# Patient Record
Sex: FEMALE | Race: WHITE | Employment: OTHER | URBAN - METROPOLITAN AREA
[De-identification: names, ages, dates, MRNs, and addresses within clinical notes are randomized per-mention and may not be internally consistent; named-entity substitution may affect disease eponyms.]

---

## 2020-08-11 ENCOUNTER — OFFICE VISIT (OUTPATIENT)
Dept: NEUROLOGY | Age: 50
End: 2020-08-11
Payer: COMMERCIAL

## 2020-08-11 VITALS
OXYGEN SATURATION: 95 % | DIASTOLIC BLOOD PRESSURE: 77 MMHG | HEART RATE: 71 BPM | TEMPERATURE: 97 F | SYSTOLIC BLOOD PRESSURE: 103 MMHG | BODY MASS INDEX: 37.65 KG/M2 | HEIGHT: 65 IN | WEIGHT: 226 LBS

## 2020-08-11 PROCEDURE — 4004F PT TOBACCO SCREEN RCVD TLK: CPT | Performed by: PSYCHIATRY & NEUROLOGY

## 2020-08-11 PROCEDURE — 99205 OFFICE O/P NEW HI 60 MIN: CPT | Performed by: PSYCHIATRY & NEUROLOGY

## 2020-08-11 PROCEDURE — G8419 CALC BMI OUT NRM PARAM NOF/U: HCPCS | Performed by: PSYCHIATRY & NEUROLOGY

## 2020-08-11 PROCEDURE — G8427 DOCREV CUR MEDS BY ELIG CLIN: HCPCS | Performed by: PSYCHIATRY & NEUROLOGY

## 2020-08-11 RX ORDER — HYDROCHLOROTHIAZIDE 25 MG/1
25 TABLET ORAL DAILY
COMMUNITY
Start: 2020-08-05

## 2020-08-11 RX ORDER — CLOPIDOGREL BISULFATE 75 MG/1
75 TABLET ORAL DAILY
Qty: 30 TABLET | Refills: 3 | Status: ON HOLD | OUTPATIENT
Start: 2020-08-11 | End: 2020-08-27 | Stop reason: SDUPTHER

## 2020-08-11 RX ORDER — VENLAFAXINE HYDROCHLORIDE 75 MG/1
150 CAPSULE, EXTENDED RELEASE ORAL DAILY
COMMUNITY
Start: 2020-06-19

## 2020-08-11 RX ORDER — LISINOPRIL 40 MG/1
40 TABLET ORAL DAILY
Status: ON HOLD | COMMUNITY
Start: 2020-08-05 | End: 2021-02-05 | Stop reason: HOSPADM

## 2020-08-11 RX ORDER — CARVEDILOL 12.5 MG/1
12.5 TABLET ORAL 2 TIMES DAILY WITH MEALS
COMMUNITY

## 2020-08-11 RX ORDER — CLONAZEPAM 0.5 MG/1
0.5 TABLET ORAL 2 TIMES DAILY PRN
COMMUNITY
Start: 2020-06-19

## 2020-08-11 RX ORDER — OMEPRAZOLE 40 MG/1
40 CAPSULE, DELAYED RELEASE ORAL
COMMUNITY
Start: 2020-08-05 | End: 2020-08-25

## 2020-08-11 RX ORDER — ATORVASTATIN CALCIUM 10 MG/1
10 TABLET, FILM COATED ORAL NIGHTLY
Status: ON HOLD | COMMUNITY
End: 2021-02-05 | Stop reason: HOSPADM

## 2020-08-11 NOTE — PROGRESS NOTES
Neurocritical Care, Stroke & Neurointerventional Note    [address]   [address] [place] [address],  [address] , [address] [place] [address]   P: [phone]  Endovascular Neurosurgery In Person Consult    Pt Name: Roopa Mcfarland  MRN: [id number]  YOB: 1970  Date of evaluation: 8/11/2020  Primary Care Physician: Cielo Rodriguez MD    Reason for evaluation: Cerebral Aneurysm     SUBJECTIVE:   History of Chief Complaint:    Roopa Mcfarland is a 53 y.o. female who presents with hx of hypertension, hypercholesterolemia, breast cancer in 2018 and cancer free since then, c-spine surgery with fusion surgery, she has been having vague symptoms for the last year including dizziness, and during the evaluation, she was found to have:    Bifurcation right MCA aneurysm: A 9 mm saccular aneurysm arising off of the distal M1 segment of the right MCA on MR on May 26, 2020. She is here for further management and evaluation    Allergies  is allergic to adhesive tape. Medications  Prior to Admission medications    Medication Sig Start Date End Date Taking? Authorizing Provider   lisinopril (PRINIVIL;ZESTRIL) 40 MG tablet Take 40 mg by mouth daily 8/5/20  Yes Historical Provider, MD   omeprazole (PRILOSEC) 40 MG delayed release capsule Take 40 mg by mouth every morning (before breakfast) 8/5/20  Yes Historical Provider, MD   clonazePAM (KLONOPIN) 0.5 MG tablet Take 0.5 mg by mouth 2 times daily as needed.  6/19/20  Yes Historical Provider, MD   venlafaxine (EFFEXOR XR) 75 MG extended release capsule Take 75 mg by mouth daily 6/19/20  Yes Historical Provider, MD   hydroCHLOROthiazide (HYDRODIURIL) 25 MG tablet Take 25 mg by mouth daily 8/5/20  Yes Historical Provider, MD   Cholecalciferol (VITAMIN D3) 25 MCG (1000 UT) CAPS Take 2,000 Units by mouth daily   Yes Historical Provider, MD   atorvastatin (LIPITOR) 10 MG tablet Take 10 mg by mouth daily   Yes Historical Provider, MD   carvedilol (COREG) 12.5 MG tablet Take 12.5 mg by mouth 2 times daily (with meals)   Yes Historical Provider, MD    Scheduled Meds:  Continuous Infusions:  PRN Meds:.  Past Medical History   has no past medical history on file. Past Surgical History   has no past surgical history on file. Social History   has no history on file for tobacco.   has no history on file for alcohol.   has no history on file for drug. Family History  family history is not on file. Review of Systems:  CONSTITUTIONAL:  negative for fevers, chills, fatigue and malaise    EYES:  negative for double vision, blurred vision and photophobia     HEENT:  negative for tinnitus, epistaxis and sore throat    RESPIRATORY:  negative for cough, shortness of breath, wheezing    CARDIOVASCULAR:  negative for chest pain, palpitations, syncope, edema    GASTROINTESTINAL:  negative for nausea, vomiting    GENITOURINARY:  negative for incontinence    MUSCULOSKELETAL:  negative for neck or back pain    NEUROLOGICAL:  Negative for weakness and tingling  negative for headaches and dizziness    PSYCHIATRIC:  negative for anxiety      Review of systems otherwise negative. OBJECTIVE:   Vitals: /77 (Site: Right Upper Arm, Position: Sitting, Cuff Size: Large Adult)   Pulse 71   Temp 97 °F (36.1 °C) (Temporal)   Ht 5' 5\" (1.651 m)   Wt 226 lb (102.5 kg)   SpO2 95%   BMI 37.61 kg/m²   General appearance: Lying in bed, NAD  HEENT: Head: Normocephalic, no lesions, without obvious abnormality.   Neck: no adenopathy, no carotid bruit, no JVD, supple, symmetrical, trachea midline and thyroid not enlarged, symmetric, no tenderness/mass/nodules  Lungs: clear to auscultation bilaterally  Heart: regular rate and rhythm, S1, S2 normal, no murmur, click, rub or gallop  Abdomen: soft, non-tender; nondistended, bowel sounds normal  Extremities: extremities normal, atraumatic, no cyanosis or edema    Neurologic: Mental status: Alert, oriented, thought content appropriate, Alert and oriented x 3, Language/speech: intact language, attention, knowledge  CN: II-XII intact  MOTOR: 5/5 throughout with normal tone and bulk for age  SENSORY: LT and PP symmetrical and intact  COORDINATION: F to N and Gait intact for age      LABS:   No results for input(s): WBC, HGB, HCT, PLT, NA, K, CL, CO2, BUN, CREATININE, MG, PHOS, CALCIUM, PTT, INR, AST, ALT, BILITOT, BILIDIR, NITRU, COLORU, BACTERIA in the last 72 hours. Invalid input(s): PT, WBCU, RBCU, LEUKOCYTESUA  No results for input(s): ALKPHOS, ALT, AST, BILITOT, BILIDIR, LABALBU, AMYLASE, LIPASE in the last 72 hours. RADIOLOGY:   Images were personally reviewed including:    MRI head 5.26.2020  There is a 9 mm saccular aneurysm arising off of the distal M1 segment of the right MCA. IMPRESSIONS:   Gurmeet Cooper is a 53 y.o. female who presents with hx of hypertension, hypercholesterolemia, breast cancer in 2018 and cancer free since then, c-spine surgery with fusion surgery, she has been having vague symptoms for the last year including dizziness, and during the evaluation, she was found to have:    Bifurcation right MCA aneurysm: A 9 mm saccular aneurysm arising off of the distal M1 segment of the right MCA on MR on May 26, 2020. She is here for further management and evaluation    The natural history of cerebral aneurysm rupture is discussed based on UCAS and ISUA data at length with the patient. Options, alternatives including open surgical clipping and coiling as well as observation were discussed with the patient. The risk and benefit of the transarterial aneurysm coiling with or without stenting or WEB device placement was explained at length to the patient, including but not limited to vessel injury, X-ray dye allergic reaction, kidney dysfunction, stroke and groin hematoma. 1. does not have a problem list on file. PLANS:   1. CTA head   2.  Web device or coiling or stent assisted therapy  Consultation Visit Time:  60 minutes  Patient given educational materials - see patient instructions. Discussed use, benefit, and side effects of prescribed medications. Personally reviewed imaging with patients and all questions answered. Pt voiced understanding. Patient agreed with treatment plan. Follow up as directed below. Greater than 50% of the time was for counseling and providing answer to the patient question. The findings and the plan discussed with the patient and all her questions were answered. Thank you very much for your referral, please do not hesitate to contact me with any questions.     Charley Zhu MD Pager: [phone]  Stroke, [facility] Stroke [facility], [address] [place] [address]  Pager [phone]  Electronically signed 8/11/2020 at 2:54 PM

## 2020-08-19 ENCOUNTER — HOSPITAL ENCOUNTER (OUTPATIENT)
Dept: CT IMAGING | Age: 50
Discharge: HOME | End: 2020-08-21
Payer: COMMERCIAL

## 2020-08-19 PROCEDURE — 6360000004 HC RX CONTRAST MEDICATION: Performed by: PSYCHIATRY & NEUROLOGY

## 2020-08-19 PROCEDURE — 70496 CT ANGIOGRAPHY HEAD: CPT

## 2020-08-19 RX ADMIN — IOHEXOL 90 ML: 350 INJECTION, SOLUTION INTRAVENOUS at 09:33

## 2020-08-22 ENCOUNTER — HOSPITAL ENCOUNTER (OUTPATIENT)
Dept: PREADMISSION TESTING | Age: 50
Setting detail: SPECIMEN
Discharge: HOME | End: 2020-08-26
Payer: COMMERCIAL

## 2020-08-22 PROCEDURE — U0003 INFECTIOUS AGENT DETECTION BY NUCLEIC ACID (DNA OR RNA); SEVERE ACUTE RESPIRATORY SYNDROME CORONAVIRUS 2 (SARS-COV-2) (CORONAVIRUS DISEASE [COVID-19]), AMPLIFIED PROBE TECHNIQUE, MAKING USE OF HIGH THROUGHPUT TECHNOLOGIES AS DESCRIBED BY CMS-2020-01-R: HCPCS

## 2020-08-23 LAB — SARS-COV-2, NAA: NOT DETECTED

## 2020-08-24 ENCOUNTER — TELEPHONE (OUTPATIENT)
Dept: PRIMARY CARE CLINIC | Age: 50
End: 2020-08-24

## 2020-08-25 RX ORDER — ASPIRIN 325 MG
325 TABLET ORAL DAILY
Status: ON HOLD | COMMUNITY
End: 2020-08-27 | Stop reason: SDUPTHER

## 2020-08-25 NOTE — PROGRESS NOTES
Instructed on NPO. No mint, gum or cigarettes. Leave all personal belongs and jewelry at home. Wear comfortable clothing the day of surgery. May shower before surgery with antibacterial soap. Patient to take medications the morning of surgery with a sip of water as per office instructions. Patient to bring C-pap with her. Verbalized understanding.

## 2020-08-26 ENCOUNTER — ANESTHESIA (OUTPATIENT)
Dept: INTERVENTIONAL RADIOLOGY/VASCULAR | Age: 50
End: 2020-08-26

## 2020-08-26 ENCOUNTER — ANESTHESIA EVENT (OUTPATIENT)
Dept: INTERVENTIONAL RADIOLOGY/VASCULAR | Age: 50
End: 2020-08-26

## 2020-08-26 ENCOUNTER — HOSPITAL ENCOUNTER (INPATIENT)
Dept: INTERVENTIONAL RADIOLOGY/VASCULAR | Age: 50
LOS: 1 days | Discharge: HOME | DRG: 030 | End: 2020-08-27
Attending: PSYCHIATRY & NEUROLOGY | Admitting: PSYCHIATRY & NEUROLOGY
Payer: COMMERCIAL

## 2020-08-26 ENCOUNTER — HOSPITAL ENCOUNTER (OUTPATIENT)
Age: 50
Discharge: HOME | End: 2020-08-28
Payer: COMMERCIAL

## 2020-08-26 VITALS — SYSTOLIC BLOOD PRESSURE: 77 MMHG | DIASTOLIC BLOOD PRESSURE: 44 MMHG | OXYGEN SATURATION: 100 % | TEMPERATURE: 96.4 F

## 2020-08-26 PROBLEM — Z98.890 S/P COIL EMBOLIZATION OF CEREBRAL ANEURYSM: Status: ACTIVE | Noted: 2020-08-26

## 2020-08-26 LAB
ABO/RH: NORMAL
ACTIVATED CLOTTING TIME: 124 SEC (ref 79–149)
ACTIVATED CLOTTING TIME: 213 SEC (ref 79–149)
ACTIVATED CLOTTING TIME: 217 SEC (ref 79–149)
ACTIVATED CLOTTING TIME: 230 SEC (ref 79–149)
ACTIVATED CLOTTING TIME: 247 SEC (ref 79–149)
ACTIVATED CLOTTING TIME: 276 SEC (ref 79–149)
ANTIBODY SCREEN: NEGATIVE
ARM BAND NUMBER: NORMAL
COLLAGEN ADENOSINE-5'-DIPHOSPHATE (ADP) TIME: 76 SEC (ref 67–112)
COLLAGEN EPINEPHRINE TIME: 255 SEC (ref 85–172)
EXPIRATION DATE: NORMAL
GFR NON-AFRICAN AMERICAN: >60 ML/MIN
GFR SERPL CREATININE-BSD FRML MDRD: >60 ML/MIN
GFR SERPL CREATININE-BSD FRML MDRD: NORMAL ML/MIN/{1.73_M2}
GLUCOSE BLD-MCNC: 110 MG/DL (ref 74–100)
PLATELET FUNCTION INTERP: ABNORMAL
POC CHLORIDE: 106 MMOL/L (ref 98–107)
POC CREATININE: 0.6 MG/DL (ref 0.51–1.19)
POC HEMATOCRIT: 43 % (ref 36–46)
POC HEMOGLOBIN: 14.6 G/DL (ref 12–16)
POC IONIZED CALCIUM: 1.25 MMOL/L (ref 1.15–1.33)
POC LACTIC ACID: 1.45 MMOL/L (ref 0.56–1.39)
POC POTASSIUM: 3.6 MMOL/L (ref 3.5–4.5)
POC SODIUM: 144 MMOL/L (ref 138–146)

## 2020-08-26 PROCEDURE — C1769 GUIDE WIRE: HCPCS

## 2020-08-26 PROCEDURE — 85014 HEMATOCRIT: CPT

## 2020-08-26 PROCEDURE — 86850 RBC ANTIBODY SCREEN: CPT

## 2020-08-26 PROCEDURE — 75898 FOLLOW-UP ANGIOGRAPHY: CPT | Performed by: PSYCHIATRY & NEUROLOGY

## 2020-08-26 PROCEDURE — C1887 CATHETER, GUIDING: HCPCS

## 2020-08-26 PROCEDURE — 7100000000 HC PACU RECOVERY - FIRST 15 MIN

## 2020-08-26 PROCEDURE — 2709999900 HC NON-CHARGEABLE SUPPLY

## 2020-08-26 PROCEDURE — 86901 BLOOD TYPING SEROLOGIC RH(D): CPT

## 2020-08-26 PROCEDURE — 6360000002 HC RX W HCPCS: Performed by: NURSE PRACTITIONER

## 2020-08-26 PROCEDURE — C1725 CATH, TRANSLUMIN NON-LASER: HCPCS

## 2020-08-26 PROCEDURE — 2500000003 HC RX 250 WO HCPCS: Performed by: NURSE ANESTHETIST, CERTIFIED REGISTERED

## 2020-08-26 PROCEDURE — C1760 CLOSURE DEV, VASC: HCPCS

## 2020-08-26 PROCEDURE — 75894 X-RAYS TRANSCATH THERAPY: CPT | Performed by: PSYCHIATRY & NEUROLOGY

## 2020-08-26 PROCEDURE — 82947 ASSAY GLUCOSE BLOOD QUANT: CPT

## 2020-08-26 PROCEDURE — 36224 PLACE CATH CAROTD ART: CPT | Performed by: PSYCHIATRY & NEUROLOGY

## 2020-08-26 PROCEDURE — 61624 TCAT PERM OCCLS/EMBOLJ CNS: CPT | Performed by: PSYCHIATRY & NEUROLOGY

## 2020-08-26 PROCEDURE — 6360000004 HC RX CONTRAST MEDICATION: Performed by: NURSE ANESTHETIST, CERTIFIED REGISTERED

## 2020-08-26 PROCEDURE — C9248 INJ, CLEVIDIPINE BUTYRATE: HCPCS | Performed by: NURSE PRACTITIONER

## 2020-08-26 PROCEDURE — 86900 BLOOD TYPING SEROLOGIC ABO: CPT

## 2020-08-26 PROCEDURE — 82330 ASSAY OF CALCIUM: CPT

## 2020-08-26 PROCEDURE — 85347 COAGULATION TIME ACTIVATED: CPT

## 2020-08-26 PROCEDURE — 7100000001 HC PACU RECOVERY - ADDTL 15 MIN

## 2020-08-26 PROCEDURE — 6370000000 HC RX 637 (ALT 250 FOR IP): Performed by: STUDENT IN AN ORGANIZED HEALTH CARE EDUCATION/TRAINING PROGRAM

## 2020-08-26 PROCEDURE — 2580000003 HC RX 258: Performed by: STUDENT IN AN ORGANIZED HEALTH CARE EDUCATION/TRAINING PROGRAM

## 2020-08-26 PROCEDURE — B31R1ZZ FLUOROSCOPY OF INTRACRANIAL ARTERIES USING LOW OSMOLAR CONTRAST: ICD-10-PCS | Performed by: PSYCHIATRY & NEUROLOGY

## 2020-08-26 PROCEDURE — 82565 ASSAY OF CREATININE: CPT

## 2020-08-26 PROCEDURE — 99223 1ST HOSP IP/OBS HIGH 75: CPT | Performed by: PSYCHIATRY & NEUROLOGY

## 2020-08-26 PROCEDURE — 76377 3D RENDER W/INTRP POSTPROCES: CPT | Performed by: PSYCHIATRY & NEUROLOGY

## 2020-08-26 PROCEDURE — 6370000000 HC RX 637 (ALT 250 FOR IP): Performed by: NURSE PRACTITIONER

## 2020-08-26 PROCEDURE — B3131ZZ FLUOROSCOPY OF RIGHT COMMON CAROTID ARTERY USING LOW OSMOLAR CONTRAST: ICD-10-PCS | Performed by: PSYCHIATRY & NEUROLOGY

## 2020-08-26 PROCEDURE — 2780000010 IR ANGIOGRAM CAROTID CEREBRAL BILATERAL

## 2020-08-26 PROCEDURE — 6360000002 HC RX W HCPCS: Performed by: NURSE ANESTHETIST, CERTIFIED REGISTERED

## 2020-08-26 PROCEDURE — 84132 ASSAY OF SERUM POTASSIUM: CPT

## 2020-08-26 PROCEDURE — 3700000001 HC ADD 15 MINUTES (ANESTHESIA)

## 2020-08-26 PROCEDURE — 84295 ASSAY OF SERUM SODIUM: CPT

## 2020-08-26 PROCEDURE — APPNB45 APP NON BILLABLE 31-45 MINUTES: Performed by: NURSE PRACTITIONER

## 2020-08-26 PROCEDURE — 83605 ASSAY OF LACTIC ACID: CPT

## 2020-08-26 PROCEDURE — 3700000000 HC ANESTHESIA ATTENDED CARE

## 2020-08-26 PROCEDURE — C1874 STENT, COATED/COV W/DEL SYS: HCPCS

## 2020-08-26 PROCEDURE — 2580000003 HC RX 258: Performed by: NURSE ANESTHETIST, CERTIFIED REGISTERED

## 2020-08-26 PROCEDURE — 82435 ASSAY OF BLOOD CHLORIDE: CPT

## 2020-08-26 PROCEDURE — 36228 PLACE CATH INTRACRANIAL ART: CPT | Performed by: PSYCHIATRY & NEUROLOGY

## 2020-08-26 PROCEDURE — 2000000003 HC NEURO ICU R&B

## 2020-08-26 PROCEDURE — C1894 INTRO/SHEATH, NON-LASER: HCPCS

## 2020-08-26 PROCEDURE — B3161ZZ FLUOROSCOPY OF RIGHT INTERNAL CAROTID ARTERY USING LOW OSMOLAR CONTRAST: ICD-10-PCS | Performed by: PSYCHIATRY & NEUROLOGY

## 2020-08-26 PROCEDURE — 03VG3HZ RESTRICTION OF INTRACRANIAL ARTERY WITH INTRALUMINAL DEVICE, FLOW DIVERTER, PERCUTANEOUS APPROACH: ICD-10-PCS | Performed by: PSYCHIATRY & NEUROLOGY

## 2020-08-26 PROCEDURE — 85576 BLOOD PLATELET AGGREGATION: CPT

## 2020-08-26 RX ORDER — CLOPIDOGREL BISULFATE 75 MG/1
75 TABLET ORAL DAILY
Status: DISCONTINUED | OUTPATIENT
Start: 2020-08-26 | End: 2020-08-27 | Stop reason: HOSPADM

## 2020-08-26 RX ORDER — LISINOPRIL 20 MG/1
40 TABLET ORAL DAILY
Status: DISCONTINUED | OUTPATIENT
Start: 2020-08-26 | End: 2020-08-27 | Stop reason: HOSPADM

## 2020-08-26 RX ORDER — CARVEDILOL 12.5 MG/1
12.5 TABLET ORAL 2 TIMES DAILY WITH MEALS
Status: DISCONTINUED | OUTPATIENT
Start: 2020-08-26 | End: 2020-08-27 | Stop reason: HOSPADM

## 2020-08-26 RX ORDER — SODIUM CHLORIDE 9 MG/ML
INJECTION, SOLUTION INTRAVENOUS CONTINUOUS
Status: DISCONTINUED | OUTPATIENT
Start: 2020-08-26 | End: 2020-08-27 | Stop reason: HOSPADM

## 2020-08-26 RX ORDER — FENTANYL CITRATE 50 UG/ML
25 INJECTION, SOLUTION INTRAMUSCULAR; INTRAVENOUS EVERY 5 MIN PRN
Status: DISCONTINUED | OUTPATIENT
Start: 2020-08-26 | End: 2020-08-26

## 2020-08-26 RX ORDER — GLYCOPYRROLATE 1 MG/5 ML
SYRINGE (ML) INTRAVENOUS PRN
Status: DISCONTINUED | OUTPATIENT
Start: 2020-08-26 | End: 2020-08-26 | Stop reason: SDUPTHER

## 2020-08-26 RX ORDER — SODIUM CHLORIDE, SODIUM LACTATE, POTASSIUM CHLORIDE, CALCIUM CHLORIDE 600; 310; 30; 20 MG/100ML; MG/100ML; MG/100ML; MG/100ML
INJECTION, SOLUTION INTRAVENOUS CONTINUOUS PRN
Status: DISCONTINUED | OUTPATIENT
Start: 2020-08-26 | End: 2020-08-26 | Stop reason: SDUPTHER

## 2020-08-26 RX ORDER — HYDROCHLOROTHIAZIDE 25 MG/1
25 TABLET ORAL DAILY
Status: DISCONTINUED | OUTPATIENT
Start: 2020-08-26 | End: 2020-08-27 | Stop reason: HOSPADM

## 2020-08-26 RX ORDER — FENTANYL CITRATE 50 UG/ML
50 INJECTION, SOLUTION INTRAMUSCULAR; INTRAVENOUS EVERY 5 MIN PRN
Status: DISCONTINUED | OUTPATIENT
Start: 2020-08-26 | End: 2020-08-26

## 2020-08-26 RX ORDER — IODIXANOL 270 MG/ML
100 INJECTION, SOLUTION INTRAVASCULAR
Status: COMPLETED | OUTPATIENT
Start: 2020-08-26 | End: 2020-08-26

## 2020-08-26 RX ORDER — ONDANSETRON 2 MG/ML
4 INJECTION INTRAMUSCULAR; INTRAVENOUS EVERY 6 HOURS PRN
Status: DISCONTINUED | OUTPATIENT
Start: 2020-08-26 | End: 2020-08-27 | Stop reason: HOSPADM

## 2020-08-26 RX ORDER — CLOPIDOGREL BISULFATE 75 MG/1
75 TABLET ORAL DAILY
Status: DISCONTINUED | OUTPATIENT
Start: 2020-08-27 | End: 2020-08-26 | Stop reason: SDUPTHER

## 2020-08-26 RX ORDER — LIDOCAINE HYDROCHLORIDE 10 MG/ML
INJECTION, SOLUTION EPIDURAL; INFILTRATION; INTRACAUDAL; PERINEURAL PRN
Status: DISCONTINUED | OUTPATIENT
Start: 2020-08-26 | End: 2020-08-26 | Stop reason: SDUPTHER

## 2020-08-26 RX ORDER — LABETALOL HYDROCHLORIDE 5 MG/ML
10 INJECTION, SOLUTION INTRAVENOUS ONCE
Status: DISCONTINUED | OUTPATIENT
Start: 2020-08-26 | End: 2020-08-27 | Stop reason: HOSPADM

## 2020-08-26 RX ORDER — ONDANSETRON 2 MG/ML
INJECTION INTRAMUSCULAR; INTRAVENOUS PRN
Status: DISCONTINUED | OUTPATIENT
Start: 2020-08-26 | End: 2020-08-26 | Stop reason: SDUPTHER

## 2020-08-26 RX ORDER — HEPARIN SODIUM 1000 [USP'U]/ML
INJECTION, SOLUTION INTRAVENOUS; SUBCUTANEOUS PRN
Status: DISCONTINUED | OUTPATIENT
Start: 2020-08-26 | End: 2020-08-26 | Stop reason: SDUPTHER

## 2020-08-26 RX ORDER — ATORVASTATIN CALCIUM 10 MG/1
10 TABLET, FILM COATED ORAL DAILY
Status: DISCONTINUED | OUTPATIENT
Start: 2020-08-26 | End: 2020-08-27 | Stop reason: HOSPADM

## 2020-08-26 RX ORDER — VENLAFAXINE HYDROCHLORIDE 75 MG/1
75 CAPSULE, EXTENDED RELEASE ORAL DAILY
Status: DISCONTINUED | OUTPATIENT
Start: 2020-08-27 | End: 2020-08-27 | Stop reason: HOSPADM

## 2020-08-26 RX ORDER — FENTANYL CITRATE 50 UG/ML
INJECTION, SOLUTION INTRAMUSCULAR; INTRAVENOUS PRN
Status: DISCONTINUED | OUTPATIENT
Start: 2020-08-26 | End: 2020-08-26 | Stop reason: SDUPTHER

## 2020-08-26 RX ORDER — ROCURONIUM BROMIDE 10 MG/ML
INJECTION, SOLUTION INTRAVENOUS PRN
Status: DISCONTINUED | OUTPATIENT
Start: 2020-08-26 | End: 2020-08-26 | Stop reason: SDUPTHER

## 2020-08-26 RX ORDER — ASPIRIN 325 MG
325 TABLET ORAL ONCE
Status: COMPLETED | OUTPATIENT
Start: 2020-08-26 | End: 2020-08-26

## 2020-08-26 RX ORDER — NEOSTIGMINE METHYLSULFATE 5 MG/5 ML
SYRINGE (ML) INTRAVENOUS PRN
Status: DISCONTINUED | OUTPATIENT
Start: 2020-08-26 | End: 2020-08-26 | Stop reason: SDUPTHER

## 2020-08-26 RX ORDER — CLONAZEPAM 1 MG/1
0.5 TABLET ORAL 2 TIMES DAILY PRN
Status: DISCONTINUED | OUTPATIENT
Start: 2020-08-26 | End: 2020-08-27 | Stop reason: HOSPADM

## 2020-08-26 RX ORDER — ACETAMINOPHEN 325 MG/1
650 TABLET ORAL EVERY 4 HOURS PRN
Status: DISCONTINUED | OUTPATIENT
Start: 2020-08-26 | End: 2020-08-27 | Stop reason: HOSPADM

## 2020-08-26 RX ORDER — DEXAMETHASONE SODIUM PHOSPHATE 4 MG/ML
INJECTION, SOLUTION INTRA-ARTICULAR; INTRALESIONAL; INTRAMUSCULAR; INTRAVENOUS; SOFT TISSUE PRN
Status: DISCONTINUED | OUTPATIENT
Start: 2020-08-26 | End: 2020-08-26 | Stop reason: SDUPTHER

## 2020-08-26 RX ORDER — PROPOFOL 10 MG/ML
INJECTION, EMULSION INTRAVENOUS PRN
Status: DISCONTINUED | OUTPATIENT
Start: 2020-08-26 | End: 2020-08-26 | Stop reason: SDUPTHER

## 2020-08-26 RX ORDER — PROTAMINE SULFATE 10 MG/ML
INJECTION, SOLUTION INTRAVENOUS PRN
Status: DISCONTINUED | OUTPATIENT
Start: 2020-08-26 | End: 2020-08-26 | Stop reason: SDUPTHER

## 2020-08-26 RX ADMIN — FENTANYL CITRATE 75 MCG: 50 INJECTION INTRAMUSCULAR; INTRAVENOUS at 08:33

## 2020-08-26 RX ADMIN — HEPARIN SODIUM 7000 UNITS: 1000 INJECTION INTRAVENOUS; SUBCUTANEOUS at 09:34

## 2020-08-26 RX ADMIN — ONDANSETRON 4 MG: 2 INJECTION, SOLUTION INTRAMUSCULAR; INTRAVENOUS at 13:13

## 2020-08-26 RX ADMIN — PHENYLEPHRINE HYDROCHLORIDE 100 MCG: 10 INJECTION INTRAVENOUS at 13:26

## 2020-08-26 RX ADMIN — PHENYLEPHRINE HYDROCHLORIDE 100 MCG: 10 INJECTION INTRAVENOUS at 09:09

## 2020-08-26 RX ADMIN — Medication 0.2 MG: at 09:15

## 2020-08-26 RX ADMIN — SODIUM CHLORIDE, POTASSIUM CHLORIDE, SODIUM LACTATE AND CALCIUM CHLORIDE: 600; 310; 30; 20 INJECTION, SOLUTION INTRAVENOUS at 08:27

## 2020-08-26 RX ADMIN — PHENYLEPHRINE HYDROCHLORIDE 100 MCG: 10 INJECTION INTRAVENOUS at 09:37

## 2020-08-26 RX ADMIN — CLEVIPIDINE 6 MG/HR: 0.5 EMULSION INTRAVENOUS at 22:21

## 2020-08-26 RX ADMIN — CLOPIDOGREL 75 MG: 75 TABLET, FILM COATED ORAL at 08:09

## 2020-08-26 RX ADMIN — SODIUM CHLORIDE: 9 INJECTION, SOLUTION INTRAVENOUS at 15:25

## 2020-08-26 RX ADMIN — PHENYLEPHRINE HYDROCHLORIDE 100 MCG: 10 INJECTION INTRAVENOUS at 09:15

## 2020-08-26 RX ADMIN — Medication 0.2 MG: at 10:29

## 2020-08-26 RX ADMIN — HEPARIN SODIUM 3000 UNITS: 1000 INJECTION INTRAVENOUS; SUBCUTANEOUS at 10:09

## 2020-08-26 RX ADMIN — PROPOFOL 30 MG: 10 INJECTION, EMULSION INTRAVENOUS at 08:36

## 2020-08-26 RX ADMIN — ACETAMINOPHEN 650 MG: 325 TABLET ORAL at 21:02

## 2020-08-26 RX ADMIN — PROTAMINE SULFATE 100 MG: 10 INJECTION, SOLUTION INTRAVENOUS at 13:23

## 2020-08-26 RX ADMIN — DEXAMETHASONE SODIUM PHOSPHATE 4 MG: 4 INJECTION, SOLUTION INTRAMUSCULAR; INTRAVENOUS at 09:24

## 2020-08-26 RX ADMIN — CLONAZEPAM 0.5 MG: 1 TABLET ORAL at 21:10

## 2020-08-26 RX ADMIN — Medication 3 MG: at 13:17

## 2020-08-26 RX ADMIN — ATORVASTATIN CALCIUM 10 MG: 10 TABLET, FILM COATED ORAL at 21:02

## 2020-08-26 RX ADMIN — CARVEDILOL 12.5 MG: 12.5 TABLET, FILM COATED ORAL at 21:02

## 2020-08-26 RX ADMIN — LIDOCAINE HYDROCHLORIDE 50 MG: 10 INJECTION, SOLUTION EPIDURAL; INFILTRATION; INTRACAUDAL; PERINEURAL at 08:33

## 2020-08-26 RX ADMIN — PHENYLEPHRINE HYDROCHLORIDE 100 MCG: 10 INJECTION INTRAVENOUS at 09:29

## 2020-08-26 RX ADMIN — FENTANYL CITRATE 25 MCG: 50 INJECTION INTRAMUSCULAR; INTRAVENOUS at 08:36

## 2020-08-26 RX ADMIN — ASPIRIN 325 MG: 325 TABLET ORAL at 08:09

## 2020-08-26 RX ADMIN — CLEVIPIDINE 2 MG/HR: 0.5 EMULSION INTRAVENOUS at 16:55

## 2020-08-26 RX ADMIN — Medication 0.6 MG: at 13:17

## 2020-08-26 RX ADMIN — IODIXANOL 148 ML: 270 INJECTION, SOLUTION INTRAVASCULAR at 13:21

## 2020-08-26 RX ADMIN — ROCURONIUM BROMIDE 50 MG: 10 INJECTION INTRAVENOUS at 08:33

## 2020-08-26 RX ADMIN — CEFAZOLIN 2 G: 10 INJECTION, POWDER, FOR SOLUTION INTRAVENOUS at 09:10

## 2020-08-26 RX ADMIN — HEPARIN SODIUM 3000 UNITS: 1000 INJECTION INTRAVENOUS; SUBCUTANEOUS at 10:35

## 2020-08-26 RX ADMIN — PHENYLEPHRINE HYDROCHLORIDE 25 MCG/MIN: 10 INJECTION INTRAVENOUS at 09:32

## 2020-08-26 RX ADMIN — PHENYLEPHRINE HYDROCHLORIDE 100 MCG: 10 INJECTION INTRAVENOUS at 09:22

## 2020-08-26 RX ADMIN — SODIUM CHLORIDE, POTASSIUM CHLORIDE, SODIUM LACTATE AND CALCIUM CHLORIDE: 600; 310; 30; 20 INJECTION, SOLUTION INTRAVENOUS at 12:12

## 2020-08-26 RX ADMIN — PHENYLEPHRINE HYDROCHLORIDE 100 MCG: 10 INJECTION INTRAVENOUS at 13:23

## 2020-08-26 RX ADMIN — SODIUM CHLORIDE, POTASSIUM CHLORIDE, SODIUM LACTATE AND CALCIUM CHLORIDE: 600; 310; 30; 20 INJECTION, SOLUTION INTRAVENOUS at 08:56

## 2020-08-26 RX ADMIN — HEPARIN SODIUM 2000 UNITS: 1000 INJECTION INTRAVENOUS; SUBCUTANEOUS at 11:42

## 2020-08-26 RX ADMIN — PROPOFOL 170 MG: 10 INJECTION, EMULSION INTRAVENOUS at 08:33

## 2020-08-26 ASSESSMENT — PULMONARY FUNCTION TESTS
PIF_VALUE: 21
PIF_VALUE: 0
PIF_VALUE: 21
PIF_VALUE: 1
PIF_VALUE: 20
PIF_VALUE: 22
PIF_VALUE: 21
PIF_VALUE: 28
PIF_VALUE: 20
PIF_VALUE: 21
PIF_VALUE: 20
PIF_VALUE: 1
PIF_VALUE: 21
PIF_VALUE: 21
PIF_VALUE: 20
PIF_VALUE: 21
PIF_VALUE: 22
PIF_VALUE: 21
PIF_VALUE: 21
PIF_VALUE: 0
PIF_VALUE: 21
PIF_VALUE: 20
PIF_VALUE: 20
PIF_VALUE: 21
PIF_VALUE: 20
PIF_VALUE: 21
PIF_VALUE: 20
PIF_VALUE: 22
PIF_VALUE: 21
PIF_VALUE: 22
PIF_VALUE: 20
PIF_VALUE: 21
PIF_VALUE: 20
PIF_VALUE: 21
PIF_VALUE: 1
PIF_VALUE: 20
PIF_VALUE: 20
PIF_VALUE: 21
PIF_VALUE: 20
PIF_VALUE: 21
PIF_VALUE: 2
PIF_VALUE: 21
PIF_VALUE: 22
PIF_VALUE: 21
PIF_VALUE: 20
PIF_VALUE: 21
PIF_VALUE: 25
PIF_VALUE: 21
PIF_VALUE: 21
PIF_VALUE: 22
PIF_VALUE: 21
PIF_VALUE: 22
PIF_VALUE: 21
PIF_VALUE: 25
PIF_VALUE: 21
PIF_VALUE: 20
PIF_VALUE: 21
PIF_VALUE: 21
PIF_VALUE: 20
PIF_VALUE: 21
PIF_VALUE: 20
PIF_VALUE: 21
PIF_VALUE: 20
PIF_VALUE: 21
PIF_VALUE: 20
PIF_VALUE: 21
PIF_VALUE: 21
PIF_VALUE: 20
PIF_VALUE: 21
PIF_VALUE: 21
PIF_VALUE: 3
PIF_VALUE: 21
PIF_VALUE: 21
PIF_VALUE: 2
PIF_VALUE: 21
PIF_VALUE: 0
PIF_VALUE: 21
PIF_VALUE: 21
PIF_VALUE: 25
PIF_VALUE: 21
PIF_VALUE: 20
PIF_VALUE: 20
PIF_VALUE: 22
PIF_VALUE: 21
PIF_VALUE: 20
PIF_VALUE: 20
PIF_VALUE: 21
PIF_VALUE: 20
PIF_VALUE: 2
PIF_VALUE: 20
PIF_VALUE: 21
PIF_VALUE: 4
PIF_VALUE: 21
PIF_VALUE: 21
PIF_VALUE: 0
PIF_VALUE: 21
PIF_VALUE: 3
PIF_VALUE: 21
PIF_VALUE: 22
PIF_VALUE: 21
PIF_VALUE: 20
PIF_VALUE: 21
PIF_VALUE: 20
PIF_VALUE: 21
PIF_VALUE: 20
PIF_VALUE: 21
PIF_VALUE: 21
PIF_VALUE: 20
PIF_VALUE: 20
PIF_VALUE: 21
PIF_VALUE: 20
PIF_VALUE: 21
PIF_VALUE: 4
PIF_VALUE: 21
PIF_VALUE: 3
PIF_VALUE: 20
PIF_VALUE: 21
PIF_VALUE: 21
PIF_VALUE: 22
PIF_VALUE: 21
PIF_VALUE: 22
PIF_VALUE: 21
PIF_VALUE: 20
PIF_VALUE: 21
PIF_VALUE: 0
PIF_VALUE: 21
PIF_VALUE: 20
PIF_VALUE: 21
PIF_VALUE: 20
PIF_VALUE: 21
PIF_VALUE: 21
PIF_VALUE: 1
PIF_VALUE: 21
PIF_VALUE: 20
PIF_VALUE: 21
PIF_VALUE: 20
PIF_VALUE: 2
PIF_VALUE: 3
PIF_VALUE: 21
PIF_VALUE: 22
PIF_VALUE: 21

## 2020-08-26 ASSESSMENT — PAIN DESCRIPTION - PAIN TYPE
TYPE: ACUTE PAIN
TYPE: ACUTE PAIN

## 2020-08-26 ASSESSMENT — PAIN SCALES - GENERAL
PAINLEVEL_OUTOF10: 3
PAINLEVEL_OUTOF10: 0
PAINLEVEL_OUTOF10: 0
PAINLEVEL_OUTOF10: 8
PAINLEVEL_OUTOF10: 5
PAINLEVEL_OUTOF10: 0
PAINLEVEL_OUTOF10: 0

## 2020-08-26 ASSESSMENT — PAIN DESCRIPTION - ORIENTATION
ORIENTATION: LEFT
ORIENTATION: LEFT

## 2020-08-26 ASSESSMENT — PAIN DESCRIPTION - LOCATION
LOCATION: ANKLE
LOCATION: ANKLE

## 2020-08-26 ASSESSMENT — PAIN - FUNCTIONAL ASSESSMENT: PAIN_FUNCTIONAL_ASSESSMENT: 0-10

## 2020-08-26 ASSESSMENT — ENCOUNTER SYMPTOMS: SHORTNESS OF BREATH: 0

## 2020-08-26 ASSESSMENT — PAIN DESCRIPTION - DESCRIPTORS: DESCRIPTORS: ACHING

## 2020-08-26 NOTE — PLAN OF CARE
Problem: Pain:  Goal: Pain level will decrease  Description: Pain level will decrease  Outcome: Ongoing     Problem: Infection:  Goal: Will remain free from infection  Description: Will remain free from infection  Outcome: Ongoing     Problem: Safety:  Goal: Free from accidental physical injury  Description: Free from accidental physical injury  Outcome: Ongoing  Goal: Free from intentional harm  Description: Free from intentional harm  Outcome: Ongoing     Problem: Skin Integrity:  Goal: Skin integrity will stabilize  Description: Skin integrity will stabilize  Outcome: Ongoing

## 2020-08-26 NOTE — H&P
Neuro Critical Care  H&P    Patient Name: Dewey Wilhelm [facility]  Patient : 1970  Room/Bed: 0521/0521-01  Code Status: FULL  Allergies: Allergies   Allergen Reactions    Adhesive Tape Rash     redness       CHIEF COMPLAINT:      Aneurysm      HISTORY OF PRESENT ILLNESS:     The patient is a 54 yo female with a history of breast CA (s/p lumpectomy, chemo, radiation 2018), HTN, HLD, and ARVIND on CPAP who presented to [address] [place] [address] on 20 for elective treatment of a right MCA bifurcation aneurysm. Per patient, she has been struggling with nausea and vomiting for the last year. She states her Oncologist ordered a CT head to rule out any underlying mass that could be contributing to her symptoms. There was an incidental finding on an aneurysm. She was referred to Dr. Jim Kimball in the Neuro Endovascular Clinic. She had an outpatient CTA Head on 20 showed a right MCA trifurcation aneurysm measuring 1.0x0.8cm. She presents today for elective treatment. Underwent cerebral angio showing a right MCA bifurcation aneurysm successfully treated with WEB device and two stents. Admitted to the Neuro ICU post op. Right groin site intact. Goal -150. 's. Borderline bradycardic. Started on Clevidipine infusion. Home antihypertensive resumed, goal to titrate of Clevidipine infusion. Doing well post op, no focal neurological deficits.     CURRENT MEDICATIONS:  SCHEDULED MEDICATIONS:   clopidogrel  75 mg Oral Daily    [START ON 2020] aspirin  325 mg Oral Daily    labetalol  10 mg Intravenous Once     CONTINUOUS INFUSIONS:   sodium chloride 75 mL/hr at 20 1525    clevidipine       PRN MEDICATIONS:   fentanNYL, fentanNYL, acetaminophen    VITALS:  Temperature Range: Temp: 98 °F (36.7 °C) Temp  Av.6 °F (35.9 °C)  Min: 96.2 °F (35.7 °C)  Max: 98.6 °F (37 °C)  BP Range: Systolic (86WGX), TPK:651 , Min:77 , QTZ:713     Diastolic (30XJQ), XSC:16, Min:44, Max:171    Pulse Range: Pulse Av.7  Min: 49  Max: 75  Respiration Range: Resp  Av.3  Min: 0  Max: 43  Current Pulse Ox: SpO2: 96 %  24HR Pulse Ox Range: SpO2  Av.3 %  Min: 96 %  Max: 100 %  Patient Vitals for the past 12 hrs:   BP Temp Temp src Pulse Resp SpO2 Height Weight   20 1600 (!) 190/88 -- -- 75 18 96 % -- --   20 1545 -- -- -- 52 20 96 % -- --   20 1530 -- -- -- 64 17 98 % -- --   20 1515 -- -- -- -- -- 97 % -- --   20 1513 -- -- -- -- -- -- 5' 5\" (1.651 m) 240 lb 4.8 oz (109 kg)   20 1508 (!) 191/90 98 °F (36.7 °C) Oral 54 16 96 % -- --   20 1430 124/88 97.9 °F (36.6 °C) Temporal 54 13 99 % -- --   20 1415 128/77 -- -- (!) 49 12 100 % -- --   20 1400 131/70 -- -- 50 16 100 % -- --   20 1352 119/65 97.9 °F (36.6 °C) Temporal 54 15 100 % -- --   20 0706 128/88 98.6 °F (37 °C) Temporal 67 18 98 % -- --     Estimated body mass index is 39.99 kg/m² as calculated from the following:    Height as of this encounter: 5' 5\" (1.651 m). Weight as of this encounter: 240 lb 4.8 oz (109 kg).  []<16 Severe malnutrition  []16-16.99 Moderate malnutrition  []17-18.49 Mild malnutrition  []18.5-24.9 Normal  []25-29.9 Overweight (not obese)  []30-34.9 Obese class 1 (Low Risk)  [x]35-39.9 Obese class 2 (Moderate Risk)  []?40 Obese class 3 (High Risk)    RECENT LABS:   Lab Results   Component Value Date    CREATININE 0.60 2020     24 HOUR INTAKE/OUTPUT:    Intake/Output Summary (Last 24 hours) at 2020 1633  Last data filed at 2020 1347  Gross per 24 hour   Intake 1800 ml   Output 760 ml   Net 1040 ml       IMAGING:   Cta Head W Contrast  Result Date: 2020  Aneurysm at the right middle cerebral artery trifurcation measuring 1.0 x 0.8 cm. Labs and Images reviewed with:  [x] Dr. Shoaib Cameron    [] Dr. Audra Jackson  [] Dr. Kristine Neville  [] There are no new interval images to review.      REVIEW OF SYSTEMS     CONSTITUTIONAL: negative for fatigue and malaise   EYES: negative for double vision and photophobia    HEENT: negative for tinnitus and sore throat   RESPIRATORY: negative for cough, shortness of breath   CARDIOVASCULAR: negative for chest pain, palpitations, or syncope   GASTROINTESTINAL: negative for abdominal pain, nausea, vomiting, diarrhea, or constipation    GENITOURINARY: negative for incontinence or retention    MUSCULOSKELETAL: negative for neck or back pain, negative for extremity pain   NEUROLOGICAL: Negative for seizures, headaches, weakness, numbness, confusion, aphasia, dysarthria   PSYCHIATRIC: negative for agitation, hallucination, SI/HI   SKIN Negative for spontaneous contusions, rashes, or lesions        PHYSICAL EXAM       CONSTITUTIONAL:  Well developed, well nourished. Alert and oriented x 3, in no acute distress. GCS 15. Nontoxic. No dysarthria. No aphasia. HEAD:  normocephalic, atraumatic    EYES:  PERRL, EOMI   ENT:  moist mucous membranes   NECK:  supple, symmetric   LUNGS:  Equal air entry bilaterally, clear   CARDIOVASCULAR:  normal s1 / s2, RRR, distal pulses intact   ABDOMEN:  Soft, no rigidity, normal bowel sounds   NEUROLOGIC:  Mental Status:  A & O x3, Awake             Cranial Nerves:    cranial nerves II-XII are grossly intact    Motor Exam:    Drift:  absent  Tone:  normal    Motor exam is symmetrical 5 out of 5 all extremities bilaterally    Sensory:    Touch:    Right Upper Extremity:  normal  Left Upper Extremity:  normal  Right Lower Extremity:  normal  Left Lower Extremity:  normal    Coordination/Dysmetria:  Heel to Shin:  Right:  normal  Left:  normal  Finger to Nose:   Right:  normal  Left:  normal    DRAINS:  [x] There are no drains for Neuro Critical Care to monitor at this time.      ASSESSMENT AND PLAN:       The patient is a 52 yo female with a history of breast CA (s/p lumpectomy, chemo, radiation 2018), HTN, HLD, and ARVIND on CPAP who [facility] for elective treatment of a right MCA bifurcation aneurysm. NEUROLOGIC:  - Right MCA bifurcation aneurysm  - POD #0 s/p treatment of right MCA bifurcation aneurysm with web device and stent x2  - Resume Aspirin 325mg QD and Plavix 75mg QD tomorrow  - Goal -150  - Neuro checks per protocol    CARDIOVASCULAR:  - Goal -150  - Clevidipine infusion for hypertension  - History of HTN  - Resume home Lisinopril 40mg QD, HCTZ 25mg QD and Coreg 12.5mg BID (hold HR<60)  - Continue telemetry    PULMONARY:  - Maintaining O2 sats on room air    RENAL/FLUID/ELECTROLYTE:  - Normal renal functioning   - Creatinine 0.60  - Monitor urine output  - Remove milan cath  - IVF: [email or web address] post contrast  - Replace electrolytes PRN  - Daily BMP    GI/NUTRITION:  NUTRITION:  No diet orders on file  - Bowel regimen: Milk of Mag PRN  - GI prophylaxis: not indicated    ID/HEME:  - Afebrile  - No signs/symptoms of infection  - Continue to monitor for fevers  - No active bleeding, groin site intact  - Daily CBC    ENDOCRINE:  - Continue to monitor blood glucose, goal <180  - Blood glucose well controlled    OTHER:  - PT/OT/ST  - Code Status: FULL     PROPHYLAXIS:  Stress ulcer: N/A    DVT PROPHYLAXIS:  - SCD sleeves - Thigh High   - Consider starting Lovenox tomorrow AM    DISPOSITION:  [x] To remain ICU for close neurological monitoring post angio. We will continue to follow along. For any changes in exam or patient status please contact Neuro Critical Care.       POPEYE Cole - [facility]  Neuro Critical Care  Pager [phone]  8/26/2020     4:33 PM    .[facility]

## 2020-08-26 NOTE — OP NOTE
[facility] Stroke [facility]    NEUROENDOVASCULAR SERVICE: POST-OP NOTE: 8/26/2020    Pt Name: Sandi Jackson  MRN: [id number]  YOB: 1970  Date of Procedure: 8/26/2020  Primary Care Physician: POPEYE Betts - CNP    Pre-Procedural Diagnosis: Right MCA bifurcation aneurysm. Post-Procedural Diagnosis: Successfully treated with WEB device stent assisted coiling (two Neuroform [place] stents)      Procedure Performed:cerebral angiogram with stent assisted coil embolization with WEB device    Surgeon:   James Bruce MD    Fellow:  Kristi Menezes MD and Abdirizak Bernard MD, PhD     1st Assistant:  Walter Patel    PRE-PROCEDURAL EXAM:  Prestroke baseline mRS MODIFIED DELORES SCORE: 0 - No symptoms at all. Neurological exam performed and unchanged from initial H&P or consult    Anesthesia: General Anesthesia  Complications: none    Intra-Operative EXAM:  Neurological exam performed and unchanged from initial H&P or consult    EBL: < Minimal      Cc            Specimens: Were not Obtained  Contrast:     Visipaque 270 low osmolar 148 Cc             Fluoro: 55.8 min    Findings:  Please see dictated Radiology note for further details  1. Right MCA bifurcation aneurysm measuring approximately 5.33 x 7.4 with neck size 6.37 mm.  2. This was successfully treated using WEB device 8 x 4 mm stent assisted coiling (two Neuroform [place] stents) resulting in WEB occlusion scale at the D. Both Neuroform [place] stents appears wih good wall apposition with no IntraStent stenosis noted. WEB occlusion scale D. POST-PROCEDURAL EXAM :   Stable neurological Exam  Neurological exam performed and unchanged from initial H&P or consult    Closure:  right Angioseal 6   F    POST-PROCEDURAL MONITORING : see orders  Disposition: Neuro ICU    Recommendations:  Back to NSICU. Do not bend right leg for 3 hours. Groin checks per protocol. Neuro checks per icu protocol. Peripheral pulse checks per protocol.   ICU management per NSICU team.   SBP goal 120-150  Resume aspirin and plavix starting tomorrow.   Upon discharge follow up with Dr. Cecilia Pacheco in 2 weeks and Dr. Maggy Galvez in 3 months with MRA head with and without contrast.      MD Carol De Jesus MD   Pager: [phone]  Stroke, [facility]  [address]  Electronically signed 8/26/2020 at 1:34 PM

## 2020-08-26 NOTE — ANESTHESIA PROCEDURE NOTES
Arterial Line:    An arterial line was placed using ultrasound guidance, in the OR for the following indication(s): continuous blood pressure monitoring and blood sampling needed. A 20 gauge (size), 1 and 1/4 inch (length), Arrow (type) catheter was placed, Seldinger technique used, into the right radial artery, secured by Tegaderm and tape. Events:  patient tolerated procedure well with no complications.   8/26/2020 8:39 AM8/26/2020 8:42 AM  Anesthesiologist: Sobia Dawson MD  Performed: Anesthesiologist   Preanesthetic Checklist  Completed: patient identified, site marked, surgical consent, pre-op evaluation, timeout performed, IV checked, risks and benefits discussed, monitors and equipment checked, anesthesia consent given, oxygen available and patient being monitored

## 2020-08-26 NOTE — ANESTHESIA POSTPROCEDURE EVALUATION
Department of Anesthesiology  Postprocedure Note    Patient: Wojciech Lopez  MRN: [id number]  YOB: 1970  Date of evaluation: 8/26/2020  Time:  2:56 PM     Procedure Summary     Date:  08/26/20 Room / Location:  [facility] Special Procedures    Anesthesia Start:  0827 Anesthesia Stop:  7332    Procedure:  IR ANGIOGRAM CAROTID CEREBRAL BILATERAL Diagnosis:       Aneurysm ([address]      S/P coil embolization of cerebral aneurysm      (Embo/coiling)    Scheduled Providers:  POPEYE Carlin - CRNA Responsible Provider:  Silvia Patino MD    Anesthesia Type:  general, MAC ASA Status:  3          Anesthesia Type: general, MAC    Katie Phase I: Katie Score: 9    Katie Phase II:      Last vitals: Reviewed and per EMR flowsheets.        Anesthesia Post Evaluation    Patient location during evaluation: PACU  Patient participation: complete - patient participated  Level of consciousness: awake and alert  Pain score: 0  Airway patency: patent  Nausea & Vomiting: no nausea and no vomiting  Complications: no  Cardiovascular status: hemodynamically stable  Respiratory status: acceptable, spontaneous ventilation and room air  Hydration status: euvolemic

## 2020-08-26 NOTE — H&P
Endovascular Neurosurgery Consult    Pt Name: Dejuan Napoles  MRN: [id number]  YOB: 1970  Date of evaluation: 8/26/2020  Primary Care Physician: POPEYE Marquez CNP  Reason for evaluation: Right MCA aneurysm. SUBJECTIVE:   History of Chief Complaint:    Dejuan Napoles is a 53 y.o. female with past medical history including hypertension, hyperlipidemia, sleep apnea. She was evaluated in the neuro endovascular clinic after symptoms of dizziness. 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 She presented today for aneurysm treatment. She is on dual antiplatelet therapy with aspirin 325 and Plavix 75. She received aspirin and Plavix this morning. She is not aware of family history of aneurysms. Doing good. No new weakness. She is n.p.o. Allergies  is allergic to adhesive tape. Medications  Prior to Admission medications    Medication Sig Start Date End Date Taking? Authorizing Provider   Probiotic Product (PROBIOTIC DAILY PO) Take 1 tablet by mouth daily   Yes Historical Provider, MD   aspirin 325 MG tablet Take 325 mg by mouth daily   Yes Historical Provider, MD   lisinopril (PRINIVIL;ZESTRIL) 40 MG tablet Take 40 mg by mouth daily 8/5/20  Yes Historical Provider, MD   clonazePAM (KLONOPIN) 0.5 MG tablet Take 0.5 mg by mouth 2 times daily as needed.  6/19/20  Yes Historical Provider, MD   venlafaxine (EFFEXOR XR) 75 MG extended release capsule Take 75 mg by mouth daily 6/19/20  Yes Historical Provider, MD   hydroCHLOROthiazide (HYDRODIURIL) 25 MG tablet Take 25 mg by mouth daily 8/5/20  Yes Historical Provider, MD   Cholecalciferol (VITAMIN D3) 25 MCG (1000 UT) CAPS Take 2,000 Units by mouth daily   Yes Historical Provider, MD   atorvastatin (LIPITOR) 10 MG tablet Take 10 mg by mouth daily   Yes Historical Provider, MD   carvedilol (COREG) 12.5 MG tablet Take 12.5 mg by mouth 2 times daily (with meals)   Yes Historical Provider, MD   clopidogrel (PLAVIX) 75 MG tablet Take 1 tablet by mouth daily 20  Yes Vladimir Mujica MD    Scheduled Meds:   clopidogrel  75 mg Oral Daily     Continuous Infusions:  PRN Meds:.  Past Medical History   has a past medical history of Cancer ([address] Contact lens/glasses fitting, Hyperlipidemia, Hypertension, and Sleep apnea. Past Surgical History   has a past surgical history that includes Breast surgery (Left, 2018); Tunneled venous port placement; Hysterectomy (2018); Carpal tunnel release (Bilateral);  section; and back surgery. Social History   reports that she has quit smoking. She does not have any smokeless tobacco history on file. has no history on file for alcohol. reports no history of drug use. Family History  Family history is unknown by patient. Review of Systems:  CONSTITUTIONAL:  negative for fevers, chills, fatigue and malaise    EYES:  negative for double vision, blurred vision and photophobia     HEENT:  negative for tinnitus, epistaxis and sore throat    RESPIRATORY:  negative for cough, shortness of breath, wheezing    CARDIOVASCULAR:  negative for chest pain, palpitations, syncope, edema    GASTROINTESTINAL:  negative for nausea, vomiting    GENITOURINARY:  negative for incontinence    MUSCULOSKELETAL:  negative for neck or back pain    NEUROLOGICAL:  Negative for weakness and tingling  negative for headaches and dizziness    PSYCHIATRIC:  negative for anxiety      Review of systems otherwise negative. OBJECTIVE:   Vitals: /88   Pulse 67   Temp 98.6 °F (37 °C) (Temporal)   Resp 18   Ht 5' 5\" (1.651 m)   Wt 225 lb (102.1 kg)   SpO2 98%   BMI 37.44 kg/m²   General appearance: Lying in bed, NAD. HEENT: Atraumatic. Neck: Neck is supple. Lungs: No respiratory distress noted. Heart: normal sinus rhythm on tele. .   Abdomen: Soft nontender. Extremities: No lower limb edema noted.   Neurologic: He is awake, following simple commands appropriately, able to name simple objects, intact comprehension, no dysarthria noted. CN: Has intact extraocular muscles movements, no facial droop noted, intact sensation on the face on trigeminal distribution bilaterally. MOTOR: Has good strength in both upper and lower extremities, moving both upper and lower extremities against gravity with no drift. SENSORY: Intact sensation to simple touch bilaterally in both upper and lower extremities. COORDINATION: Intact finger-nose test bilaterally with no dysmetria noted. LABS:     Recent Labs     08/26/20  0720   CREATININE 0.60     No results for input(s): ALKPHOS, ALT, AST, BILITOT, BILIDIR, LABALBU, AMYLASE, LIPASE in the last 72 hours. RADIOLOGY:   Images were personally reviewed including:  CTA imaging: Vessel images reviewed with right MCA bifurcation versus anterior temporal artery origin aneurysm. IMPRESSIONS:     Right MCA bifurcation versus anterior temporal artery origin aneurysm. PLANS:   1. Keep patient n.p.o.  2. Continue dual antiplatelet therapy with aspirin and Plavix. 3. Proceed for angiogram.      Thank you for the interesting evaluation.     Sari Scott MD  Pager [phone]  Stroke, [facility]  [id number] [place]  Electronically signed 8/26/2020 at 8:44 AM

## 2020-08-26 NOTE — SEDATION DOCUMENTATION
Post Procedure Transfer from IR Table  [x] Tubes and Lines intact     Post Procedure Neuro-Checks/NIHSS/Vitals  [x] Completed post procedure  [x] Completed bedside handoff  [x] Frequency ordered  [x] Verbal communication of frequency      Post Procedure Puncture Site Checks  [x] Completed post procedure  [x] Completed bedside handoff  [x] Frequency ordered  [x] Verbal communication of frequency    Post Procedure Pulse  Checks  [x] Completed post procedure  [x] Completed bedside handoff  [x] Frequency ordered  [x] Verbal communication of frequency    Order Set  [x] Post Neuro-Endo Procedure  [] Stroke  [] t-PA     B/P control  [x] Verbal Communication  -150   [x] Order in Care Path    Medication Review  [x] Given during procedure   IV ancef 2gm, IV Heparin 65080 units, IV Protamine 100mg  [x] Current drips/meds/fluids    [x] Physician Notified of All changes in Assessment  1340   pt waking up with large pupils and very thick speech, unintellagable. Called fellows over, pupils reactive, pt did follow commands. Watch pt  [id number]   Pt more awake, oriented, speaking, able to understand, still thick tongued, c/o that feet hurt. Pulses palpable and warm to touch. Family  [x] Location Post Procedure  SaDONOVAN barrera fellow spoke with family in IR waiting room.

## 2020-08-26 NOTE — SEDATION DOCUMENTATION
2nd Neuroform Winter stent placed inside previous stent extents outward both sides since larger than 1st stent.

## 2020-08-26 NOTE — ANESTHESIA PRE PROCEDURE
Department of Anesthesiology  Preprocedure Note       Name:  Mack Hooker   Age:  53 y.o.  :  1970                                          MRN:  [id number]         Date:  2020      Surgeon: * No surgeons listed *    Procedure: * No procedures listed *    Medications prior to admission:   Prior to Admission medications    Medication Sig Start Date End Date Taking? Authorizing Provider   Probiotic Product (PROBIOTIC DAILY PO) Take 1 tablet by mouth daily   Yes Historical Provider, MD   aspirin 325 MG tablet Take 325 mg by mouth daily   Yes Historical Provider, MD   lisinopril (PRINIVIL;ZESTRIL) 40 MG tablet Take 40 mg by mouth daily 20  Yes Historical Provider, MD   clonazePAM (KLONOPIN) 0.5 MG tablet Take 0.5 mg by mouth 2 times daily as needed. 20  Yes Historical Provider, MD   venlafaxine (EFFEXOR XR) 75 MG extended release capsule Take 75 mg by mouth daily 20  Yes Historical Provider, MD   hydroCHLOROthiazide (HYDRODIURIL) 25 MG tablet Take 25 mg by mouth daily 20  Yes Historical Provider, MD   Cholecalciferol (VITAMIN D3) 25 MCG (1000 UT) CAPS Take 2,000 Units by mouth daily   Yes Historical Provider, MD   atorvastatin (LIPITOR) 10 MG tablet Take 10 mg by mouth daily   Yes Historical Provider, MD   carvedilol (COREG) 12.5 MG tablet Take 12.5 mg by mouth 2 times daily (with meals)   Yes Historical Provider, MD   clopidogrel (PLAVIX) 75 MG tablet Take 1 tablet by mouth daily 20  Yes Marry Felix MD       Current medications:    Current Outpatient Medications   Medication Sig Dispense Refill    Probiotic Product (PROBIOTIC DAILY PO) Take 1 tablet by mouth daily      aspirin 325 MG tablet Take 325 mg by mouth daily      lisinopril (PRINIVIL;ZESTRIL) 40 MG tablet Take 40 mg by mouth daily      clonazePAM (KLONOPIN) 0.5 MG tablet Take 0.5 mg by mouth 2 times daily as needed.       venlafaxine (EFFEXOR XR) 75 MG extended release capsule Take 75 mg by mouth daily      hydroCHLOROthiazide (HYDRODIURIL) 25 MG tablet Take 25 mg by mouth daily      Cholecalciferol (VITAMIN D3) 25 MCG (1000 UT) CAPS Take 2,000 Units by mouth daily      atorvastatin (LIPITOR) 10 MG tablet Take 10 mg by mouth daily      carvedilol (COREG) 12.5 MG tablet Take 12.5 mg by mouth 2 times daily (with meals)      clopidogrel (PLAVIX) 75 MG tablet Take 1 tablet by mouth daily 30 tablet 3     Current Facility-Administered Medications   Medication Dose Route Frequency Provider Last Rate Last Dose    aspirin tablet 325 mg  325 mg Oral Once Giselle Pink MD        clopidogrel (PLAVIX) tablet 75 mg  75 mg Oral Daily Giselle Pink MD           Allergies: Allergies   Allergen Reactions    Adhesive Tape Rash     redness       Problem List:  There is no problem list on file for this patient.       Past Medical History:        Diagnosis Date    Cancer [facility]) 2018    left breast    Contact lens/glasses fitting     Hyperlipidemia     Hypertension     Abrahan Fly, CNP    Sleep apnea     C-pap       Past Surgical History:        Procedure Laterality Date    BACK SURGERY      neck fusion & L4-L5 fusion    BREAST SURGERY Left 2018    lumpectomy    CARPAL TUNNEL RELEASE Bilateral      SECTION      x 2,  &     HYSTERECTOMY  2018    TUNNELED VENOUS PORT PLACEMENT      placed , removed 2019       Social History:    Social History     Tobacco Use    Smoking status: Former Smoker    Tobacco comment: quit 20 years ago   Substance Use Topics    Alcohol use: Not on file     Comment: drinks lwine                                 Counseling given: Not Answered  Comment: quit 20 years ago      Vital Signs (Current):   Vitals:    20 0836 20 0706   BP:  128/88   Pulse:  67   Resp:  18   Temp:  98.6 °F (37 °C)   TempSrc:  Temporal   SpO2:  98%   Weight: 225 lb (102.1 kg)    Height: 5' 5\" (1.651 m)                                               BP Readings from Last 3 Encounters: 08/26/20 128/88   08/11/20 103/77       NPO Status: Time of last liquid consumption: 0500(sip of water with meds this a.m.)                        Time of last solid consumption: 2100                        Date of last liquid consumption: 08/26/20                        Date of last solid food consumption: 08/25/20    BMI:   Wt Readings from Last 3 Encounters:   08/25/20 225 lb (102.1 kg)   08/11/20 226 lb (102.5 kg)     Body mass index is 37.44 kg/m².     CBC: No results found for: WBC, RBC, HGB, HCT, MCV, RDW, PLT    CMP:   Lab Results   Component Value Date    CREATININE 0.60 08/26/2020    LABGLOM >60 08/26/2020       POC Tests:   Recent Labs     08/26/20  0720   POCGLU 110*   POCNA 144   POCK 3.6   POCCL 106   POCHEMO 14.6   POCHCT 43       Coags: No results found for: PROTIME, INR, APTT    HCG (If Applicable): No results found for: PREGTESTUR, PREGSERUM, HCG, HCGQUANT     ABGs: No results found for: PHART, PO2ART, GCV0XXP, DXE2AHX, BEART, E7DQWNEL     Type & Screen (If Applicable):  No results found for: LABABO, LABRH    Drug/Infectious Status (If Applicable):  No results found for: HIV, HEPCAB    COVID-19 Screening (If Applicable):   Lab Results   Component Value Date    COVID19 Not Detected 08/22/2020         Anesthesia Evaluation  Patient summary reviewed and Nursing notes reviewed no history of anesthetic complications:   Airway: Mallampati: I  TM distance: >3 FB   Neck ROM: full  Comment: Limited neck flexion, extension is normal  Mouth opening: > = 3 FB Dental: normal exam         Pulmonary:normal exam  breath sounds clear to auscultation  (+) sleep apnea: on CPAP,      (-) COPD, asthma, shortness of breath and recent URI                           Cardiovascular:  Exercise tolerance: good (>4 METS),   (+) hypertension:, hyperlipidemia    (-) past MI, CAD, CABG/stent,  angina,  CHF, orthopnea and  RUVALCABA      Rhythm: regular  Rate: normal                    Neuro/Psych:   (+) psychiatric history:   (-) seizures, TIA and CVA            ROS comment: 9 mm saccular aneurysm arising off of the distal M1 segment of the right MCA  Hx cervical fusion GI/Hepatic/Renal:   (+) morbid obesity     (-) GERD       Endo/Other:    (+) malignancy/cancer (Hx L breast). (-) diabetes mellitus               Abdominal:           Vascular: negative vascular ROS. Anesthesia Plan      general and MAC     ASA 3       Induction: intravenous. arterial line  MIPS: Postoperative opioids intended and Prophylactic antiemetics administered. Anesthetic plan and risks discussed with patient.       Plan discussed with CRNA and surgical team.                Anna Crain MD   8/26/2020

## 2020-08-26 NOTE — PROGRESS NOTES
Patient ambulated from bed to bedside commode. Tolerated well. Pt did complain of being dizzy which improved after sitting at the side of the bed for a few minutes. Snow catheter removed since patient is able to sit up and ambulate post angio.

## 2020-08-27 VITALS
HEIGHT: 65 IN | RESPIRATION RATE: 19 BRPM | HEART RATE: 73 BPM | DIASTOLIC BLOOD PRESSURE: 82 MMHG | SYSTOLIC BLOOD PRESSURE: 148 MMHG | OXYGEN SATURATION: 96 % | BODY MASS INDEX: 39.56 KG/M2 | TEMPERATURE: 98.7 F | WEIGHT: 237.44 LBS

## 2020-08-27 LAB
ABSOLUTE EOS #: <0.03 K/UL (ref 0–0.44)
ABSOLUTE IMMATURE GRANULOCYTE: 0.05 K/UL (ref 0–0.3)
ABSOLUTE LYMPH #: 1.77 K/UL (ref 1.1–3.7)
ABSOLUTE MONO #: 0.77 K/UL (ref 0.1–1.2)
ANION GAP SERPL CALCULATED.3IONS-SCNC: 12 MMOL/L (ref 9–17)
BASOPHILS # BLD: 0 % (ref 0–2)
BASOPHILS ABSOLUTE: <0.03 K/UL (ref 0–0.2)
BUN BLDV-MCNC: 6 MG/DL (ref 6–20)
BUN/CREAT BLD: ABNORMAL (ref 9–20)
CALCIUM SERPL-MCNC: 8.7 MG/DL (ref 8.6–10.4)
CHLORIDE BLD-SCNC: 104 MMOL/L (ref 98–107)
CO2: 24 MMOL/L (ref 20–31)
CREAT SERPL-MCNC: 0.38 MG/DL (ref 0.5–0.9)
DIFFERENTIAL TYPE: ABNORMAL
EOSINOPHILS RELATIVE PERCENT: 0 % (ref 1–4)
GFR AFRICAN AMERICAN: >60 ML/MIN
GFR NON-AFRICAN AMERICAN: >60 ML/MIN
GFR SERPL CREATININE-BSD FRML MDRD: ABNORMAL ML/MIN/{1.73_M2}
GFR SERPL CREATININE-BSD FRML MDRD: ABNORMAL ML/MIN/{1.73_M2}
GLUCOSE BLD-MCNC: 122 MG/DL (ref 70–99)
HCT VFR BLD CALC: 39.1 % (ref 36.3–47.1)
HEMOGLOBIN: 13.2 G/DL (ref 11.9–15.1)
IMMATURE GRANULOCYTES: 0 %
LYMPHOCYTES # BLD: 15 % (ref 24–43)
MCH RBC QN AUTO: 32.1 PG (ref 25.2–33.5)
MCHC RBC AUTO-ENTMCNC: 33.8 G/DL (ref 28.4–34.8)
MCV RBC AUTO: 95.1 FL (ref 82.6–102.9)
MONOCYTES # BLD: 7 % (ref 3–12)
NRBC AUTOMATED: 0 PER 100 WBC
PDW BLD-RTO: 12.7 % (ref 11.8–14.4)
PLATELET # BLD: 350 K/UL (ref 138–453)
PLATELET ESTIMATE: ABNORMAL
PMV BLD AUTO: 9.4 FL (ref 8.1–13.5)
POTASSIUM SERPL-SCNC: 3.7 MMOL/L (ref 3.7–5.3)
RBC # BLD: 4.11 M/UL (ref 3.95–5.11)
RBC # BLD: ABNORMAL 10*6/UL
SEG NEUTROPHILS: 78 % (ref 36–65)
SEGMENTED NEUTROPHILS ABSOLUTE COUNT: 8.95 K/UL (ref 1.5–8.1)
SODIUM BLD-SCNC: 140 MMOL/L (ref 135–144)
WBC # BLD: 11.6 K/UL (ref 3.5–11.3)
WBC # BLD: ABNORMAL 10*3/UL

## 2020-08-27 PROCEDURE — C9248 INJ, CLEVIDIPINE BUTYRATE: HCPCS | Performed by: NURSE PRACTITIONER

## 2020-08-27 PROCEDURE — 80048 BASIC METABOLIC PNL TOTAL CA: CPT

## 2020-08-27 PROCEDURE — 97162 PT EVAL MOD COMPLEX 30 MIN: CPT

## 2020-08-27 PROCEDURE — 97535 SELF CARE MNGMENT TRAINING: CPT

## 2020-08-27 PROCEDURE — 6360000002 HC RX W HCPCS: Performed by: NURSE PRACTITIONER

## 2020-08-27 PROCEDURE — 6370000000 HC RX 637 (ALT 250 FOR IP): Performed by: STUDENT IN AN ORGANIZED HEALTH CARE EDUCATION/TRAINING PROGRAM

## 2020-08-27 PROCEDURE — 6370000000 HC RX 637 (ALT 250 FOR IP): Performed by: NURSE PRACTITIONER

## 2020-08-27 PROCEDURE — 99233 SBSQ HOSP IP/OBS HIGH 50: CPT | Performed by: PSYCHIATRY & NEUROLOGY

## 2020-08-27 PROCEDURE — APPNB45 APP NON BILLABLE 31-45 MINUTES: Performed by: NURSE PRACTITIONER

## 2020-08-27 PROCEDURE — 85025 COMPLETE CBC W/AUTO DIFF WBC: CPT

## 2020-08-27 PROCEDURE — 99238 HOSP IP/OBS DSCHRG MGMT 30/<: CPT | Performed by: PSYCHIATRY & NEUROLOGY

## 2020-08-27 PROCEDURE — 97165 OT EVAL LOW COMPLEX 30 MIN: CPT

## 2020-08-27 PROCEDURE — 2500000003 HC RX 250 WO HCPCS: Performed by: NURSE PRACTITIONER

## 2020-08-27 PROCEDURE — 97530 THERAPEUTIC ACTIVITIES: CPT

## 2020-08-27 RX ORDER — CLOPIDOGREL BISULFATE 75 MG/1
75 TABLET ORAL DAILY
Qty: 30 TABLET | Refills: 3 | Status: SHIPPED | OUTPATIENT
Start: 2020-08-27 | End: 2020-08-27 | Stop reason: SDUPTHER

## 2020-08-27 RX ORDER — ASPIRIN 325 MG
325 TABLET ORAL DAILY
Qty: 30 TABLET | Refills: 3 | Status: SHIPPED | OUTPATIENT
Start: 2020-08-27

## 2020-08-27 RX ORDER — LABETALOL HYDROCHLORIDE 5 MG/ML
10 INJECTION, SOLUTION INTRAVENOUS
Status: DISCONTINUED | OUTPATIENT
Start: 2020-08-27 | End: 2020-08-27 | Stop reason: HOSPADM

## 2020-08-27 RX ORDER — CLOPIDOGREL BISULFATE 75 MG/1
75 TABLET ORAL DAILY
Qty: 30 TABLET | Refills: 3 | Status: SHIPPED | OUTPATIENT
Start: 2020-08-27 | End: 2021-01-21

## 2020-08-27 RX ADMIN — VENLAFAXINE HYDROCHLORIDE 75 MG: 75 CAPSULE, EXTENDED RELEASE ORAL at 08:04

## 2020-08-27 RX ADMIN — CLOPIDOGREL 75 MG: 75 TABLET, FILM COATED ORAL at 08:04

## 2020-08-27 RX ADMIN — CLEVIPIDINE 10 MG/HR: 0.5 EMULSION INTRAVENOUS at 04:55

## 2020-08-27 RX ADMIN — HYDROCHLOROTHIAZIDE 25 MG: 25 TABLET ORAL at 08:04

## 2020-08-27 RX ADMIN — Medication 10 MG: at 11:40

## 2020-08-27 RX ADMIN — LISINOPRIL 40 MG: 20 TABLET ORAL at 08:04

## 2020-08-27 RX ADMIN — Medication 10 MG: at 08:59

## 2020-08-27 RX ADMIN — ASPIRIN 325 MG: 325 TABLET, COATED ORAL at 08:04

## 2020-08-27 RX ADMIN — CARVEDILOL 12.5 MG: 12.5 TABLET, FILM COATED ORAL at 08:04

## 2020-08-27 ASSESSMENT — PAIN SCALES - GENERAL
PAINLEVEL_OUTOF10: 0

## 2020-08-27 NOTE — PROGRESS NOTES
Occupational Therapy   Occupational Therapy Initial Assessment  Date: 2020   Patient Name: Alma Jett  MRN: [id number]     : 1970    Date of Service: 2020    Discharge Recommendations:    No therapy recommended at discharge. Assessment   Treatment Diagnosis: aneurysm  Prognosis: Good  Decision Making: Low Complexity  Patient Education: pt ed on POC, purpose of eval, balancing rest with movement. good return  No Skilled OT: Independent with functional mobility; Independent with ADL's;No OT goals identified  REQUIRES OT FOLLOW UP: No  Activity Tolerance  Activity Tolerance: Patient Tolerated treatment well  Safety Devices  Safety Devices in place: Yes  Type of devices: Gait belt;Left in bed;Call light within reach  Restraints  Initially in place: No           Patient Diagnosis(es): Diagnoses of Aneurysm ([facility] [address] and S/P coil embolization of cerebral aneurysm were pertinent to this visit. has a past medical history of Cancer ([facility] [address] Contact lens/glasses fitting, Hyperlipidemia, Hypertension, and Sleep apnea. has a past surgical history that includes Breast surgery (Left, 2018); Tunneled venous port placement; Hysterectomy (2018); Carpal tunnel release (Bilateral);  section; back surgery; and Brain aneurysm surgery (2020). Treatment Diagnosis: aneurysm      Restrictions  Restrictions/Precautions  Required Braces or Orthoses?: No  Position Activity Restriction  Other position/activity restrictions: Up with assist    Subjective   General  Patient assessed for rehabilitation services?: Yes  Family / Caregiver Present: No  Diagnosis: aneurysm  General Comment  Comments: RN ok'd for therapy this morning.  Pt agreeable to participate in session and cooperative/pleasant throughout  Patient Currently in Pain: Denies    Social/Functional History  Social/Functional History  Lives With: Alone(Pt reports having one cat)  Type of Home: Apartment(Condo)  Home Layout: Multi-level(2nd floor condo/apartment)  Home Access: Stairs to enter with rails  Entrance Stairs - Number of Steps: 12 to get to second floor, 3 into building  Entrance Stairs - Rails: Both  Bathroom Shower/Tub: Tub/Shower unit  Bathroom Toilet: Standard  Home Equipment: (No DME)  Receives Help From: Friend(s)  ADL Assistance: Independent  Homemaking Assistance: Independent  Homemaking Responsibilities: Yes  Ambulation Assistance: Independent  Transfer Assistance: Independent  Active : Yes  Mode of Transportation: SUV  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        Objective   Vision: Impaired(or contacts)  Vision Exceptions: Wears glasses at all times  Hearing: Within functional limits    Orientation  Overall Orientation Status: Within Functional Limits  Observation/Palpation  Posture: Good  Balance  Sitting Balance: Independent(~10 minutes on EOB and on commode)  Standing Balance: Supervision  Standing Balance  Time: ~6 minutes  Activity: pt completed functional mobility around unit and to/from commode  Comment: pt with no LOB  Toilet Transfers  Toilet - Technique: Ambulating  Equipment Used: Standard bedside commode  Toilet Transfer: Supervision  ADL  Feeding: Independent  Grooming: Independent  UE Bathing: Independent  LE Bathing: Modified independent   UE Dressing: Independent(pt donned gown around back)  LE Dressing: Modified independent (pt donned B socks while sitting on EOB)  Toileting: Modified independent (pt used commode and completed kelly-care with no difficulties)  Tone RUE  RUE Tone: Normotonic  Tone LUE  LUE Tone: Normotonic  Coordination  Movements Are Fluid And Coordinated: Yes     Bed mobility  Supine to Sit: Independent  Sit to Supine: Independent  Scooting: Independent  Transfers  Sit to stand: Supervision  Stand to sit: Supervision     Cognition  Overall Cognitive Status: [facility]  Overall Sensation Status: WFL        LUE AROM (degrees)  LUE AROM : WFL  Left Hand AROM (degrees)  Left Hand AROM: WFL  RUE AROM (degrees)  RUE AROM : WFL  Right Hand AROM (degrees)  Right Hand AROM: WFL  LUE Strength  Gross LUE Strength: WFL  L Hand General: 4+/5  RUE Strength  Gross RUE Strength: WFL  R Hand General: 4+/5         Plan   Plan  Times per week: D/C OT    AM-PAC Score        AM-[facility] Inpatient Daily Activity Raw Score: 24 (08/27/20 1407)  AM-PAC Inpatient ADL T-Scale Score : 57.54 (08/27/20 1407)  ADL Inpatient CMS 0-100% Score: 0 (08/27/20 1407)  ADL Inpatient CMS G-Code Modifier : CH (08/27/20 1407)       Therapy Time   Individual Concurrent Group Co-treatment   Time In 0949         Time Out 1013         Minutes 24      co-eval with PT    Timed Code Treatment Minutes: Azam 1765, OTR/L

## 2020-08-27 NOTE — PROGRESS NOTES
Breast surgery (Left, 2018); Tunneled venous port placement; Hysterectomy (2018); Carpal tunnel release (Bilateral);  section; back surgery; and Brain aneurysm surgery (2020). Restrictions  Restrictions/Precautions  Required Braces or Orthoses?: No  Position Activity Restriction  Other position/activity restrictions: Up with assist  Vision/Hearing  Vision: Impaired  Vision Exceptions: Wears glasses for distance(typically wears contacts)  Hearing: Exceptions to [facility]  Hearing Exceptions: Hard of hearing/hearing concerns     Subjective  General  Patient assessed for rehabilitation services?: Yes  Response To Previous Treatment: Not applicable  Family / Caregiver Present: No  Follows Commands: Within Functional Limits  Subjective  Subjective: RN and pt in agreement for eval/treat. Pt supine in bed upon arrival. Pt very pleasant and cooperative throughout. Pain Screening  Patient Currently in Pain: No  Pain Assessment  Pain Assessment: 0-10  Pain Level: 0  Vital Signs  Patient Currently in Pain: No       Orientation  Orientation  Overall Orientation Status: Within Normal Limits  Social/Functional History  Social/Functional History  Lives With: Alone(Pt reports having one cat)  Type of Home: Apartment(Condo)  Home Layout: One level(2nd Level condo)  Home Access: Stairs to enter with rails  Entrance Stairs - Number of Steps: 12 + 3  Entrance Stairs - Rails: Both  Bathroom Shower/Tub: Tub/Shower unit  Bathroom Toilet: Standard  Home Equipment: (No DME)  Receives Help From: Friend(s)  ADL Assistance: Independent  Homemaking Assistance: Independent  Homemaking Responsibilities: Yes  Ambulation Assistance: Independent  Transfer Assistance: Independent  Active : Yes  Mode of Transportation: [facility]  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   Cognition   Cognition  Overall Cognitive Status: WNL    Objective     Observation/Palpation  Posture: Good    Joint Mobility  ROM RLE: WNL  ROM LLE: WNL  ROM RUE: WNL  ROM LUE: WNL  Strength RLE  Strength RLE: Exception  R Hip Flexion: 4+/5  R Knee Flexion: 4/5  R Knee Extension: 4+/5  R Ankle Dorsiflexion: 3+/5  R Ankle Plantar flexion: 3+/5  Strength LLE  Strength LLE: Exception  L Hip Flexion: 4+/5  L Knee Flexion: 4/5  L Knee Extension: 4+/5  L Ankle Dorsiflexion: 3+/5  L Ankle Plantar Flexion: 3+/5  Tone RLE  RLE Tone: Normotonic  Sensation  Overall Sensation Status: WNL  Bed mobility  Bridging: Independent  Rolling to Left: Independent  Rolling to Right: Independent  Supine to Sit: Independent  Sit to Supine: Independent  Scooting: Independent  Transfers  Sit to Stand: Independent  Stand to sit: Independent  Bed to Chair: Independent  Ambulation  Ambulation?: Yes  Ambulation 1  Surface: level tile  Device: No Device  Assistance: Contact guard assistance  Gait Deviations: Slow Neida;Decreased step length  Comments: Pt ambulates with toe out gait, no observed LOB. Stairs/Curb  Stairs?: Yes  Stairs  # Steps : 2  Stairs Height: 6\"  Rails: Left ascending  Device: No Device  Comment: Pt performed step-to pattern, unable to complete additional steps due to lines. Balance  Posture: Good  Sitting - Static: Good  Sitting - Dynamic: Good  Standing - Static: Good  Standing - Dynamic: Good        Plan   Plan  Times per week: DCPT  Safety Devices  Type of devices: Call light within reach, Gait belt, Left in bed, Nurse notified  Restraints  Initially in place: No      Therapy Time   Individual Concurrent Group Co-treatment   Time In 0949         Time Out 1012         Minutes 23         Timed Code Treatment Minutes: Yesenia Bradshaw 12 performed by Student PT under the supervision of co-signing PT who agrees with all evaluation/treatment and documentation.

## 2020-08-27 NOTE — PROGRESS NOTES
Endovascular Neurosurgery Progress Note    SUBJECTIVE:   No reported events overnight. Pt was on clevidipine gtt overnight for htn. This am pt has no acute complaints. Review of Systems:  CONSTITUTIONAL:  negative for fevers, chills, fatigue and malaise    EYES:  negative for double vision, blurred vision and photophobia     HEENT:  negative for tinnitus, epistaxis and sore throat    RESPIRATORY:  negative for cough, shortness of breath, wheezing    CARDIOVASCULAR:  negative for chest pain, palpitations, syncope, edema    GASTROINTESTINAL:  negative for nausea, vomiting    GENITOURINARY:  negative for incontinence    MUSCULOSKELETAL:  negative for neck or back pain    NEUROLOGICAL:  Negative for weakness and tingling  negative for headaches and dizziness    PSYCHIATRIC:  negative for anxiety      Review of systems otherwise negative. OBJECTIVE:     Vitals:    08/27/20 0745   BP:    Pulse: 93   Resp: 21   Temp:    SpO2: 98%        General:  Gen: normal habitus, NAD  HEENT: NCAT, mucosa moist  Cvs: RRR, S1 S2 normal  Resp: symmetric unlabored breathing  Abd: s/nd/nt  Ext: no edema  Skin: no lesions seen, warm and dry. Groin site no hematoma    Neuro:  Gen: awake and alert, oriented x3. Lang/speech: no aphasia or dysarthria. Follows commands. CN: PERRL, EOMI, VFF, V1-3 intact, face symmetric, hearing intact, shoulder shrug symmetric, tongue midline  Motor: grossly 5/5 UE and LE b/l  Sense: LT intact in all 4 ext. Coord: FTN and HTS intact b/l  DTR: deferred  Gait: deferred    LABS:   Reviewed. RADIOLOGY:   Images were personally reviewed including:   DSA 8/26/2020  1. Right MCA M1 bifurcation aneurysm projecting inferiorly and anteriorly measuring approximately 5.33 x 7.4 by X 6.12 mm and neck size 6.37 mm.    2. The above mentioned aneurysm is treated with Web device SL 8 x 4 mm that resulted in Web Occlusion Scale: D.  This was combined with a stent assisted coiling using Y stenting 4.5 x 21 mm and 4.5 x 30 mm Neuroform [place] stents. 3. No evidence of arterial vasospasm or occlusion. ASSESSMENT:   Abigail Mallory is a 53 y.o. female with past medical history including hypertension, hyperlipidemia, sleep apnea. 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 S/p atlas y stent x2 assisted web embolism 8/26/2020. There is slight occlusion of the parent M2 (25% occlusion)     Pt was on clevidipine gtt overnight for htn. PLAN:   --once bp is normalized stable for dc to home from endovascular perspective. --continue asa and plavix  --Upon discharge follow up with Dr. Jordyn Lawson in 2 weeks and Dr. Dk Payne in 3 months with MRA head with and without contrast.    Case discussed with Dr. Angel Zheng attending.     Wisam Marsh MD, PhD    Stroke, [facility]  [facility]  Electronically signed 8/27/2020 at 8:32 AM

## 2020-08-27 NOTE — DISCHARGE INSTR - COC
Continuity of Care Form    Patient Name: Mack Hooker   :  1970  MRN:  [id number]    [facility] date:  2020  Discharge date:  ***    Code Status Order: Full Code   Advance Directives:   Advance Care Flowsheet Documentation     Date/Time Healthcare Directive Type of Healthcare Directive Copy in [address] Agent's Name Healthcare Agent's Phone Number    20 1523  No, patient does not have an advance directive for healthcare treatment -- -- -- -- --    20 0833  Yes, patient has an advance directive for healthcare treatment -- -- -- -- --          Admitting Physician:  Susana Mackenzie MD  PCP: POPEYE Malloy CNP    Discharging Nurse: [facility] Unit/Room#: 0521/0521-01  Discharging Unit Phone Number: ***    Emergency Contact:   Extended Emergency Contact Information  Primary Emergency Contact: Monica Avendano Phone: [phone]  Relation: Child  Preferred language: English   needed? No    Past Surgical History:  Past Surgical History:   Procedure Laterality Date    BACK SURGERY      neck fusion & L4-L5 fusion    BRAIN ANEURYSM SURGERY  2020    IR ANGIOGRAM CAROTID CEREBRAL BILATERAL,,S/P coil embolization of cerebral aneurysm    BREAST SURGERY Left 2018    lumpectomy    CARPAL TUNNEL RELEASE Bilateral      SECTION      x 2,  &     HYSTERECTOMY  2018    TUNNELED VENOUS PORT PLACEMENT      placed 2018, removed 2019       Immunization History: There is no immunization history on file for this patient.     Active Problems:  Patient Active Problem List   Diagnosis Code    S/P coil embolization of cerebral aneurysm Z98.890       Isolation/Infection:   Isolation          No Isolation        Patient Infection Status     Infection Onset Added Last Indicated Last Indicated By Review Planned Expiration Resolved Resolved By    None active    Resolved    COVID-19 Rule Out 20 [address] CC Vent MWVI:[id number]}    Rehab Therapies: {THERAPEUTIC INTERVENTION:3682038658}  Weight Bearing Status/Restrictions: {[facility] Weight Bearin}  Other Medical Equipment (for information only, NOT a DME order):  {EQUIPMENT:332833823}  Other Treatments: ***    Patient's personal belongings (please select all that are sent with patient):  {CHP DME Belongings:178431823}    RN SIGNATURE:  {Esignature:500963004}    CASE MANAGEMENT/SOCIAL WORK SECTION    Inpatient Status Date: ***    Readmission Risk Assessment Score:  Readmission Risk              Risk of Unplanned Readmission:        6           Discharging to Facility/ Agency   · Name:   · Address:  · Phone:  · Fax:    Dialysis Facility (if applicable)   · Name:  · Address:  · Dialysis Schedule:  · Phone:  · Fax:    / signature: {Esignature:189871367}    PHYSICIAN SECTION    Prognosis: {Prognosis:2286624886}    Condition at Discharge: [address] Patient Condition:029852432}    Rehab Potential (if transferring to Rehab): {Prognosis:2291407110}    Recommended Labs or Other Treatments After Discharge: ***    Physician Certification: I certify the above information and transfer of Rufino Chavez  is necessary for the continuing treatment of the diagnosis listed and that she requires {Admit to Appropriate Level of Care:34779} for {GREATER/LESS:144036721} 30 days.      Update Admission H&P: {CHP DME Changes in TLIVS:481171097}    PHYSICIAN SIGNATURE:  {Esignature:340587141}

## 2020-08-27 NOTE — PLAN OF CARE
Problem: Pain:  Goal: Pain level will decrease  Description: Pain level will decrease  8/26/2020 2006 by Carmen Sepulveda RN  Outcome: Met This Shift  8/26/2020 1759 by Eryn Alas RN  Outcome: Ongoing     Problem: Infection:  Goal: Will remain free from infection  Description: Will remain free from infection  8/26/2020 2006 by Carmen Sepulveda RN  Outcome: Ongoing  8/26/2020 1759 by Eryn Alas RN  Outcome: Ongoing     Problem: Safety:  Goal: Free from accidental physical injury  Description: Free from accidental physical injury  8/26/2020 2006 by Carmen Sepulveda RN  Outcome: Ongoing  8/26/2020 1759 by Eryn lAas RN  Outcome: Ongoing  Goal: Free from intentional harm  Description: Free from intentional harm  8/26/2020 1759 by Eryn Alas RN  Outcome: Ongoing     Problem: Skin Integrity:  Goal: Skin integrity will stabilize  Description: Skin integrity will stabilize  8/26/2020 2006 by Carmen Sepulveda RN  Outcome: Ongoing  8/26/2020 1759 by Eryn Alas RN  Outcome: Ongoing

## 2020-08-27 NOTE — FLOWSHEET NOTE
08/27/20 1052   Encounter Summary   Services provided to: Patient   Support System Children   Place of Taoism None   Contact Church No   Continue Visiting   (8/27/20)   Complexity of Encounter Low   Length of Encounter 15 minutes   Spiritual Assessment Completed Yes   Routine   Type Post-procedure   Assessment Calm; Approachable;Coping   Intervention Active listening;Explored feelings, thoughts, concerns;Nurtured hope;Sustaining presence/ Ministry of presence   Outcome Acceptance;Comfort;Expressed gratitude

## 2020-08-27 NOTE — DISCHARGE SUMMARY
Neuro Critical Care   Discharge Summary      PATIENT NAME: Haily Guerrero FPGSESF  YOB: 1970  MEDICAL RECORD NO. [id number]  DATE: 8/27/2020  PRIMARY CARE PHYSICIAN: POPEYE Vanessa CNP   ADMISSION DATE:  8/26/20  DISCHARGE DATE:  08/27/20  DISCHARGE DIAGNOSIS:   Patient Active Problem List   Diagnosis Code    S/P coil embolization of cerebral aneurysm Z98.890       HOSPITAL COURSE     Ainsley Schlatter is a 53 y.o. yo female with a history of breast CA (s/p lumpectomy, chemo, radiation 2018), HTN, HLD, and ARVIND on CPAP who presented to [facility] on 8/26/2020  2:50 PM for elective treatment of a right MCA bifurcation aneurysm. Per patient, she has been struggling with nausea and vomiting for the last year. She states her Oncologist ordered a CT head to rule out any underlying mass that could be contributing to her symptoms. There was an incidental finding on an aneurysm. She was referred to Dr. Emanuel Bullock in the Neuro Endovascular Clinic. She had an outpatient CTA Head on 8/19/20 showed a right MCA trifurcation aneurysm measuring 1.0x0.8cm. She presents today for elective treatment. Underwent cerebral angio showing a right MCA bifurcation aneurysm successfully treated with WEB device and two stents.       Admitted to the Neuro ICU post op. Right groin site intact. Patient did well post procedure and remained neurologically intact. Goal -150. 's. Borderline bradycardic. Started on Clevidipine infusion. Home antihypertensive resumed. Patient weaned off Clevidpine infusion the next morning after receiving home medications. She was evaluated by PT and OT who did not feel she needed further therapy on discharge. She was counseled on the importance of medication compliance, especially Aspirin and Plavix in setting of stents.   She is to follow up with the Neuro Endovascular team in 2 weeks and 3 months with pre-clinic MRA Head with and without contrast.  Also instructed to follow up with her PCP for continued blood pressure management. Labs and imaging were followed daily. At time of discharge, Archana Cristina was tolerating a DIET GENERAL;, ambulating independently and is in stable condition to be discharged to home. PROCEDURES:    8/26: Cerebral angio showing a right MCA bifurcation aneurysm successfully treated with WEB device and two stents. PHYSICAL EXAMINATION        Discharge Vitals:  height is 5' 5\" (1.651 m) and weight is 237 lb 7 oz (107.7 kg). Her oral temperature is 98.7 °F (37.1 °C). Her blood pressure is 148/82 (abnormal) and her pulse is 73. Her respiration is 19 and oxygen saturation is 96%. CONSTITUTIONAL:  Well developed, well nourished. Alert and oriented x 3, in no acute distress. GCS 15. Nontoxic. No dysarthria. No aphasia.    HEAD:  normocephalic, atraumatic    EYES:  PERRL, EOMI   ENT:  moist mucous membranes   NECK:  supple, symmetric   LUNGS:  Equal air entry bilaterally, clear   CARDIOVASCULAR:  normal s1 / s2, RRR, distal pulses intact   ABDOMEN:  Soft, no rigidity, normal bowel sounds   NEUROLOGIC:  Mental Status:  A & O x3, Awake             Cranial Nerves:    cranial nerves II-XII are grossly intact     Motor Exam:    Drift:  absent  Tone:  normal     Motor exam is symmetrical 5 out of 5 all extremities bilaterally     Sensory:    Touch:    Right Upper Extremity:  normal  Left Upper Extremity:  normal  Right Lower Extremity:  normal  Left Lower Extremity:  normal     Coordination/Dysmetria:  Heel to Shin:  Right:  normal  Left:  normal  Finger to Nose:   Right:  normal  Left:  normal        LABS/IMAGING     Recent Labs     08/26/20  0720 08/27/20  0346   WBC  --  11.6*   HGB  --  13.2   HCT  --  39.1   PLT  --  350   NA  --  140   K  --  3.7   CL  --  104   CO2  --  24   BUN  --  6   CREATININE 0.60 0.38*       Ir Angiogram Carotid Cerebral Bilateral    Result Date: 8/26/2020  Date of Service: 8/26/2020 Diagnosis: Middle cerebral artery cerebral aneurysm. Procedure: 1. Transarterial Coil Embolization of MCA Artery Cerebral Aneurysm 2. Intra-procedure right Internal Carotid Artery Cerebral Angiograms. 3. Selective right Common Carotid Artery Cervical Angiogram. 4. 3D right middle Cerebral Artery Angiogram 5. Slow IA Nicardipine Infusion to the right ICA Neurointerventionalist: ?Twan Mccollum MD, MS [place]: Farrah Mcnair Fluoroscopy time: 55.8minutes Contrast:148 cc of Visipaque-270 Access: Right common femoral artery sheath placement. Consent: After explaining the risk and benefit to the patient's family, including but not limited to stroke, coma, death, vessel injury, dissection, tear, occlusion, X-ray dye allergic reaction; consent form was obtained. Indication and Clinical History: ?71-year-old female with past medical history including hypertension, hyperlipidemia, sleep apnea. He has a right MCA bifurcation aneurysm. She is undergoing a right MCA aneurysm treatment. She is on dual antiplatelet therapy with aspirin and Plavix. Anesthesia: General Endotracheal Anesthesia. Description of Procedure: the patient was brought to the interventional suite by the anesthesia team and placed in a supine position on the angio table and general anesthesia is administered. The right groin region was prepped, draped and cleaned in a sterile fashion. The right common femoral artery was accessed via single wall needle technique and a 6F x 10 cm short introducer sheath was placed. 70 units/kg of Heparin was given. A 6 [place] 90cm shuttle was advanced into the right common carotid artery under fluoroscopic guidance and images were obtained in biplane projections of the cervical carotid artery. Then the right internal carotid artery was selectively catheterized under fluoroscopic and roadmap guidance and images were obtained in biplane projections of the anterior circulation.  3-D rotational right internal carotid cerebral angiogram: A 3-D rotation of cerebral angiogram was performed with a right internal carotid artery injection using a power injector to better visualize the aneurysmal morphology and for surgical planning. This was interpreted on a separate workstation. Information obtained from the 3-D rotational angiogram could not be obtained from other noninvasive diagnostic studies done prior to the procedure. ? The Guide catheter was advanced into the petrous portion of the right internal carotid artery under fluoroscopic guidance and the working view was obtained. ? Subsequently, A VIA 27 micro-catheter was advanced and placed over Synchro2 . [address] through [place] intermediate cathter through Shuttle catheter into the aneurysm lumen and was parked in the aneurysm. Subsequently, SL-10micro-catheter was advanced and placed over Synchro2 . [address] through [place] intermediate cathter through Shuttle into the right MCA M2 inferior division lumen. Then the Neuroform [place] 4.5 x 21 mm without TIP stent was advanced through the SL 10 microcatheter and was successfully achieved/deployed across the aneurysm neck. Then SL-10micro-catheter was removed. Then through the VIA 27 micro-catheter the web device was advanced and deployed sequentially. ? Then the SL-10micro-catheter was advanced and placed over Synchro2 . [address] through [place] intermediate cathter through Shuttle into the right MCA M2 superior division lumen. Then the Neuroform [place] 4.5 x 30 mm without TIP stent was advanced through the SL 10 microcatheter and was successfully achieved/deployed across the aneurysm neck. Then SL-10micro-catheter was removed. Super-selective intra-procedure internal carotid artery cerebral angiograms were obtained. After reviewing the final images, the guide catheter was removed from the internal carotid artery under fluoroscopic guidance.  Right common femoral artery angiogram was obtained and revealed the puncture site and the artery size to be suitable for  closure 8/19/2020  Aneurysm at the right middle cerebral artery trifurcation measuring 1.0 x 0.8 cm. DISCHARGE INSTRUCTIONS     *Plavix continued from PTA, new script sent    Discharge Medications:        Medication List      START taking these medications    clopidogrel 75 MG tablet  Commonly known as:  Plavix  Take 1 tablet by mouth daily        CONTINUE taking these medications    aspirin 325 MG tablet  Take 1 tablet by mouth daily     atorvastatin 10 MG tablet  Commonly known as:  LIPITOR     carvedilol 12.5 MG tablet  Commonly known as:  COREG     clonazePAM 0.5 MG tablet  Commonly known as:  KLONOPIN     hydroCHLOROthiazide 25 MG tablet  Commonly known as:  HYDRODIURIL     lisinopril 40 MG tablet  Commonly known as:  PRINIVIL;ZESTRIL     PROBIOTIC DAILY PO     venlafaxine 75 MG extended release capsule  Commonly known as:  EFFEXOR XR     Vitamin D3 25 MCG (1000 UT) Caps           Where to Get Your Medications      These medications were sent to [address], [address]  [address] [place] [address], [address] [place] [address]    Phone:  [phone]   · aspirin 325 MG tablet  · clopidogrel 75 MG tablet       Diet: DIET GENERAL; diet as tolerated  Activity: As tolerated, no lifting greater than 10 pounds for 1 week post angio. Follow-up:  PCP in 1-2 weeks.   Dr. Richie Martino in   Time Spent for discharge: 27 minutes    POPEYE Interiano - CNP  Neuro Critical Care  8/27/2020, 2:13 PM

## 2020-09-02 ENCOUNTER — APPOINTMENT (OUTPATIENT)
Dept: CT IMAGING | Age: 50
End: 2020-09-02
Payer: COMMERCIAL

## 2020-09-02 ENCOUNTER — APPOINTMENT (OUTPATIENT)
Dept: MRI IMAGING | Age: 50
End: 2020-09-02
Payer: COMMERCIAL

## 2020-09-02 ENCOUNTER — HOSPITAL ENCOUNTER (OUTPATIENT)
Age: 50
Setting detail: OBSERVATION
Discharge: HOME | End: 2020-09-04
Attending: EMERGENCY MEDICINE | Admitting: PSYCHIATRY & NEUROLOGY
Payer: COMMERCIAL

## 2020-09-02 ENCOUNTER — APPOINTMENT (OUTPATIENT)
Dept: GENERAL RADIOLOGY | Age: 50
End: 2020-09-02
Payer: COMMERCIAL

## 2020-09-02 PROBLEM — R42 VERTIGO: Status: ACTIVE | Noted: 2020-09-02

## 2020-09-02 LAB
ABSOLUTE EOS #: 0.29 K/UL (ref 0–0.44)
ABSOLUTE IMMATURE GRANULOCYTE: 0.04 K/UL (ref 0–0.3)
ABSOLUTE LYMPH #: 2.54 K/UL (ref 1.1–3.7)
ABSOLUTE MONO #: 0.87 K/UL (ref 0.1–1.2)
ALBUMIN SERPL-MCNC: 3.9 G/DL (ref 3.5–5.2)
ALBUMIN/GLOBULIN RATIO: 1.1 (ref 1–2.5)
ALP BLD-CCNC: 113 U/L (ref 35–104)
ALT SERPL-CCNC: 25 U/L (ref 5–33)
ANION GAP SERPL CALCULATED.3IONS-SCNC: 11 MMOL/L (ref 9–17)
AST SERPL-CCNC: 21 U/L
BASOPHILS # BLD: 0 % (ref 0–2)
BASOPHILS ABSOLUTE: 0.04 K/UL (ref 0–0.2)
BILIRUB SERPL-MCNC: 0.41 MG/DL (ref 0.3–1.2)
BUN BLDV-MCNC: 17 MG/DL (ref 6–20)
BUN/CREAT BLD: ABNORMAL (ref 9–20)
CALCIUM SERPL-MCNC: 9.3 MG/DL (ref 8.6–10.4)
CHLORIDE BLD-SCNC: 100 MMOL/L (ref 98–107)
CO2: 25 MMOL/L (ref 20–31)
CREAT SERPL-MCNC: 0.51 MG/DL (ref 0.5–0.9)
D-DIMER QUANTITATIVE: 2.19 MG/L FEU
DIFFERENTIAL TYPE: NORMAL
EOSINOPHILS RELATIVE PERCENT: 3 % (ref 1–4)
GFR AFRICAN AMERICAN: >60 ML/MIN
GFR NON-AFRICAN AMERICAN: >60 ML/MIN
GFR SERPL CREATININE-BSD FRML MDRD: ABNORMAL ML/MIN/{1.73_M2}
GFR SERPL CREATININE-BSD FRML MDRD: ABNORMAL ML/MIN/{1.73_M2}
GLUCOSE BLD-MCNC: 99 MG/DL (ref 70–99)
HCT VFR BLD CALC: 40.8 % (ref 36.3–47.1)
HEMOGLOBIN: 13.9 G/DL (ref 11.9–15.1)
IMMATURE GRANULOCYTES: 0 %
LYMPHOCYTES # BLD: 28 % (ref 24–43)
MCH RBC QN AUTO: 32.3 PG (ref 25.2–33.5)
MCHC RBC AUTO-ENTMCNC: 34.1 G/DL (ref 28.4–34.8)
MCV RBC AUTO: 94.9 FL (ref 82.6–102.9)
MONOCYTES # BLD: 10 % (ref 3–12)
NRBC AUTOMATED: 0 PER 100 WBC
PDW BLD-RTO: 12.8 % (ref 11.8–14.4)
PLATELET # BLD: 387 K/UL (ref 138–453)
PLATELET ESTIMATE: NORMAL
PMV BLD AUTO: 9.5 FL (ref 8.1–13.5)
POTASSIUM SERPL-SCNC: 3.6 MMOL/L (ref 3.7–5.3)
RBC # BLD: 4.3 M/UL (ref 3.95–5.11)
RBC # BLD: NORMAL 10*6/UL
SEG NEUTROPHILS: 59 % (ref 36–65)
SEGMENTED NEUTROPHILS ABSOLUTE COUNT: 5.16 K/UL (ref 1.5–8.1)
SODIUM BLD-SCNC: 136 MMOL/L (ref 135–144)
TOTAL PROTEIN: 7.5 G/DL (ref 6.4–8.3)
WBC # BLD: 8.9 K/UL (ref 3.5–11.3)
WBC # BLD: NORMAL 10*3/UL

## 2020-09-02 PROCEDURE — 71045 X-RAY EXAM CHEST 1 VIEW: CPT

## 2020-09-02 PROCEDURE — 2580000003 HC RX 258: Performed by: STUDENT IN AN ORGANIZED HEALTH CARE EDUCATION/TRAINING PROGRAM

## 2020-09-02 PROCEDURE — 70546 MR ANGIOGRAPH HEAD W/O&W/DYE: CPT

## 2020-09-02 PROCEDURE — G0378 HOSPITAL OBSERVATION PER HR: HCPCS

## 2020-09-02 PROCEDURE — 93005 ELECTROCARDIOGRAM TRACING: CPT | Performed by: EMERGENCY MEDICINE

## 2020-09-02 PROCEDURE — 70547 MR ANGIOGRAPHY NECK W/O DYE: CPT

## 2020-09-02 PROCEDURE — 70551 MRI BRAIN STEM W/O DYE: CPT

## 2020-09-02 PROCEDURE — 96372 THER/PROPH/DIAG INJ SC/IM: CPT

## 2020-09-02 PROCEDURE — A9576 INJ PROHANCE MULTIPACK: HCPCS | Performed by: STUDENT IN AN ORGANIZED HEALTH CARE EDUCATION/TRAINING PROGRAM

## 2020-09-02 PROCEDURE — 85379 FIBRIN DEGRADATION QUANT: CPT

## 2020-09-02 PROCEDURE — 80053 COMPREHEN METABOLIC PANEL: CPT

## 2020-09-02 PROCEDURE — 2580000003 HC RX 258: Performed by: EMERGENCY MEDICINE

## 2020-09-02 PROCEDURE — 99233 SBSQ HOSP IP/OBS HIGH 50: CPT | Performed by: PSYCHIATRY & NEUROLOGY

## 2020-09-02 PROCEDURE — 70450 CT HEAD/BRAIN W/O DYE: CPT

## 2020-09-02 PROCEDURE — 99285 EMERGENCY DEPT VISIT HI MDM: CPT

## 2020-09-02 PROCEDURE — 85025 COMPLETE CBC W/AUTO DIFF WBC: CPT

## 2020-09-02 PROCEDURE — 6370000000 HC RX 637 (ALT 250 FOR IP): Performed by: STUDENT IN AN ORGANIZED HEALTH CARE EDUCATION/TRAINING PROGRAM

## 2020-09-02 PROCEDURE — 6360000002 HC RX W HCPCS: Performed by: STUDENT IN AN ORGANIZED HEALTH CARE EDUCATION/TRAINING PROGRAM

## 2020-09-02 PROCEDURE — 93971 EXTREMITY STUDY: CPT

## 2020-09-02 PROCEDURE — 6360000004 HC RX CONTRAST MEDICATION: Performed by: STUDENT IN AN ORGANIZED HEALTH CARE EDUCATION/TRAINING PROGRAM

## 2020-09-02 PROCEDURE — 96374 THER/PROPH/DIAG INJ IV PUSH: CPT

## 2020-09-02 PROCEDURE — 6360000002 HC RX W HCPCS: Performed by: EMERGENCY MEDICINE

## 2020-09-02 PROCEDURE — 6370000000 HC RX 637 (ALT 250 FOR IP): Performed by: EMERGENCY MEDICINE

## 2020-09-02 RX ORDER — CARVEDILOL 12.5 MG/1
12.5 TABLET ORAL 2 TIMES DAILY WITH MEALS
Status: DISCONTINUED | OUTPATIENT
Start: 2020-09-02 | End: 2020-09-04 | Stop reason: HOSPADM

## 2020-09-02 RX ORDER — LACTOBACILLUS RHAMNOSUS GG 10B CELL
1 CAPSULE ORAL DAILY
Status: DISCONTINUED | OUTPATIENT
Start: 2020-09-02 | End: 2020-09-04 | Stop reason: HOSPADM

## 2020-09-02 RX ORDER — ONDANSETRON 2 MG/ML
4 INJECTION INTRAMUSCULAR; INTRAVENOUS EVERY 6 HOURS PRN
Status: DISCONTINUED | OUTPATIENT
Start: 2020-09-02 | End: 2020-09-04 | Stop reason: HOSPADM

## 2020-09-02 RX ORDER — CLONAZEPAM 1 MG/1
0.5 TABLET ORAL 2 TIMES DAILY PRN
Status: DISCONTINUED | OUTPATIENT
Start: 2020-09-02 | End: 2020-09-04 | Stop reason: HOSPADM

## 2020-09-02 RX ORDER — ACETAMINOPHEN 325 MG/1
650 TABLET ORAL EVERY 6 HOURS PRN
Status: DISCONTINUED | OUTPATIENT
Start: 2020-09-02 | End: 2020-09-04 | Stop reason: HOSPADM

## 2020-09-02 RX ORDER — 0.9 % SODIUM CHLORIDE 0.9 %
1000 INTRAVENOUS SOLUTION INTRAVENOUS ONCE
Status: COMPLETED | OUTPATIENT
Start: 2020-09-02 | End: 2020-09-02

## 2020-09-02 RX ORDER — VITAMIN B COMPLEX
2000 TABLET ORAL DAILY
Status: DISCONTINUED | OUTPATIENT
Start: 2020-09-02 | End: 2020-09-04 | Stop reason: HOSPADM

## 2020-09-02 RX ORDER — SODIUM CHLORIDE 0.9 % (FLUSH) 0.9 %
10 SYRINGE (ML) INJECTION 2 TIMES DAILY
Status: DISCONTINUED | OUTPATIENT
Start: 2020-09-02 | End: 2020-09-02

## 2020-09-02 RX ORDER — ATORVASTATIN CALCIUM 10 MG/1
10 TABLET, FILM COATED ORAL DAILY
Status: DISCONTINUED | OUTPATIENT
Start: 2020-09-02 | End: 2020-09-04 | Stop reason: HOSPADM

## 2020-09-02 RX ORDER — POLYETHYLENE GLYCOL 3350 17 G/17G
17 POWDER, FOR SOLUTION ORAL DAILY PRN
Status: DISCONTINUED | OUTPATIENT
Start: 2020-09-02 | End: 2020-09-04 | Stop reason: HOSPADM

## 2020-09-02 RX ORDER — HYDROCHLOROTHIAZIDE 25 MG/1
25 TABLET ORAL DAILY
Status: DISCONTINUED | OUTPATIENT
Start: 2020-09-02 | End: 2020-09-04 | Stop reason: HOSPADM

## 2020-09-02 RX ORDER — PROMETHAZINE HYDROCHLORIDE 25 MG/1
12.5 TABLET ORAL EVERY 6 HOURS PRN
Status: DISCONTINUED | OUTPATIENT
Start: 2020-09-02 | End: 2020-09-04 | Stop reason: HOSPADM

## 2020-09-02 RX ORDER — ONDANSETRON 2 MG/ML
4 INJECTION INTRAMUSCULAR; INTRAVENOUS ONCE
Status: COMPLETED | OUTPATIENT
Start: 2020-09-02 | End: 2020-09-02

## 2020-09-02 RX ORDER — SODIUM CHLORIDE 0.9 % (FLUSH) 0.9 %
10 SYRINGE (ML) INJECTION PRN
Status: DISCONTINUED | OUTPATIENT
Start: 2020-09-02 | End: 2020-09-04 | Stop reason: HOSPADM

## 2020-09-02 RX ORDER — VENLAFAXINE HYDROCHLORIDE 75 MG/1
75 CAPSULE, EXTENDED RELEASE ORAL DAILY
Status: DISCONTINUED | OUTPATIENT
Start: 2020-09-02 | End: 2020-09-04 | Stop reason: HOSPADM

## 2020-09-02 RX ORDER — CLOPIDOGREL BISULFATE 75 MG/1
75 TABLET ORAL DAILY
Status: DISCONTINUED | OUTPATIENT
Start: 2020-09-02 | End: 2020-09-04 | Stop reason: HOSPADM

## 2020-09-02 RX ORDER — ASPIRIN 325 MG
325 TABLET ORAL DAILY
Status: DISCONTINUED | OUTPATIENT
Start: 2020-09-02 | End: 2020-09-04 | Stop reason: HOSPADM

## 2020-09-02 RX ORDER — SODIUM CHLORIDE, SODIUM LACTATE, POTASSIUM CHLORIDE, CALCIUM CHLORIDE 600; 310; 30; 20 MG/100ML; MG/100ML; MG/100ML; MG/100ML
INJECTION, SOLUTION INTRAVENOUS CONTINUOUS
Status: ACTIVE | OUTPATIENT
Start: 2020-09-02 | End: 2020-09-03

## 2020-09-02 RX ORDER — MECLIZINE HCL 12.5 MG/1
25 TABLET ORAL 3 TIMES DAILY PRN
Status: DISCONTINUED | OUTPATIENT
Start: 2020-09-02 | End: 2020-09-04 | Stop reason: HOSPADM

## 2020-09-02 RX ORDER — SODIUM CHLORIDE 0.9 % (FLUSH) 0.9 %
10 SYRINGE (ML) INJECTION EVERY 12 HOURS SCHEDULED
Status: DISCONTINUED | OUTPATIENT
Start: 2020-09-02 | End: 2020-09-04 | Stop reason: HOSPADM

## 2020-09-02 RX ORDER — ACETAMINOPHEN 650 MG/1
650 SUPPOSITORY RECTAL EVERY 6 HOURS PRN
Status: DISCONTINUED | OUTPATIENT
Start: 2020-09-02 | End: 2020-09-04 | Stop reason: HOSPADM

## 2020-09-02 RX ORDER — LISINOPRIL 10 MG/1
40 TABLET ORAL DAILY
Status: DISCONTINUED | OUTPATIENT
Start: 2020-09-02 | End: 2020-09-04 | Stop reason: HOSPADM

## 2020-09-02 RX ORDER — MECLIZINE HCL 12.5 MG/1
25 TABLET ORAL ONCE
Status: COMPLETED | OUTPATIENT
Start: 2020-09-02 | End: 2020-09-02

## 2020-09-02 RX ADMIN — CARVEDILOL 12.5 MG: 12.5 TABLET, FILM COATED ORAL at 18:20

## 2020-09-02 RX ADMIN — MECLIZINE HYDROCHLORIDE 25 MG: 12.5 TABLET, FILM COATED ORAL at 13:33

## 2020-09-02 RX ADMIN — SODIUM CHLORIDE, POTASSIUM CHLORIDE, SODIUM LACTATE AND CALCIUM CHLORIDE: 600; 310; 30; 20 INJECTION, SOLUTION INTRAVENOUS at 17:24

## 2020-09-02 RX ADMIN — ONDANSETRON 4 MG: 2 INJECTION INTRAMUSCULAR; INTRAVENOUS at 11:29

## 2020-09-02 RX ADMIN — VITAMIN D, TAB 1000IU (100/BT) 2000 UNITS: 25 TAB at 19:28

## 2020-09-02 RX ADMIN — SODIUM CHLORIDE 1000 ML: 9 INJECTION, SOLUTION INTRAVENOUS at 11:38

## 2020-09-02 RX ADMIN — GADOTERIDOL 20 ML: 279.3 INJECTION, SOLUTION INTRAVENOUS at 15:29

## 2020-09-02 RX ADMIN — MECLIZINE HYDROCHLORIDE 25 MG: 12.5 TABLET, FILM COATED ORAL at 18:17

## 2020-09-02 RX ADMIN — ATORVASTATIN CALCIUM 10 MG: 10 TABLET, FILM COATED ORAL at 19:28

## 2020-09-02 RX ADMIN — ENOXAPARIN SODIUM 40 MG: 40 INJECTION SUBCUTANEOUS at 18:22

## 2020-09-02 ASSESSMENT — ENCOUNTER SYMPTOMS
WHEEZING: 0
VOICE CHANGE: 0
VOMITING: 0
NAUSEA: 0
COUGH: 0
FACIAL SWELLING: 0
ABDOMINAL PAIN: 0
DIARRHEA: 0
SHORTNESS OF BREATH: 0

## 2020-09-02 NOTE — ED NOTES
Pt to ed from home with friend. Patient reports having brain aneurysm surgery last week with mesh and two stents. Patient reports staying for 24 hours afterwards. Patient reports went home asymptomatic. Patient reports lightheaded x3 days. Pt reports nausea. Pt reports room spinning and she is unable to close her eyes due to the dizziness. Patient states she has a hx of breast cancer with lymph node removal on left side. Patient reports chemo finished last year. Report she had a brain ct scan approx 1 month ago due to nausea and ongoing symptoms to rule out a [place] tumor\" patient reports they incidentally found the brain aneurysm and surgery was scheduled which happened one week ago. Patient is aox3. Patient denies numbness or tingling. Pt denies chest pain or shortness of breath.       Brook Patino RN  09/02/20 8978

## 2020-09-02 NOTE — ED NOTES
Pt resting in bed. Family at bedside. Pt respirations are even and unlabored, pt is oriented X 4, speaking in complete sentences, bed is in the lowest position, call light is within reach. Will continue to monitor.        Lia Evangelista RN  09/02/20 6291

## 2020-09-02 NOTE — ED NOTES
ED to inpatient nurses report    Chief Complaint   Patient presents with    Dizziness     brain aneurysm surgery one week ago with stents. denies numbness or tingling. Present to ED from home  LOC:   Vital signs   Vitals:    09/02/20 1417 09/02/20 1801 09/02/20 1817 09/02/20 1832   BP: 119/69 126/82 130/73 134/82   Pulse: 74 73 71 70   Resp: 20 21 21 16   Temp:       TempSrc:       SpO2: 98% 97% 96% 97%   Weight:       Height:          Oxygen Baseline 98    Current needs required none  LDAs:   Peripheral IV 09/02/20 Right Forearm (Active)   Site Assessment Clean;Dry; Intact 09/02/20 1120   Line Status Blood return noted;Brisk blood return;Normal saline locked; Flushed;Specimen collected 09/02/20 1120   Dressing Status Clean;Dry; Intact 09/02/20 1120   Dressing Intervention New 09/02/20 1120     Mobility: dizziness with ambulation  Pending ED orders: none  Present condition: a&o x 4   Code Status: [unfilled]   Consults:  []  Hospitalist  Completed  [] yes [] no  []  Medicine  Completed  [] yes [] No  []  Cardiology  Completed  [] yes [] No  []  GI   Completed  [] yes [] No  []  Neurology  Completed  [] yes [] No  []  Nephrology Completed  [] yes [] No  []  Vascular  Completed  [] yes [] No   []  Surgery  Completed  [] yes [] No   []  Urology  Completed  [] yes [] No   []  Plastics  Completed  [] yes [] No   []  ENT  Completed  [] yes [] No   []  Other   Completed  [] yes [] No  Pertinent event(  Pertinent event(s)   Electronically signed by Darlina Severance, RN on 9/2/2020 at 7:50 PM     Darlina Severance, RN  09/02/20 1953

## 2020-09-02 NOTE — ED NOTES
Pt adjusted in bed. Provided water. NAD noted. Will continue to monitor.      Connor Huerta RN  09/02/20 5280

## 2020-09-02 NOTE — ED NOTES
[facility]  09/02/20 735 [facility], RN  09/02/20 8839

## 2020-09-02 NOTE — FLOWSHEET NOTE
[facility] CARE DEPARTMENT - [address]     Emergency/Trauma Note    PATIENT NAME: Gurmeet Cooper    Shift date: 09/02/2020  Shift day: Wednesday   Shift # 1    Room # 25/25   Name: Gurmeet Cooper            Age: 53 y.o. Gender: female          Pentecostal: None   Place of Quaker: None    Trauma/Incident type: Stroke Consult  Admit Date & Time: 9/2/2020 10:41 AM  TRAUMA NAME: None    PATIENT/EVENT DESCRIPTION:  Gurmeet Cooper is a 53 y.o. female who arrived ED as stroke Consult. Patient was conscious and responsive. Patient to be admitted to 25/25. SPIRITUAL ASSESSMENT/INTERVENTION:  No spiritual assessment was carried. Patient was receptive to pastoral presence but declined prayer. Patient's friend was present and open to spiritual care. Patient remained hopeful and seemed to have confidence in the medical staff.  offered emotional support. PATIENT BELONGINGS:  This  did not handle patient's belongings. ANY BELONGINGS OF SIGNIFICANT VALUE NOTED:  Unknown    REGISTRATION STAFF NOTIFIED? Yes    WHAT IS YOUR SPIRITUAL CARE PLAN FOR THIS PATIENT?:   Follow up visits recommended for more support. Electronically signed by Fr. Trecia Duverney, on 9/2/2020 at 1:18 PM.  [address] [facility]  [phone]       09/02/20 1317   Encounter Summary   Services provided to: Patient  (Friend)   Support System Family members;Friends/neighbors   Place of Samaritan None   Continue Visiting   (09/02/2020)   Complexity of Encounter Moderate   Length of Encounter 15 minutes   Spiritual Assessment Completed Yes   Routine   Type Initial   Assessment Calm; Approachable; Hopeful   Intervention Active listening;Nurtured hope;Empowerment   Outcome Expressed gratitude

## 2020-09-02 NOTE — ED PROVIDER NOTES
[facility]  Emergency Department  Emergency Medicine Resident Sign-out     Care of Gladis Mac was assumed from Dr. Kenrick Walker and is being seen for Dizziness (brain aneurysm surgery one week ago with stents. denies numbness or tingling. )  . The patient's initial evaluation and plan have been discussed with the prior provider who initially evaluated the patient.      EMERGENCY DEPARTMENT COURSE / MEDICAL DECISION MAKING:       MEDICATIONS GIVEN:  Orders Placed This Encounter   Medications    0.9 % sodium chloride bolus    ondansetron (ZOFRAN) injection 4 mg    meclizine (ANTIVERT) tablet 25 mg    gadoteridol (PROHANCE) injection 20 mL    sodium chloride flush 0.9 % injection 10 mL    sodium chloride flush 0.9 % injection 10 mL    sodium chloride flush 0.9 % injection 10 mL    OR Linked Order Group     acetaminophen (TYLENOL) tablet 650 mg     acetaminophen (TYLENOL) suppository 650 mg    polyethylene glycol (GLYCOLAX) packet 17 g    OR Linked Order Group     promethazine (PHENERGAN) tablet 12.5 mg     ondansetron (ZOFRAN) injection 4 mg    enoxaparin (LOVENOX) injection 40 mg    aspirin tablet 325 mg    atorvastatin (LIPITOR) tablet 10 mg    carvedilol (COREG) tablet 12.5 mg    Vitamin D3 CAPS 2,000 Units    clonazePAM (KLONOPIN) tablet 0.5 mg    clopidogrel (PLAVIX) tablet 75 mg    hydroCHLOROthiazide (HYDRODIURIL) tablet 25 mg    lisinopril (PRINIVIL;ZESTRIL) tablet 40 mg    Probiotic Daily CAPS 1 tablet    venlafaxine (EFFEXOR XR) extended release capsule 75 mg    lactated ringers infusion    meclizine (ANTIVERT) chewable tablet 25 mg       LABS / RADIOLOGY:     Results for orders placed or performed during the hospital encounter of 09/02/20   CBC Auto Differential   Result Value Ref Range    WBC 8.9 3.5 - 11.3 k/uL    RBC 4.30 3.95 - 5.11 m/uL    Hemoglobin 13.9 11.9 - 15.1 g/dL    Hematocrit 40.8 36.3 - 47.1 %    MCV 94.9 82.6 - 102.9 fL    MCH 32.3 25.2 - 33.5 pg    MCHC 34.1 28.4 - 34.8 g/dL    RDW 12.8 11.8 - 14.4 %    Platelets 632 854 - 713 k/uL    MPV 9.5 8.1 - 13.5 fL    NRBC Automated 0.0 0.0 per 100 WBC    Differential Type NOT REPORTED     Seg Neutrophils 59 36 - 65 %    Lymphocytes 28 24 - 43 %    Monocytes 10 3 - 12 %    Eosinophils % 3 1 - 4 %    Basophils 0 0 - 2 %    Immature Granulocytes 0 0 %    Segs Absolute 5.16 1.50 - 8.10 k/uL    Absolute Lymph # 2.54 1.10 - 3.70 k/uL    Absolute Mono # 0.87 0.10 - 1.20 k/uL    Absolute Eos # 0.29 0.00 - 0.44 k/uL    Basophils Absolute 0.04 0.00 - 0.20 k/uL    Absolute Immature Granulocyte 0.04 0.00 - 0.30 k/uL    WBC Morphology NOT REPORTED     RBC Morphology NOT REPORTED     Platelet Estimate NOT REPORTED    Comprehensive Metabolic Panel   Result Value Ref Range    Glucose 99 70 - 99 mg/dL    BUN 17 6 - 20 mg/dL    CREATININE 0.51 0.50 - 0.90 mg/dL    Bun/Cre Ratio NOT REPORTED 9 - 20    Calcium 9.3 8.6 - 10.4 mg/dL    Sodium 136 135 - 144 mmol/L    Potassium 3.6 (L) 3.7 - 5.3 mmol/L    Chloride 100 98 - 107 mmol/L    CO2 25 20 - 31 mmol/L    Anion Gap 11 9 - 17 mmol/L    Alkaline Phosphatase 113 (H) 35 - 104 U/L    ALT 25 5 - 33 U/L    AST 21 <32 U/L    Total Bilirubin 0.41 0.3 - 1.2 mg/dL    Total Protein 7.5 6.4 - 8.3 g/dL    Alb 3.9 3.5 - 5.2 g/dL    Albumin/Globulin Ratio 1.1 1.0 - 2.5    GFR Non-African American >60 >60 mL/min    GFR African American >60 >60 mL/min    GFR Comment          GFR Staging NOT REPORTED    D-DIMER, QUANTITATIVE   Result Value Ref Range    D-Dimer, Quant 2.19 mg/L FEU   EKG 12 Lead   Result Value Ref Range    Ventricular Rate 67 BPM    Atrial Rate 67 BPM    P-R Interval 124 ms    QRS Duration 80 ms    Q-T Interval 418 ms    QTc Calculation (Bazett) 441 ms    R Axis 39 degrees    T Axis 46 degrees       Ct Head Wo Contrast    Result Date: 9/2/2020  EXAMINATION: CT OF THE HEAD WITHOUT CONTRAST  9/2/2020 12:08 pm TECHNIQUE: CT of the head was performed without the administration of intravenous contrast. Dose modulation, iterative reconstruction, and/or weight based adjustment of the mA/kV was utilized to reduce the radiation dose to as low as reasonably achievable. COMPARISON: CTA head performed 08/19/2020. HISTORY: ORDERING SYSTEM PROVIDED HISTORY: s/o aneurysm stent, new dizziness TECHNOLOGIST PROVIDED HISTORY: s/o aneurysm stent, new dizziness Is the patient pregnant?->No Reason for Exam: s/o aneurysm stent, new dizziness Acuity: Acute Type of Exam: Initial FINDINGS: BRAIN/VENTRICLES: There is no acute intracranial hemorrhage, mass effect, or midline shift. There is satisfactory overall gray-white matter differentiation. There is an aneurysm coil mass at the site of prior right middle cerebral artery aneurysm. Stent is visualized as well. The ventricular structures are symmetric and unremarkable. The infratentorial structures are unremarkable. ORBITS: The visualized portion of the orbits demonstrate no acute abnormality. SINUSES: The visualized paranasal sinuses and mastoid air cells demonstrate no acute abnormality. SOFT TISSUES/SKULL:  No acute abnormality of the visualized skull or soft tissues. No acute intracranial abnormality. Aneurysm coil mass at the site of prior right middle cerebral artery aneurysm with adjacent stents. Ir Angiogram Carotid Cerebral Bilateral    Result Date: 9/1/2020  Date of Service: 8/26/2020 Diagnosis: Middle cerebral artery cerebral aneurysm. Procedure: 1. Transarterial ENDOVASCULAR WEB DEVICE Embolization of MCA Artery Cerebral Aneurysm with ATLAS Y Stents 2. Intra-procedure right Internal Carotid Artery Cerebral Angiograms. 3. Selective right Common Carotid Artery Cervical Angiogram. 4. 3D right middle Cerebral Artery Angiogram 5.  Slow IA Nicardipine Infusion to the right ICA Neurointerventionalist: ?Jewels Strong MD, MS [employer]: Gaby [employer] Fluoroscopy time: 55.8minutes Contrast:148 cc of Visipaque-270 Access: Right common femoral artery sheath placement. Consent: After explaining the risk and benefit to the patient's family, including but not limited to stroke, coma, death, vessel injury, dissection, tear, occlusion, X-ray dye allergic reaction; consent form was obtained. Indication and Clinical History: ?81-year-old female with past medical history including hypertension, hyperlipidemia, sleep apnea. He has a right MCA bifurcation aneurysm. She is undergoing a right MCA aneurysm treatment. She is on dual antiplatelet therapy with aspirin and Plavix. Anesthesia: General Endotracheal Anesthesia. Description of Procedure: the patient was brought to the interventional suite by the anesthesia team and placed in a supine position on the angio table and general anesthesia is administered. The right groin region was prepped, draped and cleaned in a sterile fashion. The right common femoral artery was accessed via single wall needle technique and a 6F x 10 cm short introducer sheath was placed. 70 units/kg of Heparin was given. A 6 [place] 90cm shuttle was advanced into the right common carotid artery under fluoroscopic guidance and images were obtained in biplane projections of the cervical carotid artery. Then the right internal carotid artery was selectively catheterized under fluoroscopic and roadmap guidance and images were obtained in biplane projections of the anterior circulation. 3-D rotational right internal carotid cerebral angiogram: A 3-D rotation of cerebral angiogram was performed with a right internal carotid artery injection using a power injector to better visualize the aneurysmal morphology and for surgical planning. This was interpreted on a separate workstation. Information obtained from the 3-D rotational angiogram could not be obtained from other noninvasive diagnostic studies done prior to the procedure. ?  The Guide catheter was advanced into the petrous portion of the right internal carotid artery under fluoroscopic guidance and the working view was obtained. ? Subsequently, A VIA 27 micro-catheter was advanced and placed over Synchro2 . [address] through [place] intermediate cathter through Shuttle catheter into the aneurysm lumen and was parked in the aneurysm. Subsequently, SL-10micro-catheter was advanced and placed over Synchro2 . [address] through [place] intermediate cathter through Shuttle into the right MCA M2 inferior division lumen. Then the Neuroform [place] 4.5 x 21 mm without TIP stent was advanced through the SL 10 microcatheter and was successfully achieved/deployed across the aneurysm neck. Then SL-10micro-catheter was removed. Then through the VIA 27 micro-catheter the web device was advanced and deployed sequentially. ? Then the SL-10micro-catheter was advanced and placed over Synchro2 . [address] through [place] intermediate cathter through Shuttle into the right MCA M2 superior division lumen. Then the Neuroform [place] 4.5 x 30 mm without TIP stent was advanced through the SL 10 microcatheter and was successfully achieved/deployed across the aneurysm neck. Then SL-10micro-catheter was removed. Super-selective intra-procedure internal carotid artery cerebral angiograms were obtained. After reviewing the final images, the guide catheter was removed from the internal carotid artery under fluoroscopic guidance. Right common femoral artery angiogram was obtained and revealed the puncture site and the artery size to be suitable for  closure device use. Hemostasis was maintained using 6 [place] Angio-Seal device, with good result and intact distal pulses. Findings: The right common carotid cervical angiogram demonstrated minimal atherosclerosis at the carotid artery bifurcation. The right intracranial ICA images demonstrated normal anterograde flow into the middle cerebral arteries with no evidence of vasospasm or narrowing or vascular occlusion.  The MCA, M1 bifurcation aneurysm is noted measuring approximately 5.33 x 7.4 by X 6.12 and neck 6.37mm. Normal capillary and venous phases was noted. Trans-arterial Web Embolization of the [right] MCA Cerebral Artery Aneurysm: the VIA 27 micro-catheter was placed in the aneurysm lumen under fluoroscopic guidance and was used to deploy Web device SL 8 x 4 mm under fluoroscopic and roadmap guidance. Super-selective intra-procedure internal carotid artery cerebral angiograms were obtained, and demonstrated progressive occlusion of the aneurysm without any evidence of a clot formation or branch occlusion to Web Occlusion Scale (WOS): D. Disposition: the patient was extubated and transported to NSICU in stable clinical and hemodynamic conditions. Complications: None immediate. Impression: 1. Right MCA M1 bifurcation aneurysm projecting inferiorly and anteriorly measuring approximately 5.33 x 7.4 by X 6.12 mm and neck size 6.37 mm. 2. The above mentioned aneurysm is treated with Web device SL 8 x 4 mm that resulted in Web Occlusion Scale: D. This was combined with a stent assisted coiling using Y stenting 4.5 x 21 mm and 4.5 x 30 mm Neuroform [place] stents. 3. No evidence of arterial vasospasm or occlusion. Dr. Vickie Cox dictated this invasive procedure. Christy Villareal was present for all procedural and imaging components of this case. Examination was reviewed and reported findings confirmed and edited by Christy Villareal.  supervised and interpreted this procedure. Final report electronically signed by Marlen Luz M.D. on 9/1/2020 10:30 AM    Xr Chest Portable    Result Date: 9/2/2020  EXAMINATION: ONE XRAY VIEW OF THE CHEST 9/2/2020 11:17 am COMPARISON: None HISTORY: ORDERING SYSTEM PROVIDED HISTORY: dizziness, recent surgery TECHNOLOGIST PROVIDED HISTORY: dizziness, recent surgery Reason for Exam: dizzy, recent surgery 1 wk ago FINDINGS: Clear lungs. No definite findings of pneumothorax or pleural effusion. Normal mediastinal, hilar, and cardiac contours.   Partially included changes of abnormality. MRA HEAD: ANTERIOR CIRCULATION: The internal carotid arteries are normal in caliber. The anterior cerebral arteries are patent. There are areas of abnormal signal along the distal M1 segment of the right middle cerebral artery, related to stent material.  There aneurysm coils noted for the right MCA bifurcation. The stent extends into the posterior right M branch. Faint signal is noted along the margins of the aneurysm coils, more pronounced along the lateral aspect where residual filling cannot entirely be excluded. No other aneurysms are identified. POSTERIOR CIRCULATION: The vertebrobasilar system is The posterior normal cerebral arteries in appearance. Are unremarkable. The right PCOM is patent. 1. Unremarkable MRI brain. No evidence of an acute infarct. 2. Right MCA aneurysm bifurcation coiling with stent assistance. Most of the aneurysm lumen is occluded. There are areas of increased signal along the margins of the aneurysm, more pronounced laterally where residual filling cannot entirely be excluded. 3. No other aneurysms are identified. Mra Head W Wo Contrast    Result Date: 9/2/2020  EXAMINATION: MRI OF THE BRAIN WITHOUT CONTRAST AND MRA HEAD WITHOUT CONTRAST 9/2/2020 2:38 pm TECHNIQUE: Multiplanar multisequence MRI of the brain was performed without the administration of intravenous contrast. MRA of the head was performed utilizing time-of-flight imaging with MIP images. No intravenous contrast was administered.  COMPARISON: 08/19/2020 HISTORY: ORDERING SYSTEM PROVIDED HISTORY: vertigo, recent aneurysm stenting TECHNOLOGIST PROVIDED HISTORY: vertigo, recent aneurysm stenting Is the patient pregnant?->No; ORDERING SYSTEM PROVIDED HISTORY: new onset vertigo, recent aneursym stenting TECHNOLOGIST PROVIDED HISTORY: new onset vertigo, recent aneursym stenting Is the patient pregnant?->No FINDINGS: MRI BRAIN: INTRACRANIAL STRUCTURES/VENTRICLES: There are no areas of restricted

## 2020-09-02 NOTE — ED NOTES
Pt respirations are even and unlabored, pt is oriented X 4, speaking in complete sentences, bed is in the lowest position, call light is within reach. Pt states that he feels pressure on the top of her head, like she is wearing a hat that fits too tight. Will continue to monitor.        Chandrika Calvert RN  09/02/20 [id number]

## 2020-09-02 NOTE — ED NOTES
Pt respirations are even and unlabored, pt is oriented X 4, speaking in complete sentences, bed is in the lowest position, call light is within reach. Will continue to monitor.        Asher Garcia RN  09/02/20 9799

## 2020-09-02 NOTE — ED NOTES
Bed: 25  Expected date:   Expected time:   Means of arrival:   Comments:     Maria Del Carmen Queen RN  09/02/20 1045

## 2020-09-02 NOTE — ED NOTES
Pt respirations are even and unlabored, pt is oriented X 4, speaking in complete sentences, bed is in the lowest position, call light is within reach. Will continue to monitor.        Asher Garcia RN  09/02/20 0774

## 2020-09-02 NOTE — ED NOTES
Pt received meal tray and the chicken is not edible. Pt not provided a knife to attempt to cut chicken. Plastic knife provided. Offered pt lunch box. Pt states she has a friend who is going to bring her phone  and states she might have her bring her some food. Christi Singh RN notified. Will continue to monitor.         Santy Schaeffer RN  09/02/20 4032

## 2020-09-02 NOTE — ED PROVIDER NOTES
FACULTY SIGN-OUT  ADDENDUM     Care of this patient was assumed from previous attending physician. The patient's initial evaluation and plan have been discussed with the prior provider who initially evaluated the patient. Attestation  I was available and discussed any additional care issues that arose and coordinated the management plans with the resident(s) caring for the patient during my duty period. Any areas of disagreement with resident's documentation of care or procedures are noted on the chart. I was personally present for the key portions of any/all procedures, during my duty period. I have documented in the chart those procedures where I was not present during the key portions. ED COURSE      The patient was given the following medications:  Orders Placed This Encounter   Medications    0.9 % sodium chloride bolus    ondansetron (ZOFRAN) injection 4 mg    meclizine (ANTIVERT) tablet 25 mg       RECENT VITALS:   Temp: 98.4 °F (36.9 °C), Pulse: 69, Resp: 17, BP: 130/83    MEDICAL DECISION MAKING        Vriginia Roth is a 53 y.o. female who presents to the Emergency Department with complaints of persistent dizziness. Seen by neurology requesting MRI. Evgeny Polk MD, F.A.C.E.P.   Attending Emergency Physician    (Please note that portions of this note were completed with a voice recognition program.  Efforts were made to edit the dictations but occasionally words are mis-transcribed.)         Evgeny Polk MD  09/02/20 0761

## 2020-09-02 NOTE — CONSULTS
Endovascular Neurosurgery Consult      Reason for evaluation: vertigo    SUBJECTIVE:   History of Chief Complaint:    53 y.o. female with past medical history including hypertension, hyperlipidemia, sleep apnea, breat cancer s/p rx 2018, cervical spine fusion, right MCA bifurcation aneurysm S/p atlas y stent x2 assisted web embolism 8/26/2020 with slight occlusion of the parent M2 (25% occlusion). Pt presents with nausea and vertigo. For the past 4 days (since 8/30/2020) pt has had episodes of room spinning vertigo and nausea lasting seconds, often induced on changing head positions. This would occur 1-2x/day. However today 9/2/2020 pt awoke, and on changing positions she had worse and more frequent episodes. She went to work and was not able to tolerate the symptoms, and presented to the ED. ROS reveals a sense of something on her head, but otherwise no headache, weakness, numbness, double vision, speech issues. This has never happened before, she does not have history of migraines or headaches. Allergies  is allergic to adhesive tape. Medications  Prior to Admission medications    Medication Sig Start Date End Date Taking? Authorizing Provider   aspirin 325 MG tablet Take 1 tablet by mouth daily 8/27/20   Clearance POPEYE Bell CNP   clopidogrel (PLAVIX) 75 MG tablet Take 1 tablet by mouth daily 8/27/20   Clearance POPEYE Bell - MENDEL   Probiotic Product (PROBIOTIC DAILY PO) Take 1 tablet by mouth daily    Historical Provider, MD   lisinopril (PRINIVIL;ZESTRIL) 40 MG tablet Take 40 mg by mouth daily 8/5/20   Historical Provider, MD   clonazePAM (KLONOPIN) 0.5 MG tablet Take 0.5 mg by mouth 2 times daily as needed.  6/19/20   Historical Provider, MD   venlafaxine (EFFEXOR XR) 75 MG extended release capsule Take 75 mg by mouth daily 6/19/20   Historical Provider, MD   hydroCHLOROthiazide (HYDRODIURIL) 25 MG tablet Take 25 mg by mouth daily 8/5/20   Historical Provider, MD   Cholecalciferol (VITAMIN D3) 25 MCG (1000 UT) CAPS Take 2,000 Units by mouth daily    Historical Provider, MD   atorvastatin (LIPITOR) 10 MG tablet Take 10 mg by mouth daily    Historical Provider, MD   carvedilol (COREG) 12.5 MG tablet Take 12.5 mg by mouth 2 times daily (with meals)    Historical Provider, MD    Scheduled Meds:  Continuous Infusions:  PRN Meds:.  Past Medical History   has a past medical history of Cancer ([facility] [address] Contact lens/glasses fitting, Hyperlipidemia, Hypertension, and Sleep apnea. Past Surgical History   has a past surgical history that includes Breast surgery (Left, 2018); Tunneled venous port placement; Hysterectomy (2018); Carpal tunnel release (Bilateral);  section; back surgery; and Brain aneurysm surgery (2020). Social History   reports that she has quit smoking. She has never used smokeless tobacco.   has no history on file for alcohol. reports no history of drug use. Family History  Family history is unknown by patient. Review of Systems:  CONSTITUTIONAL:  negative for fevers, chills, fatigue and malaise    EYES:  negative for double vision, blurred vision and photophobia     HEENT:  negative for tinnitus, epistaxis and sore throat    RESPIRATORY:  negative for cough, shortness of breath, wheezing    CARDIOVASCULAR:  negative for chest pain, palpitations, syncope, edema    GASTROINTESTINAL:  negative for nausea, vomiting    GENITOURINARY:  negative for incontinence    MUSCULOSKELETAL:  negative for neck or back pain    NEUROLOGICAL:  Negative for weakness and tingling  negative for headaches and dizziness    PSYCHIATRIC:  negative for anxiety      Review of systems otherwise negative. OBJECTIVE:     Vitals:    20 1051   BP:    Pulse:    Resp:    Temp: 98.4 °F (36.9 °C)   SpO2:         General:  Gen: obese habitus, NAD. Nausea with sitting up lasting seconds.   HEENT: NCAT, mucosa moist  Cvs: RRR, S1 S2 normal  Resp: symmetric unlabored breathing  Abd: s/nd/nt  Ext: no edema  Skin: no lesions seen, warm and dry    Neuro:  Gen: awake and alert, oriented x3. Lang/speech: no aphasia or dysarthria. Follows commands. CN: PERRL, EOMI, VFF, V1-3 intact, face symmetric, hearing intact, shoulder shrug symmetric, tongue midline  Motor: grossly 5/5 UE and LE b/l  Sense: LT intact in all 4 ext. Coord: FTN and HTS intact b/l  DTR: deferred  Gait: deferred  Special: HINTS: R  Nystagmus on R gaze, unclear if persistent (pt closes eyes because she becomes nauseous after a couple seconds), + horizontal skew, + L impulse (nystagmus on L head turn). fito hallpike neg. NIH Stroke Scale:   1a  Level of consciousness: 0 - alert; keenly responsive   1b. LOC questions:  0 - answers both questions correctly   1c. LOC commands: 0 - performs both tasks correctly   2. Best Gaze: 0 - normal   3. Visual: 0 - no visual loss   4. Facial Palsy: 0 - normal symmetric movement   5a. Motor left arm: 0 - no drift, limb holds 90 (or 45) degrees for full 10 seconds   5b. Motor right arm: 0 - no drift, limb holds 90 (or 45) degrees for full 10 seconds   6a. Motor left le - no drift; leg holds 30 degree position for full 5 seconds   6b  Motor right le - no drift; leg holds 30 degree position for full 5 seconds   7. Limb Ataxia: 0 - absent   8. Sensory: 0 - normal; no sensory loss   9. Best Language:  0 - no aphasia, normal   10. Dysarthria: 0 - normal   11. Extinction and Inattention: 0 - no abnormality         Total:   0     MRS: 0      LABS:   Reviewed. RADIOLOGY:   Images were personally reviewed including:  CT head 2020  No acute intracranial abnormality.         Aneurysm coil mass at the site of prior right middle cerebral artery aneurysm    with adjacent stents.           ASSESSMENT:   53 y.o. female with past medical history including hypertension, hyperlipidemia, sleep apnea, breat cancer s/p rx 2018, cervical spine fusion, right MCA bifurcation aneurysm S/p atlas y stent x2 assisted web embolism 2020 with slight occlusion of the parent M2 (25% occlusion). Pt presents with symptoms suggestive of BPPV, exam also suggestive of peripheral etiology. R/o posterior stroke given risk factors. Aneurysm unlikely to be causative of her symptoms.     PLAN:   --check MRI brain wo con and  MRA head w and wo con  --if imaging is stable/not suggestive of central etiology, ok to treat symptomatically and d/c to home as per ED. Consider IVF and meclazine  --continue asa 80 and plavix 75    Case discussed with Dr. Vicente Diss attending.     Feliciano Vazquez MD, PhD   Stroke, [facility]  [facility]  Electronically signed 9/2/2020 at 2:10 PM

## 2020-09-02 NOTE — ED PROVIDER NOTES
results  2. Discussed with neuro endovascular attending, no concern for MRI results however patient continuing to complain of dizziness, will admit for observation PT OT     FINAL IMPRESSION:     No diagnosis found. DISPOSITION:       DISPOSITION:  []  Discharge   []  Transfer -    [x]  Admission -     []  Against Medical Advice   []  Eloped   FOLLOW-UP: No follow-up provider specified.    DISCHARGE MEDICATIONS: New Prescriptions    No medications on file          Bhavin Alexis DO  Emergency Medicine Resident  [address] [facility], [address] [place] [address]  Resident  09/02/20 3023

## 2020-09-02 NOTE — ED NOTES
Pt states that she does not have any dizziness as long as she lays still, however, once she starts moving or sits up she get very dizzy again. Pt updated on plan of care.        Asher Garcia RN  09/02/20 3238

## 2020-09-02 NOTE — ED NOTES
Called dietary about status of meal tray. Told by Paula Keene that the tray is on the way.      Jennifer Mobley, BILL  09/02/20 3201

## 2020-09-02 NOTE — ED NOTES
Pt resting in bed. Filling out MRI checklist with family at bedside. Pt respirations are even and unlabored, pt is oriented X 4, speaking in complete sentences, bed is in the lowest position, call light is within reach. Will continue to monitor.          Mateo Thompson RN  09/02/20 4518

## 2020-09-02 NOTE — ED PROVIDER NOTES
[facility] ONC/MED SURG  eMERGENCY dEPARTMENT eNCOUnter      Pt Name: Sandi Jackson  MRN: [id number]  [place] 1970  Date of evaluation: 9/2/2020  Provider: Suzi Verde MD    CHIEF COMPLAINT     Chief Complaint   Patient presents with    Dizziness     brain aneurysm surgery one week ago with stents. denies numbness or tingling. HISTORY OF PRESENT ILLNESS   (Location/Symptom, Timing/Onset, Context/Setting,Quality, Duration, Modifying Factors, Severity)  Note limiting factors. Sandi Jackson is a52 y.o. female who presents to the emergency department      HPI    81-year-old with incidental aneurysm found on brain CT. Underwent stents and coiling 1 week ago by neuro endovascular. For the last 2 days she has had intermittent dizziness, worse today. States she has room spinning which is worse when her eyes are closed. She denies any numbness, weakness, trouble speaking, trouble forming words, confusion. Nursing Notes werereviewed. REVIEW OF SYSTEMS    (2-9 systems for level 4, 10 or more for level 5)     Review of Systems   Constitutional: Negative for appetite change, chills, fatigue and fever. HENT: Negative for drooling, facial swelling, mouth sores and voice change. Eyes: Negative for visual disturbance. Respiratory: Negative for cough, shortness of breath and wheezing. Cardiovascular: Negative for chest pain. Gastrointestinal: Negative for abdominal pain, diarrhea, nausea and vomiting. Genitourinary: Negative for difficulty urinating and dysuria. Musculoskeletal: Negative for neck stiffness. Skin: Negative for rash. Neurological: Positive for dizziness and light-headedness. Negative for tremors, syncope, speech difficulty, weakness, numbness and headaches. Psychiatric/Behavioral: Negative for agitation. All other systems reviewed and are negative. Except as noted above the remainder of the review of systems was reviewed and negative.        PAST MEDICAL HISTORY     Past Medical History:   Diagnosis Date    Cancer [facility]) 2018    left breast    Contact lens/glasses fitting     Hyperlipidemia     Hypertension     Amrita Velazquez, CNP    Sleep apnea     C-pap         SURGICALHISTORY       Past Surgical History:   Procedure Laterality Date    BACK SURGERY      neck fusion & L4-L5 fusion    BRAIN ANEURYSM SURGERY  2020    [employer] EMOBLIZATION SYSTEM MRI CONDTIONAL 3T OK, SAFE IMMEDIATELY. IR ANGIOGRAM CAROTID CEREBRAL BILATERAL,,S/P coil embolization of cerebral aneurysm    BREAST SURGERY Left 2018    lumpectomy    CARPAL TUNNEL RELEASE Bilateral      SECTION      x 2,  &     HYSTERECTOMY  2018    TUNNELED VENOUS PORT PLACEMENT      placed 2018, removed 2019         CURRENT MEDICATIONS       Discharge Medication List as of 2020  9:57 AM      CONTINUE these medications which have NOT CHANGED    Details   aspirin 325 MG tablet Take 1 tablet by mouth daily, Disp-30 tablet,R-3Normal      clopidogrel (PLAVIX) 75 MG tablet Take 1 tablet by mouth daily, Disp-30 tablet,R-3Normal      Probiotic Product (PROBIOTIC DAILY PO) Take 1 tablet by mouth dailyHistorical Med      lisinopril (PRINIVIL;ZESTRIL) 40 MG tablet Take 40 mg by mouth dailyHistorical Med      clonazePAM (KLONOPIN) 0.5 MG tablet Take 0.5 mg by mouth 2 times daily as needed. Historical Med      venlafaxine (EFFEXOR XR) 75 MG extended release capsule Take 75 mg by mouth dailyHistorical Med      hydroCHLOROthiazide (HYDRODIURIL) 25 MG tablet Take 25 mg by mouth dailyHistorical Med      Cholecalciferol (VITAMIN D3) 25 MCG (1000 UT) CAPS Take 2,000 Units by mouth dailyHistorical Med      atorvastatin (LIPITOR) 10 MG tablet Take 10 mg by mouth dailyHistorical Med      carvedilol (COREG) 12.5 MG tablet Take 12.5 mg by mouth 2 times daily (with meals)Historical Med             ALLERGIES     Adhesive tape    FAMILY HISTORY       Family History   Family history unknown:  Yes General: She is not in acute distress. Appearance: She is well-developed. HENT:      Head: Normocephalic and atraumatic. Eyes:      Extraocular Movements: Extraocular movements intact. Conjunctiva/sclera: Conjunctivae normal.      Pupils: Pupils are equal, round, and reactive to light. Comments: No nystagmus, does have some double vision with extremes of gaze however extraocular motion appears to be intact. Neck:      Musculoskeletal: Normal range of motion and neck supple. Vascular: No JVD. Cardiovascular:      Rate and Rhythm: Normal rate and regular rhythm. Pulmonary:      Effort: Pulmonary effort is normal. No respiratory distress. Breath sounds: No stridor. Abdominal:      General: There is no distension. Palpations: Abdomen is soft. Musculoskeletal: Normal range of motion. Skin:     General: Skin is warm and dry. Neurological:      Mental Status: She is alert and oriented to person, place, and time. Comments: Patient has dizziness that is somewhat worsened by turning the head to the left, no focal neuro deficits, cranial nerves are intact, full strength and sensation to bilateral upper and lower extremities. No signs of ataxia or dysmetria. DIAGNOSTIC RESULTS     EKG: All EKG's are interpreted by the Emergency Department Physician who either signs orCo-signs this chart in the absence of a cardiologist.      RADIOLOGY:   Non-plainfilm images such as CT, Ultrasound and MRI are read by the radiologist. Plain radiographic images are visualized and preliminarily interpreted by the emergency physician with the below findings:      Interpretationper the Radiologist below, if available at the time of this note:    MRI BRAIN WO CONTRAST   Final Result   1. Unremarkable MRI brain. No evidence of an acute infarct. 2. Right MCA aneurysm bifurcation coiling with stent assistance. Most of the   aneurysm lumen is occluded.   There are areas of increased signal along the   margins of the aneurysm, more pronounced laterally where residual aneurysm   filling cannot entirely be excluded. 3. No other aneurysms are identified. MRA HEAD W WO CONTRAST   Final Result   1. Unremarkable MRI brain. No evidence of an acute infarct. 2. Right MCA aneurysm bifurcation coiling with stent assistance. Most of the   aneurysm lumen is occluded. There are areas of increased signal along the   margins of the aneurysm, more pronounced laterally where residual aneurysm   filling cannot entirely be excluded. 3. No other aneurysms are identified. MRA NECK WO CONTRAST   Final Result   Normal MRA neck         VL DUP LOWER EXTREMITY VENOUS RIGHT   Final Result      CT HEAD WO CONTRAST   Final Result   No acute intracranial abnormality. Aneurysm coil mass at the site of prior right middle cerebral artery aneurysm   with adjacent stents. XR CHEST PORTABLE   Final Result   No acute cardiopulmonary process. ED BEDSIDE ULTRASOUND:   Performed by ED Physician - none    LABS:  Labs Reviewed   COMPREHENSIVE METABOLIC PANEL - Abnormal; Notable for the following components:       Result Value    Potassium 3.6 (*)     Alkaline Phosphatase 113 (*)     All other components within normal limits   CBC WITH AUTO DIFFERENTIAL   D-DIMER, QUANTITATIVE       All other labs were within normal range or not returned as of this dictation. EMERGENCY DEPARTMENT COURSE and DIFFERENTIAL DIAGNOSIS/MDM:   Vitals:    Vitals:    09/03/20 1130 09/03/20 1645 09/03/20 2129 09/04/20 0731   BP: 120/77 126/80 (!) 132/91 127/83   Pulse: 74 65 72 70   Resp: 12 14 15 14   Temp: 98.6 °F (37 °C) 98.1 °F (36.7 °C) 98.3 °F (36.8 °C) 98.2 °F (36.8 °C)   TempSrc: Oral Oral Oral Oral   SpO2: 98% 100% 98% 97%   Weight:       Height:             MDM    78-year-old female with recent coiling and stenting for incidental finding of an aneurysm.   Developed dizziness several days ago and now is worsening and persistent. Patient has no other focal neuro deficits, this did not have a clear onset and has been going for greater than 48 hours. [facility] [place] re[place]ovascular  Dr. Jo Hayes paged at [phone], did not return call  Paged several more times, then Dr. Humberto Jeronimo paged. Decision to place a stroke consult  1:01 PM EDT  Neurology resident at bedside    Signed out at 1500 to oncoming attending with resident following the case. ED Course as of Oct 14 2059   Wed Sep 02, 2020   1623 Doppler of the lower extremity unremarkable.    [WG]   1634 Spoke with neuro endovascular attending, not concerned about the MRI findings however due to the continued vertigo will plan to admit patient to neurology service for observation.    [WG]      ED Course User Index  [WG] Froy Fuller,         Procedures    FINAL IMPRESSION      1.  Dizziness        DISPOSITION/PLAN   DISPOSITION Admitted 09/02/2020 06:00:11 PM      PATIENT REFERRED TO:  POPEYE Gan - [address]  [facility] [id number]  [address]  [phone]    In 3 weeks      Enedelia Doty MD  [address] [place] [address], [facility] [address]  [facility] [address] [facility]  [phone]    In 2 weeks        DISCHARGE MEDICATIONS:  Discharge Medication List as of 9/4/2020  9:57 AM      START taking these medications    Details   meclizine (ANTIVERT) 25 MG tablet Take 1 tablet by mouth 3 times daily as needed for Dizziness, Disp-30 tablet,R-0Normal                    Summation      Patient Course:      ED Medications administered this visit:    Medications   lactated ringers infusion ( Intravenous New Bag 9/3/20 1306)   0.9 % sodium chloride bolus (0 mLs Intravenous Stopped 9/2/20 1335)   ondansetron (ZOFRAN) injection 4 mg (4 mg Intravenous Given 9/2/20 1129)   meclizine (ANTIVERT) tablet 25 mg (25 mg Oral Given 9/2/20 1333)   gadoteridol (PROHANCE) injection 20 mL (20 mLs Intravenous Given 9/2/20 1529)       New Prescriptions from this visit:    Discharge Medication List as of 9/4/2020  9:57 AM      START taking these medications    Details   meclizine (ANTIVERT) 25 MG tablet Take 1 tablet by mouth 3 times daily as needed for Dizziness, Disp-30 tablet,R-0Normal             Follow-up:  POPEYE Broderick - MENDEL  [address]  [address]  [phone]    In 3 weeks      Juan Gentile MD  [address] [place] [address] [id number] [facility]  [phone]    In 2 weeks          Final Impression:   1.  Dizziness               (Please note that portions of this note were completed with a voice recognition program.  Efforts were made to edit the dictations but occasionally words are mis-transcribed.)            Kaleb Bey MD  09/02/20 1916 Roma Bryan MD  10/14/20 3001

## 2020-09-02 NOTE — H&P
[facility]   [address] [place] [address]    HISTORY AND PHYSICAL EXAMINATION            Date:   9/2/2020  Patient name:  Wojciech Lopez  Date of admission:  9/2/2020 10:41 AM  MRN:   [id number]  Account:  [de-identified]  YOB: 1970  PCP:    POPEYE Guzman CNP  Room:   25/25  Code Status:    Prior    Chief Complaint:     Chief Complaint   Patient presents with    Dizziness     brain aneurysm surgery one week ago with stents. denies numbness or tingling. History Obtained From:     patient, electronic medical record    History of Present Illness:     41-year-old female with past medical history of hypertension hyperlipidemia, sleep apnea, breast cancer status post resection 2018, cervical spine fusion, recent right MCA furcation aneurysm repair on 8/26/2020 with a slight occlusion of M2 25% who presented with nausea and vertigo for 4 days. Vertigo started 4 days ago and was intermittent brief episodes associated with closing her eyes, and change in position. However, today she started to feel more constant vertigo and was not able to complete her walk and presented to the ED. She also reports nausea without vomiting. She denies diplopia, numbness, weakness, or tinnitus. She reports feeling bandlike pressure over her head without headache. She reports that her blood pressure is usually very high with diastolic more than 429J. Her blood pressure in the ED was 105/65 which is not usual for her. Her blood pressure medications were uptitrated last month and lisinopril was increased from 20 mg to 40 mg daily. She is also on hydrochlorothiazide 25 mg daily. Orthostatic vital signs were done in the ED and showed no orthostatic hypotension.     Past Medical History:     Past Medical History:   Diagnosis Date    Cancer [facility]) 2018    left breast    Contact lens/glasses fitting     Hyperlipidemia     Hypertension     Macie Mark, CNP    Sleep apnea C-pap        Past Surgical History:     Past Surgical History:   Procedure Laterality Date    BACK SURGERY      neck fusion & L4-L5 fusion    BRAIN ANEURYSM SURGERY  2020    [employer] MRI CONDTIONAL 3T OK, SAFE IMMEDIATELY. IR ANGIOGRAM CAROTID CEREBRAL BILATERAL,,S/P coil embolization of cerebral aneurysm    BREAST SURGERY Left 2018    lumpectomy    CARPAL TUNNEL RELEASE Bilateral      SECTION      x 2, 1988 &     HYSTERECTOMY  2018    TUNNELED VENOUS PORT PLACEMENT      placed , removed 2019        Medications Prior to Admission:     Prior to Admission medications    Medication Sig Start Date End Date Taking? Authorizing Provider   aspirin 325 MG tablet Take 1 tablet by mouth daily 20   POPEYE Forman CNP   clopidogrel (PLAVIX) 75 MG tablet Take 1 tablet by mouth daily 20   POPEYE Forman CNP   Probiotic Product (PROBIOTIC DAILY PO) Take 1 tablet by mouth daily    Historical Provider, MD   lisinopril (PRINIVIL;ZESTRIL) 40 MG tablet Take 40 mg by mouth daily 20   Historical Provider, MD   clonazePAM (KLONOPIN) 0.5 MG tablet Take 0.5 mg by mouth 2 times daily as needed. 20   Historical Provider, MD   venlafaxine (EFFEXOR XR) 75 MG extended release capsule Take 75 mg by mouth daily 20   Historical Provider, MD   hydroCHLOROthiazide (HYDRODIURIL) 25 MG tablet Take 25 mg by mouth daily 20   Historical Provider, MD   Cholecalciferol (VITAMIN D3) 25 MCG (1000 UT) CAPS Take 2,000 Units by mouth daily    Historical Provider, MD   atorvastatin (LIPITOR) 10 MG tablet Take 10 mg by mouth daily    Historical Provider, MD   carvedilol (COREG) 12.5 MG tablet Take 12.5 mg by mouth 2 times daily (with meals)    Historical Provider, MD        Allergies:     Adhesive tape    Social History:     Tobacco:    reports that she has quit smoking. She has never used smokeless tobacco.  Alcohol:      has no history on file for alcohol.   Drug Use: reports no history of drug use. Family History:     Family History   Family history unknown: Yes       Review of Systems:     ROS:  Constitutional  Negative for fever and chills    HEENT  Negative for ear discharge, ear pain, nosebleed    Eyes  Negative for photophobia, pain and discharge    Respiratory  Negative for hemoptysis and sputum    Cardiovascular  Negative for orthopnea, claudication and PND    Gastrointestinal  Negative for abdominal pain, diarrhea, blood in stool . Positive for nauesa   Musculoskeletal  Negative for joint pain, positive for right medial thigh ache    Skin  Negative for rash or itching    Endo/heme/allergies  Negative for polydipsia, environmental allergy    Psychiatric/behavioral  Negative for suicidal ideation. Patient is not anxious        Physical Exam:   /69   Pulse 74   Temp 98.4 °F (36.9 °C) (Oral)   Resp 20   Ht 5' 5\" (1.651 m)   Wt 237 lb (107.5 kg)   SpO2 98%   BMI 39.44 kg/m²   Temp (24hrs), Av.4 °F (36.9 °C), Min:98.4 °F (36.9 °C), Max:98.4 °F (36.9 °C)    No results for input(s): POCGLU in the last 72 hours. No intake or output data in the 24 hours ending 20 1702    General Appearance:  alert, well appearing, and in no acute distress  HEENT:  normocephalic, atraumatic. Lungs: Bilateral equal air entry, clear to ausculation, no wheezing, rales or rhonchi, normal effort  Cardiovascular: normal rate, regular rhythm, no murmur, gallop, rub.   Abdomen: Soft, nontender, nondistended, normal bowel sounds, no hepatomegaly or splenomegaly    NEUROLOGIC EXAMINATION    MENTAL STATUS:  Alert, oriented, intact memory, no confusion, normal speech, normal language, no hallucination or delusion   CRANIAL NERVES: II     -      Visual fields intact to confrontation  III,IV,VI -  PERR, EOMs full, no ptosis  V     -     Normal facial sensation   VII    -     Normal facial symmetry  VIII   -     Intact hearing   IX,X -     Symmetrical palate  XI    -     Symmetrical shoulder shrug  XII   -     Midline tongue, no atrophy    MOTOR FUNCTION: RUE: Significant for good strength of grade 5/5 in proximal and distal muscle groups   LUE: Significant for good strength of grade 5/5 in proximal and distal muscle groups   RLE: Significant for good strength of grade 5/5 in proximal and distal muscle groups   LLE: Significant for good strength of grade 5/5 in proximal and distal muscle groups      Normal bulk, normal tone and no involuntary movements, no tremor   SENSORY FUNCTION:  Normal touch, normal pin, normal vibration, normal proprioception   CEREBELLAR FUNCTION:  Intact fine motor control over upper limbs and lower limbs   REFLEX FUNCTION:  Symmetric in upper and lower extremities, no Babinski sign   STATION and GAIT  Normal gait and tandem station, normal tip toes and heel walking  Negative fito-halpike maneuver  HINT exam: negative head impulse test, nonsustained horizontal unilateral nystagmus, negative test of skew      Investigations:      Laboratory Testing:  Recent Results (from the past 24 hour(s))   EKG 12 Lead    Collection Time: 09/02/20 10:48 AM   Result Value Ref Range    Ventricular Rate 67 BPM    Atrial Rate 67 BPM    P-R Interval 124 ms    QRS Duration 80 ms    Q-T Interval 418 ms    QTc Calculation (Bazett) 441 ms    R Axis 39 degrees    T Axis 46 degrees   CBC Auto Differential    Collection Time: 09/02/20 11:19 AM   Result Value Ref Range    WBC 8.9 3.5 - 11.3 k/uL    RBC 4.30 3.95 - 5.11 m/uL    Hemoglobin 13.9 11.9 - 15.1 g/dL    Hematocrit 40.8 36.3 - 47.1 %    MCV 94.9 82.6 - 102.9 fL    MCH 32.3 25.2 - 33.5 pg    MCHC 34.1 28.4 - 34.8 g/dL    RDW 12.8 11.8 - 14.4 %    Platelets 000 283 - 546 k/uL    MPV 9.5 8.1 - 13.5 fL    NRBC Automated 0.0 0.0 per 100 WBC    Differential Type NOT REPORTED     Seg Neutrophils 59 36 - 65 %    Lymphocytes 28 24 - 43 %    Monocytes 10 3 - 12 %    Eosinophils % 3 1 - 4 %    Basophils 0 0 - 2 %    Immature Granulocytes 0 0 %    Segs Absolute 5.16 1.50 - 8.10 k/uL    Absolute Lymph # 2.54 1.10 - 3.70 k/uL    Absolute Mono # 0.87 0.10 - 1.20 k/uL    Absolute Eos # 0.29 0.00 - 0.44 k/uL    Basophils Absolute 0.04 0.00 - 0.20 k/uL    Absolute Immature Granulocyte 0.04 0.00 - 0.30 k/uL    WBC Morphology NOT REPORTED     RBC Morphology NOT REPORTED     Platelet Estimate NOT REPORTED    Comprehensive Metabolic Panel    Collection Time: 09/02/20 11:19 AM   Result Value Ref Range    Glucose 99 70 - 99 mg/dL    BUN 17 6 - 20 mg/dL    CREATININE 0.51 0.50 - 0.90 mg/dL    Bun/Cre Ratio NOT REPORTED 9 - 20    Calcium 9.3 8.6 - 10.4 mg/dL    Sodium 136 135 - 144 mmol/L    Potassium 3.6 (L) 3.7 - 5.3 mmol/L    Chloride 100 98 - 107 mmol/L    CO2 25 20 - 31 mmol/L    Anion Gap 11 9 - 17 mmol/L    Alkaline Phosphatase 113 (H) 35 - 104 U/L    ALT 25 5 - 33 U/L    AST 21 <32 U/L    Total Bilirubin 0.41 0.3 - 1.2 mg/dL    Total Protein 7.5 6.4 - 8.3 g/dL    Alb 3.9 3.5 - 5.2 g/dL    Albumin/Globulin Ratio 1.1 1.0 - 2.5    GFR Non-African American >60 >60 mL/min    GFR African American >60 >60 mL/min    GFR Comment          GFR Staging NOT REPORTED    D-DIMER, QUANTITATIVE    Collection Time: 09/02/20 11:19 AM   Result Value Ref Range    D-Dimer, Quant 2.19 mg/L FEU       Imaging/Diagnostics:  MRI brain without contrast-MRA head with and without contrast  1. Unremarkable MRI brain. No evidence of an acute infarct. 2. Right MCA aneurysm bifurcation coiling with stent assistance. Most of the   aneurysm lumen is occluded. There are areas of increased signal along the   margins of the aneurysm, more pronounced laterally where residual filling   cannot entirely be excluded. 3. No other aneurysms are identified. Assessment :          Active Hospital Problems    Diagnosis Date Noted    Vertigo [R42] 09/02/2020     Vertigo, peripheral  Central causes were rule out with MRI brain.   Patient is significantly symptomatic and was kept in observation for that reason. Orthostatic vital signs are negative. Right MCA bifurcation aneurysm status post embolization   MRA of the head was done which showed patent artery and web device in place   Plan:     Patient status Admit as observation in the  Med/Surge    1. Meclizine 25 mg p.o. 3 times daily as needed for vertigo  2. Physical therapy and Occupational Therapy  3. IV fluids, 100 cc/h LR  4. Lovenox 40 mg subcu for DVT prophylaxis  5. Continue home blood pressure medications including lisinopril 40 mg daily and HCTZ 25 mg daily  6. Phenergan 12.5 mg every 6 hours PRN for nausea  7. Continue aspirin 325 mg daily and Plavix 80 mg daily      Consultations:   IP CONSULT TO NEUROSURGERY  IP CONSULT TO NEUROLOGY     Patient is admitted as inpatient status because of co-morbidities listed above, severity of signs and symptoms as outlined, requirement for current medical therapies and most importantly because of direct risk to patient if care not provided in a hospital setting.     Hilda Isaac MD  Neurology Resident PGY-3  9/2/2020 at 5:02 PM

## 2020-09-03 PROCEDURE — G0378 HOSPITAL OBSERVATION PER HR: HCPCS

## 2020-09-03 PROCEDURE — 99220 PR INITIAL OBSERVATION CARE/DAY 70 MINUTES: CPT | Performed by: PSYCHIATRY & NEUROLOGY

## 2020-09-03 PROCEDURE — 96376 TX/PRO/DX INJ SAME DRUG ADON: CPT

## 2020-09-03 PROCEDURE — 95819 EEG AWAKE AND ASLEEP: CPT | Performed by: PSYCHIATRY & NEUROLOGY

## 2020-09-03 PROCEDURE — 6360000002 HC RX W HCPCS: Performed by: STUDENT IN AN ORGANIZED HEALTH CARE EDUCATION/TRAINING PROGRAM

## 2020-09-03 PROCEDURE — 96372 THER/PROPH/DIAG INJ SC/IM: CPT

## 2020-09-03 PROCEDURE — 6370000000 HC RX 637 (ALT 250 FOR IP): Performed by: STUDENT IN AN ORGANIZED HEALTH CARE EDUCATION/TRAINING PROGRAM

## 2020-09-03 PROCEDURE — 99233 SBSQ HOSP IP/OBS HIGH 50: CPT | Performed by: PSYCHIATRY & NEUROLOGY

## 2020-09-03 PROCEDURE — 95819 EEG AWAKE AND ASLEEP: CPT

## 2020-09-03 PROCEDURE — 2580000003 HC RX 258: Performed by: STUDENT IN AN ORGANIZED HEALTH CARE EDUCATION/TRAINING PROGRAM

## 2020-09-03 RX ADMIN — ENOXAPARIN SODIUM 40 MG: 40 INJECTION SUBCUTANEOUS at 11:01

## 2020-09-03 RX ADMIN — ONDANSETRON 4 MG: 2 INJECTION INTRAMUSCULAR; INTRAVENOUS at 15:07

## 2020-09-03 RX ADMIN — Medication 1 CAPSULE: at 10:56

## 2020-09-03 RX ADMIN — CLOPIDOGREL 75 MG: 75 TABLET, FILM COATED ORAL at 10:59

## 2020-09-03 RX ADMIN — LISINOPRIL 40 MG: 10 TABLET ORAL at 10:53

## 2020-09-03 RX ADMIN — ASPIRIN 325 MG: 325 TABLET ORAL at 10:53

## 2020-09-03 RX ADMIN — CARVEDILOL 12.5 MG: 12.5 TABLET, FILM COATED ORAL at 10:58

## 2020-09-03 RX ADMIN — SODIUM CHLORIDE, POTASSIUM CHLORIDE, SODIUM LACTATE AND CALCIUM CHLORIDE: 600; 310; 30; 20 INJECTION, SOLUTION INTRAVENOUS at 13:06

## 2020-09-03 RX ADMIN — VENLAFAXINE HYDROCHLORIDE 75 MG: 75 CAPSULE, EXTENDED RELEASE ORAL at 10:57

## 2020-09-03 RX ADMIN — ATORVASTATIN CALCIUM 10 MG: 10 TABLET, FILM COATED ORAL at 10:59

## 2020-09-03 RX ADMIN — VITAMIN D, TAB 1000IU (100/BT) 2000 UNITS: 25 TAB at 10:57

## 2020-09-03 RX ADMIN — HYDROCHLOROTHIAZIDE 25 MG: 25 TABLET ORAL at 10:56

## 2020-09-03 RX ADMIN — CARVEDILOL 12.5 MG: 12.5 TABLET, FILM COATED ORAL at 17:17

## 2020-09-03 ASSESSMENT — PAIN SCALES - GENERAL: PAINLEVEL_OUTOF10: 0

## 2020-09-03 NOTE — CARE COORDINATION
Case Management Initial Discharge Plan  María Elena Raymond,             Met with:patient to discuss discharge plans. Information verified: address, contacts, phone number, , insurance Yes    Emergency Contact/Next of Kin name & number:   Geo Holman (daughter) [phone]    PCP: Manjit Arauz, POPEYE - CNP  Date of last visit: 1 month ago    Insurance Provider: [employer]    Discharge Planning    Living Arrangements:  Alone   Support Systems:  Children, Friends/Neighbors    Home apartment   4 stairs to climb to get into front door,   Location of bedroom/bathroom in home maain    Patient able to perform ADL's:Independent    Current Services (outpatient & in home) none  DME equipment: none  DME provider: n/a    Receiving oral anticoagulation therapy? No    If indicated:   Physician managing anticoagulation treatment: n/a  Where does patient obtain lab work for ATC treatment? n/a      Potential Assistance Needed:  N/A    Patient agreeable to home care: No  [place]armel of choice provided:  n/a    Prior SNF/Rehab Placement and Facility: none  Agreeable to SNF/Rehab: No  Carmel of choice provided: n/a     Evaluation: n/a    Expected Discharge date:  20    Patient expects to be discharged to:  Home  Follow Up Appointment: Best Day/ Time: Wednesday AM    Transportation provider: friend  Transportation arrangements needed for discharge: No    Readmission Risk              Risk of Unplanned Readmission:        0             Does patient have a readmission risk score greater than 14?: No  If yes, follow-up appointment must be made within 7 days of discharge. Goals of Care: return to prior level of function      Discharge Plan: Home independent, states a friend will be staying with her for a few days.           Electronically signed by Patrick Young RN on 9/3/20 at 3:55 PM EDT

## 2020-09-03 NOTE — PROGRESS NOTES
ENDOVASCULAR NEUROSURGERY PROGRESS NOTE  9/3/2020 7:54 AM  Subjective:   Admit Date: 9/2/2020  PCP: Paul Crain APRN - CNP    She is feeling better this morning. No nausea or vomiting. She is tolerating p.o. intake. Objective:   Vitals: /88   Pulse 65   Temp 98 °F (36.7 °C) (Oral)   Resp 14   Ht 5' 5\" (1.651 m)   Wt 237 lb (107.5 kg)   SpO2 93%   BMI 39.44 kg/m²   General appearance: Lying in bed, NAD. HEENT: Atraumatic. Neck: Neck is supple. Lungs: No respiratory distress noted. Abdomen: Soft nontender. Extremities: No lower limb edema noted. Groin: Groin looks clean, no hematoma noted. Neurologic:  awake, following simple commands appropriately, able to name simple objects, intact comprehension, no dysarthria noted. CN: Has intact extraocular muscles movements, no facial droop noted  MOTOR: Has good strength in both upper and lower extremities, moving both upper and lower extremities against gravity with no drift. SENSORY: Intact sensation to simple touch bilaterally in both upper and lower extremities. COORDINATION: Intact finger-nose test bilaterally with no dysmetria noted.     Medications and labs:   Scheduled Meds:   sodium chloride flush  10 mL Intravenous 2 times per day    enoxaparin  40 mg Subcutaneous Daily    aspirin  325 mg Oral Daily    atorvastatin  10 mg Oral Daily    carvedilol  12.5 mg Oral BID WC    Vitamin D  2,000 Units Oral Daily    clopidogrel  75 mg Oral Daily    hydroCHLOROthiazide  25 mg Oral Daily    lisinopril  40 mg Oral Daily    lactobacillus  1 capsule Oral Daily    venlafaxine  75 mg Oral Daily     Continuous Infusions:   lactated ringers 100 mL/hr at 09/02/20 1724     CBC:   Recent Labs     09/02/20  1119   WBC 8.9   HGB 13.9        BMP:    Recent Labs     09/02/20  1119      K 3.6*      CO2 25   BUN 17   CREATININE 0.51   GLUCOSE 99     Hepatic:   Recent Labs     09/02/20  1119   AST 21   ALT 25   BILITOT 0.41 ALKPHOS 113*     Troponin: No results for input(s): TROPONINI in the last 72 hours. BNP: No results for input(s): BNP in the last 72 hours. Lipids: No results for input(s): CHOL, HDL in the last 72 hours. Invalid input(s): LDLCALCU  INR: No results for input(s): INR in the last 72 hours. Assessment and Recommendations:   53 y.o. female with past medical history including hypertension, hyperlipidemia, sleep apnea, breat cancer s/p rx 2018, cervical spine fusion, right MCA bifurcation aneurysm S/p atlas y stent x2 assisted web embolism 8/26/2020 with slight occlusion of the parent M2 (25% occlusion). Pt presents with nausea and vertigo.     For the past 4 days (since 8/30/2020) pt has had episodes of room spinning vertigo and nausea lasting seconds, often induced on changing head positions. This would occur 1-2x/day. However today 9/2/2020 pt awoke, and on changing positions she had worse and more frequent episodes. She went to work and was not able to tolerate the symptoms, and presented to the ED. ROS reveals a sense of something on her head, but otherwise no headache, weakness, numbness, double vision, speech issues. This has never happened before, she does not have history of migraines or headaches. MRI brain is negative for acute pathology. MRA head and neck with residual filling of right MCA aneurysm noted. Etiology of current dizziness is likely peripheral.    She is improving. Exam is stable.       1. Continue dual antiplatelet therapy with aspirin and Plavix. 2. PT/OT evaluation. 3. Follow-up in the neuro endovascular clinic.         Quin Hurst MD  Stroke, [facility]  [id number] [place]  Electronically signed 9/3/2020 at 7:54 AM

## 2020-09-03 NOTE — PROGRESS NOTES
Neurology Resident Progress Note      SUBJECTIVE:  This is a 53 y.o.  female admitted 2020 for Vertigo [R42]  Vertigo [R42]  This is a follow-up neurology progress note. The patient was seen and examined and the chart was reviewed. Today this morning, the patient reported feeling much better in terms of her vertigo, still however, complained of brain fogginess in addition to her pressure-like sensation/headache. There was no nausea, no vomiting, no new onset of focal neurological symptoms. Vital signs review was normal with no orthostatic hypotension. No new complaints.  sodium chloride flush  10 mL Intravenous 2 times per day    enoxaparin  40 mg Subcutaneous Daily    aspirin  325 mg Oral Daily    atorvastatin  10 mg Oral Daily    carvedilol  12.5 mg Oral BID WC    Vitamin D  2,000 Units Oral Daily    clopidogrel  75 mg Oral Daily    hydroCHLOROthiazide  25 mg Oral Daily    lisinopril  40 mg Oral Daily    lactobacillus  1 capsule Oral Daily    venlafaxine  75 mg Oral Daily       Past Medical History:   Diagnosis Date    Cancer ([facility] [address] 2018    left breast    Contact lens/glasses fitting     Hyperlipidemia     Hypertension     Shiva Pool, CNP    Sleep apnea     C-pap       Past Surgical History:   Procedure Laterality Date    BACK SURGERY      neck fusion & L4-L5 fusion    BRAIN ANEURYSM SURGERY  2020    WEB [employer] EMOBLIZATION SYSTEM MRI CONDTIONAL 3T OK, SAFE IMMEDIATELY. IR ANGIOGRAM CAROTID CEREBRAL BILATERAL,,S/P coil embolization of cerebral aneurysm    BREAST SURGERY Left 2018    lumpectomy    CARPAL TUNNEL RELEASE Bilateral      SECTION      x 2,  &     HYSTERECTOMY  2018    TUNNELED VENOUS PORT PLACEMENT      placed , removed 2019       PHYSICAL EXAM:      Blood pressure 129/88, pulse 65, temperature 98 °F (36.7 °C), temperature source Oral, resp. rate 14, height 5' 5\" (1.651 m), weight 237 lb (107.5 kg), SpO2 93 %.      General Examination General Awake   Head Normocephalic, without obvious abnormality, atraumatic   Neck Supple, symmetrical, trachea midline, No mass   Lungs Respirations unlabored   Chest Wall No deformity   Heart Regular rate and rhythm   Abdomen No masses   Extremities Extremities normal, atraumatic, no cyanosis or edema     Pulses 2+ and symmetric   Skin: Skin color, texture, turgor normal, no rashes or lesions             Mental status   Alert and oriented; intact memory with no confusion, speech or language problems; no hallucinations or delusions     Cranial nerves   II - visual fields intact to confrontation                                                III, IV, VI - extra-ocular muscles full: no pupillary defect; no RAFAEL, no nystagmus, no ptosis                                                                      V - normal facial sensation                                                               VII - normal facial symmetry                                                             VIII - intact hearing                                                                             IX, X - symmetrical palate                                                                  XI - symmetrical shoulder shrug                                                       XII - midline tongue without atrophy or fasciculation     Motor function  Normal muscle bulk and tone; normal power 5/5, including fine motor movements     Sensory function Intact to touch, pin, vibration, proprioception     Cerebellar Intact fine motor movement.  No involuntary movements or tremors     Reflex function Intact 2+ DTR and symmetric with no pathologic reflex or Babinski sign     Gait                  Normal station and gait             DATA      Lab Results   Component Value Date    WBC 8.9 09/02/2020    HGB 13.9 09/02/2020    HCT 40.8 09/02/2020     09/02/2020    ALT 25 09/02/2020    AST 21 09/02/2020     09/02/2020    K 3.6 (L) 09/02/2020  09/02/2020    CREATININE 0.51 09/02/2020    BUN 17 09/02/2020    CO2 25 09/02/2020         ASSESSMENT:  53years old female patient presented with nausea vomiting and vertigo 4 days prior to admission. Patient admitted for management of vertigo in addition to the nausea as patient was significantly symptomatic and the vertigo interfered with her daily activities. Patient did not have any vertigo like this before. It was associated and was increased when closing her eyes, change in body posture and rapid head movements. Patient denied any tinnitus any diplopia or hearing loss. No motor or sensory deficits. History was positive for pressure band sensation/headache. Past medical history significant for uncontrolled hypertension for which the patient had an increase for her lisinopril from 20 to 40 mg and hydrochlorothiazide 25 mg daily. Hyperlipidemia, obstructive sleep apnea, breast cancer status post resection in 2018 and 4 cycles of chemotherapy back in 2018, cervical spine fusion and a recent right MCA aneurysm repair on 26 August 2020. Investigations and labs:  -MRA: Showed patent arteries in addition to what device implanted in place.  -Brain MRI: Negative for strokes, right MCA aneurysm bifurcation coiling with stent assistance most of the aneurysm lumen is occluded. -MRA neck: negative   -brain CT: Normal.  -CBC and BMP were normal.    PLAN:    1. Vertigo:   -Given patient's history and physical examination, this vertigo most likely represents be BPPV. The conclusion of peripheral vertigo is likely in the light of negative MRA, brain MRI, brain CT and MRA neck. Patient is to continue on meclizine 25 mg 3 times daily as needed, and will be educated on Epley's maneuver. Nausea and vomiting were controlled with as needed Zofran in addition to promethazine.  -Cardiac etiology was ruled out with negative findings on EKG.   -Anemia was ruled out with normal CBC, BMP was normal.    2. Brain fogginess; R/O underlying seizures:  -Ordered EEG, awaiting final results. If EEG was negative, patient can be discharged on meclizine 25 mg 3 times daily as needed in addition to education on Epley's maneuver. -PT/OT clearance for discharge.    -Continue on patient's medications.  -Follow-up with neurology outpatient after clearance with PT OT.     Electronically signed by Bruce Boateng MD on 9/3/2020 at 8:20 AM

## 2020-09-04 VITALS
SYSTOLIC BLOOD PRESSURE: 127 MMHG | HEIGHT: 65 IN | DIASTOLIC BLOOD PRESSURE: 83 MMHG | BODY MASS INDEX: 39.49 KG/M2 | RESPIRATION RATE: 14 BRPM | WEIGHT: 237 LBS | OXYGEN SATURATION: 97 % | TEMPERATURE: 98.2 F | HEART RATE: 70 BPM

## 2020-09-04 LAB
EKG ATRIAL RATE: 67 BPM
EKG P-R INTERVAL: 124 MS
EKG Q-T INTERVAL: 418 MS
EKG QRS DURATION: 80 MS
EKG QTC CALCULATION (BAZETT): 441 MS
EKG R AXIS: 39 DEGREES
EKG T AXIS: 46 DEGREES
EKG VENTRICULAR RATE: 67 BPM

## 2020-09-04 PROCEDURE — 97535 SELF CARE MNGMENT TRAINING: CPT

## 2020-09-04 PROCEDURE — 99233 SBSQ HOSP IP/OBS HIGH 50: CPT | Performed by: PSYCHIATRY & NEUROLOGY

## 2020-09-04 PROCEDURE — G0378 HOSPITAL OBSERVATION PER HR: HCPCS

## 2020-09-04 PROCEDURE — 97165 OT EVAL LOW COMPLEX 30 MIN: CPT

## 2020-09-04 PROCEDURE — 99217 PR OBSERVATION CARE DISCHARGE MANAGEMENT: CPT | Performed by: PSYCHIATRY & NEUROLOGY

## 2020-09-04 RX ORDER — MECLIZINE HYDROCHLORIDE 25 MG/1
25 TABLET ORAL 3 TIMES DAILY PRN
Qty: 30 TABLET | Refills: 0 | Status: SHIPPED | OUTPATIENT
Start: 2020-09-04 | End: 2020-09-14

## 2020-09-04 ASSESSMENT — PAIN SCALES - GENERAL: PAINLEVEL_OUTOF10: 0

## 2020-09-04 NOTE — PROCEDURES
EEG REPORT       Patient: Court Flowers Age: 53 y.o. MRN: [id number]    Date: 9/2/2020  Referring Provider: No ref. provider found    History: This routine 30 minute scalp EEG was recorded with video- monitoring for a 53 y.o. Prem Royal female  who presented with encephalopathy. This EEG was performed to evaluate for focal and epileptiform abnormalities.      Lulu Maldonadowill   Current Facility-Administered Medications   Medication Dose Route Frequency Provider Last Rate Last Dose    sodium chloride flush 0.9 % injection 10 mL  10 mL Intravenous 2 times per day Chastity Palencia MD   Stopped at 09/03/20 1107    sodium chloride flush 0.9 % injection 10 mL  10 mL Intravenous PRN Chasttiy Palencia MD        acetaminophen (TYLENOL) tablet 650 mg  650 mg Oral Q6H PRN Chastity Palencia MD        Or    acetaminophen (TYLENOL) suppository 650 mg  650 mg Rectal Q6H PRN Chastity Palencia MD        polyethylene glycol [facility]) packet 17 g  17 g Oral Daily PRN Chastity Palencia MD        promethazine (PHENERGAN) tablet 12.5 mg  12.5 mg Oral Q6H PRN Chastity Palencia MD        Or    ondansetron [facility]) injection 4 mg  4 mg Intravenous Q6H PRN Chastity Palencia MD   4 mg at 09/03/20 1507    enoxaparin (LOVENOX) injection 40 mg  40 mg Subcutaneous Daily Chastity Palencia MD   40 mg at 09/03/20 1101    aspirin tablet 325 mg  325 mg Oral Daily Chastity Palencia MD   325 mg at 09/03/20 1053    atorvastatin (LIPITOR) tablet 10 mg  10 mg Oral Daily Chastity Palencia MD   10 mg at 09/03/20 1059    carvedilol (COREG) tablet 12.5 mg  12.5 mg Oral BID WC Chastity Palencia MD   12.5 mg at 09/03/20 1717    Vitamin D (CHOLECALCIFEROL) tablet 2,000 Units  2,000 Units Oral Daily Chastity Palencia MD   2,000 Units at 09/03/20 1057    clonazePAM (KLONOPIN) tablet 0.5 mg  0.5 mg Oral BID PRN Chastity Palencia MD        clopidogrel (PLAVIX) tablet 75 mg  75 mg Oral Daily Chastity Palencia MD   75 mg at 09/03/20 1053    hydroCHLOROthiazide (HYDRODIURIL) tablet 25 mg  25 mg Oral Daily Melba Huffman MD   25 mg at 09/03/20 1056    lisinopril (PRINIVIL;ZESTRIL) tablet 40 mg  40 mg Oral Daily Melba Huffman MD   40 mg at 09/03/20 1053    lactobacillus (CULTURELLE) capsule 1 capsule  1 capsule Oral Daily Melba Huffman MD   1 capsule at 09/03/20 1056    venlafaxine (EFFEXOR XR) extended release capsule 75 mg  75 mg Oral Daily Melba Huffman MD   75 mg at 09/03/20 1057    meclizine (ANTIVERT) tablet 25 mg  25 mg Oral TID PRN Melba Huffman MD   25 mg at 09/02/20 1164        Technical Description: This is a 21 channel digital EEG recording with time-locked video. Electrodes were placed in accordance with the 10-20 International System of Electrode Placement. Single lead EKG monitoring as well as temporal electrodes were included. The patient was not sleep deprived. This recording was obtained during wakefulness. EEG Description: The dominant background activity during maximal recorded wakefulness consisted of bioccipitally dominant 9-10 Hz, 25-35 uV symmetric, regular activity that was reactive to eye opening. During drowsiness, the background rhythm waxed and waned and there were periods of slowing. During stage II sleep symmetric V waves, K complexes, and sleep spindles were seen. Photic stimulation - stepwise photic stimulation at 2-30 Hz was performed and there was a biposterior, symmetric, driving response. Hyperventilation - was not preformed. No abnormalities were activated by photic stimulation     The EKG channel demonstrated a normal sinus rhythm. Interpretation  This EEG was normal in wakefulness and sleep. Clinical correlation  This EEG was normal. No focal or epileptiform abnormalities were seen.     Richie Ng MD  Diplomate, American Board of Psychiatry and Neurology  Diplomate, American Board of Clinical Neurophysiology  Diplomate, American Board of Epilepsy

## 2020-09-04 NOTE — PROGRESS NOTES
(1.651 m), weight 237 lb (107.5 kg), SpO2 97 %. General Examination    General Awake   Head Normocephalic, without obvious abnormality, atraumatic   Neck Supple, symmetrical, trachea midline, No mass   Lungs Respirations unlabored   Chest Wall No deformity   Heart Regular rate and rhythm   Abdomen No masses   Extremities Extremities normal, atraumatic, no cyanosis or edema     Pulses 2+ and symmetric   Skin: Skin color, texture, turgor normal, no rashes or lesions             Mental status   Alert and oriented; intact memory with no confusion, speech or language problems; no hallucinations or delusions     Cranial nerves   II - visual fields intact to confrontation                                                III, IV, VI - extra-ocular muscles full: no pupillary defect; no RAFAEL, no nystagmus, no ptosis                                                                      V - normal facial sensation                                                               VII - normal facial symmetry                                                             VIII - intact hearing                                                                             IX, X - symmetrical palate                                                                  XI - symmetrical shoulder shrug                                                       XII - midline tongue without atrophy or fasciculation     Motor function  Normal muscle bulk and tone; normal power 5/5, including fine motor movements     Sensory function Intact to touch, pin, vibration, proprioception     Cerebellar Intact fine motor movement.  No involuntary movements or tremors     Reflex function Intact 2+ DTR and symmetric with no pathologic reflex or Babinski sign     Gait                  Normal station and gait             DATA      Lab Results   Component Value Date    WBC 8.9 09/02/2020    HGB 13.9 09/02/2020    HCT 40.8 09/02/2020     09/02/2020    ALT 25 09/02/2020    AST 21 09/02/2020     09/02/2020    K 3.6 (L) 09/02/2020     09/02/2020    CREATININE 0.51 09/02/2020    BUN 17 09/02/2020    CO2 25 09/02/2020       ASSESSMENT:  53years old female patient presented with nausea vomiting and vertigo 4 days prior to admission. Patient admitted for management of vertigo in addition to the nausea as patient was significantly symptomatic and the vertigo interfered with her daily activities. Patient did not have any vertigo like this before. It was associated and was increased when closing her eyes, change in body posture and rapid head movements. Patient denied any tinnitus any diplopia or hearing loss. No motor or sensory deficits. History was positive for pressure band sensation/headache. Past medical history significant for uncontrolled hypertension for which the patient had an increase for her lisinopril from 20 to 40 mg and hydrochlorothiazide 25 mg daily. Hyperlipidemia, obstructive sleep apnea, breast cancer status post resection in 2018 and 4 cycles of chemotherapy back in 2018, cervical spine fusion and a recent right MCA aneurysm repair on 26 August 2020. Investigations and labs:  -MRA: Showed patent arteries in addition to what device implanted in place.  -Brain MRI: Negative for strokes, right MCA aneurysm bifurcation coiling with stent assistance most of the aneurysm lumen is occluded. -MRA neck: negative   -brain CT: Normal.  -CBC and BMP were normal.    PLAN:    1. Vertigo:   -Given patient's history and physical examination, this vertigo most likely represents be BPPV. The conclusion of peripheral vertigo is likely in the light of negative MRA, brain MRI, brain CT and MRA neck. Patient is to continue on meclizine 25 mg 3 times daily as needed, and will be educated on Epley's maneuver.   Nausea and vomiting were controlled with as needed Zofran in addition to promethazine.  -Cardiac etiology was ruled out with negative findings

## 2020-09-04 NOTE — PROGRESS NOTES
ENDOVASCULAR NEUROSURGERY PROGRESS NOTE  9/4/2020 9:28 AM  Subjective:   Admit Date: 9/2/2020  PCP: POPEYE García CNP    No new acute events. She is feeling better this morning. OT to evaluate. Objective:   Vitals: /83   Pulse 70   Temp 98.2 °F (36.8 °C) (Oral)   Resp 14   Ht 5' 5\" (1.651 m)   Wt 237 lb (107.5 kg)   SpO2 97%   BMI 39.44 kg/m²   General appearance: Lying in bed, NAD. HEENT: Atraumatic. Lungs: No respiratory distress noted. Abdomen: Soft nontender. Extremities: No lower limb edema noted. Neurologic:  awake, following simple commands appropriately, able to name simple objects, intact comprehension, no dysarthria noted. CN: Has intact extraocular muscles movements, no facial droop noted  MOTOR: Has good strength in both upper and lower extremities, moving both upper and lower extremities against gravity with no drift. SENSORY: Intact sensation to simple touch bilaterally in both upper and lower extremities. Medications and labs:   Scheduled Meds:   sodium chloride flush  10 mL Intravenous 2 times per day    enoxaparin  40 mg Subcutaneous Daily    aspirin  325 mg Oral Daily    atorvastatin  10 mg Oral Daily    carvedilol  12.5 mg Oral BID WC    Vitamin D  2,000 Units Oral Daily    clopidogrel  75 mg Oral Daily    hydroCHLOROthiazide  25 mg Oral Daily    lisinopril  40 mg Oral Daily    lactobacillus  1 capsule Oral Daily    venlafaxine  75 mg Oral Daily     Continuous Infusions:    CBC:   Recent Labs     09/02/20  1119   WBC 8.9   HGB 13.9        BMP:    Recent Labs     09/02/20  1119      K 3.6*      CO2 25   BUN 17   CREATININE 0.51   GLUCOSE 99     Hepatic:   Recent Labs     09/02/20  1119   AST 21   ALT 25   BILITOT 0.41   ALKPHOS 113*     Troponin: No results for input(s): TROPONINI in the last 72 hours. BNP: No results for input(s): BNP in the last 72 hours.   Lipids: No results for input(s): CHOL, HDL in the last 72 hours.    Invalid input(s): LDLCALCU  INR: No results for input(s): INR in the last 72 hours. Assessment and Recommendations:   53 y.o. female with past medical history including hypertension, hyperlipidemia, sleep apnea, breat cancer s/p rx 2018, cervical spine fusion, right MCA bifurcation aneurysm S/p atlas y stent x2 assisted web embolism 8/26/2020 with slight occlusion of the parent M2 (25% occlusion). Pt presents with nausea and vertigo.     For the past 4 days (since 8/30/2020) pt has had episodes of room spinning vertigo and nausea lasting seconds, often induced on changing head positions. This would occur 1-2x/day. However today 9/2/2020 pt awoke, and on changing positions she had worse and more frequent episodes. She went to work and was not able to tolerate the symptoms, and presented to the ED. ROS reveals a sense of something on her head, but otherwise no headache, weakness, numbness, double vision, speech issues. This has never happened before, she does not have history of migraines or headaches. MRI brain is negative for acute pathology. MRA head and neck with residual filling of right MCA aneurysm noted. Etiology of current dizziness is likely peripheral.    She is improving. Exam is stable. PT/OT evaluation. No new acute events.     1. Continue dual antiplatelet therapy with aspirin and Plavix. 2. PT/OT evaluation. 3. Follow-up in the neuro endovascular clinic.       Sapphire Damon MD  Stroke, [facility]  [address]  Electronically signed 9/4/2020 at 9:28 AM

## 2020-09-04 NOTE — PLAN OF CARE
Problem: Safety:  Goal: Free from accidental physical injury  Description: Free from accidental physical injury  9/4/2020 0506 by Cristofer Almaguer RN  Outcome: Met This Shift  9/3/2020 1900 by Ignacio De Dios RN  Outcome: Ongoing  9/3/2020 1741 by Jacques Lema RN  Outcome: Ongoing     Problem: Daily Care:  Goal: Daily care needs are met  Description: Daily care needs are met  9/4/2020 0506 by Cristofer Almaguer RN  Outcome: Met This Shift  9/3/2020 1900 by Ignacio De Dios RN  Outcome: Ongoing  9/3/2020 1741 by Jacques Lema RN  Outcome: Ongoing     Problem: Discharge Planning:  Goal: Patients continuum of care needs are met  Description: Patients continuum of care needs are met  9/4/2020 0506 by Cristofer Almaguer RN  Outcome: Ongoing  9/3/2020 1900 by Ignacio De Dios RN  Outcome: Ongoing  9/3/2020 1741 by Jacques Lema RN  Outcome: Ongoing

## 2020-09-04 NOTE — PROGRESS NOTES
Care  Patient Education: Pt ed on OT POC, safety awareness tech, proper hand placement for transfers, with good return  No Skilled OT: Independent with functional mobility; Independent with ADL's;No OT goals identified  REQUIRES OT FOLLOW UP: No  Activity Tolerance  Activity Tolerance: Patient Tolerated treatment well  Safety Devices  Safety Devices in place: Yes  Type of devices: Call light within reach; Left in chair  Restraints  Initially in place: No         Patient Diagnosis(es): The encounter diagnosis was Dizziness. has a past medical history of Cancer ([facility] [address] Contact lens/glasses fitting, Hyperlipidemia, Hypertension, and Sleep apnea. has a past surgical history that includes Breast surgery (Left, 2018); Tunneled venous port placement; Hysterectomy (2018); Carpal tunnel release (Bilateral);  section; back surgery; and Brain aneurysm surgery (2020). Treatment Diagnosis: Vertigo      Restrictions  Restrictions/Precautions  Restrictions/Precautions: Up as Tolerated  Required Braces or Orthoses?: No  Position Activity Restriction  Other position/activity restrictions: up with assistance    Subjective   General  Patient assessed for rehabilitation services?: Yes  Family / Caregiver Present: No  Patient Currently in Pain: No  Pain Assessment  Pain Assessment: 0-10  Pain Level: 0  Vital Signs  Patient Currently in Pain: No  Oxygen Therapy  O2 Device: None (Room air)  Social/Functional History  Social/Functional History  Lives With: Alone  Type of Home: House(2nd floor condo)  Home Layout: One level  Home Access: Stairs to enter with rails  Entrance Stairs - Number of Steps: 3 steps to enter building with R hand rail.  One flight of steps to 2nd floor with B rails  Entrance Stairs - Rails: Both  Bathroom Shower/Tub: Tub/Shower unit, Curtain  Bathroom Toilet: Standard(pt can use sink if needed on R side)  Bathroom Equipment: Shower chair, Hand-held shower(pt reports shower seat is stored away, does

## 2020-09-07 NOTE — DISCHARGE SUMMARY
[facility]    Discharge Summary     Patient ID: Chip Nix  :  1970   MRN: [id number]     ACCOUNT:  [de-identified]   Patient's PCP: POPEYE Reynolds CNP  Admit Date: 2020   Discharge Date: 2020    Length of Stay: 0  Code Status:  Prior  Admitting Physician: Sahil Che MD  Discharge Physician: Eduarda Verma MD     Active Discharge Diagnoses:       Primary Problem  <principal problem not specified>      [place] Problems    Diagnosis Date Noted    Family history of brain aneurysm [Z82.49]     History of cervical spinal surgery [Z98.890]     History of breast cancer [Z85.3]     Dizziness [R42] 2020       Admission Condition:  fair     Discharged Condition: good    Hospital Stay:       Hospital Course:  Chip Nix is a 53 y.o. female who was admitted for the management of   <principal problem not specified> , presented to ER with Dizziness (brain aneurysm surgery one week ago with stents. denies numbness or tingling. )    Patient was severely symptomatic and was admitted for symptomatic treatment and for further workup. Brain imaging with Brain CT and MRI/MRA didn't show any acute changes and no strokes or bleeding detected. Patient was given meclizine 25 mg orally once daily during hospitalization in addition to 25 mg orally TID PRN. Promethazine in addition to Zofran for nausea and vomiting control that were associated with patient's dizziness. EEG was done during hospitalization to R/O Seizures after the patient complained of brain fogginess. BPPV was presumed as a diagnosis and patient was discharged on meclizine 25 mg orally TID PRN with Epley's maneuver  Given her history of brain aneurysm, the patient was instructed to follow up with neurovasular department.          Significant therapeutic interventions:     Significant Diagnostic Studies:   Labs / Micro:  CBC:   Lab Results allergic reaction; consent form was obtained. Indication and Clinical History: ?46-year-old female with past medical history including hypertension, hyperlipidemia, sleep apnea. He has a right MCA bifurcation aneurysm. She is undergoing a right MCA aneurysm treatment. She is on dual antiplatelet therapy with aspirin and Plavix. Anesthesia: General Endotracheal Anesthesia. Description of Procedure: the patient was brought to the interventional suite by the anesthesia team and placed in a supine position on the angio table and general anesthesia is administered. The right groin region was prepped, draped and cleaned in a sterile fashion. The right common femoral artery was accessed via single wall needle technique and a 6F x 10 cm short introducer sheath was placed. 70 units/kg of Heparin was given. A 6 [place] 90cm shuttle was advanced into the right common carotid artery under fluoroscopic guidance and images were obtained in biplane projections of the cervical carotid artery. Then the right internal carotid artery was selectively catheterized under fluoroscopic and roadmap guidance and images were obtained in biplane projections of the anterior circulation. 3-D rotational right internal carotid cerebral angiogram: A 3-D rotation of cerebral angiogram was performed with a right internal carotid artery injection using a power injector to better visualize the aneurysmal morphology and for surgical planning. This was interpreted on a separate workstation. Information obtained from the 3-D rotational angiogram could not be obtained from other noninvasive diagnostic studies done prior to the procedure. ? The Guide catheter was advanced into the petrous portion of the right internal carotid artery under fluoroscopic guidance and the working view was obtained. ? Subsequently, A VIA 27 micro-catheter was advanced and placed over Synchro2 . [facility] through [place] intermediate cathter through Shuttle catheter into the aneurysm lumen and was parked in the aneurysm. Subsequently, SL-10micro-catheter was advanced and placed over Synchro2 . [address] through [place] intermediate cathter through Shuttle into the right MCA M2 inferior division lumen. Then the Neuroform [place] 4.5 x 21 mm without TIP stent was advanced through the SL 10 microcatheter and was successfully achieved/deployed across the aneurysm neck. Then SL-10micro-catheter was removed. Then through the VIA 27 micro-catheter the web device was advanced and deployed sequentially. ? Then the SL-10micro-catheter was advanced and placed over Synchro2 . [address] through [place] intermediate cathter through Shuttle into the right MCA M2 superior division lumen. Then the Neuroform [place] 4.5 x 30 mm without TIP stent was advanced through the SL 10 microcatheter and was successfully achieved/deployed across the aneurysm neck. Then SL-10micro-catheter was removed. Super-selective intra-procedure internal carotid artery cerebral angiograms were obtained. After reviewing the final images, the guide catheter was removed from the internal carotid artery under fluoroscopic guidance. Right common femoral artery angiogram was obtained and revealed the puncture site and the artery size to be suitable for  closure device use. Hemostasis was maintained using 6 [place] Angio-Seal device, with good result and intact distal pulses. Findings: The right common carotid cervical angiogram demonstrated minimal atherosclerosis at the carotid artery bifurcation. The right intracranial ICA images demonstrated normal anterograde flow into the middle cerebral arteries with no evidence of vasospasm or narrowing or vascular occlusion. The MCA, M1 bifurcation aneurysm is noted measuring approximately 5.33 x 7.4 by X 6.12 and neck 6.37mm. Normal capillary and venous phases was noted.  Trans-arterial Web Embolization of the [right] MCA Cerebral Artery Aneurysm: the VIA 27 micro-catheter was placed in the aneurysm lumen under fluoroscopic guidance and was used to deploy Web device SL 8 x 4 mm under fluoroscopic and roadmap guidance. Super-selective intra-procedure internal carotid artery cerebral angiograms were obtained, and demonstrated progressive occlusion of the aneurysm without any evidence of a clot formation or branch occlusion to Web Occlusion Scale (WOS): D. Disposition: the patient was extubated and transported to NSICU in stable clinical and hemodynamic conditions. Complications: None immediate. Impression: 1. Right MCA M1 bifurcation aneurysm projecting inferiorly and anteriorly measuring approximately 5.33 x 7.4 by X 6.12 mm and neck size 6.37 mm. 2. The above mentioned aneurysm is treated with Web device SL 8 x 4 mm that resulted in Web Occlusion Scale: D. This was combined with a stent assisted coiling using Y stenting 4.5 x 21 mm and 4.5 x 30 mm Neuroform [place] stents. 3. No evidence of arterial vasospasm or occlusion. Dr. Sorin Mendoza dictated this invasive procedure. Radhika Jo was present for all procedural and imaging components of this case. Examination was reviewed and reported findings confirmed and edited by Radhika Jo.  supervised and interpreted this procedure. Final report electronically signed by Carol Thomas M.D. on 9/1/2020 10:30 AM    Xr Chest Portable    Result Date: 9/2/2020  EXAMINATION: ONE XRAY VIEW OF THE CHEST 9/2/2020 11:17 am COMPARISON: None HISTORY: ORDERING SYSTEM PROVIDED HISTORY: dizziness, recent surgery TECHNOLOGIST PROVIDED HISTORY: dizziness, recent surgery Reason for Exam: dizzy, recent surgery 1 wk ago FINDINGS: Clear lungs. No definite findings of pneumothorax or pleural effusion. Normal mediastinal, hilar, and cardiac contours. Partially included changes of anterior cervical spine fusion. Surgical clips projecting over the left axilla. No acute fracture. No acute cardiopulmonary process.      Vl Dup Lower Extremity Venous Right    Result Date: +-------------------+----+-------------------------------------------------+   - The patient's risk factor(s) include: dyslipidemia and arterial     hypertension.   - The patient has a former tobacco history. - The patient has breast cancer. Velocities are measured in cm/s ; Diameters are measured in cm Right Lower Extremities DVT Study Measurements Right 2D Measurements +------------------------------------+----------+---------------+----------+ ! Location                            ! Visualized! Compressibility! Thrombosis! +------------------------------------+----------+---------------+----------+ ! Common Femoral                      !Yes       ! Yes            ! None      ! +------------------------------------+----------+---------------+----------+ ! Prox Femoral                        !Yes       ! Yes            ! None      ! +------------------------------------+----------+---------------+----------+ ! Mid Femoral                         !Yes       ! Yes            ! None      ! +------------------------------------+----------+---------------+----------+ ! Dist Femoral                        !Yes       ! Yes            ! None      ! +------------------------------------+----------+---------------+----------+ ! Deep Femoral                        !Yes       ! Yes            ! None      ! +------------------------------------+----------+---------------+----------+ ! Popliteal                           !Yes       ! Yes            ! None      ! +------------------------------------+----------+---------------+----------+ ! [place]o Femoral Junction            ! Yes       ! Yes            ! None      ! +------------------------------------+----------+---------------+----------+ ! PTV                                 ! Yes       ! Yes            ! None      ! +------------------------------------+----------+---------------+----------+ ! Peroneal                            !Yes       ! Yes            ! None      ! +------------------------------------+----------+---------------+----------+ ! Gastroc                             ! Yes       ! Yes            ! None      ! +------------------------------------+----------+---------------+----------+ ! GSV Thigh                           ! Yes       ! Yes            ! None      ! +------------------------------------+----------+---------------+----------+ ! GSV Knee                            ! Yes       ! Yes            ! None      ! +------------------------------------+----------+---------------+----------+ ! GSV Ankle                           ! Yes       ! Yes            ! None      ! +------------------------------------+----------+---------------+----------+ ! SSV                                 ! Yes       ! Yes            ! None      ! +------------------------------------+----------+---------------+----------+ Right Doppler Measurements +---------------------------+------+------+--------------------------------+ ! Location                   ! Signal!Reflux! Reflux (msec)                   ! +---------------------------+------+------+--------------------------------+ ! Common Femoral             !Phasic!      !                                ! +---------------------------+------+------+--------------------------------+ ! Prox Femoral               !Phasic!      !                                ! +---------------------------+------+------+--------------------------------+ ! Popliteal                  !Phasic!      !                                ! +---------------------------+------+------+--------------------------------+    Cta Head W Contrast    Result Date: 8/19/2020  EXAMINATION: CTA OF THE HEAD WITH CONTRAST 8/19/2020 9:18 am: TECHNIQUE: CTA of the head/brain was performed with the administration of intravenous contrast. Multiplanar reformatted images are provided for review. MIP images are provided for review.  Dose modulation, iterative reconstruction, and/or weight based adjustment of the mA/kV was utilized to reduce the radiation dose to as low as reasonably achievable. COMPARISON: None. HISTORY: ORDERING SYSTEM PROVIDED HISTORY: Cerebral aneurysm TECHNOLOGIST PROVIDED HISTORY: right MCA aneurysm Reason for Exam: right MCA aneurysm Acuity: Unknown Type of Exam: Unknown FINDINGS: ANTERIOR CIRCULATION: No significant stenosis of the intracranial internal carotid, anterior cerebral, or middle cerebral arteries. There is an aneurysm at the right middle cerebral artery trifurcation measuring 1.0 x 0.8 cm. POSTERIOR CIRCULATION: No significant stenosis of the vertebral, basilar, or posterior cerebral arteries. No aneurysm. OTHER: No dural venous sinus thrombosis on this non-dedicated study. BRAIN: No mass effect or midline shift. No extra-axial fluid collection. The gray-white differentiation is maintained. Aneurysm at the right middle cerebral artery trifurcation measuring 1.0 x 0.8 cm. Mra Neck Wo Contrast    Result Date: 9/2/2020  EXAMINATION: MRA OF THE NECK WITHOUT CONTRAST 9/2/2020 2:38 pm TECHNIQUE: Multiplanar multisequence MRA of the neck was performed without the administration of intravenous contrast. Stenosis of the internal carotid arteries measured using NASCET criteria. COMPARISON: CT brain September 2, 2020 and CTA head August 19, 2020 HISTORY: ORDERING SYSTEM PROVIDED HISTORY: new onset of vertigo and dizziness TECHNOLOGIST PROVIDED HISTORY: new onset of vertigo and dizziness Is the patient pregnant?->No FINDINGS: AORTIC ARCH/ARCH VESSELS: No dissection or arterial injury. No significant stenosis of the brachiocephalic or subclavian arteries. CAROTID ARTERIES: No dissection, arterial injury, or hemodynamically significant stenosis by NASCET criteria. VERTEBRAL ARTERIES: No dissection, arterial injury, or significant stenosis.      Normal MRA neck     Mri Brain Wo Contrast    Result Date: 9/2/2020  EXAMINATION: MRI OF THE BRAIN WITHOUT CONTRAST AND MRA HEAD WITHOUT CONTRAST 9/2/2020 2:38 pm TECHNIQUE: Multiplanar multisequence MRI of the brain was performed without the administration of intravenous contrast. MRA of the head was performed utilizing time-of-flight imaging with MIP images. No intravenous contrast was administered. COMPARISON: 08/19/2020 HISTORY: ORDERING SYSTEM PROVIDED HISTORY: vertigo, recent aneurysm stenting TECHNOLOGIST PROVIDED HISTORY: vertigo, recent aneurysm stenting Is the patient pregnant?->No; ORDERING SYSTEM PROVIDED HISTORY: new onset vertigo, recent aneursym stenting TECHNOLOGIST PROVIDED HISTORY: new onset vertigo, recent aneursym stenting Is the patient pregnant?->No FINDINGS: MRI BRAIN: INTRACRANIAL STRUCTURES/VENTRICLES: There are no areas of restricted diffusion to suggest an acute infarct. The cerebral and cerebellar parenchyma demonstrate normal volume without areas of abnormal signal.  No abnormal extra-axial fluid collections. The ventricles are normal in size. Normal major intracranial flow voids are noted. There is susceptibility artifact related to the aneurysm coils near the right MCA bifurcation. No other areas of susceptibility weighted artifact are identified. ORBITS: The visualized portion of the orbits demonstrate no acute abnormality. SINUSES: The visualized paranasal sinuses and mastoid air cells are well aerated. BONES/SOFT TISSUES: The bone marrow signal intensity appears normal. The soft tissues demonstrate no acute abnormality. MRA HEAD: ANTERIOR CIRCULATION: The internal carotid arteries are normal in caliber. The anterior cerebral arteries are patent. There are areas of abnormal signal along the distal M1 segment of the right middle cerebral artery, related to stent material.  There aneurysm coils noted for the right MCA bifurcation. The stent extends into the posterior right M branch.   Faint signal is noted along the margins of the aneurysm coils, more pronounced along the lateral aspect where residual filling cannot entirely visualized paranasal sinuses and mastoid air cells are well aerated. BONES/SOFT TISSUES: The bone marrow signal intensity appears normal. The soft tissues demonstrate no acute abnormality. MRA HEAD: ANTERIOR CIRCULATION: The internal carotid arteries are normal in caliber. The anterior cerebral arteries are patent. There are areas of abnormal signal along the distal M1 segment of the right middle cerebral artery, related to stent material.  There aneurysm coils noted for the right MCA bifurcation. The stent extends into the posterior right M branch. Faint signal is noted along the margins of the aneurysm coils, more pronounced along the lateral aspect where residual filling cannot entirely be excluded. No other aneurysms are identified. POSTERIOR CIRCULATION: The vertebrobasilar system is The posterior normal cerebral arteries in appearance. Are unremarkable. The right PCOM is patent. 1. Unremarkable MRI brain. No evidence of an acute infarct. 2. Right MCA aneurysm bifurcation coiling with stent assistance. Most of the aneurysm lumen is occluded. There are areas of increased signal along the margins of the aneurysm, more pronounced laterally where residual aneurysm filling cannot entirely be excluded. 3. No other aneurysms are identified. Consultations:    Consults:     Final Specialist Recommendations/Findings:   IP CONSULT TO NEUROSURGERY  IP CONSULT TO NEUROLOGY      The patient was seen and examined on day of discharge and this discharge summary is in conjunction with any daily progress note from day of discharge. Discharge plan:       Disposition: Home    Physician Follow Up:      Arville Skiff, APRN - CNP  [phone]  [facility] [id number]  [address], [address] [facility] [address] [facility] [address] [facility]  [phone]    In 3 weeks      Juana Saldana MD  [address] [place] [address]  MOB # Lucas [address] [facility]  [phone]    In 2 weeks         Requiring Further Evaluation/Follow Up POST HOSPITALIZATION/Incidental Findings:     Diet: regular diet    Activity: As tolerated    Instructions to Patient: Follow up with PCP and neuro-vascular     Discharge Medications:      Medication List      START taking these medications    meclizine 25 MG tablet  Commonly known as:  ANTIVERT  Take 1 tablet by mouth 3 times daily as needed for Dizziness        CONTINUE taking these medications    aspirin 325 MG tablet  Take 1 tablet by mouth daily     atorvastatin 10 MG tablet  Commonly known as:  LIPITOR     carvedilol 12.5 MG tablet  Commonly known as:  COREG     clonazePAM 0.5 MG tablet  Commonly known as:  KLONOPIN     clopidogrel 75 MG tablet  Commonly known as:  Plavix  Take 1 tablet by mouth daily     hydroCHLOROthiazide 25 MG tablet  Commonly known as:  HYDRODIURIL     lisinopril 40 MG tablet  Commonly known as:  PRINIVIL;ZESTRIL     PROBIOTIC DAILY PO     venlafaxine 75 MG extended release capsule  Commonly known as:  EFFEXOR XR     Vitamin D3 25 MCG (1000 UT) Caps           Where to Get Your Medications      These medications were sent to [place] [address] [address] [place] [address]    Phone:  [phone]   · meclizine 25 MG tablet         Time Spent on discharge is  33 mins in patient examination, evaluation, counseling as well as medication reconciliation, prescriptions for required medications, discharge plan and follow up. Electronically signed by   Yousuf Robertson MD  9/6/2020  9:58 PM      Thank you Dr. Bentley Hummel, APRN - CNP for the opportunity to be involved in this patient's care.

## 2020-09-18 ENCOUNTER — OFFICE VISIT (OUTPATIENT)
Dept: NEUROLOGY | Age: 50
End: 2020-09-18
Payer: COMMERCIAL

## 2020-09-18 VITALS
WEIGHT: 225 LBS | HEART RATE: 66 BPM | HEIGHT: 65 IN | TEMPERATURE: 96.3 F | SYSTOLIC BLOOD PRESSURE: 160 MMHG | BODY MASS INDEX: 37.49 KG/M2 | OXYGEN SATURATION: 99 % | DIASTOLIC BLOOD PRESSURE: 114 MMHG

## 2020-09-18 PROBLEM — I67.1 MIDDLE CEREBRAL ARTERY ANEURYSM: Status: ACTIVE | Noted: 2020-09-18

## 2020-09-18 PROCEDURE — G8427 DOCREV CUR MEDS BY ELIG CLIN: HCPCS | Performed by: PSYCHIATRY & NEUROLOGY

## 2020-09-18 PROCEDURE — 99214 OFFICE O/P EST MOD 30 MIN: CPT | Performed by: PSYCHIATRY & NEUROLOGY

## 2020-09-18 PROCEDURE — G8417 CALC BMI ABV UP PARAM F/U: HCPCS | Performed by: PSYCHIATRY & NEUROLOGY

## 2020-09-18 PROCEDURE — 1036F TOBACCO NON-USER: CPT | Performed by: PSYCHIATRY & NEUROLOGY

## 2020-09-18 PROCEDURE — 1111F DSCHRG MED/CURRENT MED MERGE: CPT | Performed by: PSYCHIATRY & NEUROLOGY

## 2020-09-18 NOTE — PROGRESS NOTES
Endovascular Neurosurgery Consult      Reason for evaluation: f/u ED dizziness. F/u R MCA IA s/p web and stents    SUBJECTIVE:   History of Chief Complaint:    53 y.o. female with past medical history including hypertension, hyperlipidemia, sleep apnea, breast cancer s/p rx 2018, cervical spine fusion, right MCA bifurcation aneurysm S/p atlas y stent x2 assisted web embolism 8/26/2020 with slight occlusion of the parent M2 (25% occlusion). Pt admitted 9/2/2020 with BPPV, MRI/A stable. Since discharge from hospital pt denies any new focal neuro symptoms. She does have persistent dizziness, occurring about 3x/week, each time lasting <1 second and associated with positional changes (e.g. bending over). The symptoms are mild, and she has not required prn medications. She reports compliance with medications. Allergies  is allergic to adhesive tape. Medications  Prior to Admission medications    Medication Sig Start Date End Date Taking? Authorizing Provider   aspirin 325 MG tablet Take 1 tablet by mouth daily 8/27/20  Yes POPEYE Amaral CNP   clopidogrel (PLAVIX) 75 MG tablet Take 1 tablet by mouth daily 8/27/20  Yes POPEYE Amaral CNP   Probiotic Product (PROBIOTIC DAILY PO) Take 1 tablet by mouth daily   Yes Historical Provider, MD   lisinopril (PRINIVIL;ZESTRIL) 40 MG tablet Take 40 mg by mouth daily 8/5/20  Yes Historical Provider, MD   clonazePAM (KLONOPIN) 0.5 MG tablet Take 0.5 mg by mouth 2 times daily as needed.  6/19/20  Yes Historical Provider, MD   venlafaxine (EFFEXOR XR) 75 MG extended release capsule Take 75 mg by mouth daily 6/19/20  Yes Historical Provider, MD   hydroCHLOROthiazide (HYDRODIURIL) 25 MG tablet Take 25 mg by mouth daily 8/5/20  Yes Historical Provider, MD   Cholecalciferol (VITAMIN D3) 25 MCG (1000 UT) CAPS Take 2,000 Units by mouth daily   Yes Historical Provider, MD   atorvastatin (LIPITOR) 10 MG tablet Take 10 mg by mouth daily   Yes Historical Provider, MD carvedilol (COREG) 12.5 MG tablet Take 12.5 mg by mouth 2 times daily (with meals)   Yes Historical Provider, MD    Scheduled Meds:  Continuous Infusions:  PRN Meds:.  Past Medical History   has a past medical history of Cancer ([address] Contact lens/glasses fitting, Hyperlipidemia, Hypertension, and Sleep apnea. Past Surgical History   has a past surgical history that includes Breast surgery (Left, 2018); Tunneled venous port placement; Hysterectomy (2018); Carpal tunnel release (Bilateral);  section; back surgery; and Brain aneurysm surgery (2020). Social History   reports that she quit smoking about 20 years ago. Her smoking use included cigarettes. She has a 15.00 pack-year smoking history. She has never used smokeless tobacco.   has no history on file for alcohol. reports no history of drug use. Family History  Family history is unknown by patient. Review of Systems:  CONSTITUTIONAL:  negative for fevers, chills, fatigue and malaise    EYES:  negative for double vision, blurred vision and photophobia     HEENT:  negative for tinnitus, epistaxis and sore throat    RESPIRATORY:  negative for cough, shortness of breath, wheezing    CARDIOVASCULAR:  negative for chest pain, palpitations, syncope, edema    GASTROINTESTINAL:  negative for nausea, vomiting    GENITOURINARY:  negative for incontinence    MUSCULOSKELETAL:  negative for neck or back pain    NEUROLOGICAL:  Negative for weakness and tingling  negative for headaches and dizziness    PSYCHIATRIC:  negative for anxiety      Review of systems otherwise negative. OBJECTIVE:     Vitals:    20 1003   BP: (!) 160/114   Pulse: 66   Temp:    SpO2:         General:  Gen: obese habitus, NAD  HEENT: NCAT, mucosa moist  Cvs: RRR, S1 S2 normal  Resp: symmetric unlabored breathing  Abd: s/nd/nt  Ext: no edema  Skin: no lesions seen, warm and dry    Neuro:  Gen: awake and alert, oriented x3. Lang/speech: no aphasia or dysarthria.  Follows commands. CN: PERRL, EOMI, VFF, V1-3 intact, face symmetric, hearing intact, shoulder shrug symmetric, tongue midline  Motor: grossly 5/5 UE and LE b/l  Sense: LT intact in all 4 ext. Coord: FTN and HTS intact b/l  DTR: deferred  Gait: narrow base gait    NIH Stroke Scale:   1a  Level of consciousness: 0 - alert; keenly responsive   1b. LOC questions:  0 - answers both questions correctly   1c. LOC commands: 0 - performs both tasks correctly   2. Best Gaze: 0 - normal   3. Visual: 0 - no visual loss   4. Facial Palsy: 0 - normal symmetric movement   5a. Motor left arm: 0 - no drift, limb holds 90 (or 45) degrees for full 10 seconds   5b. Motor right arm: 0 - no drift, limb holds 90 (or 45) degrees for full 10 seconds   6a. Motor left le - no drift; leg holds 30 degree position for full 5 seconds   6b  Motor right le - no drift; leg holds 30 degree position for full 5 seconds   7. Limb Ataxia: 0 - absent   8. Sensory: 0 - normal; no sensory loss   9. Best Language:  0 - no aphasia, normal   10. Dysarthria: 0 - normal   11. Extinction and Inattention: 0 - no abnormality         Total:   0     MRS: 1      LABS:   Reviewed. RADIOLOGY:   Images were personally reviewed including:  MRA/I head and neck 2020  Normal MRA neck   1. Unremarkable MRI brain.  No evidence of an acute infarct. 2. Right MCA aneurysm bifurcation coiling with stent assistance.  Most of the    aneurysm lumen is occluded.  There are areas of increased signal along the    margins of the aneurysm, more pronounced laterally where residual aneurysm    filling cannot entirely be excluded. 3. No other aneurysms are identified. ASSESSMENT:   53 y.o. female with past medical history including hypertension, hyperlipidemia, sleep apnea, breast cancer s/p rx 2018, cervical spine fusion, right MCA bifurcation aneurysm S/p atlas y stent x2 assisted web embolism 2020 with slight occlusion of the parent M2 (25% occlusion).  Pt admitted 9/2/2020 with BPPV, MRI/A stable. Pt has persistent positional dizziness, but is better and not requiring prn meds. PLAN:   --continue asa 80, plavix 75  --MRA head w and wo con prior to zaidat visit 11/27/2020. Pt instructed to call for appt. --f/u dr. Teresa Marroquin endovascular clinic scheduled 12/7/2020    Case discussed with Dr. Leonor Kraft attending.     Ludwin Sher MD, PhD   Stroke, [facility] Stroke [facility]  Electronically signed 9/18/2020 at 1:37 PM

## 2020-12-01 ENCOUNTER — HOSPITAL ENCOUNTER (OUTPATIENT)
Dept: MRI IMAGING | Age: 50
Discharge: HOME | End: 2020-12-03
Payer: COMMERCIAL

## 2020-12-01 LAB
GFR NON-AFRICAN AMERICAN: >60 ML/MIN
GFR SERPL CREATININE-BSD FRML MDRD: >60 ML/MIN
GFR SERPL CREATININE-BSD FRML MDRD: NORMAL ML/MIN/{1.73_M2}
POC CREATININE: 0.58 MG/DL (ref 0.51–1.19)

## 2020-12-01 PROCEDURE — A9576 INJ PROHANCE MULTIPACK: HCPCS | Performed by: PSYCHIATRY & NEUROLOGY

## 2020-12-01 PROCEDURE — 82565 ASSAY OF CREATININE: CPT

## 2020-12-01 PROCEDURE — 6360000004 HC RX CONTRAST MEDICATION: Performed by: PSYCHIATRY & NEUROLOGY

## 2020-12-01 PROCEDURE — 70546 MR ANGIOGRAPH HEAD W/O&W/DYE: CPT

## 2020-12-01 RX ORDER — SODIUM CHLORIDE 0.9 % (FLUSH) 0.9 %
10 SYRINGE (ML) INJECTION PRN
Status: DISCONTINUED | OUTPATIENT
Start: 2020-12-01 | End: 2020-12-04 | Stop reason: HOSPADM

## 2020-12-01 RX ADMIN — GADOTERIDOL 20 ML: 279.3 INJECTION, SOLUTION INTRAVENOUS at 09:11

## 2020-12-07 ENCOUNTER — OFFICE VISIT (OUTPATIENT)
Dept: NEUROLOGY | Age: 50
End: 2020-12-07
Payer: COMMERCIAL

## 2020-12-07 VITALS
DIASTOLIC BLOOD PRESSURE: 74 MMHG | SYSTOLIC BLOOD PRESSURE: 130 MMHG | RESPIRATION RATE: 16 BRPM | OXYGEN SATURATION: 96 % | HEART RATE: 71 BPM

## 2020-12-07 PROCEDURE — G8417 CALC BMI ABV UP PARAM F/U: HCPCS | Performed by: PSYCHIATRY & NEUROLOGY

## 2020-12-07 PROCEDURE — 1036F TOBACCO NON-USER: CPT | Performed by: PSYCHIATRY & NEUROLOGY

## 2020-12-07 PROCEDURE — 99215 OFFICE O/P EST HI 40 MIN: CPT | Performed by: PSYCHIATRY & NEUROLOGY

## 2020-12-07 PROCEDURE — G8427 DOCREV CUR MEDS BY ELIG CLIN: HCPCS | Performed by: PSYCHIATRY & NEUROLOGY

## 2020-12-07 PROCEDURE — G8484 FLU IMMUNIZE NO ADMIN: HCPCS | Performed by: PSYCHIATRY & NEUROLOGY

## 2020-12-07 NOTE — PROGRESS NOTES
Neurocritical Care, Stroke & Neurointerventional Note    [address]   [address] [place] [address],  [address] , [address] [place] [address]   P: [phone]  Endovascular Neurosurgery In Person Consult    Pt Name: Stephanie Vasquez  MRN: [id number]  YOB: 1970  Date of evaluation: 12/7/2020  Primary Care Physician: POPEYE Bowen CNP    Reason for evaluation: Cerebral Aneurysm     SUBJECTIVE:   History of Chief Complaint:    Stephanie Vasquez is a 53 y.o. female who presents with hx of hypertension, hypercholesterolemia, breast cancer in 2018 and cancer free since then, c-spine surgery with fusion surgery, she has been having vague symptoms for the last year including dizziness, and during the evaluation, she was found to have:    Bifurcation right MCA aneurysm: A 9 mm saccular aneurysm arising off of the distal M1 segment of the right MCA on MR on May 26, 2020. S/P aneurysm treatment with WEB and STENT on Aug, 26, 2020    She is here for further management and evaluation    Allergies  is allergic to adhesive tape. Medications  Prior to Admission medications    Medication Sig Start Date End Date Taking? Authorizing Provider   aspirin 325 MG tablet Take 1 tablet by mouth daily 8/27/20  Yes POPEYE Crawley CNP   clopidogrel (PLAVIX) 75 MG tablet Take 1 tablet by mouth daily 8/27/20  Yes POPEYE Crawley CNP   Probiotic Product (PROBIOTIC DAILY PO) Take 1 tablet by mouth daily   Yes Historical Provider, MD   lisinopril (PRINIVIL;ZESTRIL) 40 MG tablet Take 40 mg by mouth daily 8/5/20  Yes Historical Provider, MD   clonazePAM (KLONOPIN) 0.5 MG tablet Take 0.5 mg by mouth 2 times daily as needed.  6/19/20  Yes Historical Provider, MD   venlafaxine (EFFEXOR XR) 75 MG extended release capsule Take 75 mg by mouth daily 6/19/20  Yes Historical Provider, MD   hydroCHLOROthiazide (HYDRODIURIL) 25 MG tablet Take 25 mg by mouth daily 8/5/20  Yes Historical Provider, MD   Cholecalciferol (VITAMIN D3) 25 MCG (1000 UT) CAPS Take 2,000 Units by mouth daily   Yes Historical Provider, MD   atorvastatin (LIPITOR) 10 MG tablet Take 10 mg by mouth daily   Yes Historical Provider, MD   carvedilol (COREG) 12.5 MG tablet Take 12.5 mg by mouth 2 times daily (with meals)   Yes Historical Provider, MD    Scheduled Meds:  Continuous Infusions:  PRN Meds:.  Past Medical History   has a past medical history of Cancer ([address] Contact lens/glasses fitting, Hyperlipidemia, Hypertension, and Sleep apnea. Past Surgical History   has a past surgical history that includes Breast surgery (Left, 2018); Tunneled venous port placement; Hysterectomy (2018); Carpal tunnel release (Bilateral);  section; back surgery; and Brain aneurysm surgery (2020). Social History   reports that she quit smoking about 20 years ago. Her smoking use included cigarettes. She has a 15.00 pack-year smoking history. She has never used smokeless tobacco.   has no history on file for alcohol. reports no history of drug use. Family History  Family history is unknown by patient. Review of Systems:  CONSTITUTIONAL:  negative for fevers, chills, fatigue and malaise    EYES:  negative for double vision, blurred vision and photophobia     HEENT:  negative for tinnitus, epistaxis and sore throat    RESPIRATORY:  negative for cough, shortness of breath, wheezing    CARDIOVASCULAR:  negative for chest pain, palpitations, syncope, edema    GASTROINTESTINAL:  negative for nausea, vomiting    GENITOURINARY:  negative for incontinence    MUSCULOSKELETAL:  negative for neck or back pain    NEUROLOGICAL:  Negative for weakness and tingling  negative for headaches and dizziness    PSYCHIATRIC:  negative for anxiety      Review of systems otherwise negative.       OBJECTIVE:   Vitals: /74 (Site: Right Upper Arm, Position: Sitting, Cuff Size: Medium Adult)   Pulse 71   Resp 16   SpO2 96% Comment: room air  General appearance: Lying in bed, NAD  HEENT: Head: Normocephalic, no lesions, without obvious abnormality. Neck: no adenopathy, no carotid bruit, no JVD, supple, symmetrical, trachea midline and thyroid not enlarged, symmetric, no tenderness/mass/nodules  Lungs: clear to auscultation bilaterally  Heart: regular rate and rhythm, S1, S2 normal, no murmur, click, rub or gallop  Abdomen: soft, non-tender; nondistended, bowel sounds normal  Extremities: extremities normal, atraumatic, no cyanosis or edema    Neurologic: Mental status: Alert, oriented, thought content appropriate, Alert and oriented x 3,   Language/speech: intact language, attention, knowledge  CN: II-XII intact  MOTOR: 5/5 throughout with normal tone and bulk for age  SENSORY: LT and PP symmetrical and intact  COORDINATION: F to N and Gait intact for age      LABS:   No results for input(s): WBC, HGB, HCT, PLT, NA, K, CL, CO2, BUN, CREATININE, MG, PHOS, CALCIUM, PTT, INR, AST, ALT, BILITOT, BILIDIR, NITRU, COLORU, BACTERIA in the last 72 hours. Invalid input(s): PT, WBCU, RBCU, LEUKOCYTESUA  No results for input(s): ALKPHOS, ALT, AST, BILITOT, BILIDIR, LABALBU, AMYLASE, LIPASE in the last 72 hours. RADIOLOGY:   Images were personally reviewed includin2020      MRA dec 1, 2020      MRI head 2020  There is a 9 mm saccular aneurysm arising off of the distal M1 segment of the right MCA. IMPRESSIONS:   Surinder Núñez is a 53 y.o. female who presents with hx of hypertension, hypercholesterolemia, breast cancer in 2018 and cancer free since then, c-spine surgery with fusion surgery, she has been having vague symptoms for the last year including dizziness, and during the evaluation, she was found to have:    Bifurcation right MCA aneurysm: A 9 mm saccular aneurysm arising off of the distal M1 segment of the right MCA on MR on May 26, 2020.     S/P aneurysm treatment with WEB and STENT on Aug, 26, 2020    She is here for further management and evaluation    Bilateral hand numbness  Right dorsal foot numbness     does not have any pertinent problems on file. PLANS:   1. FU angiogram   2. DC plavix 12/26/2020  3. The risk and benefit of the cerebral angiography was explained at length to the patient and her family, including but not limited to vessel injury, X-ray dye allergic reaction, kidney dysfunction, stroke and groin hematoma. Consultation Visit Time:  40 minutes  Patient given educational materials - see patient instructions. Discussed use, benefit, and side effects of prescribed medications. Personally reviewed imaging with patients and all questions answered. Pt voiced understanding. Patient agreed with treatment plan. Follow up as directed below. Greater than 50% of the time was for counseling and providing answer to the patient question. The findings and the plan discussed with the patient and all her questions were answered. Thank you very much for your referral, please do not hesitate to contact me with any questions.     Fiona Morin MD Pager: [phone]  Stroke, [facility] Stroke [place], [place]  Pager [phone]  Electronically signed 12/7/2020 at 2:59 PM

## 2021-01-21 ENCOUNTER — HOSPITAL ENCOUNTER (OUTPATIENT)
Dept: INTERVENTIONAL RADIOLOGY/VASCULAR | Age: 51
Discharge: HOME | End: 2021-01-23
Payer: COMMERCIAL

## 2021-01-21 VITALS
SYSTOLIC BLOOD PRESSURE: 144 MMHG | OXYGEN SATURATION: 96 % | HEIGHT: 65 IN | WEIGHT: 230 LBS | HEART RATE: 92 BPM | RESPIRATION RATE: 19 BRPM | DIASTOLIC BLOOD PRESSURE: 81 MMHG | BODY MASS INDEX: 38.32 KG/M2 | TEMPERATURE: 98.1 F

## 2021-01-21 DIAGNOSIS — I72.9 ANEURYSM (HCC): ICD-10-CM

## 2021-01-21 LAB
GFR NON-AFRICAN AMERICAN: >60 ML/MIN
GFR SERPL CREATININE-BSD FRML MDRD: >60 ML/MIN
GFR SERPL CREATININE-BSD FRML MDRD: NORMAL ML/MIN/{1.73_M2}
GLUCOSE BLD-MCNC: 84 MG/DL (ref 74–100)
POC CHLORIDE: 105 MMOL/L (ref 98–107)
POC CREATININE: 0.57 MG/DL (ref 0.51–1.19)
POC HEMATOCRIT: 42 % (ref 36–46)
POC HEMOGLOBIN: 14.1 G/DL (ref 12–16)
POC POTASSIUM: 4 MMOL/L (ref 3.5–4.5)
POC SODIUM: 142 MMOL/L (ref 138–146)

## 2021-01-21 PROCEDURE — C1894 INTRO/SHEATH, NON-LASER: HCPCS

## 2021-01-21 PROCEDURE — 84295 ASSAY OF SERUM SODIUM: CPT

## 2021-01-21 PROCEDURE — C1769 GUIDE WIRE: HCPCS

## 2021-01-21 PROCEDURE — 2780000010 HC IMPLANT OTHER

## 2021-01-21 PROCEDURE — 7100000001 HC PACU RECOVERY - ADDTL 15 MIN

## 2021-01-21 PROCEDURE — 84132 ASSAY OF SERUM POTASSIUM: CPT

## 2021-01-21 PROCEDURE — 36226 PLACE CATH VERTEBRAL ART: CPT | Performed by: PSYCHIATRY & NEUROLOGY

## 2021-01-21 PROCEDURE — 99152 MOD SED SAME PHYS/QHP 5/>YRS: CPT | Performed by: PSYCHIATRY & NEUROLOGY

## 2021-01-21 PROCEDURE — 36224 PLACE CATH CAROTD ART: CPT | Performed by: PSYCHIATRY & NEUROLOGY

## 2021-01-21 PROCEDURE — 6360000002 HC RX W HCPCS: Performed by: STUDENT IN AN ORGANIZED HEALTH CARE EDUCATION/TRAINING PROGRAM

## 2021-01-21 PROCEDURE — 76376 3D RENDER W/INTRP POSTPROCES: CPT | Performed by: PSYCHIATRY & NEUROLOGY

## 2021-01-21 PROCEDURE — 36225 PLACE CATH SUBCLAVIAN ART: CPT | Performed by: PSYCHIATRY & NEUROLOGY

## 2021-01-21 PROCEDURE — 82947 ASSAY GLUCOSE BLOOD QUANT: CPT

## 2021-01-21 PROCEDURE — 7100000000 HC PACU RECOVERY - FIRST 15 MIN

## 2021-01-21 PROCEDURE — 85014 HEMATOCRIT: CPT

## 2021-01-21 PROCEDURE — C1887 CATHETER, GUIDING: HCPCS

## 2021-01-21 PROCEDURE — 2709999900 HC NON-CHARGEABLE SUPPLY

## 2021-01-21 PROCEDURE — 2580000003 HC RX 258: Performed by: PSYCHIATRY & NEUROLOGY

## 2021-01-21 PROCEDURE — 6360000004 HC RX CONTRAST MEDICATION: Performed by: PSYCHIATRY & NEUROLOGY

## 2021-01-21 PROCEDURE — 2580000003 HC RX 258: Performed by: STUDENT IN AN ORGANIZED HEALTH CARE EDUCATION/TRAINING PROGRAM

## 2021-01-21 PROCEDURE — 82565 ASSAY OF CREATININE: CPT

## 2021-01-21 PROCEDURE — 82435 ASSAY OF BLOOD CHLORIDE: CPT

## 2021-01-21 PROCEDURE — 99213 OFFICE O/P EST LOW 20 MIN: CPT | Performed by: PSYCHIATRY & NEUROLOGY

## 2021-01-21 RX ORDER — ONDANSETRON 2 MG/ML
4 INJECTION INTRAMUSCULAR; INTRAVENOUS EVERY 6 HOURS PRN
Status: DISCONTINUED | OUTPATIENT
Start: 2021-01-21 | End: 2021-01-24 | Stop reason: HOSPADM

## 2021-01-21 RX ORDER — PROMETHAZINE HYDROCHLORIDE 12.5 MG/1
12.5 TABLET ORAL EVERY 6 HOURS PRN
Status: DISCONTINUED | OUTPATIENT
Start: 2021-01-21 | End: 2021-01-24 | Stop reason: HOSPADM

## 2021-01-21 RX ORDER — FENTANYL CITRATE 50 UG/ML
INJECTION, SOLUTION INTRAMUSCULAR; INTRAVENOUS
Status: COMPLETED | OUTPATIENT
Start: 2021-01-21 | End: 2021-01-21

## 2021-01-21 RX ORDER — SODIUM CHLORIDE 9 MG/ML
INJECTION, SOLUTION INTRAVENOUS CONTINUOUS
Status: DISCONTINUED | OUTPATIENT
Start: 2021-01-21 | End: 2021-01-24 | Stop reason: HOSPADM

## 2021-01-21 RX ORDER — 0.9 % SODIUM CHLORIDE 0.9 %
1000 INTRAVENOUS SOLUTION INTRAVENOUS ONCE
Status: COMPLETED | OUTPATIENT
Start: 2021-01-21 | End: 2021-01-21

## 2021-01-21 RX ORDER — HEPARIN SODIUM (PORCINE) LOCK FLUSH IV SOLN 100 UNIT/ML 100 UNIT/ML
SOLUTION INTRAVENOUS
Status: COMPLETED | OUTPATIENT
Start: 2021-01-21 | End: 2021-01-21

## 2021-01-21 RX ORDER — ACETAMINOPHEN 325 MG/1
650 TABLET ORAL EVERY 4 HOURS PRN
Status: DISCONTINUED | OUTPATIENT
Start: 2021-01-21 | End: 2021-01-24 | Stop reason: HOSPADM

## 2021-01-21 RX ORDER — MIDAZOLAM HYDROCHLORIDE 2 MG/2ML
INJECTION, SOLUTION INTRAMUSCULAR; INTRAVENOUS
Status: COMPLETED | OUTPATIENT
Start: 2021-01-21 | End: 2021-01-21

## 2021-01-21 RX ORDER — IODIXANOL 270 MG/ML
300 INJECTION, SOLUTION INTRAVASCULAR
Status: COMPLETED | OUTPATIENT
Start: 2021-01-21 | End: 2021-01-21

## 2021-01-21 RX ADMIN — FENTANYL CITRATE 25 MCG: 50 INJECTION, SOLUTION INTRAMUSCULAR; INTRAVENOUS at 13:49

## 2021-01-21 RX ADMIN — SODIUM CHLORIDE 1000 ML: 9 INJECTION, SOLUTION INTRAVENOUS at 13:00

## 2021-01-21 RX ADMIN — FENTANYL CITRATE 50 MCG: 50 INJECTION, SOLUTION INTRAMUSCULAR; INTRAVENOUS at 11:47

## 2021-01-21 RX ADMIN — MIDAZOLAM HYDROCHLORIDE 1 MG: 1 INJECTION, SOLUTION INTRAMUSCULAR; INTRAVENOUS at 12:00

## 2021-01-21 RX ADMIN — FENTANYL CITRATE 25 MCG: 50 INJECTION, SOLUTION INTRAMUSCULAR; INTRAVENOUS at 13:01

## 2021-01-21 RX ADMIN — IODIXANOL 120 ML: 270 INJECTION, SOLUTION INTRAVASCULAR at 14:08

## 2021-01-21 RX ADMIN — FENTANYL CITRATE 25 MCG: 50 INJECTION, SOLUTION INTRAMUSCULAR; INTRAVENOUS at 12:08

## 2021-01-21 RX ADMIN — HEPARIN 2 ML: 100 SYRINGE at 12:20

## 2021-01-21 RX ADMIN — MIDAZOLAM HYDROCHLORIDE 1 MG: 1 INJECTION, SOLUTION INTRAMUSCULAR; INTRAVENOUS at 11:48

## 2021-01-21 RX ADMIN — CEFAZOLIN 2000 MG: 10 INJECTION, POWDER, FOR SOLUTION INTRAVENOUS at 11:17

## 2021-01-21 RX ADMIN — FENTANYL CITRATE 25 MCG: 50 INJECTION, SOLUTION INTRAMUSCULAR; INTRAVENOUS at 12:17

## 2021-01-21 RX ADMIN — SODIUM CHLORIDE: 9 INJECTION, SOLUTION INTRAVENOUS at 09:30

## 2021-01-21 ASSESSMENT — PAIN SCALES - GENERAL: PAINLEVEL_OUTOF10: 0

## 2021-01-21 NOTE — OP NOTE
[facility]    NEUROENDOVASCULAR SERVICE: POST-OP NOTE: 1/21/2021    Pt Name: Regino Schwab  MRN: [id number]  YOB: 1970  Date of Procedure: 1/21/2021  Primary Care Physician: POPEYE Dinero - CNP    Pre-Procedural Diagnosis: History of right MCA bifurcation aneurysm treated with WEB device with stent assisted (Y stenting)  Post-Procedural Diagnosis: There is a small residual filling noted. Procedure Performed:Diagnostic Cerebral Angiogram    Surgeon:   Pavel Evangelista MD    Fellow:  Berny Hopper MD and Stanislaw Bernabe MD, PhD     1st Assistant:  Clovis Albert    PRE-PROCEDURAL EXAM:  Prestroke baseline mRS MODIFIED DELORES SCORE: 1 - No significant disability: despite symptoms, able to carry out all usual duties and activities. Neurological exam performed and unchanged from initial H&P or consult    Anesthesia: IV Moderate Sedation  Complications: none    Intra-Operative EXAM:  Neurological exam performed and unchanged from initial H&P or consult    EBL: < Minimal      Cc            Specimens: Were not Obtained  Contrast:     Visipaque 270 low osmolar 120 Cc             Fluoro: 26.4 min    Findings:  Please see dictated Radiology note for further details  1. Previously treated right MCA M1 bifurcation aneurysm is with a small residual filling noted measuring approximately 10.7 x 4.19 mm in diameter. With occlusion scale C.  2. Functional right fetal PCA noted. POST-PROCEDURAL EXAM :   Stable neurological Exam  Neurological exam performed and unchanged from initial H&P or consult    Closure:  right [place] 5   F    POST-PROCEDURAL MONITORING : see orders  Disposition: Recovery room    Recommendations:  Back to recovery room. Do not bend right leg for 3 hours. Groin checks per protocol. Neuro checks per icu protocol. Peripheral pulse checks per protocol. ICU management per NSICU team.   SBP goal 100-140  Resume aspirin and plavix starting tomorrow.   Upon discharge follow up with Dr. Suzanne Camacho in 2 weeks and Dr. Clay Quintero in 3 months. We will plan for retreatment during follow-up.       MD Eduar Suh MD   Pager: [phone]  Stroke, [facility]  [address]  Electronically signed 1/21/2021 at 2:07 PM

## 2021-01-21 NOTE — PROGRESS NOTES
Received post procedure to [facility] to room 8 alert and awake. Assessment obtained / neuro intact. Restrictions reviewed with patient and friendd Post procedure pathway initiated. Right groin site soft, SAFEGUARD secure, dry and intact. No hematoma noted. Friend at side. Patient without complaints. Head of bed flat with right leg straight. Declines fluids. Side rails up 2 of 2 with call light to reach.

## 2021-01-21 NOTE — PROGRESS NOTES
Patient admitted, consent signed and questions answered. Patient ready for procedure. Call light to reach with side rails up 2 of 2. Family at bedside with patient. History and physical needed.

## 2021-01-21 NOTE — PROGRESS NOTES
All discharge instructions reviewed, questions answered, paper signed and given copy. Patient discharged per ambulatory with friend and belongings.

## 2021-01-21 NOTE — SEDATION DOCUMENTATION
Placed supine on table. Monitors on. Writer again explains procedure. Patient was unaware she would be awake for this. Patient crying and upset.  Dr Ohara Bear in to talk with patient

## 2021-01-21 NOTE — H&P
Endovascular Neurosurgery H&P      Reason for evaluation:  R MCA aneurysm s/p web and stent    SUBJECTIVE:   History of Chief Complaint:    53yo female with pmh of hld, htn, breast cancer 2018, gray. 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 Pt presents for f/u DSA. Pt was last seen in clinic with dr Jos Marroquin 12/7/2020. Since this time pt has been well, and denies nay new focal symptoms. Allergies  is allergic to adhesive tape. Medications  Prior to Admission medications    Medication Sig Start Date End Date Taking? Authorizing Provider   aspirin 325 MG tablet Take 1 tablet by mouth daily 8/27/20   Leopoldo Leventhal, APRN - CNP   clopidogrel (PLAVIX) 75 MG tablet Take 1 tablet by mouth daily 8/27/20   Leopoldo Leventhal, APRN - CNP   Probiotic Product (PROBIOTIC DAILY PO) Take 1 tablet by mouth daily    Historical Provider, MD   lisinopril (PRINIVIL;ZESTRIL) 40 MG tablet Take 40 mg by mouth daily 8/5/20   Historical Provider, MD   clonazePAM (KLONOPIN) 0.5 MG tablet Take 0.5 mg by mouth 2 times daily as needed. 6/19/20   Historical Provider, MD   venlafaxine (EFFEXOR XR) 75 MG extended release capsule Take 75 mg by mouth daily 6/19/20   Historical Provider, MD   hydroCHLOROthiazide (HYDRODIURIL) 25 MG tablet Take 25 mg by mouth daily 8/5/20   Historical Provider, MD   Cholecalciferol (VITAMIN D3) 25 MCG (1000 UT) CAPS Take 2,000 Units by mouth daily    Historical Provider, MD   atorvastatin (LIPITOR) 10 MG tablet Take 10 mg by mouth daily    Historical Provider, MD   carvedilol (COREG) 12.5 MG tablet Take 12.5 mg by mouth 2 times daily (with meals)    Historical Provider, MD    Scheduled Meds:  Continuous Infusions:  PRN Meds:.  Past Medical History   has a past medical history of Cancer ([address] Contact lens/glasses fitting, Hyperlipidemia, Hypertension, and Sleep apnea. Past Surgical History   has a past surgical history that includes Breast surgery (Left, 2018);  Tunneled venous port placement; Hysterectomy (2018); Carpal tunnel release (Bilateral);  section; back surgery; and Brain aneurysm surgery (2020). Social History   reports that she quit smoking about 20 years ago. Her smoking use included cigarettes. She has a 15.00 pack-year smoking history. She has never used smokeless tobacco.   has no history on file for alcohol. reports no history of drug use. Family History  Family history is unknown by patient. Review of Systems:  CONSTITUTIONAL:  negative for fevers, chills, fatigue and malaise    EYES:  negative for double vision, blurred vision and photophobia     HEENT:  negative for tinnitus, epistaxis and sore throat    RESPIRATORY:  negative for cough, shortness of breath, wheezing    CARDIOVASCULAR:  negative for chest pain, palpitations, syncope, edema    GASTROINTESTINAL:  negative for nausea, vomiting    GENITOURINARY:  negative for incontinence    MUSCULOSKELETAL:  negative for neck or back pain    NEUROLOGICAL:  Negative for weakness and tingling  negative for headaches and dizziness    PSYCHIATRIC:  negative for anxiety      Review of systems otherwise negative. OBJECTIVE:   There were no vitals filed for this visit. General:  Gen: normal habitus, NAD  HEENT: NCAT, mucosa moist  Cvs: RRR, S1 S2 normal  Resp: symmetric unlabored breathing  Abd: s/nd/nt  Ext: no edema  Skin: no lesions seen, warm and dry    Neuro:  Gen: awake and alert, oriented x3. Lang/speech: no aphasia or dysarthria. Follows commands. CN: PERRL, EOMI, VFF, V1-3 intact, face symmetric, hearing intact, shoulder shrug symmetric, tongue midline  Motor: grossly 5/5 UE and LE b/l  Sense: LT intact in all 4 ext. Coord: FTN and HTS intact b/l  DTR: deferred  Gait: narrow base gait    NIH Stroke Scale:   1a  Level of consciousness: 0 - alert; keenly responsive   1b. LOC questions:  0 - answers both questions correctly   1c. LOC commands: 0 - performs both tasks correctly   2.   Best Gaze: 0 - normal   3. Visual: 0 - no visual loss   4. Facial Palsy: 0 - normal symmetric movement   5a. Motor left arm: 0 - no drift, limb holds 90 (or 45) degrees for full 10 seconds   5b. Motor right arm: 0 - no drift, limb holds 90 (or 45) degrees for full 10 seconds   6a. Motor left le - no drift; leg holds 30 degree position for full 5 seconds   6b  Motor right le - no drift; leg holds 30 degree position for full 5 seconds   7. Limb Ataxia: 0 - absent   8. Sensory: 0 - normal; no sensory loss   9. Best Language:  0 - no aphasia, normal   10. Dysarthria: 0 - normal   11. Extinction and Inattention: 0 - no abnormality         Total:   0     MRS: 0      LABS:   Reviewed. RADIOLOGY:   Images were personally reviewed including:  MRA head 2020  Postsurgical change of prior right MCA bifurcation aneurysm with stent   assistance. Tessa Lindsey is redemonstration of increased signal along the margins   of the aneurysm and residual filling again cannot be excluded. DSA 2020  1. Right MCA M1 bifurcation aneurysm projecting inferiorly and anteriorly measuring approximately 5.33 x 7.4 by X 6.12 mm and neck size 6.37 mm.   2. The above mentioned aneurysm is treated with Web device SL 8 x 4 mm that resulted in Web Occlusion Scale: D. This was combined with a stent assisted coiling using Y stenting 4.5 x 21 mm and 4.5 x 30 mm Neuroform [place] stents. ASSESSMENT:   51yo female with pmh of hld, htn, breast cancer 2018, gray. Pt is followed for R MCA aneurysm s/p web and stent placement 2020. Pt presents for f/u DSA. Risk and benefit discussed. Pt consented to the procedure. PLAN:   --dx dSA today. --continue asa 81. Pt is continuing on 325 ivone until the rx runs out. Case discussed with Dr. Marianela Levine attending.     Ellamae Hashimoto, MD, PhD   Stroke, [facility] Stroke [address] [place]  Electronically signed 1/21/2021 at 8:45 AM

## 2021-01-21 NOTE — PROGRESS NOTES
Repositioned in bed with assistance. Light meal offered. Right groin site and pulses unchanged. Neuro intact. Call light to reach.

## 2021-01-21 NOTE — SEDATION DOCUMENTATION
Dr Amrita Silver talks with patient. She is agreeable to go ahead with procedure. Still appears upset. Reassurance given. Groins are prepped and draped.  Time out done and meds given

## 2021-01-24 ENCOUNTER — HOSPITAL ENCOUNTER (INPATIENT)
Age: 51
LOS: 12 days | Discharge: HOME | DRG: 023 | End: 2021-02-05
Attending: EMERGENCY MEDICINE | Admitting: PSYCHIATRY & NEUROLOGY
Payer: COMMERCIAL

## 2021-01-24 ENCOUNTER — APPOINTMENT (OUTPATIENT)
Dept: MRI IMAGING | Age: 51
DRG: 023 | End: 2021-01-24
Payer: COMMERCIAL

## 2021-01-24 DIAGNOSIS — I63.512 CEREBROVASCULAR ACCIDENT (CVA) DUE TO OCCLUSION OF LEFT MIDDLE CEREBRAL ARTERY (HCC): Primary | ICD-10-CM

## 2021-01-24 LAB
% CKMB: 2.9 % (ref 0–3)
-: ABNORMAL
ABSOLUTE EOS #: 0.03 K/UL (ref 0–0.44)
ABSOLUTE IMMATURE GRANULOCYTE: 0.08 K/UL (ref 0–0.3)
ABSOLUTE LYMPH #: 2.25 K/UL (ref 1.1–3.7)
ABSOLUTE MONO #: 1.06 K/UL (ref 0.1–1.2)
AMORPHOUS: ABNORMAL
AMPHETAMINE SCREEN URINE: NEGATIVE
ANION GAP SERPL CALCULATED.3IONS-SCNC: 16 MMOL/L (ref 9–17)
BACTERIA: ABNORMAL
BARBITURATE SCREEN URINE: NEGATIVE
BASOPHILS # BLD: 0 % (ref 0–2)
BASOPHILS ABSOLUTE: <0.03 K/UL (ref 0–0.2)
BENZODIAZEPINE SCREEN, URINE: NEGATIVE
BILIRUBIN URINE: NEGATIVE
BUN BLDV-MCNC: 14 MG/DL (ref 6–20)
BUN/CREAT BLD: ABNORMAL (ref 9–20)
BUPRENORPHINE URINE: ABNORMAL
CALCIUM SERPL-MCNC: 9.4 MG/DL (ref 8.6–10.4)
CANNABINOID SCREEN URINE: POSITIVE
CASTS UA: ABNORMAL /LPF (ref 0–8)
CHLORIDE BLD-SCNC: 102 MMOL/L (ref 98–107)
CK MB: 3.6 NG/ML
CKMB INTERPRETATION: ABNORMAL
CO2: 22 MMOL/L (ref 20–31)
COCAINE METABOLITE, URINE: NEGATIVE
COLOR: YELLOW
CREAT SERPL-MCNC: 0.41 MG/DL (ref 0.5–0.9)
CRYSTALS, UA: ABNORMAL /HPF
DIFFERENTIAL TYPE: ABNORMAL
EOSINOPHILS RELATIVE PERCENT: 0 % (ref 1–4)
EPITHELIAL CELLS UA: ABNORMAL /HPF (ref 0–5)
GFR AFRICAN AMERICAN: >60 ML/MIN
GFR NON-AFRICAN AMERICAN: >60 ML/MIN
GFR SERPL CREATININE-BSD FRML MDRD: ABNORMAL ML/MIN/{1.73_M2}
GFR SERPL CREATININE-BSD FRML MDRD: ABNORMAL ML/MIN/{1.73_M2}
GLUCOSE BLD-MCNC: 90 MG/DL (ref 70–99)
GLUCOSE URINE: NEGATIVE
HCT VFR BLD CALC: 39.9 % (ref 36.3–47.1)
HEMOGLOBIN: 13.6 G/DL (ref 11.9–15.1)
IMMATURE GRANULOCYTES: 1 %
INR BLD: 0.9
KETONES, URINE: NEGATIVE
LEUKOCYTE ESTERASE, URINE: NEGATIVE
LYMPHOCYTES # BLD: 16 % (ref 24–43)
MCH RBC QN AUTO: 32.2 PG (ref 25.2–33.5)
MCHC RBC AUTO-ENTMCNC: 34.1 G/DL (ref 28.4–34.8)
MCV RBC AUTO: 94.5 FL (ref 82.6–102.9)
MDMA URINE: ABNORMAL
METHADONE SCREEN, URINE: NEGATIVE
METHAMPHETAMINE, URINE: ABNORMAL
MONOCYTES # BLD: 7 % (ref 3–12)
MUCUS: ABNORMAL
MYOGLOBIN: 30 NG/ML (ref 25–58)
NITRITE, URINE: NEGATIVE
NRBC AUTOMATED: 0 PER 100 WBC
OPIATES, URINE: NEGATIVE
OTHER OBSERVATIONS UA: ABNORMAL
OXYCODONE SCREEN URINE: NEGATIVE
PARTIAL THROMBOPLASTIN TIME: 19.7 SEC (ref 20.5–30.5)
PDW BLD-RTO: 12.7 % (ref 11.8–14.4)
PH UA: 6 (ref 5–8)
PHENCYCLIDINE, URINE: NEGATIVE
PLATELET # BLD: 326 K/UL (ref 138–453)
PLATELET ESTIMATE: ABNORMAL
PMV BLD AUTO: 9.7 FL (ref 8.1–13.5)
POTASSIUM SERPL-SCNC: 3.5 MMOL/L (ref 3.7–5.3)
PROPOXYPHENE, URINE: ABNORMAL
PROTEIN UA: ABNORMAL
PROTHROMBIN TIME: 9.7 SEC (ref 9–12)
RBC # BLD: 4.22 M/UL (ref 3.95–5.11)
RBC # BLD: ABNORMAL 10*6/UL
RBC UA: ABNORMAL /HPF (ref 0–4)
RENAL EPITHELIAL, UA: ABNORMAL /HPF
SEG NEUTROPHILS: 76 % (ref 36–65)
SEGMENTED NEUTROPHILS ABSOLUTE COUNT: 11.02 K/UL (ref 1.5–8.1)
SODIUM BLD-SCNC: 140 MMOL/L (ref 135–144)
SPECIFIC GRAVITY UA: 1.05 (ref 1–1.03)
TEST INFORMATION: ABNORMAL
TOTAL CK: 123 U/L (ref 26–192)
TRICHOMONAS: ABNORMAL
TRICYCLIC ANTIDEPRESSANTS, UR: ABNORMAL
TROPONIN INTERP: ABNORMAL
TROPONIN T: ABNORMAL NG/ML
TROPONIN, HIGH SENSITIVITY: <6 NG/L (ref 0–14)
TURBIDITY: CLEAR
URINE HGB: NEGATIVE
UROBILINOGEN, URINE: NORMAL
WBC # BLD: 14.5 K/UL (ref 3.5–11.3)
WBC # BLD: ABNORMAL 10*3/UL
WBC UA: ABNORMAL /HPF (ref 0–5)
YEAST: ABNORMAL

## 2021-01-24 PROCEDURE — 93005 ELECTROCARDIOGRAM TRACING: CPT | Performed by: STUDENT IN AN ORGANIZED HEALTH CARE EDUCATION/TRAINING PROGRAM

## 2021-01-24 PROCEDURE — 2580000003 HC RX 258: Performed by: STUDENT IN AN ORGANIZED HEALTH CARE EDUCATION/TRAINING PROGRAM

## 2021-01-24 PROCEDURE — 85025 COMPLETE CBC W/AUTO DIFF WBC: CPT

## 2021-01-24 PROCEDURE — 6370000000 HC RX 637 (ALT 250 FOR IP): Performed by: STUDENT IN AN ORGANIZED HEALTH CARE EDUCATION/TRAINING PROGRAM

## 2021-01-24 PROCEDURE — 87086 URINE CULTURE/COLONY COUNT: CPT

## 2021-01-24 PROCEDURE — 85730 THROMBOPLASTIN TIME PARTIAL: CPT

## 2021-01-24 PROCEDURE — 70544 MR ANGIOGRAPHY HEAD W/O DYE: CPT

## 2021-01-24 PROCEDURE — 85610 PROTHROMBIN TIME: CPT

## 2021-01-24 PROCEDURE — 99285 EMERGENCY DEPT VISIT HI MDM: CPT

## 2021-01-24 PROCEDURE — 80307 DRUG TEST PRSMV CHEM ANLYZR: CPT

## 2021-01-24 PROCEDURE — 99222 1ST HOSP IP/OBS MODERATE 55: CPT | Performed by: PSYCHIATRY & NEUROLOGY

## 2021-01-24 PROCEDURE — 80048 BASIC METABOLIC PNL TOTAL CA: CPT

## 2021-01-24 PROCEDURE — U0003 INFECTIOUS AGENT DETECTION BY NUCLEIC ACID (DNA OR RNA); SEVERE ACUTE RESPIRATORY SYNDROME CORONAVIRUS 2 (SARS-COV-2) (CORONAVIRUS DISEASE [COVID-19]), AMPLIFIED PROBE TECHNIQUE, MAKING USE OF HIGH THROUGHPUT TECHNOLOGIES AS DESCRIBED BY CMS-2020-01-R: HCPCS

## 2021-01-24 PROCEDURE — 84484 ASSAY OF TROPONIN QUANT: CPT

## 2021-01-24 PROCEDURE — 83874 ASSAY OF MYOGLOBIN: CPT

## 2021-01-24 PROCEDURE — 82553 CREATINE MB FRACTION: CPT

## 2021-01-24 PROCEDURE — U0005 INFEC AGEN DETEC AMPLI PROBE: HCPCS

## 2021-01-24 PROCEDURE — 82550 ASSAY OF CK (CPK): CPT

## 2021-01-24 PROCEDURE — 81001 URINALYSIS AUTO W/SCOPE: CPT

## 2021-01-24 PROCEDURE — 6360000002 HC RX W HCPCS: Performed by: STUDENT IN AN ORGANIZED HEALTH CARE EDUCATION/TRAINING PROGRAM

## 2021-01-24 PROCEDURE — 70551 MRI BRAIN STEM W/O DYE: CPT

## 2021-01-24 PROCEDURE — 87641 MR-STAPH DNA AMP PROBE: CPT

## 2021-01-24 PROCEDURE — 2000000003 HC NEURO ICU R&B

## 2021-01-24 RX ORDER — ONDANSETRON 2 MG/ML
4 INJECTION INTRAMUSCULAR; INTRAVENOUS EVERY 6 HOURS PRN
Status: DISCONTINUED | OUTPATIENT
Start: 2021-01-24 | End: 2021-02-05 | Stop reason: HOSPADM

## 2021-01-24 RX ORDER — SODIUM CHLORIDE 9 MG/ML
INJECTION, SOLUTION INTRAVENOUS CONTINUOUS
Status: DISCONTINUED | OUTPATIENT
Start: 2021-01-24 | End: 2021-01-28

## 2021-01-24 RX ORDER — ATORVASTATIN CALCIUM 10 MG/1
10 TABLET, FILM COATED ORAL NIGHTLY
Status: DISCONTINUED | OUTPATIENT
Start: 2021-01-24 | End: 2021-01-29

## 2021-01-24 RX ORDER — ASPIRIN 325 MG
325 TABLET ORAL DAILY
Status: DISCONTINUED | OUTPATIENT
Start: 2021-01-25 | End: 2021-01-24 | Stop reason: SDUPTHER

## 2021-01-24 RX ORDER — ACETAMINOPHEN 500 MG
1000 TABLET ORAL ONCE
Status: COMPLETED | OUTPATIENT
Start: 2021-01-24 | End: 2021-01-24

## 2021-01-24 RX ORDER — PROMETHAZINE HYDROCHLORIDE 12.5 MG/1
12.5 TABLET ORAL EVERY 6 HOURS PRN
Status: DISCONTINUED | OUTPATIENT
Start: 2021-01-24 | End: 2021-02-05 | Stop reason: HOSPADM

## 2021-01-24 RX ORDER — FENTANYL CITRATE 50 UG/ML
50 INJECTION, SOLUTION INTRAMUSCULAR; INTRAVENOUS ONCE
Status: COMPLETED | OUTPATIENT
Start: 2021-01-24 | End: 2021-01-24

## 2021-01-24 RX ORDER — 0.9 % SODIUM CHLORIDE 0.9 %
1000 INTRAVENOUS SOLUTION INTRAVENOUS ONCE
Status: COMPLETED | OUTPATIENT
Start: 2021-01-24 | End: 2021-01-24

## 2021-01-24 RX ORDER — SODIUM CHLORIDE 0.9 % (FLUSH) 0.9 %
10 SYRINGE (ML) INJECTION EVERY 12 HOURS SCHEDULED
Status: DISCONTINUED | OUTPATIENT
Start: 2021-01-24 | End: 2021-02-05 | Stop reason: HOSPADM

## 2021-01-24 RX ORDER — VENLAFAXINE HYDROCHLORIDE 75 MG/1
75 CAPSULE, EXTENDED RELEASE ORAL DAILY
Status: DISCONTINUED | OUTPATIENT
Start: 2021-01-25 | End: 2021-02-05 | Stop reason: HOSPADM

## 2021-01-24 RX ORDER — ACETAMINOPHEN 650 MG/1
650 SUPPOSITORY RECTAL EVERY 6 HOURS PRN
Status: DISCONTINUED | OUTPATIENT
Start: 2021-01-24 | End: 2021-02-05 | Stop reason: HOSPADM

## 2021-01-24 RX ORDER — LISINOPRIL 20 MG/1
40 TABLET ORAL DAILY
Status: DISCONTINUED | OUTPATIENT
Start: 2021-01-25 | End: 2021-01-28

## 2021-01-24 RX ORDER — NICARDIPINE HYDROCHLORIDE 0.1 MG/ML
3-15 INJECTION INTRAVENOUS CONTINUOUS
Status: DISCONTINUED | OUTPATIENT
Start: 2021-01-24 | End: 2021-01-24

## 2021-01-24 RX ORDER — SENNA PLUS 8.6 MG/1
1 TABLET ORAL DAILY PRN
Status: DISCONTINUED | OUTPATIENT
Start: 2021-01-24 | End: 2021-01-27

## 2021-01-24 RX ORDER — CLOPIDOGREL BISULFATE 75 MG/1
75 TABLET ORAL DAILY
Status: DISCONTINUED | OUTPATIENT
Start: 2021-01-25 | End: 2021-01-26

## 2021-01-24 RX ORDER — POLYETHYLENE GLYCOL 3350 17 G/17G
17 POWDER, FOR SOLUTION ORAL DAILY PRN
Status: DISCONTINUED | OUTPATIENT
Start: 2021-01-24 | End: 2021-02-05 | Stop reason: HOSPADM

## 2021-01-24 RX ORDER — CARVEDILOL 12.5 MG/1
12.5 TABLET ORAL 2 TIMES DAILY WITH MEALS
Status: DISCONTINUED | OUTPATIENT
Start: 2021-01-25 | End: 2021-01-27

## 2021-01-24 RX ORDER — ASPIRIN 325 MG
325 TABLET ORAL ONCE
Status: DISCONTINUED | OUTPATIENT
Start: 2021-01-25 | End: 2021-01-24

## 2021-01-24 RX ORDER — SODIUM CHLORIDE 0.9 % (FLUSH) 0.9 %
10 SYRINGE (ML) INJECTION PRN
Status: DISCONTINUED | OUTPATIENT
Start: 2021-01-24 | End: 2021-02-05 | Stop reason: HOSPADM

## 2021-01-24 RX ORDER — ASPIRIN 325 MG
325 TABLET ORAL DAILY
Status: DISCONTINUED | OUTPATIENT
Start: 2021-01-25 | End: 2021-02-05 | Stop reason: HOSPADM

## 2021-01-24 RX ORDER — ACETAMINOPHEN 325 MG/1
650 TABLET ORAL EVERY 6 HOURS PRN
Status: DISCONTINUED | OUTPATIENT
Start: 2021-01-24 | End: 2021-02-05 | Stop reason: HOSPADM

## 2021-01-24 RX ORDER — HYDROCHLOROTHIAZIDE 25 MG/1
25 TABLET ORAL DAILY
Status: DISCONTINUED | OUTPATIENT
Start: 2021-01-25 | End: 2021-01-28

## 2021-01-24 RX ADMIN — SODIUM CHLORIDE: 9 INJECTION, SOLUTION INTRAVENOUS at 20:13

## 2021-01-24 RX ADMIN — ATORVASTATIN CALCIUM 10 MG: 10 TABLET, FILM COATED ORAL at 23:03

## 2021-01-24 RX ADMIN — SODIUM CHLORIDE 1000 ML: 9 INJECTION, SOLUTION INTRAVENOUS at 16:58

## 2021-01-24 RX ADMIN — FENTANYL CITRATE 50 MCG: 50 INJECTION, SOLUTION INTRAMUSCULAR; INTRAVENOUS at 17:25

## 2021-01-24 RX ADMIN — SODIUM CHLORIDE: 9 INJECTION, SOLUTION INTRAVENOUS at 22:42

## 2021-01-24 RX ADMIN — ACETAMINOPHEN 1000 MG: 500 TABLET ORAL at 17:25

## 2021-01-24 RX ADMIN — SODIUM CHLORIDE, PRESERVATIVE FREE 10 ML: 5 INJECTION INTRAVENOUS at 22:30

## 2021-01-24 ASSESSMENT — ENCOUNTER SYMPTOMS
RHINORRHEA: 0
SHORTNESS OF BREATH: 0
ABDOMINAL PAIN: 0
PHOTOPHOBIA: 1
COUGH: 0
NAUSEA: 0
VOMITING: 0

## 2021-01-24 ASSESSMENT — PAIN SCALES - GENERAL: PAINLEVEL_OUTOF10: 6

## 2021-01-24 NOTE — ED PROVIDER NOTES
[facility]  Emergency Department Encounter  Emergency Medicine Resident     Pt Name: Roseann Kelelr  MRN: [id number]  [place] 1970  Date of evaluation: 21  PCP:  POPEYE Barboza CNP    CHIEF COMPLAINT       Chief Complaint   Patient presents with    Headache       HISTORY OF PRESENT ILLNESS  (Location/Symptom, Timing/Onset, Context/Setting, Quality, Duration, Modifying Factors, Severity.)    Roseann Keller is a 53 y.o. female who presents with left-sided weakness, headache, photophobia. Patient began having the symptoms last well-known at 9:30 PM last night 2021. Patient went to [facility] this morning. Had a CT head, CTA head and neck. These demonstrated an occlusion to the right MCA. Stroke team was consulted from [facility] and then patient was transported here for endovascular and neurology evaluation. On arrival, patient is conscious, alert, and oriented x4. She speaks in full sentences and answers questions appropriately albeit slowly. Patient is complaining of headache,, photophobia. Neurology resident down to evaluate patient immediately. Stroke alert had been called. PPE Worn:  Gloves: Yes  Eye Protection: Goggles  Mask: Surgical Mask  Gown: NO    PAST MEDICAL / SURGICAL / SOCIAL / FAMILY HISTORY    has a past medical history of Aneurysm ([address] Cancer ([address] Contact lens/glasses fitting, Fibromyalgia, Former smoker, Hyperlipidemia, Hypertension, and Sleep apnea. has a past surgical history that includes Breast surgery (Left, 2018); Tunneled venous port placement; Hysterectomy (2018); Carpal tunnel release (Bilateral);  section; back surgery; Brain aneurysm surgery (2020); and other surgical history (2021).     Social History     Socioeconomic History    Marital status:      Spouse name: Not on file    Number of children: Not on file    Years of education: Not on file    Highest education level: Not on file   Occupational History    Not on file   Social Needs    Financial resource strain: Not on file    Food insecurity     Worry: Not on file     Inability: Not on file    Transportation needs     Medical: Not on file     Non-medical: Not on file   Tobacco Use    Smoking status: Former Smoker     Packs/day: 1.00     Years: 15.00     Pack years: 15.00     Types: Cigarettes     Quit date: 2000     Years since quittin.3    Smokeless tobacco: Never Used    Tobacco comment: quit 20 years ago   Substance and Sexual Activity    Alcohol use: Not on file     Comment: drinks lwine     Drug use: Never    Sexual activity: Not on file   Lifestyle    Physical activity     Days per week: Not on file     Minutes per session: Not on file    Stress: Not on file   Relationships    Social connections     Talks on phone: Not on file     Gets together: Not on file     Attends Holiness service: Not on file     Active member of club or organization: Not on file     Attends meetings of clubs or organizations: Not on file     Relationship status: Not on file    Intimate partner violence     Fear of current or ex partner: Not on file     Emotionally abused: Not on file     Physically abused: Not on file     Forced sexual activity: Not on file   Other Topics Concern    Not on file   Social History Narrative    Not on file       Family History   Family history unknown: Yes       Allergies:    Adhesive tape    Home Medications:  Prior to Admission medications    Medication Sig Start Date End Date Taking? Authorizing Provider   AMITRIPTYLINE HCL PO Take by mouth nightly    Historical Provider, MD   MELOXICAM PO Take by mouth nightly    Historical Provider, MD   aspirin 325 MG tablet Take 1 tablet by mouth daily 20   POPEYE Leal CNP   lisinopril (PRINIVIL;ZESTRIL) 40 MG tablet Take 40 mg by mouth daily 20   Historical Provider, MD   clonazePAM (KLONOPIN) 0.5 MG tablet Take 0.5 mg by mouth 2 times daily as needed.  20 Historical Provider, MD   venlafaxine (EFFEXOR XR) 75 MG extended release capsule Take 75 mg by mouth daily 6/19/20   Historical Provider, MD   hydroCHLOROthiazide (HYDRODIURIL) 25 MG tablet Take 25 mg by mouth daily 8/5/20   Historical Provider, MD   Cholecalciferol (VITAMIN D3) 25 MCG (1000 UT) CAPS Take 2,000 Units by mouth nightly     Historical Provider, MD   atorvastatin (LIPITOR) 10 MG tablet Take 10 mg by mouth nightly     Historical Provider, MD   carvedilol (COREG) 12.5 MG tablet Take 12.5 mg by mouth 2 times daily (with meals)    Historical Provider, MD       REVIEW OF SYSTEMS    (2-9 systems for level 4, 10 or more for level 5)    Review of Systems   Constitutional: Negative for chills, fatigue and fever. HENT: Negative for congestion and rhinorrhea. Eyes: Positive for photophobia. Respiratory: Negative for cough and shortness of breath. Cardiovascular: Negative for chest pain. Gastrointestinal: Negative for abdominal pain, nausea and vomiting. Musculoskeletal: Negative for myalgias. Neurological: Positive for weakness and headaches. All other systems reviewed and are negative. PHYSICAL EXAM   (up to 7 for level 4, 8 or more for level 5)    INITIAL VITALS:   ED Triage Vitals [01/24/21 1643]   BP Temp Temp Source Pulse Resp SpO2 Height Weight   (!) 163/124 98.4 °F (36.9 °C) Oral 86 18 100 % 5' 5\" (1.651 m) 230 lb (104.3 kg)       Physical Exam  Vitals signs and nursing note reviewed. Constitutional:       General: She is not in acute distress. Appearance: She is well-developed. She is ill-appearing. Eyes:      General: Visual field deficit present. Cardiovascular:      Rate and Rhythm: Normal rate and regular rhythm. Pulses: Normal pulses. Carotid pulses are 2+ on the right side and 2+ on the left side. Radial pulses are 2+ on the right side and 2+ on the left side. Posterior tibial pulses are 2+ on the right side and 2+ on the left side. Heart sounds: Normal heart sounds. Pulmonary:      Effort: Pulmonary effort is normal. No respiratory distress. Breath sounds: Normal breath sounds. No decreased breath sounds. Abdominal:      General: There is no distension. Palpations: Abdomen is soft. Tenderness: There is no abdominal tenderness. Skin:     General: Skin is warm and dry. Capillary Refill: Capillary refill takes less than 2 seconds. Neurological:      General: No focal deficit present. Mental Status: She is alert and oriented to person, place, and time. GCS: GCS eye subscore is 4. GCS verbal subscore is 5. GCS motor subscore is 6. Cranial Nerves: No dysarthria or facial asymmetry. Sensory: Sensation is intact. Motor: No weakness, tremor, abnormal muscle tone or seizure activity. Coordination: Finger-Nose-Finger Test abnormal. Heel to Lobo Test normal.         DIFFERENTIAL  DIAGNOSIS   PLAN (LABS / IMAGING / EKG):  Orders Placed This Encounter   Procedures    Culture, Urine    MRA HEAD WO CONTRAST    MRI BRAIN WO CONTRAST    CT HEAD WO CONTRAST    IR ANGIOGRAM CAROTID C EREBRAL BILATERAL    STROKE PANEL    COVID-19    Urinalysis with Microscopic    Urine Drug Screen    MRSA by PCR    CBC WITH AUTO DIFFERENTIAL    BASIC METABOLIC PANEL    MAGNESIUM    Diet NPO, After Midnight    Telemetry Monitoring    Vital signs per unit routine    Neuro checks    Swallow assessment by nursing before diet and oral medications started.     Up with assistance    Nursing communication    Full Code    Inpatient consult to Neurocritical care    Initiate Oxygen Therapy Protocol    EKG 12 Lead    PATIENT STATUS (FROM ED OR OR/PROCEDURAL) Inpatient       MEDICATIONS ORDERED:  Orders Placed This Encounter   Medications    0.9 % sodium chloride bolus    DISCONTD: niCARdipine (CARDENE) 25 mg in dextrose 5 % 250 mL infusion    DISCONTD: niCARdipine (CARDENE) 20 mg in 0.9 % sodium chloride 200 1/24/2021 6:12 pm TECHNIQUE: Multiplanar multisequence MRI of the brain was performed without the administration of intravenous contrast. MRA of the head was performed utilizing time-of-flight imaging with MIP images. No intravenous contrast was administered. COMPARISON: None. HISTORY: ORDERING SYSTEM PROVIDED HISTORY: left mca occlusion on ct TECHNOLOGIST PROVIDED HISTORY: left mca occlusion on ct Decision Support Exception->Emergency Medical Condition (MA) Is the patient pregnant?->No; ORDERING SYSTEM PROVIDED HISTORY: stroke alert TECHNOLOGIST PROVIDED HISTORY: -SIRD stroke alert Is the patient pregnant?->No Left-sided weakness, headache and photophobia with acute onset 1 day prior. Abnormal CT brain and CT angiogram brain demonstrating a right MCA occlusion. FINDINGS: MRI BRAIN: INTRACRANIAL STRUCTURES/VENTRICLES: There is extensive restricted diffusion involving the cortex and subcortical white matter of the superior right temporal lobe and to a lesser extent right frontal and right parietal lobes consistent with an acute right MCA infarct. There is a small amount of blooming on gradient imaging within the cortex of the right parietal lobe consistent with petechial hemorrhage. There is no mass effect or midline shift. There is no ventriculomegaly. There is no abnormal extra-axial fluid collection identified. There is no sellar or suprasellar mass present. ORBITS: Limited evaluation of the orbits is unremarkable. SINUSES: The paranasal sinuses and mastoid air cells are clear. BONES/SOFT TISSUES: Bone marrow signal intensity is normal. MRA HEAD: ANTERIOR CIRCULATION: Intracranial segments of the internal carotid arteries are normal in appearance. There is susceptibility artifact in the location of the patient's known right distal middle cerebral artery stent. Susceptibility artifact is similar to the previous exam from 12/01/2020. There is signal within distal right MCA branches.   The left middle cerebral artery is normal in appearance. The anterior cerebral arteries are normal in appearance. POSTERIOR CIRCULATION: The distal vertebral arteries are normal in appearance. The basilar artery is normal.  No significant stenosis or aneurysm is identified. The posterior cerebral arteries are normal in appearance. 1. Large right middle cerebral artery territory infarct involving the superior right temporal lobe and to a lesser extent the right frontal and right parietal lobes. 2. Small focus of petechial hemorrhage within the right parietal lobe infarct. 3. Limited evaluation of the right middle cerebral artery given susceptibility artifact from the patient's known MCA stent. There is normal signal identified within the proximal M1 segment as well as M2 branches beyond the stent and area of susceptibility artifact. Correlation with the patient's reported recent outside CT angiogram of the brain is recommended. Mri Brain Wo Contrast    Result Date: 1/24/2021  EXAMINATION: MRI OF THE BRAIN WITHOUT CONTRAST AND MRA HEAD WITHOUT CONTRAST 1/24/2021 6:12 pm TECHNIQUE: Multiplanar multisequence MRI of the brain was performed without the administration of intravenous contrast. MRA of the head was performed utilizing time-of-flight imaging with MIP images. No intravenous contrast was administered. COMPARISON: None. HISTORY: ORDERING SYSTEM PROVIDED HISTORY: left mca occlusion on ct TECHNOLOGIST PROVIDED HISTORY: left mca occlusion on ct Decision Support Exception->Emergency Medical Condition (MA) Is the patient pregnant?->No; ORDERING SYSTEM PROVIDED HISTORY: stroke alert TECHNOLOGIST PROVIDED HISTORY: -SIRD stroke alert Is the patient pregnant?->No Left-sided weakness, headache and photophobia with acute onset 1 day prior. Abnormal CT brain and CT angiogram brain demonstrating a right MCA occlusion.  FINDINGS: MRI BRAIN: INTRACRANIAL STRUCTURES/VENTRICLES: There is extensive restricted diffusion involving the cortex and subcortical white matter of the superior right temporal lobe and to a lesser extent right frontal and right parietal lobes consistent with an acute right MCA infarct. There is a small amount of blooming on gradient imaging within the cortex of the right parietal lobe consistent with petechial hemorrhage. There is no mass effect or midline shift. There is no ventriculomegaly. There is no abnormal extra-axial fluid collection identified. There is no sellar or suprasellar mass present. ORBITS: Limited evaluation of the orbits is unremarkable. SINUSES: The paranasal sinuses and mastoid air cells are clear. BONES/SOFT TISSUES: Bone marrow signal intensity is normal. MRA HEAD: ANTERIOR CIRCULATION: Intracranial segments of the internal carotid arteries are normal in appearance. There is susceptibility artifact in the location of the patient's known right distal middle cerebral artery stent. Susceptibility artifact is similar to the previous exam from 12/01/2020. There is signal within distal right MCA branches. The left middle cerebral artery is normal in appearance. The anterior cerebral arteries are normal in appearance. POSTERIOR CIRCULATION: The distal vertebral arteries are normal in appearance. The basilar artery is normal.  No significant stenosis or aneurysm is identified. The posterior cerebral arteries are normal in appearance. 1. Large right middle cerebral artery territory infarct involving the superior right temporal lobe and to a lesser extent the right frontal and right parietal lobes. 2. Small focus of petechial hemorrhage within the right parietal lobe infarct. 3. Limited evaluation of the right middle cerebral artery given susceptibility artifact from the patient's known MCA stent. There is normal signal identified within the proximal M1 segment as well as M2 branches beyond the stent and area of susceptibility artifact.   Correlation with the patient's reported recent outside CT angiogram of the brain is recommended. EKG    EKG Interpretation    Interpreted by me    Rhythm: normal sinus   Rate: normal, 80  Axis: normal  Ectopy: none  Conduction: normal  ST Segments: no acute change  T Waves: no acute change  Q Waves: none    Clinical Impression: Sinus rhythm rate of 80. No ST segment changes, no arrhythmia, no ectopy. Normal axis with poor R wave progression    All EKG's are interpreted by the Emergency Department Physician whoeither signs or Co-signs this chart in the absence of a cardiologist.    Impression:  Patient presents as a stroke alert transfer from [facility]. Occlusion to the right MCA artery is the read on CT imaging. Patient transferred for neuro endovascular input. NIH score of 3 as below. Patient answering questions appropriately. Stroke team recommending 1 L saline bolus, keeping blood pressure less than 180 mmHg, and if it goes above 180 mmHg to utilize Cardene. They also want an MRI head without contrast, MRI brain without contrast.  Patient to be admitted to neuro service    [address]    Time Performed:  8046 PM    Administer stroke scale items in the order listed. Record performance in each category after each subscale exam. Do not go back and change scores. Follow directions provided for each exam technique. Scores should reflect what the patient does, not what the clinician thinks the patient can do. The clinician should record answers while administering the exam and work quickly. Except where indicated, the patient should not be coached (i.e., repeated requests to patient to make a special effort). 1a.  Level of consciousness:  0 - alert; keenly responsive  1b. Level of consciousness questions:  0 - answers both questions correctly  1c. Level of consciousness questions:  0 - performs both tasks correctly  2. Best Gaze:  1 - partial gaze palsy  3. Visual:  0 - no visual loss  4. Facial Palsy:  0 - normal symmetric movement  5a.   Motor left arm:  0 - no drift, limb holds 90 (or 45) degrees for full 10 seconds  5b. Motor right arm:  0 - no drift, limb holds 90 (or 45) degrees for full 10 seconds  6a. Motor left le - no drift; leg holds 30 degree position for full 5 seconds  6b. Motor right le - no drift; leg holds 30 degree position for full 5 seconds  7. Limb Ataxia:  1 - present in one limb  8. Sensory:  0 - normal; no sensory loss  9. Best Language:  0 - no aphasia, normal  10. Dysarthria:  0 - normal  11. Extinction and Inattention:  1 - visual, tactile, auditory, spatial or personal inattention or extinction to bilateral simultaneous stimulation in one of the sensory modalities     TOTAL:  3      EMERGENCY DEPARTMENT COURSE:   Patient had MRI and MRA completed. Both demonstrating acute infarct to the right MCA territory. Spoke with stroke resident who wanted admission to the neuro ICU since patient will have angiogram in the morning. Spoke with neuro critical care resident who accepted patient to their service. Care transferred to neuro critical care team.    Patient remained conscious, alert, and orient x4. Patient and her daughter reiterated that after her angiogram 4 days ago, she did have significant pain and discomfort during the procedure and patient stated that unless she had significant pain and anxiety relief she would not go through with the angiogram.  I spoke with patient and her daughter at length stating that numbing medication could be utilized prior to the procedure along with analgesia and antianxiety medication. Patient was then able to be convinced that she would go through with the angiogram.  This information was relayed to the neuro critical care resident.     MDM  Number of Diagnoses or Management Options  Cerebrovascular accident (CVA) due to occlusion of left middle cerebral artery [facility]): new, needed workup     Amount and/or Complexity of Data Reviewed  Clinical lab tests: ordered and

## 2021-01-24 NOTE — ED NOTES
Pt to ED by Yumiko Choudhary from [facility] for neuro consult after finding a right MCA occlusion. Pt has LKW 2130 last night when she had left arm weakness, headache, and slurred speech. Pt has hx aneurysm coiling done here at .V as well as left sided breast CA . pt on arrival A&O x4 and speaking in complete sentences and placed on full monitor.  Neuro resident at bedside      Lani Castillo, formerly [facility]0 [facility]  01/24/21 7962

## 2021-01-24 NOTE — CONSULTS
Endovascular Note    [x]   Stroke Alert          []   Stroke Priority          []    Stroke Consult     1/24/2021 5:01 PM    Pt Name: Socrates Mccollum  MRN: [id number]  YOB: 1970  Date of evaluation: 1/24/2021  Primary Care Physician: Juliet Jeffrey, APRN - MENDEL    Socrates Mccollum is a 53 y.o. female who presents with past medical history of hypertension, hyperlipidemia, sleep apnea, breast cancer status post Rx 2018, cervical spine fusion, right MCA bifurcation aneurysm status post [place] via stent x2 assisted with embolism on 8/26/2020 with slight occlusion of the parent M2 presented as a transfer from [facility] with last well-known of 9 PM with left-sided weakness and neglect and right sided gaze preference CT head at [facility] was negative but CTA positive for right MCA occlusion and patient was transferred to [facility] for further management. Date last known well: 1/23/2021  Time last known well: 9 pm 1/23/2021  NIHSS:2  Blood Glucose: 84  Blood Pressure: 163/124    At home on AC/AP: Aspirin 325 mg  Compliance: Yes  Last Hb1ac 5.2  Last LDL-116  Last HDL-58        Prior Imaging/Procedure:  CT head and neck with contrast    Aneurysm at the right middle cerebral artery trifurcation measuring 1.0 x 0.8   cm. CT head without contrast:    Impression   No acute intracranial abnormality.       Aneurysm coil mass at the site of prior right middle cerebral artery aneurysm   with adjacent stents. MRI:  MRI brain without contrast 9/2/2020  Impression   1. Unremarkable MRI brain.  No evidence of an acute infarct. 2. Right MCA aneurysm bifurcation coiling with stent assistance.  Most of the   aneurysm lumen is occluded.  There are areas of increased signal along the   margins of the aneurysm, more pronounced laterally where residual aneurysm   filling cannot entirely be excluded. 3. No other aneurysms are identified. MRA head with and without contrast 9/2/2020  Impression   1. Unremarkable MRI brain.  No evidence of an acute infarct. 2. Right MCA aneurysm bifurcation coiling with stent assistance.  Most of the   aneurysm lumen is occluded.  There are areas of increased signal along the   margins of the aneurysm, more pronounced laterally where residual aneurysm   filling cannot entirely be excluded. 3. No other aneurysms are identified. MRA neck without contrast 9/2/2020  Impression   Normal MRA neck     MRA head with and without contrast 12/1/2020  Impression   Postsurgical change of prior right MCA bifurcation aneurysm with stent   assistance. Xenia Flores is redemonstration of increased signal along the margins   of the aneurysm and residual filling again cannot be excluded. EEG 9/3/2020  This EEG was normal. No focal or epileptiform abnormalities were   seen. Allergies  is allergic to adhesive tape. Medications  Prior to Admission medications    Medication Sig Start Date End Date Taking? Authorizing Provider   AMITRIPTYLINE HCL PO Take by mouth nightly    Historical Provider, MD   MELOXICAM PO Take by mouth nightly    Historical Provider, MD   aspirin 325 MG tablet Take 1 tablet by mouth daily 8/27/20   POPEYE Lopez - CNP   lisinopril (PRINIVIL;ZESTRIL) 40 MG tablet Take 40 mg by mouth daily 8/5/20   Historical Provider, MD   clonazePAM (KLONOPIN) 0.5 MG tablet Take 0.5 mg by mouth 2 times daily as needed.  6/19/20   Historical Provider, MD   venlafaxine (EFFEXOR XR) 75 MG extended release capsule Take 75 mg by mouth daily 6/19/20   Historical Provider, MD   hydroCHLOROthiazide (HYDRODIURIL) 25 MG tablet Take 25 mg by mouth daily 8/5/20   Historical Provider, MD   Cholecalciferol (VITAMIN D3) 25 MCG (1000 UT) CAPS Take 2,000 Units by mouth nightly     Historical Provider, MD   atorvastatin (LIPITOR) 10 MG tablet Take 10 mg by mouth nightly     Historical Provider, MD   carvedilol (COREG) 12.5 MG tablet Take 12.5 mg by mouth 2 times daily (with meals)    Historical Provider, MD Scheduled Meds:  Continuous Infusions:  PRN Meds:.    Past Medical History   has a past medical history of Aneurysm ([facility] [address] Cancer ([facility] [address] Contact lens/glasses fitting, Fibromyalgia, Former smoker, Hyperlipidemia, Hypertension, and Sleep apnea. Social History:  Social History     Tobacco History     Smoking Status  Former Smoker Quit date  9/18/2000 Smoking Frequency  1 pack/day for 15 years (15 pk yrs) Smoking Tobacco Type  Cigarettes    Smokeless Tobacco Use  Never Used    Tobacco Comment  quit 20 years ago          Alcohol History     Alcohol Use Status  Not Asked Comment  drinks lwine           Drug Use     Drug Use Status  Never          Sexual Activity     Sexually Active  Not Asked                  Family History:      Family history unknown: Yes         OBJECTIVE  BP (!) 163/124   Pulse 86   Temp 98.4 °F (36.9 °C) (Oral)   Resp 18   Ht 5' 5\" (1.651 m)   Wt 230 lb (104.3 kg)   SpO2 100%   BMI 38.27 kg/m²     ROS  CONSTITUTIONAL: negative for fatigue and malaise   EYES: negative for double vision and photophobia    HEENT: negative for tinnitus and sore throat   RESPIRATORY: negative for cough, shortness of breath   CARDIOVASCULAR: negative for chest pain, palpitations   GASTROINTESTINAL: negative for nausea, vomiting   GENITOURINARY: negative for incontinence   MUSCULOSKELETAL: negative for neck or back pain   NEUROLOGICAL:  Positive for headache and left-sided weakness   PSYCHIATRIC: negative for agitated     Review of systems otherwise negative.               PHYSICAL EXAM    GENERAL  Appears comfortable and in no distress   HEENT  NC/ AT   HEART  S1 and S2 heard; palpation of pulses: radial pulse    NECK  Supple and no bruits heard   MENTAL STATUS:  Alert, oriented, intact memory, no confusion, normal speech, normal language, no hallucination or delusion   CRANIAL NERVES: II     -      Visual fields intact to confrontation  III,IV,VI -  PERR, EOMs full, no ptosis  V     -     Normal facial sensation VII    -     Normal facial symmetry  VIII   -     Intact hearing   IX,X -     Symmetrical palate  XI    -     Symmetrical shoulder shrug  XII   -     Midline tongue, no atrophy    MOTOR FUNCTION: RUE: Significant for good strength of grade 5/5 in proximal and distal muscle groups   LUE: Significant for good strength of grade 4/5 in proximal and distal muscle groups   RLE: Significant for good strength of grade 5/5 in proximal and distal muscle groups   LLE: Significant for good strength of grade 5/5 in proximal and distal muscle groups      Normal bulk, normal tone and no involuntary movements, no tremor   SENSORY FUNCTION:  Decreased sensation on the left upper and lower extremities   CEREBELLAR FUNCTION:  Intact fine motor control over upper limbs and lower limbs   REFLEX FUNCTION:  Reflex 2+ symmetric b/l in upper and lower extremities, clonus-ve, plantar downgoing b/l   STATION and GAIT  Normal gait and tandem station, normal tip toes and heel walking             INITIAL NIH STROKE SCALE    Time Performed:  4:45 PM    Administer stroke scale items in the order listed. Record performance in each category after each subscale exam. Do not go back and change scores. Follow directions provided for each exam technique. Scores should reflect what the patient does, not what the clinician thinks the patient can do. The clinician should record answers while administering the exam and work quickly. Except where indicated, the patient should not be coached (i.e., repeated requests to patient to make a special effort). 1a.  Level of consciousness:  0 - alert; keenly responsive  1b. Level of consciousness questions:  0 - answers both questions correctly  1c. Level of consciousness questions:  0 - performs both tasks correctly  2. Best Gaze:  0 - normal  3. Visual:  0 - no visual loss  4. Facial Palsy:  0 - normal symmetric movement  5a.   Motor left arm:  0 - no drift, limb holds 90 (or 45) degrees for full 10 seconds  5b. Motor right arm:  0 - no drift, limb holds 90 (or 45) degrees for full 10 seconds  6a. Motor left le - no drift; leg holds 30 degree position for full 5 seconds  6b. Motor right le - no drift; leg holds 30 degree position for full 5 seconds  7. Limb Ataxia:  1 - present in one limb  8. Sensory:  1 - mild to moderate sensory loss; patient feels pinprick is less sharp or is dull on the affected side; there is a loss of superficial pain with pinprick but patient is aware of being touched   9. Best Language:  0 - no aphasia, normal  10. Dysarthria:  0 - normal  11. Extinction and Inattention:  0 - no abnormality    TOTAL:  2      Imaging:  CT brain without contrast:  Done at [facility] showed no acute intracranial abnormality  CTA head/neck with and without contrast:  Done at [facility] showed right MCA occlusion  MRI brain without contrast (limited stroke protocol):  Ordered    Intervention Assessment:  Last Known Well (date and time): 9pm  Candidate for IV tPA therapy     Yes []         No  [x]               ASSESSMENT RECOMMENDATIONS:  Left-sided weakness and paresthesia with last well-known 9 PM yesterday NIHSS 2 past medical history ofast medical history of hypertension, hyperlipidemia, sleep apnea, breast cancer status post Rx 2018, cervical spine fusion, right MCA bifurcation aneurysm status post [place] via stent x2 assisted with embolism on 2020 with slight occlusion of the parent M2   CT head at [facility] negative but CTA showed right MCA occlusion  Will obtain MRA head without contrast and MRI brain  Further management based on the above imaging         - Additional recommendations may follow    Please contact [facility] with any changes in patients neurologic status.                        Discussed with Dr. Shelley ROJAS MD  Neurology Resident,PGY-4    Electronically signed 2021 at 5:01 PM

## 2021-01-24 NOTE — ED PROVIDER NOTES
[address] [facility]     Emergency Department     Faculty Attestation        I performed a history and physical examination of the patient and discussed management with the resident. I reviewed the residents note and agree with the documented findings and plan of care. Any areas of disagreement are noted on the chart. I was personally present for the key portions of any procedures. I have documented in the chart those procedures where I was not present during the key portions. I have reviewed the emergency nurses triage note. I agree with the chief complaint, past medical history, past surgical history, allergies, medications, social and family history as documented unless otherwise noted below. For Physician Assistant/ Nurse Practitioner cases/documentation I have personally evaluated this patient and have completed at least one if not all key elements of the E/M (history, physical exam, and MDM). Additional findings are as noted. Vital Signs:    Pulse: 86  Resp: 18    SpO2: 100 %  PCP:  POPEYE Castillo - CNP    Pertinent Comments:     Patient is a 67-year-old female with history of aneurysms in the past and coiling with onset last night at 9 PM, well outside of any TPA window, with left-sided weakness and some neglect with right-sided gaze preference. Patient was seen at [facility] today with CT head negative but CTA positive for a right MCA occlusion. Patient is transferred for possible thrombectomy. Stroke alert called prior to patient arrival      EKG Interpretation    Interpreted by emergency department physician    Rhythm: normal sinus   Rate: normal at 80 bpm  Axis: normal  Conduction: normal  ST Segments: no acute change  T Waves: no acute change  Q Waves: no acute change    Clinical Impression:  nonspecific EKG.           CRITICAL CARE: There was a high probability of clinically significant/life threatening deterioration in this patient's condition which required my urgent intervention. Total critical care time was 30 minutes. This excludes any time for separately reportable procedures. This patient was evaluated in the Emergency Department for symptoms described in the history of present illness. He/she was evaluated in the context of the global COVID-19 pandemic, which necessitated consideration that the patient might be at risk for infection with the SARS-CoV-2 virus that causes COVID-19. Institutional protocols and algorithms that pertain to the evaluation of patients at risk for COVID-19 are in a state of rapid change based on information released by regulatory bodies including the CDC and federal and state organizations. These policies and algorithms were followed during the patient's care in the ED. (Please note that portions of this note were completed with a voice recognition program. Efforts were made to edit the dictations but occasionally words are mis-transcribed.  Whenever words are used in this note in any gender, they shall be construed as though they were used in the gender appropriate to the circumstances; and whenever words are used in this note in the singular or plural form, they shall be construed as though they were used in the form appropriate to the circumstances.)    MD Jose Ward  Attending Emergency Medicine Physician             Jose F Tracy MD  01/24/21 [address] MD Dia  01/24/21 0647

## 2021-01-24 NOTE — ED NOTES
Bed: 23  Expected date:   Expected time:   Means of arrival:   Comments:  Aimee Bhatia RN  01/24/21 9260

## 2021-01-25 ENCOUNTER — APPOINTMENT (OUTPATIENT)
Dept: MRI IMAGING | Age: 51
DRG: 023 | End: 2021-01-25
Payer: COMMERCIAL

## 2021-01-25 ENCOUNTER — APPOINTMENT (OUTPATIENT)
Dept: CT IMAGING | Age: 51
DRG: 023 | End: 2021-01-25
Payer: COMMERCIAL

## 2021-01-25 LAB
ABSOLUTE EOS #: 0.09 K/UL (ref 0–0.44)
ABSOLUTE IMMATURE GRANULOCYTE: 0.04 K/UL (ref 0–0.3)
ABSOLUTE LYMPH #: 2.5 K/UL (ref 1.1–3.7)
ABSOLUTE MONO #: 0.71 K/UL (ref 0.1–1.2)
ANION GAP SERPL CALCULATED.3IONS-SCNC: 10 MMOL/L (ref 9–17)
BASOPHILS # BLD: 0 % (ref 0–2)
BASOPHILS ABSOLUTE: 0.04 K/UL (ref 0–0.2)
BUN BLDV-MCNC: 14 MG/DL (ref 6–20)
BUN/CREAT BLD: ABNORMAL (ref 9–20)
CALCIUM SERPL-MCNC: 9.1 MG/DL (ref 8.6–10.4)
CHLORIDE BLD-SCNC: 106 MMOL/L (ref 98–107)
CO2: 23 MMOL/L (ref 20–31)
CREAT SERPL-MCNC: 0.44 MG/DL (ref 0.5–0.9)
DIFFERENTIAL TYPE: NORMAL
DIRECT EXAM: NORMAL
EKG ATRIAL RATE: 80 BPM
EKG P AXIS: 82 DEGREES
EKG P-R INTERVAL: 150 MS
EKG Q-T INTERVAL: 404 MS
EKG QRS DURATION: 84 MS
EKG QTC CALCULATION (BAZETT): 465 MS
EKG R AXIS: 34 DEGREES
EKG T AXIS: 47 DEGREES
EKG VENTRICULAR RATE: 80 BPM
EOSINOPHILS RELATIVE PERCENT: 1 % (ref 1–4)
GFR AFRICAN AMERICAN: >60 ML/MIN
GFR NON-AFRICAN AMERICAN: >60 ML/MIN
GFR SERPL CREATININE-BSD FRML MDRD: ABNORMAL ML/MIN/{1.73_M2}
GFR SERPL CREATININE-BSD FRML MDRD: ABNORMAL ML/MIN/{1.73_M2}
GLUCOSE BLD-MCNC: 104 MG/DL (ref 70–99)
HCT VFR BLD CALC: 38 % (ref 36.3–47.1)
HEMOGLOBIN: 12.8 G/DL (ref 11.9–15.1)
IMMATURE GRANULOCYTES: 0 %
LYMPHOCYTES # BLD: 27 % (ref 24–43)
Lab: NORMAL
MAGNESIUM: 2 MG/DL (ref 1.6–2.6)
MCH RBC QN AUTO: 32.3 PG (ref 25.2–33.5)
MCHC RBC AUTO-ENTMCNC: 33.7 G/DL (ref 28.4–34.8)
MCV RBC AUTO: 96 FL (ref 82.6–102.9)
MONOCYTES # BLD: 8 % (ref 3–12)
NRBC AUTOMATED: 0 PER 100 WBC
PDW BLD-RTO: 12.7 % (ref 11.8–14.4)
PLATELET # BLD: 317 K/UL (ref 138–453)
PLATELET ESTIMATE: NORMAL
PMV BLD AUTO: 9.5 FL (ref 8.1–13.5)
POTASSIUM SERPL-SCNC: 3.3 MMOL/L (ref 3.7–5.3)
RBC # BLD: 3.96 M/UL (ref 3.95–5.11)
RBC # BLD: NORMAL 10*6/UL
SARS-COV-2, RAPID: NORMAL
SARS-COV-2: NORMAL
SARS-COV-2: NOT DETECTED
SEG NEUTROPHILS: 64 % (ref 36–65)
SEGMENTED NEUTROPHILS ABSOLUTE COUNT: 5.96 K/UL (ref 1.5–8.1)
SODIUM BLD-SCNC: 139 MMOL/L (ref 135–144)
SOURCE: NORMAL
SPECIMEN DESCRIPTION: NORMAL
WBC # BLD: 9.3 K/UL (ref 3.5–11.3)
WBC # BLD: NORMAL 10*3/UL

## 2021-01-25 PROCEDURE — 6370000000 HC RX 637 (ALT 250 FOR IP): Performed by: STUDENT IN AN ORGANIZED HEALTH CARE EDUCATION/TRAINING PROGRAM

## 2021-01-25 PROCEDURE — 99233 SBSQ HOSP IP/OBS HIGH 50: CPT | Performed by: PSYCHIATRY & NEUROLOGY

## 2021-01-25 PROCEDURE — 2580000003 HC RX 258: Performed by: STUDENT IN AN ORGANIZED HEALTH CARE EDUCATION/TRAINING PROGRAM

## 2021-01-25 PROCEDURE — 6360000002 HC RX W HCPCS: Performed by: FAMILY MEDICINE

## 2021-01-25 PROCEDURE — 70551 MRI BRAIN STEM W/O DYE: CPT

## 2021-01-25 PROCEDURE — C1769 GUIDE WIRE: HCPCS

## 2021-01-25 PROCEDURE — 6370000000 HC RX 637 (ALT 250 FOR IP): Performed by: FAMILY MEDICINE

## 2021-01-25 PROCEDURE — 80048 BASIC METABOLIC PNL TOTAL CA: CPT

## 2021-01-25 PROCEDURE — 97166 OT EVAL MOD COMPLEX 45 MIN: CPT

## 2021-01-25 PROCEDURE — 99223 1ST HOSP IP/OBS HIGH 75: CPT | Performed by: PSYCHIATRY & NEUROLOGY

## 2021-01-25 PROCEDURE — 2709999900 HC NON-CHARGEABLE SUPPLY

## 2021-01-25 PROCEDURE — 97535 SELF CARE MNGMENT TRAINING: CPT

## 2021-01-25 PROCEDURE — 70544 MR ANGIOGRAPHY HEAD W/O DYE: CPT

## 2021-01-25 PROCEDURE — 85025 COMPLETE CBC W/AUTO DIFF WBC: CPT

## 2021-01-25 PROCEDURE — C1894 INTRO/SHEATH, NON-LASER: HCPCS

## 2021-01-25 PROCEDURE — 83735 ASSAY OF MAGNESIUM: CPT

## 2021-01-25 PROCEDURE — 2060000000 HC ICU INTERMEDIATE R&B

## 2021-01-25 PROCEDURE — 70450 CT HEAD/BRAIN W/O DYE: CPT

## 2021-01-25 PROCEDURE — 36415 COLL VENOUS BLD VENIPUNCTURE: CPT

## 2021-01-25 RX ORDER — IBUPROFEN 400 MG/1
800 TABLET ORAL ONCE
Status: COMPLETED | OUTPATIENT
Start: 2021-01-25 | End: 2021-01-25

## 2021-01-25 RX ORDER — POTASSIUM CHLORIDE 20 MEQ/1
40 TABLET, EXTENDED RELEASE ORAL PRN
Status: DISCONTINUED | OUTPATIENT
Start: 2021-01-25 | End: 2021-02-05 | Stop reason: HOSPADM

## 2021-01-25 RX ORDER — FENTANYL CITRATE 50 UG/ML
50 INJECTION, SOLUTION INTRAMUSCULAR; INTRAVENOUS ONCE
Status: COMPLETED | OUTPATIENT
Start: 2021-01-25 | End: 2021-01-25

## 2021-01-25 RX ORDER — POTASSIUM CHLORIDE 7.45 MG/ML
10 INJECTION INTRAVENOUS PRN
Status: DISCONTINUED | OUTPATIENT
Start: 2021-01-25 | End: 2021-02-05 | Stop reason: HOSPADM

## 2021-01-25 RX ADMIN — ACETAMINOPHEN 650 MG: 325 TABLET ORAL at 02:15

## 2021-01-25 RX ADMIN — POTASSIUM CHLORIDE 40 MEQ: 1500 TABLET, EXTENDED RELEASE ORAL at 08:08

## 2021-01-25 RX ADMIN — CLOPIDOGREL 75 MG: 75 TABLET, FILM COATED ORAL at 08:08

## 2021-01-25 RX ADMIN — ATORVASTATIN CALCIUM 10 MG: 10 TABLET, FILM COATED ORAL at 19:30

## 2021-01-25 RX ADMIN — ACETAMINOPHEN 650 MG: 325 TABLET ORAL at 11:41

## 2021-01-25 RX ADMIN — ASPIRIN 325 MG: 325 TABLET ORAL at 08:08

## 2021-01-25 RX ADMIN — SODIUM CHLORIDE: 9 INJECTION, SOLUTION INTRAVENOUS at 08:06

## 2021-01-25 RX ADMIN — VENLAFAXINE HYDROCHLORIDE 75 MG: 75 CAPSULE, EXTENDED RELEASE ORAL at 08:08

## 2021-01-25 RX ADMIN — IBUPROFEN 800 MG: 400 TABLET, FILM COATED ORAL at 14:40

## 2021-01-25 RX ADMIN — FENTANYL CITRATE 50 MCG: 50 INJECTION, SOLUTION INTRAMUSCULAR; INTRAVENOUS at 06:46

## 2021-01-25 ASSESSMENT — PAIN DESCRIPTION - FREQUENCY: FREQUENCY: CONTINUOUS

## 2021-01-25 ASSESSMENT — PAIN SCALES - GENERAL
PAINLEVEL_OUTOF10: 10
PAINLEVEL_OUTOF10: 9

## 2021-01-25 ASSESSMENT — PAIN DESCRIPTION - ORIENTATION: ORIENTATION: RIGHT;ANTERIOR

## 2021-01-25 ASSESSMENT — PAIN DESCRIPTION - PAIN TYPE: TYPE: ACUTE PAIN

## 2021-01-25 ASSESSMENT — PAIN DESCRIPTION - DESCRIPTORS: DESCRIPTORS: HEADACHE

## 2021-01-25 NOTE — ED NOTES
Report called to Katie RN  Questions/concerns addressed  Pt to floor with BILL Shaver RN  01/24/21 6692

## 2021-01-25 NOTE — PROGRESS NOTES
Daily Progress Note  Neuro Critical Care    Patient Name: Chelsey VASQUEZ  Patient : 1970  Room/Bed: 2500/1416-60  Code Status: FULL  Allergies: Allergies   Allergen Reactions    Adhesive Tape Rash     redness       CHIEF COMPLAINT:      Headache     INTERVAL HISTORY    Initial Presentation (Admitted 2021): The patient is a 78-year-old female with past medical history significant for diabetes, hyperlipidemia, breast cancer for which she last received chemotherapy over a year ago and states she follows with oncologist at [facility] and is in remission, right MCA aneurysm status post web and stent placement on 2020. She presented with right-sided headache, left-sided weakness, dizziness to [facility]. Her last known well was 9 PM the night prior to admission. Patient was found this way by her friend. CT head and CTA head and neck found a right MCA occlusion. Patient was transferred to [facility] for further evaluation as she receives her neurologic care at [facility]. Patient was seen as a stroke alert and found to have an NIH stroke scale of 3 for left upper extremity ataxia, left eye hemianopsia of left visual field, and attention/neglect left side. MRI and MRA were significant for right MCA infarct with right parietal petechial hemorrhages. Endovascular was consulted who recommended angiography in the morning with resumption of aspirin and Plavix and n.p.o. at midnight. Permissive hypertension with systolic blood pressure less than 220. Of note, patient has a history of right MCA aneurysm as mentioned above which was initially treated with web and stent placement on 2020. She had a follow-up DSA done on 2021    Hospital Course:   Patient was admitted to the neuro ICU for close monitoring. Was given aspirin and Plavix and awaiting to go for angiogram    Last 24h:   No acute events overnight. Return from CT head this morning.   Awaiting to go for angiogram.    CURRENT MEDICATIONS:  SCHEDULED MEDICATIONS:   clopidogrel  75 mg Oral Daily    aspirin  325 mg Oral Daily    atorvastatin  10 mg Oral Nightly    [Held by provider] carvedilol  12.5 mg Oral BID WC    [Held by provider] hydroCHLOROthiazide  25 mg Oral Daily    [Held by provider] lisinopril  40 mg Oral Daily    venlafaxine  75 mg Oral Daily    sodium chloride flush  10 mL Intravenous 2 times per day     CONTINUOUS INFUSIONS:   sodium chloride 125 mL/hr at 21 2242     PRN MEDICATIONS:   sodium chloride flush, promethazine **OR** ondansetron, polyethylene glycol, acetaminophen **OR** acetaminophen, senna    VITALS:  Temperature Range: Temp: 98.2 °F (36.8 °C) Temp  Av.3 °F (36.8 °C)  Min: 98.2 °F (36.8 °C)  Max: 98.4 °F (36.9 °C)  BP Range: Systolic (62VUU), LOX:698 , Min:143 , EEL:603     Diastolic (69TUL), ZQI:47, Min:80, Max:124    Pulse Range: Pulse  Av.3  Min: 69  Max: 96  Respiration Range: Resp  Av.3  Min: 14  Max: 24  Current Pulse Ox: SpO2: 95 %  24HR Pulse Ox Range: SpO2  Av.7 %  Min: 94 %  Max: 100 %  Patient Vitals for the past 12 hrs:   BP Temp Temp src Pulse Resp SpO2   21 0620 (!) 188/100 -- -- 74 21 --   21 0600 -- -- -- -- -- 95 %   21 0502 (!) 205/106 -- -- 84 19 95 %   21 0410 (!) 184/114 98.2 °F (36.8 °C) Oral 81 14 96 %   21 0306 (!) 156/91 -- -- 83 21 --   21 0300 -- -- -- -- -- 96 %   21 0203 (!) 152/96 -- -- 69 18 98 %   21 0102 (!) 143/93 -- -- 84 24 94 %   21 0003 (!) 150/93 -- -- 83 18 98 %   21 (!) 160/90 -- -- 80 19 97 %   21 (!) 159/85 98.4 °F (36.9 °C) Oral 94 21 96 %   21 (!) 163/85 -- -- 96 18 98 %   21 (!) 164/95 -- -- 83 15 99 %   21 -- -- -- 83 21 98 %   21 (!) 157/91 -- -- 88 23 96 %     Estimated body mass index is 38.27 kg/m² as calculated from the following:    Height as of this encounter: 5' 5\" (1.651 m).     Weight as of this encounter: 230 lb (104.3 kg).  []<16 Severe malnutrition  []16-16.99 Moderate malnutrition  []17-18.49 Mild malnutrition  []18.5-24.9 Normal  []25-29.9 Overweight (not obese)  []30-34.9 Obese class 1 (Low Risk)  [x]35-39.9 Obese class 2 (Moderate Risk)  []?40 Obese class 3 (High Risk)    RECENT LABS:   Lab Results   Component Value Date    WBC 9.3 01/25/2021    HGB 12.8 01/25/2021    HCT 38.0 01/25/2021     01/25/2021    ALT 25 09/02/2020    AST 21 09/02/2020     01/25/2021    K 3.3 (L) 01/25/2021     01/25/2021    CREATININE 0.44 (L) 01/25/2021    BUN 14 01/25/2021    CO2 23 01/25/2021    INR 0.9 01/24/2021     24 HOUR INTAKE/OUTPUT:    Intake/Output Summary (Last 24 hours) at 1/25/2021 0710  Last data filed at 1/25/2021 0520  Gross per 24 hour   Intake 4034 ml   Output 400 ml   Net 3634 ml       IMAGING:   MRI Brain WO Contrast 1/24/2021  MRA Head WO Contrast 1/24/2021  Impression   1. Large right middle cerebral artery territory infarct involving the   superior right temporal lobe and to a lesser extent the right frontal and   right parietal lobes. 2. Small focus of petechial hemorrhage within the right parietal lobe infarct. 3. Limited evaluation of the right middle cerebral artery given   susceptibility artifact from the patient's known MCA stent.  There is normal   signal identified within the proximal M1 segment as well as M2 branches   beyond the stent and area of susceptibility artifact.  Correlation with the   patient's reported recent outside CT angiogram of the brain is recommended.         CT Head WO Contrast 1/25/2021  Impression   Evolving infarct in the right frontotemporal region.  No evidence of   hemorrhagic conversion at this time. Labs and Images reviewed with:  [] Dr. Steffen Mercado. Rakesh    [x] Dr. Yesenia Anthony  [] Dr. Chhaya Moser  [] There are no new interval images to review.      PHYSICAL EXAM       CONSTITUTIONAL:  Well developed, well nourished, alert and oriented x 3, in no acute distress. GCS 15. Nontoxic. No dysarthria. No aphasia. HEAD:  normocephalic, atraumatic    EYES:  PERRLA, EOMI.   ENT:  moist mucous membranes   NECK:  supple, symmetric   LUNGS:  Equal air entry bilaterally   CARDIOVASCULAR:  normal s1 / s2, RRR, distal pulses intact   ABDOMEN:  Soft, no rigidity   NEUROLOGIC:  Mental Status:  A & O x3,awake             Cranial Nerves:    cranial nerves II-XII are grossly intact    Motor Exam:    Drift:  absent  Tone:  normal    Motor exam is symmetrical 5 out of 5 all extremities bilaterally    Sensory:    Touch:    Right Upper Extremity:  normal  Left Upper Extremity:  normal  Right Lower Extremity:  normal  Left Lower Extremity:  normal    Deep Tendon Reflexes:    Right Bicep:  2+  Left Bicep:  2+  Right Knee:  2+  Left Knee:  2+    Plantar Response:  Right:  downgoing  Left:  downgoing    Clonus:  N/A  Mont[place] Oca's:  N/A    Coordination/Dysmetria:  Finger to Nose:   Right:  normal  Left:  normal   Dysdiadochokinesia:  N/A    Gait:  Not tested   NIH Stroke Scale Total (if not done complete detailed one below):    1a.  Level of consciousness:  0 - alert; keenly responsive  1b. Level of consciousness questions:  0 - answers both questions correctly  1c. Level of consciousness questions:  0 - performs both tasks correctly  2. Best Gaze:  0 - normal  3. Visual:  0 - no visual loss  4. Facial Palsy:  0 - normal symmetric movement  5a. Motor left arm:  0 - no drift, limb holds 90 (or 45) degrees for full 10 seconds  5b. Motor right arm:  0 - no drift, limb holds 90 (or 45) degrees for full 10 seconds  6a. Motor left le - no drift; leg holds 30 degree position for full 5 seconds  6b. Motor right le - no drift; leg holds 30 degree position for full 5 seconds  7. Limb Ataxia:  0 - absent  8. Sensory:  0 - normal; no sensory loss  9. Best Language:  0 - no aphasia, normal  10. Dysarthria:  0 - normal  11.   Extinction and Inattention:  0 - no abnormality  TOTAL: 0    DRAINS:  [] There are no drains for Neuro Critical Care to monitor at this time. ASSESSMENT AND PLAN:       The patient is a 73-year-old female with past medical history significant for diabetes, hyperlipidemia, breast cancer for which she last received chemotherapy over a year ago and states she follows with oncologist at [facility] and is in remission, right MCA aneurysm status post web and stent placement on 8/26/2020. She presented with right-sided headache, left-sided weakness, dizziness to [facility], transferred here for further care. NEUROLOGIC:  - Imaging: MRI MRA -large right middle cerebral artery territory infarct involving the superior right temporal lobe. Small focus of petechial hemorrhage within the right parietal lobe  - Goal SBP <220  - ASA and Plavix  - Neuro checks per protocol  - Plan for angiogram today    CARDIOVASCULAR:  - Goal SBP <220  - Continue telemetry    PULMONARY:  - Doing well on RA  - Continue to monitor    RENAL/FLUID/ELECTROLYTE:  - BUN 14/ Creatinine 0.44  - Urine output 0.3 ml/kg/hr  - IVF: 125ml. hr  - Replace electrolytes PRN  - Daily BMP    GI/NUTRITION:  NUTRITION:  Diet NPO, After Midnight  - Bowel regimen: senna PRN  - GI prophylaxis: NA    ID:  - Tmax 98.5 (36.9)  - WBC 9.3  - Continue to monitor for fevers  - Daily CBC    HEME:   - H&H 12.8/38.0  - Platelets 448  - Daily CBC    ENDOCRINE:  - Continue to monitor blood glucose, goal <180    OTHER:  - PT/OT/ST   - Code Status: FULL    PROPHYLAXIS:  Stress ulcer: N/A    DVT PROPHYLAXIS:  - SCD sleeves - Thigh High     DISPOSITION:  [x] To remain ICU:  [] OK for out of ICU from Neuro Critical Care standpoint    We will continue to follow along. For any changes in exam or patient status please contact Neuro Critical Care.       Dimitrios Wheatley MD   Neurology PGY-2  Neuro Critical Care  1/25/2021     7:10 AM

## 2021-01-25 NOTE — ED NOTES
Pt given pillow  Pt passed swallow screening without any cough, choking or drooling  Pt given meal tray.   Pt educated NPO after midnight     Zaki [facility]  01/24/21 2040

## 2021-01-25 NOTE — PROGRESS NOTES
Brief progress note    Writer was called to bedside by nurse. At 408pm daughter was at bedside and noticed that pt had difficulty texting. Nurse examined pt and noted L neglect. On exam:    Vitals:    21 1600   BP: (!) 169/102   Pulse: 84   Resp: 17   Temp:    SpO2: 97%        General:  Gen: normal habitus, NAD  HEENT: NCAT, mucosa moist  Cvs: RRR, S1 S2 normal  Resp: symmetric unlabored breathing  Abd: s/nd/nt  Ext: no edema  Skin: no lesions seen, warm and dry    Neuro:  Gen: awake and alert, oriented x3. Lang/speech: no aphasia or dysarthria. Follows commands. CN: PERRL, EOMI, VFF, V1-3 intact, face symmetric, hearing intact, shoulder shrug symmetric, tongue midline  Motor: 5/5 R hemibody, L hemibody 4+/5 no drift. Sense: LT intact in all 4 ext. Sensory and visual extinction on L  Coord: FTN and HTS intact b/l  DTR: deferred  Gait: deferred    NIH Stroke Scale:   1a  Level of consciousness: 0 - alert; keenly responsive   1b. LOC questions:  0 - answers both questions correctly   1c. LOC commands: 0 - performs both tasks correctly   2. Best Gaze: 0 - normal   3. Visual: 0 - no visual loss   4. Facial Palsy: 0 - normal symmetric movement   5a. Motor left arm: 0 - no drift, limb holds 90 (or 45) degrees for full 10 seconds   5b. Motor right arm: 0 - no drift, limb holds 90 (or 45) degrees for full 10 seconds   6a. Motor left le - no drift; leg holds 30 degree position for full 5 seconds   6b  Motor right le - no drift; leg holds 30 degree position for full 5 seconds   7. Limb Ataxia: 0 - absent   8. Sensory: 0 - normal; no sensory loss   9. Best Language:  0 - no aphasia, normal   10. Dysarthria: 0 - normal   11. Extinction and Inattention: 2 - profound demetrice-inattention or demetrice- inattention to more than one modality.   Does not recognize own hand or orients only to one side of space          Total:   2     MRS: 1    Concern for worsening stroke vs bleed    Recs:  --bolus 1L NS now, continue 125cc/hr  --sbp 140-200  --stat CT head wo con r/o bleed  --emergent dsa, eval for occlusion/stenosis R MCA  --plan to consent pt/fam for procedure    Anisa Berrios fellow. Case discussed with Dr. Latanya Post attending. Addendum:  repeat MRI completed, which showed no acute changes. Per NSICU, on their exam there was no acute change to exam. This being the case, plan to perform DSA tomorrow morning as originally planned. Anisa Berrios fellow.

## 2021-01-25 NOTE — PROGRESS NOTES
Occupational Therapy   Occupational Therapy Initial Assessment  Date: 2021   Patient Name: Rosi Ny  MRN: [id number]     : 1970    Date of Service: 2021    Discharge Recommendations: Further therapy recommended at discharge. The patient should be able to tolerate at least three hours of therapy per day over 5 days or 15 hours over 7 days. Pt is likely to progress quickly. Patient would benefit from continued therapy after discharge  OT Equipment Recommendations  Other: CTA    Assessment   Performance deficits / Impairments: Decreased functional mobility ; Decreased endurance;Decreased ADL status; Decreased high-level IADLs;Decreased safe awareness;Decreased balance  Prognosis: Good  Decision Making: Medium Complexity  OT Education: OT Role;Plan of Care;Transfer Training;Family Education  REQUIRES OT FOLLOW UP: Yes  Activity Tolerance  Activity Tolerance: Patient Tolerated treatment well;Treatment limited secondary to medical complications (free text); Patient limited by pain  Activity Tolerance: L visual deficit, H/A  Safety Devices  Safety Devices in place: Yes  Type of devices: All fall risk precautions in place;Call light within reach;Gait belt;Left in bed;Nurse notified  Restraints  Initially in place: No         Patient Diagnosis(es): The encounter diagnosis was Cerebrovascular accident (CVA) due to occlusion of left middle cerebral artery ([address] has a past medical history of Aneurysm ([address] Cancer ([address] Contact lens/glasses fitting, Fibromyalgia, Former smoker, Hyperlipidemia, Hypertension, and Sleep apnea. has a past surgical history that includes Breast surgery (Left, 2018); Tunneled venous port placement; Hysterectomy (2018); Carpal tunnel release (Bilateral);  section; back surgery; Brain aneurysm surgery (2020); and other surgical history (2021).          Restrictions  Restrictions/Precautions  Restrictions/Precautions: General Precautions, Fall Risk  Required Braces or Orthoses?: No  Position Activity Restriction  Other position/activity restrictions: up with A, SBP goal <220    Subjective   General  Patient assessed for rehabilitation services?: Yes  Family / Caregiver Present: Yes(Dtr)  Diagnosis: R MCA CVA occlusion, hx of aneurysm, L weakness  Patient Currently in Pain: Yes  Pain Assessment  Pain Assessment: 0-10  Pain Level: 10  Pain Type: Acute pain  Pain Location: Head  Pain Orientation: Right; Anterior  Pain Descriptors: Headache  Pain Frequency: Continuous  Non-Pharmaceutical Pain Intervention(s): Ambulation/Increased Activity; Distraction; Emotional support; Therapeutic presence  Response to Pain Intervention: Patient Satisfied  Vital Signs  Level of Consciousness: Alert (0)  Patient Currently in Pain: Yes  Social/Functional History  Social/Functional History  Lives With: Alone  Type of Home: [facility], 2nd floor of building)  Home Layout: One level  Home Access: Stairs to enter with rails  Entrance Stairs - Number of Steps: 1 full flight indoors-carpeted  Bathroom Shower/Tub: Tub/Shower unit  Bathroom Equipment: Shower chair  Bathroom Accessibility: Accessible  Home Equipment: (none)  ADL Assistance: Independent  Homemaking Assistance: Independent  Homemaking Responsibilities: Yes  Ambulation Assistance: Independent  Transfer Assistance: Independent  Active : Yes  Occupation: Self employed  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, sewing  Additional Comments: Per dtr, family can provide nearly 24 hr supervision upon d/c     Objective   Vision: Impaired  Vision Exceptions: Visual field cut(L field cut observed during func activity, pt had difficulty performing formal peripheral vision assessment d/t H/A)  Hearing: Within functional limits    Orientation  Overall Orientation Status: Impaired  Orientation Level: Oriented to person;Disoriented to time;Oriented to situation;Oriented to place(Pt thought it was a Sunday instead of Monday) Balance  Sitting Balance: Supervision  Standing Balance: Stand by assistance  Standing Balance  Time: 15 min  Activity: Pt stood bedside, sinkside, at toilet, func mob to/from bathroom on unit  Functional Mobility  Functional - Mobility Device: No device  Activity: To/from bathroom  Assist Level: Contact guard assistance  Functional Mobility Comments: Pt required cues to avoid being tangled in IV lines, slowed pace, L visual deficits  Toilet Transfers  Toilet - Technique: Ambulating  Equipment Used: Grab bars  Toilet Transfer: Supervision  ADL  Feeding: Supervision  Grooming: Contact guard assistance(Pt had difficulty finding hand soap on L, after toileting pt far from sink when attempting to wash hands, pt performed full oral/hair care tasks, pt dropped 3 items into sink)  UE Bathing: Contact guard assistance  LE Bathing: Minimal assistance; Increased time to complete  UE Dressing: Minimal assistance; Increased time to complete  LE Dressing: Minimal assistance; Increased time to complete(Pt doffed/donned sock sitting EOB, became distracted during activity)  Toileting: Minimal assistance; Increased time to complete(Pt sat on toilet to successfully urinate, kelly-care not observed)  Tone RUE  RUE Tone: Normotonic  Tone LUE  LUE Tone: Normotonic  Coordination  Movements Are Fluid And Coordinated: No  Coordination and Movement description: Decreased speed;Fine motor impairments  Quality of Movement Other  Comment: Pt dropping objects during grooming activity sinkside, may have been L hand dropping these objects by writer did not observe     Bed mobility  Supine to Sit: Unable to assess  Sit to Supine: Supervision  Scooting: Modified independent  Comment: Pt sitting EOB upon arrival, pt retired supine in bed on R side with call light  Transfers  Sit to stand: Contact guard assistance  Stand to sit: Stand by assistance     Cognition  Overall Cognitive Status: WFL        Sensation  Overall Sensation Status: Impaired(Numbness in R side of face and R foot)      LUE AROM (degrees)  LUE AROM : WFL  RUE AROM (degrees)  RUE AROM : Exceptions  R Shoulder Flexion 0-180: Observed to 90 degrees-chronic deficits per pt  R Elbow Flexion 0-145: WFL  R Elbow Extension 145-0: WFL  LUE Strength  Gross LUE Strength: WFL  L Shoulder Flex: 4+/5  L Elbow Flex: 4+/5  L Elbow Ext: 4+/5  L Hand General: 5/5  RUE Strength  Gross RUE Strength: WFL  R Shoulder Flex: NT(Not tested d/t chronic deficits in shoulder per pt)  R Elbow Flex: 4+/5  R Elbow Ext: 4+/5  R Hand General: 5/5      Plan   Plan  Times per week: 4-5x    AM-PAC Score        AM-PAC Inpatient Daily Activity Raw Score: 18 (01/25/21 1211)  AM-PAC Inpatient ADL T-Scale Score : 38.66 (01/25/21 1211)  ADL Inpatient CMS 0-100% Score: 46.65 (01/25/21 1211)  ADL Inpatient CMS G-Code Modifier : CK (01/25/21 1211)    Goals  Short term goals  Time Frame for Short term goals: Pt will by discharge  Short term goal 1: demo good safety awareness during func mob around room (I)  Short term goal 2: demo bending/reaching func activity while standing with SUP  Short term goal 3: demo ADL UB/LB dressing/bathing activity with mod I, 1 vc, and increased time  Short term goal 4: demo scanning to L x6+ during func activity with 1 vc only to reduce L visual deficit  Short term goal 5: identify all common CVA symtpoms with 1 vc only to promote future safety awareness     Therapy Time   Individual Concurrent Group Co-treatment   Time In 1033         Time Out 1100         Minutes 27         Timed Code Treatment Minutes: 23 Minutes       Marshall Leak, OTR/L

## 2021-01-25 NOTE — FLOWSHEET NOTE
[facility] CARE DEPARTMENT - [facility]     Emergency/Trauma Note    PATIENT NAME: Jacklyn VASQUEZ    Shift date: 01/24/2021  Shift day: Sunday   Shift # 2    Room # 23/23   Name: Julian Soler            Age: 53 y.o. Gender: female          Pentecostalism: None   Place of [place]:     Trauma/Incident type: Stroke Alert  Admit Date & Time: 1/24/2021  4:41 PM    PATIENT/EVENT DESCRIPTION:  Julian Soler is a 53 y.o. female who arrived via EMS from home as a Stroke Consult. Pt is having a headache. Pt to be admitted to 23/23. SPIRITUAL ASSESSMENT/INTERVENTION:   responded to Stroke Consult via perfect serve.  met with pt who consistency asked about her masked.  escorted daughter, Ami Kathleen, back to ED23. Daughter stated she is stressed over this situation.  offered hospitality to daughter by getting water for her; she was thankful. PATIENT BELONGINGS:  No belongings noted    ANY BELONGINGS OF SIGNIFICANT VALUE NOTED:  n/a    REGISTRATION STAFF NOTIFIED? Yes      WHAT IS YOUR SPIRITUAL CARE PLAN FOR THIS PATIENT?:   [facility] will remain available to offer spiritual and emotional support as needed. Electronically signed by Shemar Pulliam on 1/24/2021 at 7:37 PM.  [address] [facility]  [phone]         01/24/21 1745   Encounter Summary   Services provided to: Patient   Referral/Consult From: Multi-disciplinary team   Support System Children   Continue Visiting   (01/24/2021)   Complexity of Encounter Low   Length of Encounter 15 minutes   Spiritual Assessment Completed Yes   Routine   Type Initial   Assessment Anxious; Approachable;Passive   Intervention Active listening;Sustaining presence/ Ministry of presence   Outcome Expressed gratitude;Comfort

## 2021-01-25 NOTE — PROGRESS NOTES
Discussed imaging findings with Dr. Adrianna Fuller    Right MCA acute stroke due to right MCA high grade stenosis    NIH STROKE SCALE     Time Performed:  7:20 PM 2021   1a. Level of consciousness:  0 - alert; keenly responsive  1b. Level of consciousness questions:  0 - answers both questions correctly  1c. Level of consciousness questions:  0 - performs both tasks correctly  2. Best Gaze:  0 - normal  3. Visual:  1 - partial hemianopia  4. Facial Palsy:  0 - normal symmetric movement  5a. Motor left arm:  1 - drift, limb holds 90 (or 45) degrees but drifts down before full 10 seconds: does not hit bed  5b. Motor right arm:  0 - no drift, limb holds 90 (or 45) degrees for full 10 seconds  6a. Motor left le - drift; leg falls by the end of the 5 second period but does not hit bed  6b. Motor right le - no drift; leg holds 30 degree position for full 5 seconds  7. Limb Ataxia:  0 - absent  8. Sensory:  1 - mild to moderate sensory loss; patient feels pinprick is less sharp or is dull on the affected side; there is a loss of superficial pain with pinprick but patient is aware of being touched   9. Best Language:  0 - no aphasia, normal  10. Dysarthria:  0 - normal  11.   Extinction and Inattention:  1 - visual, tactile, auditory, spatial or personal inattention or extinction to bilateral simultaneous stimulation in one of the sensory modalities     TOTAL:  5      Plan  Admit to neuro ICU overnight for close monitoring   SBP goal 140-185  Continue aspirin 325 mg daily, Plavix 75 mg daily  Neurochecks q1h  IV fluids 125 cc / hour  CT head in AM    Diagnostic angiogram tomorrow       Electronically signed by Julio Cesar Zuleta MD on 2021 at 7:50 PM

## 2021-01-25 NOTE — H&P
Neuro ICU History & Physical    Patient Name: Maricel Alfaro  Patient : 1970  Room/Bed: 7013/7450-41  Code Status: Full  Allergies: Allergies   Allergen Reactions    Adhesive Tape Rash     redness       CHIEF COMPLAINT     Headache    HPI    History Obtained From: Patient, daughter, EMR    The patient is a 53 y.o. female with past medical history significant for DM, HLD, breast cancer last treated with chemotherapy over a year ago, right MCA aneurysm s/p web and stent placement on 2020, presented with right-sided headache, left-sided weakness, dizziness to  Manuel Hein. Last known well around 9 PM last night, found this way by friend around noon. CT head and CTA head and neck found a right MCA occlusion. Patient was transferred to [facility] as this is where she receives all her neurologic care. Patient was a stroke alert, found to have NIH stroke scale 3 for left upper extremity ataxia, left eye hemianopsia of the left visual field, inattention. MRI and MRA was significant for right MCA infarct with right parietal petechial hemorrhages. Neuro endovascular was consulted, who recommended angiography in the morning, administration of aspirin and Plavix, n.p.o. at midnight, permissive hypertension to . On exam, patient is alert and oriented, reports right-sided headache, throbbing, nonradiating, similar to prior headaches, not the worst headache of her life. Patient does not have any dizziness at this time, no weakness. Patient denies chest pain, shortness of breath, abdominal pain, nausea, vomiting. Patient had history of right MCA aneurysm with web and stent placement on 2020. Patient followed up, had DSA on 2021, significant for small residual filling defect, but otherwise unremarkable.       Admitted to ICU From: Emergency Department   Reason for ICU Admission: Right MCA infarct with right parietal petechial hemorrhages requiring angiography in the morning       PATIENT HISTORY   Past Medical History:        Diagnosis Date    Aneurysm ([facility] [address] 2019    right MCA bifurcation aneurysm    Cancer ([facility] [address] 2018    left breast    Contact lens/glasses fitting     Fibromyalgia     Former smoker     Hyperlipidemia     Hypertension     Debi Garcia CNP    Sleep apnea     C-pap / STATES HAS NOT BEEN WEARING       Past Surgical History:        Procedure Laterality Date    BACK SURGERY      neck fusion & L4-L5 fusion    BRAIN ANEURYSM SURGERY  2020    WEB SL EMOBLIZATION SYSTEM MRI CONDTIONAL 3T OK, SAFE IMMEDIATELY.   IR ANGIOGRAM CAROTID CEREBRAL BILATERAL,,S/P coil embolization of cerebral aneurysm    BREAST SURGERY Left 2018    lumpectomy    CARPAL TUNNEL RELEASE Bilateral      SECTION      x 2,  &     HYSTERECTOMY  2018    OTHER SURGICAL HISTORY  2021    DIAGNOSTIC CEREBRAL ANGIOGRAM / Previously treated right MCA M1 bifurcation aneurysm is with a small residual filling noted    TUNNELED VENOUS PORT PLACEMENT      placed , removed 2019       Social History:   Social History     Socioeconomic History    Marital status:      Spouse name: Not on file    Number of children: Not on file    Years of education: Not on file    Highest education level: Not on file   Occupational History    Not on file   Social Needs    Financial resource strain: Not on file    Food insecurity     Worry: Not on file     Inability: Not on file   Nominum needs     Medical: Not on file     Non-medical: Not on file   Tobacco Use    Smoking status: Former Smoker     Packs/day: 1.00     Years: 15.00     Pack years: 15.00     Types: Cigarettes     Quit date: 2000     Years since quittin.3    Smokeless tobacco: Never Used    Tobacco comment: quit 20 years ago   Substance and Sexual Activity    Alcohol use: Not on file     Comment: drinks lwine     Drug use: Never    Sexual activity: Not on file   Lifestyle    Physical activity     Days per week: Not on file     Minutes per session: Not on file    Stress: Not on file   Relationships    Social connections     Talks on phone: Not on file     Gets together: Not on file     Attends Congregational service: Not on file     Active member of club or organization: Not on file     Attends meetings of clubs or organizations: Not on file     Relationship status: Not on file    Intimate partner violence     Fear of current or ex partner: Not on file     Emotionally abused: Not on file     Physically abused: Not on file     Forced sexual activity: Not on file   Other Topics Concern    Not on file   Social History Narrative    Not on file       Family History:       Family history unknown: Yes       Allergies:    Adhesive tape    Medications Prior to Admission:    Medications Prior to Admission: AMITRIPTYLINE HCL PO, Take by mouth nightly  MELOXICAM PO, Take by mouth nightly  aspirin 325 MG tablet, Take 1 tablet by mouth daily  lisinopril (PRINIVIL;ZESTRIL) 40 MG tablet, Take 40 mg by mouth daily  clonazePAM (KLONOPIN) 0.5 MG tablet, Take 0.5 mg by mouth 2 times daily as needed.   venlafaxine (EFFEXOR XR) 75 MG extended release capsule, Take 75 mg by mouth daily  hydroCHLOROthiazide (HYDRODIURIL) 25 MG tablet, Take 25 mg by mouth daily  Cholecalciferol (VITAMIN D3) 25 MCG (1000 UT) CAPS, Take 2,000 Units by mouth nightly   atorvastatin (LIPITOR) 10 MG tablet, Take 10 mg by mouth nightly   carvedilol (COREG) 12.5 MG tablet, Take 12.5 mg by mouth 2 times daily (with meals)    Current Medications:  Current Facility-Administered Medications: 0.9 % sodium chloride infusion, , Intravenous, Continuous  clopidogrel (PLAVIX) tablet 75 mg, 75 mg, Oral, Daily  aspirin tablet 325 mg, 325 mg, Oral, Daily  atorvastatin (LIPITOR) tablet 10 mg, 10 mg, Oral, Nightly  [Held by provider] carvedilol (COREG) tablet 12.5 mg, 12.5 mg, Oral, BID WC  [Held by provider] hydroCHLOROthiazide (HYDRODIURIL) tablet 25 mg, 25 mg, Oral, CARDIOVASCULAR:  normal s1 / s2   ABDOMEN:  Soft, no rigidity   NECK supple, symmetric, no midline tenderness to palpation    BACK without midline tenderness, step-offs or deformities    EXTREMITIES Normal ROM with no deformities   NEUROLOGIC:  Mental Status:  A & O x3,awake             Cranial Nerves:    II: Visual acuity:  normal  II: Visual fields:  abnormal -left eye left hemianopsia  III: Pupils:  equal, round, reactive to light  III,IV,VI: Extra Ocular Movements: intact  V: Facial sensation:  intact  V: Corneal reflex:  not completed  VII: Facial strength: intact  VIII: Hearing:  intact  IX: Palate:  intact  XI: Shoulder shrug:  intact  XII: Tongue movement:  normal    Motor Exam:    Drift:  absent  Tone:  normal    Motor exam is symmetrical 5 out of 5 all extremities bilaterally    Sensory:    Touch:    Right Upper Extremity:  normal  Left Upper Extremity:  normal  Right Lower Extremity:  normal  Left Lower Extremity:  normal    Coordination/Dysmetria:  Heel to Shin:  Right:  normal  Left:  normal  Finger to Nose:   Right:  normal  Left:  abnormal       SKIN No obvious ecchymosis, rashes, or lesions    NIH Stroke Scale Total (if not done complete detailed one below):    1a.  Level of consciousness:  0 - alert; keenly responsive  1b. Level of consciousness questions:  0 - answers both questions correctly  1c. Level of consciousness questions:  0 - performs both tasks correctly  2. Best Gaze:  0 - normal  3. Visual:  1 - partial hemianopia  4. Facial Palsy:  0 - normal symmetric movement  5a. Motor left arm:  0 - no drift, limb holds 90 (or 45) degrees for full 10 seconds  5b. Motor right arm:  0 - no drift, limb holds 90 (or 45) degrees for full 10 seconds  6a. Motor left le - no drift; leg holds 30 degree position for full 5 seconds  6b. Motor right le - no drift; leg holds 30 degree position for full 5 seconds  7. Limb Ataxia:  1 - present in one limb  8.     Sensory:  0 - normal; no sensory loss  9. Best Language:  0 - no aphasia, normal  10. Dysarthria:  0 - normal  11. Extinction and Inattention:  1 - visual, tactile, auditory, spatial or personal inattention or extinction to bilateral simultaneous stimulation in one of the sensory modalities   Total: 3    LABS AND IMAGING:     RECENT LABS:  CBC with Differential:    Lab Results   Component Value Date    WBC 14.5 01/24/2021    RBC 4.22 01/24/2021    HGB 13.6 01/24/2021    HCT 39.9 01/24/2021     01/24/2021    MCV 94.5 01/24/2021    MCH 32.2 01/24/2021    MCHC 34.1 01/24/2021    RDW 12.7 01/24/2021    LYMPHOPCT 16 01/24/2021    MONOPCT 7 01/24/2021    BASOPCT 0 01/24/2021    MONOSABS 1.06 01/24/2021    LYMPHSABS 2.25 01/24/2021    EOSABS 0.03 01/24/2021    BASOSABS <0.03 01/24/2021    DIFFTYPE NOT REPORTED 01/24/2021     BMP:    Lab Results   Component Value Date     01/24/2021    K 3.5 01/24/2021     01/24/2021    CO2 22 01/24/2021    BUN 14 01/24/2021    LABALBU 3.9 09/02/2020    CREATININE 0.41 01/24/2021    CALCIUM 9.4 01/24/2021    GFRAA >60 01/24/2021    LABGLOM >60 01/24/2021    GLUCOSE 90 01/24/2021       RADIOLOGY:   Mra Head Wo Contrast    Result Date: 1/24/2021  EXAMINATION: MRI OF THE BRAIN WITHOUT CONTRAST AND MRA HEAD WITHOUT CONTRAST 1/24/2021 6:12 pm TECHNIQUE: Multiplanar multisequence MRI of the brain was performed without the administration of intravenous contrast. MRA of the head was performed utilizing time-of-flight imaging with MIP images. No intravenous contrast was administered. COMPARISON: None.  HISTORY: ORDERING SYSTEM PROVIDED HISTORY: left mca occlusion on ct TECHNOLOGIST PROVIDED HISTORY: left mca occlusion on ct Decision Support Exception->Emergency Medical Condition (MA) Is the patient pregnant?->No; ORDERING SYSTEM PROVIDED HISTORY: stroke alert TECHNOLOGIST PROVIDED HISTORY: -SIRD stroke alert Is the patient pregnant?->No Left-sided weakness, headache and photophobia with acute onset 1 day prior. Abnormal CT brain and CT angiogram brain demonstrating a right MCA occlusion. FINDINGS: MRI BRAIN: INTRACRANIAL STRUCTURES/VENTRICLES: There is extensive restricted diffusion involving the cortex and subcortical white matter of the superior right temporal lobe and to a lesser extent right frontal and right parietal lobes consistent with an acute right MCA infarct. There is a small amount of blooming on gradient imaging within the cortex of the right parietal lobe consistent with petechial hemorrhage. There is no mass effect or midline shift. There is no ventriculomegaly. There is no abnormal extra-axial fluid collection identified. There is no sellar or suprasellar mass present. ORBITS: Limited evaluation of the orbits is unremarkable. SINUSES: The paranasal sinuses and mastoid air cells are clear. BONES/SOFT TISSUES: Bone marrow signal intensity is normal. MRA HEAD: ANTERIOR CIRCULATION: Intracranial segments of the internal carotid arteries are normal in appearance. There is susceptibility artifact in the location of the patient's known right distal middle cerebral artery stent. Susceptibility artifact is similar to the previous exam from 12/01/2020. There is signal within distal right MCA branches. The left middle cerebral artery is normal in appearance. The anterior cerebral arteries are normal in appearance. POSTERIOR CIRCULATION: The distal vertebral arteries are normal in appearance. The basilar artery is normal.  No significant stenosis or aneurysm is identified. The posterior cerebral arteries are normal in appearance. 1. Large right middle cerebral artery territory infarct involving the superior right temporal lobe and to a lesser extent the right frontal and right parietal lobes. 2. Small focus of petechial hemorrhage within the right parietal lobe infarct.  3. Limited evaluation of the right middle cerebral artery given susceptibility artifact from the patient's known normal. MRA HEAD: ANTERIOR CIRCULATION: Intracranial segments of the internal carotid arteries are normal in appearance. There is susceptibility artifact in the location of the patient's known right distal middle cerebral artery stent. Susceptibility artifact is similar to the previous exam from 12/01/2020. There is signal within distal right MCA branches. The left middle cerebral artery is normal in appearance. The anterior cerebral arteries are normal in appearance. POSTERIOR CIRCULATION: The distal vertebral arteries are normal in appearance. The basilar artery is normal.  No significant stenosis or aneurysm is identified. The posterior cerebral arteries are normal in appearance. 1. Large right middle cerebral artery territory infarct involving the superior right temporal lobe and to a lesser extent the right frontal and right parietal lobes. 2. Small focus of petechial hemorrhage within the right parietal lobe infarct. 3. Limited evaluation of the right middle cerebral artery given susceptibility artifact from the patient's known MCA stent. There is normal signal identified within the proximal M1 segment as well as M2 branches beyond the stent and area of susceptibility artifact. Correlation with the patient's reported recent outside CT angiogram of the brain is recommended. Labs and Images reviewed with:    [] Reema Perez MD    [x] Marissa Reynolds MD  [] Suellen Bolaños MD  --[] there are no new interval images to review.      ASSESSMENT AND PLAN:         ASSESSMENT:     This is a 53 y.o. female with past medical history significant for breast cancer in remission, right MCA aneurysm s/p web and stent in 8/26/2020, repeat DSA on 1/21/2021, presented with right-sided headache, left-sided weakness, dizziness, CT head negative and [facility]A at [facility] significant for right MCA occlusion, transferred to [facility] [place]. Eleazar's, NIH stroke scale 3, MRI and MRA found right MCA infarct with right parietal petechial hemorrhages, admitted to the neuro ICU for close monitoring, diagnostic angiogram in the morning. Patient care will be discussed with attending, will reevaluate patient along with attending. PLAN/MEDICAL DECISION MAKING:      NEUROLOGIC:  - Imaging CT head and CTA head and neck consistent with right MCA occlusion   MRI and MRA found right MCA infarct and small focus of petechial hemorrhage within right parietal lobe   DSA 1/25/2021  - Right MCA occlusion   Aspirin daily   Plavix 75 mg daily  - AEDs N/A  - Goal SBP < 220, permissive hypertension  - Neuro checks per protocol    CARDIOVASCULAR:  - Goal SBP <220, permissive HTN  - Hypertension   Hold home antihypertensives  - Hyperlipidemia   Continue home dose lipitor  - Continue telemetry    PULMONARY:  - Satting well on room air  - Continue to monitor    RENAL/FLUID/ELECTROLYTE:  - BUN 14/ Creatinine 0.41  - Monitor I&Os  - IVF: NS at 125 cc/hr  - Replace electrolytes PRN  - Daily BMP    GI/NUTRITION:  NUTRITION:  Diet NPO, After Midnight  - Bowel regimen: GlycoLax and senna as needed  - GI prophylaxis: N/A    ID:  - Afebrile  - WBC 14.5  - Continue to monitor for fevers  - Daily CBC    HEME:   - H&H 13.6/39.9  - Platelets 322  - Daily CBC    ENDOCRINE:  - Continue to monitor blood glucose, goal <180    OTHER:  - PT/OT/ST     PROPHYLAXIS:  Stress ulcer: NA    DVT PROPHYLAXIS:  - SCD sleeves - Thigh High   - WILFRID stockings - Thigh High  - No chemoprophylaxis anticoagulation at this time.   -Aspirin  -Plavix    DISPOSITION: Admit to the neuro ICU      Anjel Bess MD  Neuro Critical Care Service   Pager [phone]  1/25/2021     12:12 AM

## 2021-01-26 ENCOUNTER — APPOINTMENT (OUTPATIENT)
Dept: INTERVENTIONAL RADIOLOGY/VASCULAR | Age: 51
DRG: 023 | End: 2021-01-26
Payer: COMMERCIAL

## 2021-01-26 ENCOUNTER — ANESTHESIA EVENT (OUTPATIENT)
Dept: INTERVENTIONAL RADIOLOGY/VASCULAR | Age: 51
DRG: 023 | End: 2021-01-26
Payer: COMMERCIAL

## 2021-01-26 ENCOUNTER — ANESTHESIA (OUTPATIENT)
Dept: INTERVENTIONAL RADIOLOGY/VASCULAR | Age: 51
DRG: 023 | End: 2021-01-26
Payer: COMMERCIAL

## 2021-01-26 VITALS — DIASTOLIC BLOOD PRESSURE: 75 MMHG | OXYGEN SATURATION: 100 % | SYSTOLIC BLOOD PRESSURE: 115 MMHG

## 2021-01-26 LAB
ABSOLUTE EOS #: 0.14 K/UL (ref 0–0.44)
ABSOLUTE IMMATURE GRANULOCYTE: <0.03 K/UL (ref 0–0.3)
ABSOLUTE LYMPH #: 2.14 K/UL (ref 1.1–3.7)
ABSOLUTE MONO #: 0.72 K/UL (ref 0.1–1.2)
ANION GAP SERPL CALCULATED.3IONS-SCNC: 10 MMOL/L (ref 9–17)
BASOPHILS # BLD: 1 % (ref 0–2)
BASOPHILS ABSOLUTE: 0.05 K/UL (ref 0–0.2)
BUN BLDV-MCNC: 10 MG/DL (ref 6–20)
BUN/CREAT BLD: ABNORMAL (ref 9–20)
CALCIUM SERPL-MCNC: 8.7 MG/DL (ref 8.6–10.4)
CHLORIDE BLD-SCNC: 109 MMOL/L (ref 98–107)
CO2: 21 MMOL/L (ref 20–31)
CREAT SERPL-MCNC: 0.36 MG/DL (ref 0.5–0.9)
CULTURE: NORMAL
DIFFERENTIAL TYPE: ABNORMAL
EOSINOPHILS RELATIVE PERCENT: 2 % (ref 1–4)
GFR AFRICAN AMERICAN: >60 ML/MIN
GFR NON-AFRICAN AMERICAN: >60 ML/MIN
GFR SERPL CREATININE-BSD FRML MDRD: ABNORMAL ML/MIN/{1.73_M2}
GFR SERPL CREATININE-BSD FRML MDRD: ABNORMAL ML/MIN/{1.73_M2}
GLUCOSE BLD-MCNC: 106 MG/DL (ref 70–99)
HCT VFR BLD CALC: 35.2 % (ref 36.3–47.1)
HEMOGLOBIN: 11.5 G/DL (ref 11.9–15.1)
IMMATURE GRANULOCYTES: 0 %
LYMPHOCYTES # BLD: 30 % (ref 24–43)
Lab: NORMAL
MAGNESIUM: 1.9 MG/DL (ref 1.6–2.6)
MCH RBC QN AUTO: 32.4 PG (ref 25.2–33.5)
MCHC RBC AUTO-ENTMCNC: 32.7 G/DL (ref 28.4–34.8)
MCV RBC AUTO: 99.2 FL (ref 82.6–102.9)
MONOCYTES # BLD: 10 % (ref 3–12)
NRBC AUTOMATED: 0 PER 100 WBC
PDW BLD-RTO: 13 % (ref 11.8–14.4)
PLATELET # BLD: 261 K/UL (ref 138–453)
PLATELET ESTIMATE: ABNORMAL
PMV BLD AUTO: 9.5 FL (ref 8.1–13.5)
POTASSIUM SERPL-SCNC: 3.6 MMOL/L (ref 3.7–5.3)
RBC # BLD: 3.55 M/UL (ref 3.95–5.11)
RBC # BLD: ABNORMAL 10*6/UL
SEG NEUTROPHILS: 57 % (ref 36–65)
SEGMENTED NEUTROPHILS ABSOLUTE COUNT: 4.12 K/UL (ref 1.5–8.1)
SODIUM BLD-SCNC: 140 MMOL/L (ref 135–144)
SPECIMEN DESCRIPTION: NORMAL
WBC # BLD: 7.2 K/UL (ref 3.5–11.3)
WBC # BLD: ABNORMAL 10*3/UL

## 2021-01-26 PROCEDURE — 99233 SBSQ HOSP IP/OBS HIGH 50: CPT | Performed by: PSYCHIATRY & NEUROLOGY

## 2021-01-26 PROCEDURE — 2580000003 HC RX 258: Performed by: STUDENT IN AN ORGANIZED HEALTH CARE EDUCATION/TRAINING PROGRAM

## 2021-01-26 PROCEDURE — 6360000002 HC RX W HCPCS: Performed by: NURSE ANESTHETIST, CERTIFIED REGISTERED

## 2021-01-26 PROCEDURE — 037G3ZZ DILATION OF INTRACRANIAL ARTERY, PERCUTANEOUS APPROACH: ICD-10-PCS | Performed by: PSYCHIATRY & NEUROLOGY

## 2021-01-26 PROCEDURE — 3700000001 HC ADD 15 MINUTES (ANESTHESIA)

## 2021-01-26 PROCEDURE — 6360000002 HC RX W HCPCS: Performed by: STUDENT IN AN ORGANIZED HEALTH CARE EDUCATION/TRAINING PROGRAM

## 2021-01-26 PROCEDURE — 03CG3Z7 EXTIRPATION OF MATTER FROM INTRACRANIAL ARTERY USING STENT RETRIEVER, PERCUTANEOUS APPROACH: ICD-10-PCS | Performed by: PSYCHIATRY & NEUROLOGY

## 2021-01-26 PROCEDURE — C1894 INTRO/SHEATH, NON-LASER: HCPCS

## 2021-01-26 PROCEDURE — 2500000003 HC RX 250 WO HCPCS: Performed by: FAMILY MEDICINE

## 2021-01-26 PROCEDURE — 6360000004 HC RX CONTRAST MEDICATION: Performed by: PSYCHIATRY & NEUROLOGY

## 2021-01-26 PROCEDURE — C9248 INJ, CLEVIDIPINE BUTYRATE: HCPCS | Performed by: STUDENT IN AN ORGANIZED HEALTH CARE EDUCATION/TRAINING PROGRAM

## 2021-01-26 PROCEDURE — C1887 CATHETER, GUIDING: HCPCS

## 2021-01-26 PROCEDURE — 6360000002 HC RX W HCPCS: Performed by: FAMILY MEDICINE

## 2021-01-26 PROCEDURE — C1769 GUIDE WIRE: HCPCS

## 2021-01-26 PROCEDURE — 83735 ASSAY OF MAGNESIUM: CPT

## 2021-01-26 PROCEDURE — 99152 MOD SED SAME PHYS/QHP 5/>YRS: CPT | Performed by: PSYCHIATRY & NEUROLOGY

## 2021-01-26 PROCEDURE — 2500000003 HC RX 250 WO HCPCS: Performed by: NURSE ANESTHETIST, CERTIFIED REGISTERED

## 2021-01-26 PROCEDURE — 6370000000 HC RX 637 (ALT 250 FOR IP): Performed by: STUDENT IN AN ORGANIZED HEALTH CARE EDUCATION/TRAINING PROGRAM

## 2021-01-26 PROCEDURE — 61645 PERQ ART M-THROMBECT &/NFS: CPT | Performed by: PSYCHIATRY & NEUROLOGY

## 2021-01-26 PROCEDURE — C1760 CLOSURE DEV, VASC: HCPCS

## 2021-01-26 PROCEDURE — 80048 BASIC METABOLIC PNL TOTAL CA: CPT

## 2021-01-26 PROCEDURE — 36224 PLACE CATH CAROTD ART: CPT | Performed by: PSYCHIATRY & NEUROLOGY

## 2021-01-26 PROCEDURE — 99232 SBSQ HOSP IP/OBS MODERATE 35: CPT | Performed by: PSYCHIATRY & NEUROLOGY

## 2021-01-26 PROCEDURE — 61630 BALO ANGIOPLASTY ICR PERQ: CPT | Performed by: PSYCHIATRY & NEUROLOGY

## 2021-01-26 PROCEDURE — C1725 CATH, TRANSLUMIN NON-LASER: HCPCS

## 2021-01-26 PROCEDURE — 2060000000 HC ICU INTERMEDIATE R&B

## 2021-01-26 PROCEDURE — 2720000010 HC SURG SUPPLY STERILE

## 2021-01-26 PROCEDURE — C1757 CATH, THROMBECTOMY/EMBOLECT: HCPCS

## 2021-01-26 PROCEDURE — B3161ZZ FLUOROSCOPY OF RIGHT INTERNAL CAROTID ARTERY USING LOW OSMOLAR CONTRAST: ICD-10-PCS | Performed by: PSYCHIATRY & NEUROLOGY

## 2021-01-26 PROCEDURE — 2709999900 HC NON-CHARGEABLE SUPPLY

## 2021-01-26 PROCEDURE — 36415 COLL VENOUS BLD VENIPUNCTURE: CPT

## 2021-01-26 PROCEDURE — 3700000000 HC ANESTHESIA ATTENDED CARE

## 2021-01-26 PROCEDURE — 2580000003 HC RX 258: Performed by: NURSE ANESTHETIST, CERTIFIED REGISTERED

## 2021-01-26 PROCEDURE — B3131ZZ FLUOROSCOPY OF RIGHT COMMON CAROTID ARTERY USING LOW OSMOLAR CONTRAST: ICD-10-PCS | Performed by: PSYCHIATRY & NEUROLOGY

## 2021-01-26 PROCEDURE — 85025 COMPLETE CBC W/AUTO DIFF WBC: CPT

## 2021-01-26 RX ORDER — FENTANYL CITRATE 50 UG/ML
INJECTION, SOLUTION INTRAMUSCULAR; INTRAVENOUS
Status: COMPLETED | OUTPATIENT
Start: 2021-01-26 | End: 2021-01-26

## 2021-01-26 RX ORDER — LABETALOL HYDROCHLORIDE 5 MG/ML
INJECTION, SOLUTION INTRAVENOUS
Status: DISPENSED
Start: 2021-01-26 | End: 2021-01-27

## 2021-01-26 RX ORDER — DEXMEDETOMIDINE HYDROCHLORIDE 4 UG/ML
INJECTION, SOLUTION INTRAVENOUS CONTINUOUS PRN
Status: DISCONTINUED | OUTPATIENT
Start: 2021-01-26 | End: 2021-01-26 | Stop reason: SDUPTHER

## 2021-01-26 RX ORDER — METOPROLOL TARTRATE 5 MG/5ML
INJECTION INTRAVENOUS PRN
Status: DISCONTINUED | OUTPATIENT
Start: 2021-01-26 | End: 2021-01-26 | Stop reason: SDUPTHER

## 2021-01-26 RX ORDER — IODIXANOL 270 MG/ML
300 INJECTION, SOLUTION INTRAVASCULAR
Status: COMPLETED | OUTPATIENT
Start: 2021-01-26 | End: 2021-01-26

## 2021-01-26 RX ORDER — MIDAZOLAM HYDROCHLORIDE 1 MG/ML
INJECTION INTRAMUSCULAR; INTRAVENOUS PRN
Status: DISCONTINUED | OUTPATIENT
Start: 2021-01-26 | End: 2021-01-26 | Stop reason: SDUPTHER

## 2021-01-26 RX ORDER — HYDRALAZINE HYDROCHLORIDE 20 MG/ML
INJECTION INTRAMUSCULAR; INTRAVENOUS PRN
Status: DISCONTINUED | OUTPATIENT
Start: 2021-01-26 | End: 2021-01-26 | Stop reason: SDUPTHER

## 2021-01-26 RX ORDER — FENTANYL CITRATE 50 UG/ML
INJECTION, SOLUTION INTRAMUSCULAR; INTRAVENOUS PRN
Status: DISCONTINUED | OUTPATIENT
Start: 2021-01-26 | End: 2021-01-26 | Stop reason: SDUPTHER

## 2021-01-26 RX ORDER — LIDOCAINE HYDROCHLORIDE 10 MG/ML
INJECTION, SOLUTION EPIDURAL; INFILTRATION; INTRACAUDAL; PERINEURAL PRN
Status: DISCONTINUED | OUTPATIENT
Start: 2021-01-26 | End: 2021-01-26 | Stop reason: SDUPTHER

## 2021-01-26 RX ORDER — LABETALOL HYDROCHLORIDE 5 MG/ML
10 INJECTION, SOLUTION INTRAVENOUS
Status: DISCONTINUED | OUTPATIENT
Start: 2021-01-26 | End: 2021-02-05 | Stop reason: HOSPADM

## 2021-01-26 RX ORDER — MIDAZOLAM HYDROCHLORIDE 2 MG/2ML
INJECTION, SOLUTION INTRAMUSCULAR; INTRAVENOUS
Status: COMPLETED | OUTPATIENT
Start: 2021-01-26 | End: 2021-01-26

## 2021-01-26 RX ORDER — CLOPIDOGREL BISULFATE 75 MG/1
75 TABLET ORAL DAILY
Status: DISCONTINUED | OUTPATIENT
Start: 2021-01-27 | End: 2021-02-05 | Stop reason: HOSPADM

## 2021-01-26 RX ORDER — HYDRALAZINE HYDROCHLORIDE 20 MG/ML
20 INJECTION INTRAMUSCULAR; INTRAVENOUS EVERY 4 HOURS PRN
Status: DISCONTINUED | OUTPATIENT
Start: 2021-01-26 | End: 2021-01-27

## 2021-01-26 RX ORDER — SODIUM CHLORIDE, SODIUM LACTATE, POTASSIUM CHLORIDE, CALCIUM CHLORIDE 600; 310; 30; 20 MG/100ML; MG/100ML; MG/100ML; MG/100ML
INJECTION, SOLUTION INTRAVENOUS CONTINUOUS PRN
Status: DISCONTINUED | OUTPATIENT
Start: 2021-01-26 | End: 2021-01-26 | Stop reason: SDUPTHER

## 2021-01-26 RX ORDER — FENTANYL CITRATE 50 UG/ML
25 INJECTION, SOLUTION INTRAMUSCULAR; INTRAVENOUS EVERY 5 MIN PRN
Status: DISCONTINUED | OUTPATIENT
Start: 2021-01-26 | End: 2021-01-27

## 2021-01-26 RX ORDER — HEPARIN SODIUM (PORCINE) LOCK FLUSH IV SOLN 100 UNIT/ML 100 UNIT/ML
SOLUTION INTRAVENOUS
Status: COMPLETED | OUTPATIENT
Start: 2021-01-26 | End: 2021-01-26

## 2021-01-26 RX ORDER — HEPARIN SODIUM 1000 [USP'U]/ML
INJECTION, SOLUTION INTRAVENOUS; SUBCUTANEOUS PRN
Status: DISCONTINUED | OUTPATIENT
Start: 2021-01-26 | End: 2021-01-26 | Stop reason: SDUPTHER

## 2021-01-26 RX ADMIN — CLOPIDOGREL 75 MG: 75 TABLET, FILM COATED ORAL at 07:53

## 2021-01-26 RX ADMIN — SODIUM CHLORIDE, POTASSIUM CHLORIDE, SODIUM LACTATE AND CALCIUM CHLORIDE: 600; 310; 30; 20 INJECTION, SOLUTION INTRAVENOUS at 11:14

## 2021-01-26 RX ADMIN — METOPROLOL TARTRATE 0.5 MG: 1 INJECTION, SOLUTION INTRAVENOUS at 11:37

## 2021-01-26 RX ADMIN — FENTANYL CITRATE 25 MCG: 50 INJECTION, SOLUTION INTRAMUSCULAR; INTRAVENOUS at 08:59

## 2021-01-26 RX ADMIN — FENTANYL CITRATE 25 MCG: 50 INJECTION, SOLUTION INTRAMUSCULAR; INTRAVENOUS at 11:25

## 2021-01-26 RX ADMIN — FENTANYL CITRATE 25 MCG: 50 INJECTION, SOLUTION INTRAMUSCULAR; INTRAVENOUS at 10:54

## 2021-01-26 RX ADMIN — HYDRALAZINE HYDROCHLORIDE 2 MG: 20 INJECTION INTRAMUSCULAR; INTRAVENOUS at 11:21

## 2021-01-26 RX ADMIN — METOPROLOL TARTRATE 0.5 MG: 1 INJECTION, SOLUTION INTRAVENOUS at 11:42

## 2021-01-26 RX ADMIN — HEPARIN SODIUM 1000 UNITS: 1000 INJECTION INTRAVENOUS; SUBCUTANEOUS at 12:17

## 2021-01-26 RX ADMIN — CLEVIPIDINE 2 MG/HR: 0.5 EMULSION INTRAVENOUS at 20:17

## 2021-01-26 RX ADMIN — FENTANYL CITRATE 50 MCG: 50 INJECTION, SOLUTION INTRAMUSCULAR; INTRAVENOUS at 08:25

## 2021-01-26 RX ADMIN — ACETAMINOPHEN 650 MG: 325 TABLET ORAL at 07:53

## 2021-01-26 RX ADMIN — ACETAMINOPHEN 650 MG: 325 TABLET ORAL at 20:22

## 2021-01-26 RX ADMIN — MIDAZOLAM HYDROCHLORIDE 0.5 MG: 1 INJECTION, SOLUTION INTRAMUSCULAR; INTRAVENOUS at 11:00

## 2021-01-26 RX ADMIN — Medication 10 MG: at 20:05

## 2021-01-26 RX ADMIN — HYDRALAZINE HYDROCHLORIDE 2 MG: 20 INJECTION INTRAMUSCULAR; INTRAVENOUS at 11:30

## 2021-01-26 RX ADMIN — SODIUM CHLORIDE, PRESERVATIVE FREE 10 ML: 5 INJECTION INTRAVENOUS at 20:21

## 2021-01-26 RX ADMIN — FENTANYL CITRATE 25 MCG: 50 INJECTION, SOLUTION INTRAMUSCULAR; INTRAVENOUS at 10:40

## 2021-01-26 RX ADMIN — HYDRALAZINE HYDROCHLORIDE 4 MG: 20 INJECTION INTRAMUSCULAR; INTRAVENOUS at 10:59

## 2021-01-26 RX ADMIN — ASPIRIN 325 MG: 325 TABLET ORAL at 07:53

## 2021-01-26 RX ADMIN — HYDRALAZINE HYDROCHLORIDE 4 MG: 20 INJECTION INTRAMUSCULAR; INTRAVENOUS at 11:05

## 2021-01-26 RX ADMIN — METOPROLOL TARTRATE 1 MG: 1 INJECTION, SOLUTION INTRAVENOUS at 12:07

## 2021-01-26 RX ADMIN — ATORVASTATIN CALCIUM 10 MG: 10 TABLET, FILM COATED ORAL at 20:22

## 2021-01-26 RX ADMIN — IODIXANOL 54 ML: 270 INJECTION, SOLUTION INTRAVASCULAR at 15:33

## 2021-01-26 RX ADMIN — MIDAZOLAM HYDROCHLORIDE 0.5 MG: 1 INJECTION, SOLUTION INTRAMUSCULAR; INTRAVENOUS at 10:29

## 2021-01-26 RX ADMIN — FENTANYL CITRATE 25 MCG: 50 INJECTION, SOLUTION INTRAMUSCULAR; INTRAVENOUS at 11:38

## 2021-01-26 RX ADMIN — DEXMEDETOMIDINE HYDROCHLORIDE 0.5 MCG/KG/HR: 400 INJECTION INTRAVENOUS at 13:35

## 2021-01-26 RX ADMIN — VENLAFAXINE HYDROCHLORIDE 75 MG: 75 CAPSULE, EXTENDED RELEASE ORAL at 07:53

## 2021-01-26 RX ADMIN — MIDAZOLAM HYDROCHLORIDE 0.5 MG: 1 INJECTION, SOLUTION INTRAMUSCULAR; INTRAVENOUS at 11:13

## 2021-01-26 RX ADMIN — HEPARIN SODIUM 1000 UNITS: 1000 INJECTION INTRAVENOUS; SUBCUTANEOUS at 11:49

## 2021-01-26 RX ADMIN — IODIXANOL 144 ML: 270 INJECTION, SOLUTION INTRAVASCULAR at 15:35

## 2021-01-26 RX ADMIN — FENTANYL CITRATE 25 MCG: 50 INJECTION, SOLUTION INTRAMUSCULAR; INTRAVENOUS at 11:53

## 2021-01-26 RX ADMIN — CEFAZOLIN SODIUM 2000 MG: 500 INJECTION, POWDER, FOR SOLUTION INTRAMUSCULAR; INTRAVENOUS at 08:06

## 2021-01-26 RX ADMIN — LIDOCAINE HYDROCHLORIDE 1.5 ML: 10 INJECTION, SOLUTION EPIDURAL; INFILTRATION; INTRACAUDAL; PERINEURAL at 09:54

## 2021-01-26 RX ADMIN — FENTANYL CITRATE 25 MCG: 50 INJECTION, SOLUTION INTRAMUSCULAR; INTRAVENOUS at 11:17

## 2021-01-26 RX ADMIN — HEPARIN 2 ML: 100 SYRINGE at 09:00

## 2021-01-26 RX ADMIN — FENTANYL CITRATE 25 MCG: 50 INJECTION, SOLUTION INTRAMUSCULAR; INTRAVENOUS at 08:49

## 2021-01-26 RX ADMIN — HYDRALAZINE HYDROCHLORIDE 4 MG: 20 INJECTION INTRAMUSCULAR; INTRAVENOUS at 11:12

## 2021-01-26 RX ADMIN — FENTANYL CITRATE 50 MCG: 50 INJECTION, SOLUTION INTRAMUSCULAR; INTRAVENOUS at 09:52

## 2021-01-26 RX ADMIN — HYDRALAZINE HYDROCHLORIDE 20 MG: 20 INJECTION INTRAMUSCULAR; INTRAVENOUS at 18:31

## 2021-01-26 RX ADMIN — MIDAZOLAM HYDROCHLORIDE 1 MG: 1 INJECTION, SOLUTION INTRAMUSCULAR; INTRAVENOUS at 08:26

## 2021-01-26 RX ADMIN — SODIUM CHLORIDE, POTASSIUM CHLORIDE, SODIUM LACTATE AND CALCIUM CHLORIDE: 600; 310; 30; 20 INJECTION, SOLUTION INTRAVENOUS at 09:57

## 2021-01-26 RX ADMIN — ONDANSETRON 4 MG: 2 INJECTION INTRAMUSCULAR; INTRAVENOUS at 18:13

## 2021-01-26 RX ADMIN — MIDAZOLAM HYDROCHLORIDE 0.5 MG: 1 INJECTION, SOLUTION INTRAMUSCULAR; INTRAVENOUS at 11:39

## 2021-01-26 ASSESSMENT — PULMONARY FUNCTION TESTS
PIF_VALUE: 0
PIF_VALUE: 1
PIF_VALUE: 0
PIF_VALUE: 1
PIF_VALUE: 0
PIF_VALUE: 1
PIF_VALUE: 0
PIF_VALUE: 1
PIF_VALUE: 0
PIF_VALUE: 0
PIF_VALUE: 1
PIF_VALUE: 0
PIF_VALUE: 1
PIF_VALUE: 1
PIF_VALUE: 0
PIF_VALUE: 1
PIF_VALUE: 0
PIF_VALUE: 1
PIF_VALUE: 0
PIF_VALUE: 1
PIF_VALUE: 0
PIF_VALUE: 1
PIF_VALUE: 0
PIF_VALUE: 1
PIF_VALUE: 1
PIF_VALUE: 0
PIF_VALUE: 1
PIF_VALUE: 0
PIF_VALUE: 1
PIF_VALUE: 0
PIF_VALUE: 1
PIF_VALUE: 0
PIF_VALUE: 2
PIF_VALUE: 0
PIF_VALUE: 0
PIF_VALUE: 1
PIF_VALUE: 0
PIF_VALUE: 1
PIF_VALUE: 1
PIF_VALUE: 0
PIF_VALUE: 0
PIF_VALUE: 1
PIF_VALUE: 0
PIF_VALUE: 1
PIF_VALUE: 0
PIF_VALUE: 1
PIF_VALUE: 0
PIF_VALUE: 1
PIF_VALUE: 0
PIF_VALUE: 1
PIF_VALUE: 0
PIF_VALUE: 1
PIF_VALUE: 1
PIF_VALUE: 0
PIF_VALUE: 0
PIF_VALUE: 1
PIF_VALUE: 0
PIF_VALUE: 1
PIF_VALUE: 0
PIF_VALUE: 1
PIF_VALUE: 1
PIF_VALUE: 0
PIF_VALUE: 1
PIF_VALUE: 0
PIF_VALUE: 1
PIF_VALUE: 1
PIF_VALUE: 0
PIF_VALUE: 1
PIF_VALUE: 0
PIF_VALUE: 1
PIF_VALUE: 0
PIF_VALUE: 0
PIF_VALUE: 1
PIF_VALUE: 0
PIF_VALUE: 1
PIF_VALUE: 2
PIF_VALUE: 1
PIF_VALUE: 0
PIF_VALUE: 1
PIF_VALUE: 0
PIF_VALUE: 0
PIF_VALUE: 1
PIF_VALUE: 30
PIF_VALUE: 0
PIF_VALUE: 1
PIF_VALUE: 0
PIF_VALUE: 1
PIF_VALUE: 0
PIF_VALUE: 1
PIF_VALUE: 0
PIF_VALUE: 0
PIF_VALUE: 1
PIF_VALUE: 0
PIF_VALUE: 1
PIF_VALUE: 0
PIF_VALUE: 1
PIF_VALUE: 0

## 2021-01-26 ASSESSMENT — PAIN DESCRIPTION - LOCATION: LOCATION: HEAD

## 2021-01-26 ASSESSMENT — PAIN DESCRIPTION - PAIN TYPE: TYPE: ACUTE PAIN

## 2021-01-26 ASSESSMENT — PAIN DESCRIPTION - DESCRIPTORS: DESCRIPTORS: HEADACHE

## 2021-01-26 ASSESSMENT — PAIN DESCRIPTION - FREQUENCY: FREQUENCY: INTERMITTENT

## 2021-01-26 ASSESSMENT — PAIN SCALES - GENERAL
PAINLEVEL_OUTOF10: 10
PAINLEVEL_OUTOF10: 0

## 2021-01-26 ASSESSMENT — PAIN DESCRIPTION - ONSET: ONSET: SUDDEN

## 2021-01-26 ASSESSMENT — PAIN DESCRIPTION - ORIENTATION: ORIENTATION: MID

## 2021-01-26 NOTE — SEDATION DOCUMENTATION
Post Procedure Transfer from IR Table  [x] Tubes and Lines intact     Post Procedure Neuro-Checks/NIHSS/Vitals  [x] Completed post procedure  [x] Completed bedside handoff  [x] Frequency ordered  [x] Verbal communication of frequency      Post Procedure Puncture Site Checks  [x] Completed post procedure  [x] Completed bedside handoff  [x] Frequency ordered  [x] Verbal communication of frequency    Post Procedure Pulse  Checks  [x] Completed post procedure  [x] Completed bedside handoff  [x] Frequency ordered  [x] Verbal communication of frequency    Order Set  [x] Post Neuro-Endo Procedure  [] Stroke  [] t-PA     B/P control  [x] Verbal Communication  Same as prior to case today, -200  [x] Order in Care Path    Medication Review  [x] Given during procedure  IV ancef 2 gm, IV heparin 5000 units, iv fentanyl 300 mcg, IV versed 3 mg, IV hydralzine 16 mg, IV lopressor 2 mg, IV presidex during case. [x] Current drips/meds/fluids  No gtts currentl running. [x] Physician Notified of All changes in Assessment  1420  and  completed assessment at bedside, pt following commands,  Pt is thick tongued, very dry, and speech slurred. Remaining assessment unchanged. Family  [x] Location Post Procedure  Dr. Bernie Flores spoke with son and updated.

## 2021-01-26 NOTE — PLAN OF CARE
Problem: Pain:  Goal: Pain level will decrease  Description: Pain level will decrease  Outcome: Ongoing  Goal: Control of acute pain  Description: Control of acute pain  Outcome: Ongoing  Goal: Control of chronic pain  Description: Control of chronic pain  Outcome: Ongoing     Problem: HEMODYNAMIC STATUS  Goal: Patient has stable vital signs and fluid balance  Outcome: Ongoing     Problem: ACTIVITY INTOLERANCE/IMPAIRED MOBILITY  Goal: Mobility/activity is maintained at optimum level for patient  Outcome: Ongoing     Problem: COMMUNICATION IMPAIRMENT  Goal: Ability to express needs and understand communication  Outcome: Ongoing

## 2021-01-26 NOTE — SEDATION DOCUMENTATION
Pt in full sterile drape, placed female wick system to collect urine and prevent losing sterile field.

## 2021-01-26 NOTE — ANESTHESIA PRE PROCEDURE
Department of Anesthesiology  Preprocedure Note       Name:  Regino Schwab   Age:  53 y.o.  :  1970                                          MRN:  [id number]         Date:  2021      Surgeon: * No surgeons listed *    Procedure: * No procedures listed *    Medications prior to admission:   Prior to Admission medications    Medication Sig Start Date End Date Taking? Authorizing Provider   AMITRIPTYLINE HCL PO Take by mouth nightly    Historical Provider, MD   MELOXICAM PO Take by mouth nightly    Historical Provider, MD   aspirin 325 MG tablet Take 1 tablet by mouth daily 20   POPEYE Oneal - CNP   lisinopril (PRINIVIL;ZESTRIL) 40 MG tablet Take 40 mg by mouth daily 20   Historical Provider, MD   clonazePAM (KLONOPIN) 0.5 MG tablet Take 0.5 mg by mouth 2 times daily as needed.  20   Historical Provider, MD   venlafaxine (EFFEXOR XR) 75 MG extended release capsule Take 75 mg by mouth daily 20   Historical Provider, MD   hydroCHLOROthiazide (HYDRODIURIL) 25 MG tablet Take 25 mg by mouth daily 20   Historical Provider, MD   Cholecalciferol (VITAMIN D3) 25 MCG (1000 UT) CAPS Take 2,000 Units by mouth nightly     Historical Provider, MD   atorvastatin (LIPITOR) 10 MG tablet Take 10 mg by mouth nightly     Historical Provider, MD   carvedilol (COREG) 12.5 MG tablet Take 12.5 mg by mouth 2 times daily (with meals)    Historical Provider, MD       Current medications:    Current Facility-Administered Medications   Medication Dose Route Frequency Provider Last Rate Last Admin    ceFAZolin (ANCEF) 2000 mg in dextrose 5 % 50 mL IVPB  2,000 mg Intravenous Once Berny Hopper MD        ceFAZolin (ANCEF) 2,000 mg in sterile water 10 mL IV syringe   Intravenous Continuous PRN Stanislaw Bernabe MD   2,000 mg at 21 0806    potassium chloride (KLOR-CON M) extended release tablet 40 mEq  40 mEq Oral PRN Raheem Patel MD   40 mEq at 21 0808    Or    potassium bicarb-citric acid Last Admin    fentaNYL (SUBLIMAZE) injection    PRN POPEYE Clark - CRNA   50 mcg at 21 1771    lactated ringers infusion    Continuous PRN Bijan Mobley APRN - CRNA   New Bag at 21 0957       Allergies: Allergies   Allergen Reactions    Adhesive Tape Rash     redness       Problem List:    Patient Active Problem List   Diagnosis Code    S/P coil embolization of cerebral aneurysm Z98.890    Aneurysm ([address] I72.9    Dizziness R42    Family history of brain aneurysm Z82.49    History of cervical spinal surgery Z98.890    History of breast cancer Z85.3    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    Left middle cerebral artery stroke ([address] O20.656    Acute right MCA stroke ([address] I63.511       Past Medical History:        Diagnosis Date    Aneurysm ([address] 2019    right MCA bifurcation aneurysm    Cancer ([address] 2018    left breast    Contact lens/glasses fitting     Fibromyalgia     Former smoker     Hyperlipidemia     Hypertension     Cárdenas Speller, CNP    Sleep apnea     C-pap / STATES HAS NOT BEEN WEARING       Past Surgical History:        Procedure Laterality Date    BACK SURGERY      neck fusion & L4-L5 fusion    BRAIN ANEURYSM SURGERY  2020    [employer] Archive EMOBLIZATION SYSTEM MRI CONDTIONAL 3T OK, SAFE IMMEDIATELY.   IR ANGIOGRAM CAROTID CEREBRAL BILATERAL,,S/P coil embolization of cerebral aneurysm    BREAST SURGERY Left 2018    lumpectomy    CARPAL TUNNEL RELEASE Bilateral      SECTION      x 2,  &     HYSTERECTOMY  2018    OTHER SURGICAL HISTORY  2021    DIAGNOSTIC CEREBRAL ANGIOGRAM / Previously treated right MCA M1 bifurcation aneurysm is with a small residual filling noted    TUNNELED VENOUS PORT PLACEMENT      placed , removed 2019       Social History:    Social History     Tobacco Use    Smoking status: Former Smoker     Packs/day: 1.00     Years: 15.00     Pack years: 15.00     Types: Cigarettes     Quit date: 2000     Years since quittin.3    Smokeless tobacco: Never Used    Tobacco comment: quit 20 years ago   Substance Use Topics    Alcohol use: Not on file     Comment: drinks lwine                                 Counseling given: Not Answered  Comment: quit 20 years ago      Vital Signs (Current):   Vitals:    21 0930 21 0935 21 0940 21 0945   BP: (!) 156/93 (!) 163/94 (!) 161/101 (!) 172/95   Pulse: 66 62 73 59   Resp: 16 16 16 20   Temp:       TempSrc:       SpO2: 100% 100% 100% 100%   Weight:       Height:                                                  BP Readings from Last 3 Encounters:   21 (!) 172/95   21 (!) 189/99   21 (!) 144/81       NPO Status:                                                                                 BMI:   Wt Readings from Last 3 Encounters:   21 240 lb 4.8 oz (109 kg)   21 230 lb (104.3 kg)   20 225 lb (102.1 kg)     Body mass index is 39.99 kg/m². CBC:   Lab Results   Component Value Date    WBC 7.2 2021    RBC 3.55 2021    HGB 11.5 2021    HCT 35.2 2021    MCV 99.2 2021    RDW 13.0 2021     2021       CMP:   Lab Results   Component Value Date     2021    K 3.6 2021     2021    CO2 21 2021    BUN 10 2021    CREATININE 0.36 2021    GFRAA >60 2021    LABGLOM >60 2021    GLUCOSE 106 2021    PROT 7.5 2020    CALCIUM 8.7 2021    BILITOT 0.41 2020    ALKPHOS 113 2020    AST 21 2020    ALT 25 2020       POC Tests: No results for input(s): POCGLU, POCNA, POCK, POCCL, POCBUN, POCHEMO, POCHCT in the last 72 hours.     Coags:   Lab Results   Component Value Date    PROTIME 9.7 2021    INR 0.9 2021    APTT 19.7 2021       HCG (If Applicable): No results found for: PREGTESTUR, PREGSERUM, HCG, HCGQUANT     ABGs: No results found for: PHART, PO2ART, VQG5TYS, EQY2SIW, BEART, Q2CMVLER     Type & Screen (If Applicable):  No results found for: LABABO, LABRH    Drug/Infectious Status (If Applicable):  No results found for: HIV, HEPCAB    COVID-19 Screening (If Applicable):   Lab Results   Component Value Date    COVID19 Not Detected 01/24/2021    COVID19 Not Detected 08/22/2020         Anesthesia Evaluation  Patient summary reviewed and Nursing notes reviewed no history of anesthetic complications:   Airway: Mallampati: Unable to assess / NA        Dental:          Pulmonary:normal exam    (+) sleep apnea:                             Cardiovascular:    (+) hypertension:,         Rhythm: regular  Rate: normal                    Neuro/Psych:   (+) CVA: residual symptoms, neuromuscular disease:,             GI/Hepatic/Renal: Neg GI/Hepatic/Renal ROS            Endo/Other: Negative Endo/Other ROS                    Abdominal:           Vascular:   + PVD, aortic or cerebral, . Anesthesia Plan      MAC and TIVA     ASA 3 - emergent     (Patient already on table and procedure started. IR requested anesthesia help after procedure started. )  Induction: intravenous. arterial line  MIPS: Postoperative opioids intended and Prophylactic antiemetics administered. Anesthetic plan and risks discussed with Unable to obtain due to emergent nature.       Plan discussed with CRNA and surgical team.                  Albaro Griffin MD   1/26/2021

## 2021-01-26 NOTE — SEDATION DOCUMENTATION
Diagnostic cerebral Angiogram complete, thrombus noted to right m1-m2 , diagnostic changed to procedure, anesthesia and NAN called. On my arrival Pt is sedated and sleepy but following commands and able to communicate appropriately, c/o left arm pain with anesthesia attempting a-line placement. Pt has low tolerance for pain, she is anxious. Had c/o of heart feeling funny at beginning of procedure, pt does not have this c/o presently. Reported good pulses bilat pedal.  Patient is in full sterile drape. Reported pt received 3000 units Heparin during DSA by Nguyen July. Anesthesia:  [facility] / Andie Camargo  / Tom Albright     Attending;  Cherie Gambler time    ED   Direct   inhouse  515    LKW;  TPA; bolus   Infusion    10:43 IV Cardene 10 mg added to 4000 unit Heparin / 1000 ml NS procedure infusion bag. (red)    Wheel in lab time:  0805   Changed to procedure at 0950  Groin puncture:  0858  TICI Baseline: right M1-M2 partial occlusion with thrombus TICI 1    1st run  Deployment time: 2152 at right m1 clot   1st run Retrieval time: 1055 suction on 1100 suction off, no clot obtained. 1st pass TICI score: TICI 1    1102 IV Cardene 10 mg added to 4000 unit Heparin procedure infusion bag (yellow) line changed     2nd pass deployment: 1106 continues at m1 clot 1136 placed solitaire retriever  2nd pass retrieval: 1137 suction on, 1140 suction off,  No clot obtained but clot moved to right superior m2 division   2nd pass TICI score:  0    Pt wakes with verbal stim, moving left arm and following commands with left. 3rd pass deployment time:1220 at right superior m2 division  3rd pass retrieval time:  no pass, 1232  angioplasty x 5 with gradual increase in balloon to 10 atp  3rd pass TICI score:     1350 angioplasty x 2 to 7 atp in right m 2     1425 run to see if angioplastied  vessels remained open.      Closure time:  1897 angioseal deployed to left groin     Arrival - groin access time:  Arrival - open time:  LKW-open time:

## 2021-01-26 NOTE — PROGRESS NOTES
Physical Therapy  DATE: 2021    NAME: Segundo Larios  MRN: [id number]   : 1970    Patient not seen this date for Physical Therapy due to:  [] Blood transfusion in progress  [] Hemodialysis  [] Patient Declined  [] Spine Precautions   [] Strict Bedrest  [] Surgery/ Procedure  [] Testing      [x] Other: Pt in IR for angio at this time. PT will check back as time allows or 21. [] PT is being discontinued at this time. Patient independent. No further needs. [] PT is being discontinued at this time due to declining physical/ medical status. Therapy is not appropriate at this time. Thee Lemos   Evaluation/treatment performed by Student PT under the supervision of co-signing PT who agrees with all evaluation/treatment and documentation.

## 2021-01-26 NOTE — CONSULTS
Endovascular Neurosurgery Consult      Reason for evaluation: acute R MCA syndrome, hx of R MCA aneurysm stent and web device    SUBJECTIVE:   History of Chief Complaint:    53yo female with pmh of hld, htn, breast cancer 2018, gray. 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 DSA 1/21/2021 showed residual filling of the R MCA aneurysm and an incidental L MCA blister aneurysm. Pt presented 1/24/2021 with acute L hemiweakness, neglect, r gaze pref, found to have acute large R MCA stroke. Allergies  is allergic to adhesive tape. Medications  Prior to Admission medications    Medication Sig Start Date End Date Taking? Authorizing Provider   AMITRIPTYLINE HCL PO Take by mouth nightly    Historical Provider, MD   MELOXICAM PO Take by mouth nightly    Historical Provider, MD   aspirin 325 MG tablet Take 1 tablet by mouth daily 8/27/20   Kenia ArtistPOPEYE - CNP   lisinopril (PRINIVIL;ZESTRIL) 40 MG tablet Take 40 mg by mouth daily 8/5/20   Historical Provider, MD   clonazePAM (KLONOPIN) 0.5 MG tablet Take 0.5 mg by mouth 2 times daily as needed.  6/19/20   Historical Provider, MD   venlafaxine (EFFEXOR XR) 75 MG extended release capsule Take 75 mg by mouth daily 6/19/20   Historical Provider, MD   hydroCHLOROthiazide (HYDRODIURIL) 25 MG tablet Take 25 mg by mouth daily 8/5/20   Historical Provider, MD   Cholecalciferol (VITAMIN D3) 25 MCG (1000 UT) CAPS Take 2,000 Units by mouth nightly     Historical Provider, MD   atorvastatin (LIPITOR) 10 MG tablet Take 10 mg by mouth nightly     Historical Provider, MD   carvedilol (COREG) 12.5 MG tablet Take 12.5 mg by mouth 2 times daily (with meals)    Historical Provider, MD    Scheduled Meds:   clopidogrel  75 mg Oral Daily    aspirin  325 mg Oral Daily    atorvastatin  10 mg Oral Nightly    [Held by provider] carvedilol  12.5 mg Oral BID WC    [Held by provider] hydroCHLOROthiazide  25 mg Oral Daily    [Held by provider] lisinopril 40 mg Oral Daily    venlafaxine  75 mg Oral Daily    sodium chloride flush  10 mL Intravenous 2 times per day     Continuous Infusions:   sodium chloride 125 mL/hr at 21 0806     PRN Meds:.potassium chloride **OR** potassium alternative oral replacement **OR** potassium chloride, sodium chloride flush, promethazine **OR** ondansetron, polyethylene glycol, acetaminophen **OR** acetaminophen, senna  Past Medical History   has a past medical history of Aneurysm ([facility] [address] Cancer ([facility] [address] Contact lens/glasses fitting, Fibromyalgia, Former smoker, Hyperlipidemia, Hypertension, and Sleep apnea. Past Surgical History   has a past surgical history that includes Breast surgery (Left, 2018); Tunneled venous port placement; Hysterectomy (2018); Carpal tunnel release (Bilateral);  section; back surgery; Brain aneurysm surgery (2020); and other surgical history (2021). Social History   reports that she quit smoking about 20 years ago. Her smoking use included cigarettes. She has a 15.00 pack-year smoking history. She has never used smokeless tobacco.   has no history on file for alcohol. reports no history of drug use. Family History  Family history is unknown by patient. Review of Systems:  CONSTITUTIONAL:  negative for fevers, chills, fatigue and malaise    EYES:  negative for double vision, blurred vision and photophobia     HEENT:  negative for tinnitus, epistaxis and sore throat    RESPIRATORY:  negative for cough, shortness of breath, wheezing    CARDIOVASCULAR:  negative for chest pain, palpitations, syncope, edema    GASTROINTESTINAL:  negative for nausea, vomiting    GENITOURINARY:  negative for incontinence    MUSCULOSKELETAL:  negative for neck or back pain    NEUROLOGICAL:  Negative for weakness and tingling  negative for headaches and dizziness    PSYCHIATRIC:  negative for anxiety      Review of systems otherwise negative.       OBJECTIVE:     Vitals:    21 1900   BP: (!) 159/96 Pulse: 103   Resp: 16   Temp:    SpO2: 97%        General:  Gen: normal habitus, NAD  HEENT: NCAT, mucosa moist  Cvs: RRR, S1 S2 normal  Resp: symmetric unlabored breathing  Abd: s/nd/nt  Ext: no edema  Skin: no lesions seen, warm and dry    Neuro:  Gen: awake and alert, oriented x3. Lang/speech: no aphasia or dysarthria. Follows commands. CN: PERRL, EOMI, VFF, V1-3 intact, face symmetric, hearing intact, shoulder shrug symmetric, tongue midline  Motor: R hemibody 5/5, L hemibody at least 4+/5, no drift  Sense: LT intact in all 4 ext. Coord: FTN and HTS intact b/l  DTR: deferred  Gait: deferred    NIH Stroke Scale:   1a  Level of consciousness: 0 - alert; keenly responsive   1b. LOC questions:  0 - answers both questions correctly   1c. LOC commands: 0 - performs both tasks correctly   2. Best Gaze: 0 - normal   3. Visual: 0 - no visual loss   4. Facial Palsy: 0 - normal symmetric movement   5a. Motor left arm: 0 - no drift, limb holds 90 (or 45) degrees for full 10 seconds   5b. Motor right arm: 0 - no drift, limb holds 90 (or 45) degrees for full 10 seconds   6a. Motor left le - no drift; leg holds 30 degree position for full 5 seconds   6b  Motor right le - no drift; leg holds 30 degree position for full 5 seconds   7. Limb Ataxia: 0 - absent   8. Sensory: 0 - normal; no sensory loss   9. Best Language:  0 - no aphasia, normal   10. Dysarthria: 0 - normal   11. Extinction and Inattention: 0 - no abnormality         Total:   0     MRS: 1      LABS:   Reviewed. RADIOLOGY:   Images were personally reviewed including:  DSA 2021  Impression:    --anteriorly and inferiorly directed aneurysm in the right M1 trifurcation with previously placed Web device and y-stent. The stents appear widely patent with no in-stent stenosis. The aneurysm remains patent, with the Web device compacted into the    aneurysm dome compared to the prior imaging.  Residual aneurysm neck 10.77mm, height 4.19mm, Web Occlusion Scale C.     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 MRI/A brain 1/24/2021  1. Large right middle cerebral artery territory infarct involving the   superior right temporal lobe and to a lesser extent the right frontal and   right parietal lobes. 2. Small focus of petechial hemorrhage within the right parietal lobe infarct. 3. Limited evaluation of the right middle cerebral artery given   susceptibility artifact from the patient's known MCA stent.  There is normal   signal identified within the proximal M1 segment as well as M2 branches   beyond the stent and area of susceptibility artifact.  Correlation with the   patient's reported recent outside CT angiogram of the brain is recommended. ASSESSMENT:   51yo female with pmh of hld, htn, breast cancer 2018, gray. 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 DSA 1/21/2021 showed residual filling of the R MCA aneurysm and an incidental L MCA blister aneurysm. Pt presented 1/24/2021 with acute L hemiweakness, neglect, r gaze pref, found to have acute large R MCA stroke. MRA showed artifact at the aneurysm but no clear LVO. Stroke etio not clear, possible in-stent thrombosis following insufficient antithrombotic rx. Plan for dx angio to more clearly characterize. Exam has improved compared to initial presentation. PLAN:   --continue asa 325 and plavix 75  --continue lipitor 10  --plan for dx angio 1/26/2021. Npo midnight. Plan to consent if not already done. --sbp < 220  --care as per nsicu    Case discussed with Dr. Ayush Nunes attending.     Austin Keith MD, PhD   Stroke, [facility]  [facility]  Electronically signed 1/25/2021 at 7:35 PM

## 2021-01-26 NOTE — OP NOTE
[facility]    NEUROENDOVASCULAR SERVICE: POST-OP NOTE: 1/26/2021    Pt Name: Rosi Ny  MRN: [id number]  YOB: 1970  Date of Procedure: 1/26/2021  Primary Care Physician: POPEYE Beard - CNP    Pre-Procedural Diagnosis: subacute/acute Right MCA ischemic stroke. Post-Procedural Diagnosis: Right MCA subocclusive clot extending from distal M1 to the proximal M2 superior and inferior divisions and prior right mca bifurcation web device    Procedure Performed:Diagnostic Cerebral Angiogram   Right MCA M1 intra-arterial mechanical thrombectomy with contact aspiration and stent retriever. This was followed with balloon angioplasty using 1.5 x 9 mm St John balloon, then 2 x 15 mm Quantum apex balloon. Surgeon:   Miles Richardson MD    Fellow:  Dejuan Sorto MD and Vikki Clay MD, PhD     1st Assistant:  Aye Capone    PRE-PROCEDURAL EXAM:  Prestroke baseline mRS MODIFIED DELORES SCORE: 1 - No significant disability: despite symptoms, able to carry out all usual duties and activities. Neurological exam performed and unchanged from initial H&P or consult    Anesthesia: MAC anesthesia  Complications: none    Intra-Operative EXAM:  Neurological exam performed and unchanged from initial H&P or consult    EBL: < Minimal      Cc            Specimens: Were not Obtained  Contrast:     Visipaque 270 low osmolar Cc    Diagnostic: 54  Intervention 144  Fluoro:  97.3  Diagnostic   Intervention    Findings:  Please see dictated Radiology note for further details  --Right MCA subocclusive clot extending from distal M1 to the proximal M2 superior and inferior divisions. There is collateral flow noted from right KIM to right MCA due to hemodynamically significant subocclusive clot. --This was treated with intra-arterial mechanical thrombectomy with contact aspiration and 3 oh stent retriever.   This was followed with balloon angioplasty using 1.5 x 9 mm St John balloon (x4 inflated to 6 then 8, then 8, then 10 KT), then 2 x 15 mm Quantum apex balloon (x2). --This resulted in improvement in the flow across the subocclusive occlusion. Total recanalization at TICI 2C. --The prior treated right MCA bifurcation aneurysm with Web device currently with no residual noted. TICI score: 2b  Tomas score: class I    POST-PROCEDURAL EXAM :   Stable neurological Exam  Neurological exam performed and unchanged from initial H&P or consult    Closure:  left Angioseal 8   F    POST-PROCEDURAL MONITORING : see orders  Disposition: Neuro ICU    Recommendations:  Back to NSICU. Do not bend right leg for 3 hours. Groin checks per protocol. Neuro checks per icu protocol. Peripheral pulse checks per protocol. ICU management per NSICU team.   SBP goal 120-180  Resume aspirin and plavix starting tomorrow after stable CT head at 8 PM today. Upon discharge follow up with Dr. Michele Ramirez in 2 weeks and Dr. Kevin Maier in 3 months   Obtain CTA today at 8 PM, then consider starting heparin drip without bolus.     MD Jamie Moran MD   Pager [phone]  Stroke, [facility]  [id number]  Electronically signed 1/26/2021 at 3:02 PM

## 2021-01-26 NOTE — PROGRESS NOTES
Daily Progress Note  Neuro Critical Care    Patient Name: Padmini VASQUEZ  Patient : 1970  Room/Bed: 0891/9526-67  Code Status: FULL  Allergies: Allergies   Allergen Reactions    Adhesive Tape Rash     redness       CHIEF COMPLAINT:      Headache     INTERVAL HISTORY    Initial Presentation (Admitted 2021): The patient is a 42-year-old female with past medical history significant for diabetes, hyperlipidemia, breast cancer for which she last received chemotherapy over a year ago and states she follows with oncologist at [facility] and is in remission, right MCA aneurysm status post web and stent placement on 2020. She presented with right-sided headache, left-sided weakness, dizziness to [facility]. Her last known well was 9 PM the night prior to admission. Patient was found this way by her friend. CT head and CTA head and neck found a right MCA occlusion. Patient was transferred to [address] [place] [address] for further evaluation as she receives her neurologic care at [facility]. Patient was seen as a stroke alert and found to have an NIH stroke scale of 3 for left upper extremity ataxia, left eye hemianopsia of left visual field, and attention/neglect left side. MRI and MRA were significant for right MCA infarct with right parietal petechial hemorrhages. Endovascular was consulted who recommended angiography in the morning with resumption of aspirin and Plavix and n.p.o. at midnight. Permissive hypertension with systolic blood pressure less than 220. Of note, patient has a history of right MCA aneurysm as mentioned above which was initially treated with web and stent placement on 2020. She had a follow-up DSA done on 2021    Hospital Course:   Patient was admitted to the neuro ICU for close monitoring. Was given aspirin and Plavix and awaiting to go for angiogram    : No acute events overnight. Return from CT head this morning.   Awaiting to go for angiogram.  Had wax/wane of symptoms. Got MRI/MRA done. Plan for lab tomorrow    Last 24h:   No acute events overnight.   Awaiting to go for angiogram.    CURRENT MEDICATIONS:  SCHEDULED MEDICATIONS:   clopidogrel  75 mg Oral Daily    aspirin  325 mg Oral Daily    atorvastatin  10 mg Oral Nightly    [Held by provider] carvedilol  12.5 mg Oral BID WC    [Held by provider] hydroCHLOROthiazide  25 mg Oral Daily    [Held by provider] lisinopril  40 mg Oral Daily    venlafaxine  75 mg Oral Daily    sodium chloride flush  10 mL Intravenous 2 times per day     CONTINUOUS INFUSIONS:   sodium chloride 125 mL/hr at 21 0806     PRN MEDICATIONS:   potassium chloride **OR** potassium alternative oral replacement **OR** potassium chloride, sodium chloride flush, promethazine **OR** ondansetron, polyethylene glycol, acetaminophen **OR** acetaminophen, senna    VITALS:  Temperature Range: Temp: 98.5 °F (36.9 °C) Temp  Av.3 °F (36.8 °C)  Min: 97.2 °F (36.2 °C)  Max: 99.1 °F (37.3 °C)  BP Range: Systolic (06AMU), XDJ:463 , Min:126 , IOL:265     Diastolic (35ICD), BSE:664, Min:83, Max:118    Pulse Range: Pulse  Av.6  Min: 65  Max: 103  Respiration Range: Resp  Av.5  Min: 14  Max: 25  Current Pulse Ox: SpO2: 96 %  24HR Pulse Ox Range: SpO2  Av %  Min: 96 %  Max: 99 %  Patient Vitals for the past 12 hrs:   BP Temp Temp src Pulse Resp SpO2   21 0600 -- -- -- 68 21 --   21 0400 (!) 158/95 98.5 °F (36.9 °C) Oral 69 21 96 %   21 0300 (!) 173/110 -- -- 79 17 --   21 0200 (!) 168/97 -- -- 86 25 96 %   21 0100 (!) 161/98 -- -- 66 20 97 %   21 0000 (!) 171/97 98.6 °F (37 °C) Oral 78 17 97 %   21 (!) 162/94 -- -- 79 18 96 %   21 (!) 126/96 -- -- 73 20 97 %   21 (!) 168/113 -- -- 84 18 97 %   21 (!) 174/110 99.1 °F (37.3 °C) Oral 84 15 99 %     Estimated body mass index is 39.99 kg/m² as calculated from the following:    Height as of this encounter: 5' 5\" (1.651 m). Weight as of this encounter: 240 lb 4.8 oz (109 kg).  []<16 Severe malnutrition  []16-16.99 Moderate malnutrition  []17-18.49 Mild malnutrition  []18.5-24.9 Normal  []25-29.9 Overweight (not obese)  []30-34.9 Obese class 1 (Low Risk)  [x]35-39.9 Obese class 2 (Moderate Risk)  []?40 Obese class 3 (High Risk)    RECENT LABS:   Lab Results   Component Value Date    WBC 7.2 01/26/2021    HGB 11.5 (L) 01/26/2021    HCT 35.2 (L) 01/26/2021     01/26/2021    ALT 25 09/02/2020    AST 21 09/02/2020     01/26/2021    K 3.6 (L) 01/26/2021     (H) 01/26/2021    CREATININE 0.36 (L) 01/26/2021    BUN 10 01/26/2021    CO2 21 01/26/2021    INR 0.9 01/24/2021     24 HOUR INTAKE/OUTPUT:    Intake/Output Summary (Last 24 hours) at 1/26/2021 0727  Last data filed at 1/26/2021 0400  Gross per 24 hour   Intake 2597 ml   Output 950 ml   Net 1647 ml       IMAGING:   MRI Brain WO Contrast 1/24/2021  MRA Head WO Contrast 1/24/2021  Impression   1. Large right middle cerebral artery territory infarct involving the   superior right temporal lobe and to a lesser extent the right frontal and   right parietal lobes. 2. Small focus of petechial hemorrhage within the right parietal lobe infarct. 3. Limited evaluation of the right middle cerebral artery given   susceptibility artifact from the patient's known MCA stent.  There is normal   signal identified within the proximal M1 segment as well as M2 branches   beyond the stent and area of susceptibility artifact.  Correlation with the   patient's reported recent outside CT angiogram of the brain is recommended.         CT Head WO Contrast 1/25/2021  Impression   Evolving infarct in the right frontotemporal region.  No evidence of   hemorrhagic conversion at this time. Labs and Images reviewed with:  [] Dr. Wu Brooke. Rakesh    [x] Dr. Francisco Ag  [] Dr. Uyen Dickson  [] There are no new interval images to review.      PHYSICAL EXAM CONSTITUTIONAL:  Well developed, well nourished, alert and oriented x 3, in no acute distress. GCS 15. Nontoxic. No dysarthria. No aphasia. HEAD:  normocephalic, atraumatic    EYES:  PERRLA, EOMI.   ENT:  moist mucous membranes   NECK:  supple, symmetric   LUNGS:  Equal air entry bilaterally   CARDIOVASCULAR:  normal s1 / s2, RRR, distal pulses intact   ABDOMEN:  Soft, no rigidity   NEUROLOGIC:  Mental Status:  A & O x3,awake             Cranial Nerves:    cranial nerves II-XII are grossly intact    Motor Exam:    Drift:  absent  Tone:  normal    Motor exam is symmetrical 5 out of 5 all extremities bilaterally    Sensory:    Touch:    Right Upper Extremity:  normal  Left Upper Extremity:  normal  Right Lower Extremity:  normal  Left Lower Extremity:  normal    Deep Tendon Reflexes:    Right Bicep:  2+  Left Bicep:  2+  Right Knee:  2+  Left Knee:  2+    Plantar Response:  Right:  downgoing  Left:  downgoing    Clonus:  N/A  Montes De Oca's:  N/A    Coordination/Dysmetria:  Finger to Nose:   Right:  normal  Left:  normal   Dysdiadochokinesia:  N/A    Gait:  Not tested   NIH Stroke Scale Total (if not done complete detailed one below):    1a.  Level of consciousness:  0 - alert; keenly responsive  1b. Level of consciousness questions:  0 - answers both questions correctly  1c. Level of consciousness questions:  0 - performs both tasks correctly  2. Best Gaze:  0 - normal  3. Visual:  0 - no visual loss  4. Facial Palsy:  0 - normal symmetric movement  5a. Motor left arm:  0 - no drift, limb holds 90 (or 45) degrees for full 10 seconds  5b. Motor right arm:  0 - no drift, limb holds 90 (or 45) degrees for full 10 seconds  6a. Motor left le - no drift; leg holds 30 degree position for full 5 seconds  6b. Motor right le - no drift; leg holds 30 degree position for full 5 seconds  7. Limb Ataxia:  0 - absent  8. Sensory:  0 - normal; no sensory loss  9.     Best Language:  0 - no aphasia, normal  10. Dysarthria:  0 - normal  11. Extinction and Inattention:  2 - profound demetrice-inattention or demetrice- inattention to more than one modality. Does not recognize own hand or orients only to one side of space   TOTAL: 2    DRAINS:  [] There are no drains for Neuro Critical Care to monitor at this time. ASSESSMENT AND PLAN:       The patient is a 45-year-old female with past medical history significant for diabetes, hyperlipidemia, breast cancer for which she last received chemotherapy over a year ago and states she follows with oncologist at [facility] and is in remission, right MCA aneurysm status post web and stent placement on 8/26/2020. She presented with right-sided headache, left-sided weakness, dizziness to [facility], transferred here for further care. NEUROLOGIC:  - Imaging: MRI MRA -large right middle cerebral artery territory infarct involving the superior right temporal lobe.   Small focus of petechial hemorrhage within the right parietal lobe  - Goal SBP <220  - ASA and Plavix given this morning  - Neuro checks per protocol  - Plan for angiogram today    CARDIOVASCULAR:  - Goal SBP <220  - Continue telemetry    PULMONARY:  - Doing well on RA  - Continue to monitor    RENAL/FLUID/ELECTROLYTE:  - BUN 10/ Creatinine 0.36  - Urine output 0.4 ml/kg/hr  - IVF: 125ml/hr  - Replace electrolytes PRN  - Daily BMP    GI/NUTRITION:  NUTRITION:  Diet NPO, After Midnight Exceptions are: Sips of Water with Meds  - Bowel regimen: senna PRN  - GI prophylaxis: NA    ID:  - Tmax 99.1 (37.3)  - WBC 7.2  - Continue to monitor for fevers  - Daily CBC    HEME:   - H&H 11.5/35.2  - Platelets 285  - Daily CBC    ENDOCRINE:  - Continue to monitor blood glucose, goal <180    OTHER:  - PT/OT/ST   - Code Status: FULL    PROPHYLAXIS:  Stress ulcer: N/A    DVT PROPHYLAXIS:  - SCD sleeves - Thigh High     DISPOSITION:  [x] To remain ICU:  [] OK for out of ICU from Neuro Critical Care standpoint    We will continue to follow along. For any changes in exam or patient status please contact Neuro Critical Care.       Di Maxwell MD   Neurology PGY-2  Neuro Critical Care  1/26/2021     7:27 AM

## 2021-01-26 NOTE — SEDATION DOCUMENTATION
Patient complains of heart pressure. She denies pain. Also states she feels her right arm feels uncomfortable. Able to move arms bilat. Does have good hand grasp. Dr Irma Gutierrez aware of all. Also aware of increased blood pressure. No further orders received at this time. NAN to room.

## 2021-01-27 ENCOUNTER — APPOINTMENT (OUTPATIENT)
Dept: CT IMAGING | Age: 51
DRG: 023 | End: 2021-01-27
Payer: COMMERCIAL

## 2021-01-27 LAB
ABSOLUTE EOS #: 0.03 K/UL (ref 0–0.44)
ABSOLUTE IMMATURE GRANULOCYTE: 0.05 K/UL (ref 0–0.3)
ABSOLUTE LYMPH #: 1.63 K/UL (ref 1.1–3.7)
ABSOLUTE MONO #: 0.96 K/UL (ref 0.1–1.2)
ANION GAP SERPL CALCULATED.3IONS-SCNC: 9 MMOL/L (ref 9–17)
BASOPHILS # BLD: 0 % (ref 0–2)
BASOPHILS ABSOLUTE: <0.03 K/UL (ref 0–0.2)
BUN BLDV-MCNC: 3 MG/DL (ref 6–20)
BUN/CREAT BLD: ABNORMAL (ref 9–20)
CALCIUM SERPL-MCNC: 9.4 MG/DL (ref 8.6–10.4)
CHLORIDE BLD-SCNC: 105 MMOL/L (ref 98–107)
CHOLESTEROL/HDL RATIO: 4.5
CHOLESTEROL: 197 MG/DL
CO2: 23 MMOL/L (ref 20–31)
CREAT SERPL-MCNC: 0.33 MG/DL (ref 0.5–0.9)
DIFFERENTIAL TYPE: ABNORMAL
EOSINOPHILS RELATIVE PERCENT: 0 % (ref 1–4)
ESTIMATED AVERAGE GLUCOSE: 97 MG/DL
GFR AFRICAN AMERICAN: >60 ML/MIN
GFR NON-AFRICAN AMERICAN: >60 ML/MIN
GFR SERPL CREATININE-BSD FRML MDRD: ABNORMAL ML/MIN/{1.73_M2}
GFR SERPL CREATININE-BSD FRML MDRD: ABNORMAL ML/MIN/{1.73_M2}
GLUCOSE BLD-MCNC: 117 MG/DL (ref 70–99)
HBA1C MFR BLD: 5 % (ref 4–6)
HCT VFR BLD CALC: 34.3 % (ref 36.3–47.1)
HDLC SERPL-MCNC: 44 MG/DL
HEMOGLOBIN: 11.8 G/DL (ref 11.9–15.1)
IMMATURE GRANULOCYTES: 0 %
LDL CHOLESTEROL: 119 MG/DL (ref 0–130)
LYMPHOCYTES # BLD: 14 % (ref 24–43)
MAGNESIUM: 1.9 MG/DL (ref 1.6–2.6)
MCH RBC QN AUTO: 32.8 PG (ref 25.2–33.5)
MCHC RBC AUTO-ENTMCNC: 34.4 G/DL (ref 28.4–34.8)
MCV RBC AUTO: 95.3 FL (ref 82.6–102.9)
MONOCYTES # BLD: 8 % (ref 3–12)
NRBC AUTOMATED: 0 PER 100 WBC
PDW BLD-RTO: 12.9 % (ref 11.8–14.4)
PLATELET # BLD: ABNORMAL K/UL (ref 138–453)
PLATELET ESTIMATE: ABNORMAL
PLATELET, FLUORESCENCE: NORMAL K/UL (ref 138–453)
PLATELET, IMMATURE FRACTION: NORMAL % (ref 1.1–10.3)
PMV BLD AUTO: ABNORMAL FL (ref 8.1–13.5)
POTASSIUM SERPL-SCNC: 3.4 MMOL/L (ref 3.7–5.3)
PROCALCITONIN: 0.04 NG/ML
PROCALCITONIN: 0.05 NG/ML
RBC # BLD: 3.6 M/UL (ref 3.95–5.11)
RBC # BLD: ABNORMAL 10*6/UL
SEG NEUTROPHILS: 76 % (ref 36–65)
SEGMENTED NEUTROPHILS ABSOLUTE COUNT: 8.74 K/UL (ref 1.5–8.1)
SODIUM BLD-SCNC: 137 MMOL/L (ref 135–144)
TRIGL SERPL-MCNC: 168 MG/DL
VLDLC SERPL CALC-MCNC: ABNORMAL MG/DL (ref 1–30)
WBC # BLD: 11.4 K/UL (ref 3.5–11.3)
WBC # BLD: ABNORMAL 10*3/UL

## 2021-01-27 PROCEDURE — 85025 COMPLETE CBC W/AUTO DIFF WBC: CPT

## 2021-01-27 PROCEDURE — 6370000000 HC RX 637 (ALT 250 FOR IP): Performed by: STUDENT IN AN ORGANIZED HEALTH CARE EDUCATION/TRAINING PROGRAM

## 2021-01-27 PROCEDURE — 85055 RETICULATED PLATELET ASSAY: CPT

## 2021-01-27 PROCEDURE — 84145 PROCALCITONIN (PCT): CPT

## 2021-01-27 PROCEDURE — APPNB60 APP NON BILLABLE TIME 46-60 MINS: Performed by: NURSE PRACTITIONER

## 2021-01-27 PROCEDURE — 2500000003 HC RX 250 WO HCPCS: Performed by: FAMILY MEDICINE

## 2021-01-27 PROCEDURE — 2580000003 HC RX 258: Performed by: STUDENT IN AN ORGANIZED HEALTH CARE EDUCATION/TRAINING PROGRAM

## 2021-01-27 PROCEDURE — 6360000002 HC RX W HCPCS: Performed by: STUDENT IN AN ORGANIZED HEALTH CARE EDUCATION/TRAINING PROGRAM

## 2021-01-27 PROCEDURE — 99233 SBSQ HOSP IP/OBS HIGH 50: CPT | Performed by: PSYCHIATRY & NEUROLOGY

## 2021-01-27 PROCEDURE — 70450 CT HEAD/BRAIN W/O DYE: CPT

## 2021-01-27 PROCEDURE — 94761 N-INVAS EAR/PLS OXIMETRY MLT: CPT

## 2021-01-27 PROCEDURE — 83036 HEMOGLOBIN GLYCOSYLATED A1C: CPT

## 2021-01-27 PROCEDURE — C9248 INJ, CLEVIDIPINE BUTYRATE: HCPCS | Performed by: STUDENT IN AN ORGANIZED HEALTH CARE EDUCATION/TRAINING PROGRAM

## 2021-01-27 PROCEDURE — 6360000002 HC RX W HCPCS: Performed by: NURSE PRACTITIONER

## 2021-01-27 PROCEDURE — 2000000003 HC NEURO ICU R&B

## 2021-01-27 PROCEDURE — 83735 ASSAY OF MAGNESIUM: CPT

## 2021-01-27 PROCEDURE — 36415 COLL VENOUS BLD VENIPUNCTURE: CPT

## 2021-01-27 PROCEDURE — 80048 BASIC METABOLIC PNL TOTAL CA: CPT

## 2021-01-27 PROCEDURE — 2580000003 HC RX 258: Performed by: NURSE PRACTITIONER

## 2021-01-27 PROCEDURE — 6370000000 HC RX 637 (ALT 250 FOR IP): Performed by: NURSE PRACTITIONER

## 2021-01-27 PROCEDURE — 99232 SBSQ HOSP IP/OBS MODERATE 35: CPT | Performed by: PSYCHIATRY & NEUROLOGY

## 2021-01-27 PROCEDURE — 6360000004 HC RX CONTRAST MEDICATION: Performed by: STUDENT IN AN ORGANIZED HEALTH CARE EDUCATION/TRAINING PROGRAM

## 2021-01-27 PROCEDURE — 70498 CT ANGIOGRAPHY NECK: CPT

## 2021-01-27 PROCEDURE — 80061 LIPID PANEL: CPT

## 2021-01-27 RX ORDER — CARVEDILOL 12.5 MG/1
12.5 TABLET ORAL 2 TIMES DAILY WITH MEALS
Status: DISCONTINUED | OUTPATIENT
Start: 2021-01-27 | End: 2021-02-05 | Stop reason: HOSPADM

## 2021-01-27 RX ORDER — MAGNESIUM SULFATE IN WATER 40 MG/ML
2000 INJECTION, SOLUTION INTRAVENOUS ONCE
Status: COMPLETED | OUTPATIENT
Start: 2021-01-27 | End: 2021-01-27

## 2021-01-27 RX ORDER — POTASSIUM CHLORIDE 20 MEQ/1
30 TABLET, EXTENDED RELEASE ORAL EVERY 4 HOURS
Status: COMPLETED | OUTPATIENT
Start: 2021-01-27 | End: 2021-01-27

## 2021-01-27 RX ORDER — 0.9 % SODIUM CHLORIDE 0.9 %
500 INTRAVENOUS SOLUTION INTRAVENOUS ONCE
Status: COMPLETED | OUTPATIENT
Start: 2021-01-27 | End: 2021-01-27

## 2021-01-27 RX ORDER — SENNA AND DOCUSATE SODIUM 50; 8.6 MG/1; MG/1
2 TABLET, FILM COATED ORAL DAILY
Status: DISCONTINUED | OUTPATIENT
Start: 2021-01-27 | End: 2021-02-05 | Stop reason: HOSPADM

## 2021-01-27 RX ADMIN — CLOPIDOGREL 75 MG: 75 TABLET, FILM COATED ORAL at 08:37

## 2021-01-27 RX ADMIN — MAGNESIUM SULFATE 2000 MG: 2 INJECTION INTRAVENOUS at 15:45

## 2021-01-27 RX ADMIN — CLEVIPIDINE 10 MG/HR: 0.5 EMULSION INTRAVENOUS at 00:53

## 2021-01-27 RX ADMIN — CARVEDILOL 12.5 MG: 12.5 TABLET, FILM COATED ORAL at 09:54

## 2021-01-27 RX ADMIN — ACETAMINOPHEN 650 MG: 325 TABLET ORAL at 08:37

## 2021-01-27 RX ADMIN — ENOXAPARIN SODIUM 40 MG: 40 INJECTION SUBCUTANEOUS at 17:56

## 2021-01-27 RX ADMIN — ACETAMINOPHEN 650 MG: 325 TABLET ORAL at 14:57

## 2021-01-27 RX ADMIN — Medication 10 MG: at 21:11

## 2021-01-27 RX ADMIN — Medication 10 MG: at 15:45

## 2021-01-27 RX ADMIN — ATORVASTATIN CALCIUM 10 MG: 10 TABLET, FILM COATED ORAL at 19:49

## 2021-01-27 RX ADMIN — ASPIRIN 325 MG: 325 TABLET ORAL at 08:38

## 2021-01-27 RX ADMIN — ACETAMINOPHEN 650 MG: 325 TABLET ORAL at 01:46

## 2021-01-27 RX ADMIN — POTASSIUM CHLORIDE 30 MEQ: 1500 TABLET, EXTENDED RELEASE ORAL at 19:49

## 2021-01-27 RX ADMIN — SODIUM CHLORIDE, PRESERVATIVE FREE 10 ML: 5 INJECTION INTRAVENOUS at 20:00

## 2021-01-27 RX ADMIN — VENLAFAXINE HYDROCHLORIDE 75 MG: 75 CAPSULE, EXTENDED RELEASE ORAL at 08:38

## 2021-01-27 RX ADMIN — CLEVIPIDINE 14 MG/HR: 0.5 EMULSION INTRAVENOUS at 05:20

## 2021-01-27 RX ADMIN — ACETAMINOPHEN 650 MG: 325 TABLET ORAL at 21:10

## 2021-01-27 RX ADMIN — CARVEDILOL 12.5 MG: 12.5 TABLET, FILM COATED ORAL at 19:49

## 2021-01-27 RX ADMIN — Medication 10 MG: at 01:44

## 2021-01-27 RX ADMIN — IOPAMIDOL 90 ML: 755 INJECTION, SOLUTION INTRAVENOUS at 12:55

## 2021-01-27 RX ADMIN — POTASSIUM CHLORIDE 30 MEQ: 1500 TABLET, EXTENDED RELEASE ORAL at 17:56

## 2021-01-27 RX ADMIN — ONDANSETRON 4 MG: 2 INJECTION INTRAMUSCULAR; INTRAVENOUS at 05:40

## 2021-01-27 RX ADMIN — SODIUM CHLORIDE: 9 INJECTION, SOLUTION INTRAVENOUS at 20:05

## 2021-01-27 RX ADMIN — STANDARDIZED SENNA CONCENTRATE AND DOCUSATE SODIUM 2 TABLET: 8.6; 5 TABLET ORAL at 08:39

## 2021-01-27 RX ADMIN — SODIUM CHLORIDE 500 ML: 9 INJECTION, SOLUTION INTRAVENOUS at 14:58

## 2021-01-27 ASSESSMENT — PAIN SCALES - GENERAL
PAINLEVEL_OUTOF10: 10
PAINLEVEL_OUTOF10: 3
PAINLEVEL_OUTOF10: 10

## 2021-01-27 ASSESSMENT — PAIN DESCRIPTION - ONSET: ONSET: ON-GOING

## 2021-01-27 ASSESSMENT — PAIN DESCRIPTION - LOCATION
LOCATION: HEAD
LOCATION: HEAD

## 2021-01-27 ASSESSMENT — PAIN DESCRIPTION - DESCRIPTORS: DESCRIPTORS: ACHING

## 2021-01-27 NOTE — PROGRESS NOTES
Daily Progress Note  Neuro Critical Care    Patient Name: Baljinder VASQUEZ  Patient : 1970  Room/Bed: 6233/6022-82  Code Status: FULL  Allergies: Allergies   Allergen Reactions    Adhesive Tape Rash     redness       CHIEF COMPLAINT:      Stroke     INTERVAL HISTORY    Initial Presentation (Admitted 21): The patient is a 53 y.o. female with past medical history significant for breast CA (s/p lumpectomy, chemo and radiation 2018), HTN, HLD, ARVIND on CPAP and unruptured right MCA aneurysm (treated with web and stent placement on 2020) [facility] due to concern for a right MCA occlusion. Patient initially presented to [facility] ED with right-sided headache, left-sided weakness and dizziness. LKW around 2100 last night. Patient was found by her friend around 1200PM with symptoms. CTA Head/Neck reportedly showed an acute right MCA occlusion. Of note, patient recently underwent a diagnostic angiogram on 21 which showed residual filling of the right M1 MCA aneurysm. Transferred to [facility] for Neuro Endovascular evaluation. Stroke alert called on arrival to ED.  NIHSS 3; left upper extremity ataxia, left eye hemianopsia of the left visual field, inattention. MRI brain revealed a large right MCA territory infarct with petechial hemorrhage. MRA Head with limited evaluation of right M1 MCA due to artifact. Continued on Aspirin and Plavix. Admitted to the Neuro ICU for further management. Hospital Course:   : Concern for new onset left hemineglect in the evening. Given 1L NS bolus. Goal -200. Repeat MRI Brain showed minimal progression of the right MCA distribution infarct with new areas of restricted diffusion in the right frontal centrum semioval and right posterior MCA, stable minimal petechial hemorrhage.     : Cerebral angiogram revealed right MCA sub-occlusive clot extending from distal M1 to proximal M2 treated with mechanical thrombectomy and balloon angioplasty x6, TICI 2C. CT Head showed large right MCA infarct with hemorrhagic transformation and mild effacement of the lateral ventricle. Last 24h:   Remained on Clevidipine infusion overnight to keep -180. Goal SBP changed to 120-160 given hemorrhagic conversion on CT Head last night. Coreg 12.5mg BID resumed. Wean off Clevidipine infusion. Clinical exam remains stable. Repeat CT Head this afternoon re-demonstrated right MCA infarct with hemorrhagic conversion not significantly changed except for small acute SAH, similar edema. CTA Head/Neck showed multiple stents within the right MCA territory that appear patent.       CURRENT MEDICATIONS:  SCHEDULED MEDICATIONS:   sennosides-docusate sodium  2 tablet Oral Daily    ceFAZolin  2,000 mg Intravenous Once    clopidogrel  75 mg Oral Daily    aspirin  325 mg Oral Daily    atorvastatin  10 mg Oral Nightly    [Held by provider] carvedilol  12.5 mg Oral BID WC    [Held by provider] hydroCHLOROthiazide  25 mg Oral Daily    [Held by provider] lisinopril  40 mg Oral Daily    venlafaxine  75 mg Oral Daily    sodium chloride flush  10 mL Intravenous 2 times per day     CONTINUOUS INFUSIONS:   clevidipine 4 mg/hr (21 0721)    sodium chloride 125 mL/hr at 21 0806     PRN MEDICATIONS:   labetalol, potassium chloride **OR** potassium alternative oral replacement **OR** potassium chloride, sodium chloride flush, promethazine **OR** ondansetron, polyethylene glycol, acetaminophen **OR** acetaminophen    VITALS:  Temperature Range: Temp: 99.2 °F (37.3 °C) Temp  Av.8 °F (37.1 °C)  Min: 98.5 °F (36.9 °C)  Max: 99.2 °F (37.3 °C)  BP Range: Systolic (05QUJ), RND:194 , Min:115 , XJA:332     Diastolic (11OUQ), YLN:98, Min:72, Max:111    Pulse Range: Pulse  Av.1  Min: 59  Max: 111  Respiration Range: Resp  Av.8  Min: 0  Max: 31  Current Pulse Ox: SpO2: 92 %  24HR Pulse Ox Range: SpO2  Av.9 % Min: 92 %  Max: 100 %  Patient Vitals for the past 12 hrs:   BP Temp Temp src Pulse Resp SpO2   01/27/21 0700 (!) 143/79 -- -- 96 -- --   01/27/21 0600 135/87 -- -- 107 -- --   01/27/21 0500 (!) 142/94 -- -- 108 20 --   01/27/21 0400 (!) 142/78 99.2 °F (37.3 °C) Oral 96 22 92 %   01/27/21 0300 137/72 -- -- 87 26 --   01/27/21 0200 118/76 -- -- 92 23 --   01/27/21 0100 136/83 -- -- 106 27 --   01/27/21 0000 (!) 143/82 98.6 °F (37 °C) Oral 111 27 96 %   01/26/21 2300 (!) 148/83 -- -- 102 26 --   01/26/21 2200 136/82 -- -- 95 22 --   01/26/21 2100 (!) 140/82 -- -- 99 25 --   01/26/21 2000 (!) 149/89 98.5 °F (36.9 °C) Oral 83 18 98 %     Estimated body mass index is 39.99 kg/m² as calculated from the following:    Height as of this encounter: 5' 5\" (1.651 m). Weight as of this encounter: 240 lb 4.8 oz (109 kg).  []<16 Severe malnutrition  []16-16.99 Moderate malnutrition  []17-18.49 Mild malnutrition  []18.5-24.9 Normal  []25-29.9 Overweight (not obese)  []30-34.9 Obese class 1 (Low Risk)  [x]35-39.9 Obese class 2 (Moderate Risk)  []?40 Obese class 3 (High Risk)    RECENT LABS:   Lab Results   Component Value Date    WBC 11.4 (H) 01/27/2021    HGB 11.8 (L) 01/27/2021    HCT 34.3 (L) 01/27/2021    PLT See Reflexed IPF Result 01/27/2021    ALT 25 09/02/2020    AST 21 09/02/2020     01/26/2021    K 3.6 (L) 01/26/2021     (H) 01/26/2021    CREATININE 0.36 (L) 01/26/2021    BUN 10 01/26/2021    CO2 21 01/26/2021    INR 0.9 01/24/2021     24 HOUR INTAKE/OUTPUT:    Intake/Output Summary (Last 24 hours) at 1/27/2021 4334  Last data filed at 1/27/2021 0700  Gross per 24 hour   Intake 2255 ml   Output 5450 ml   Net -3195 ml       IMAGING:   Ct Head Wo Contrast  Addendum Date: 1/27/2021    ADDENDUM: Critical results were relayed by Dr. Poppy Rosenthal to ELISHA Forrest on 1/27/2021 at 1351 EST. Result Date: 1/27/2021  Evolving right MCA territory infarct with hemorrhagic conversion.   The approximately 1 cm parenchymal hemorrhage is not significantly changed relative to 10 hours prior, but there is suggestion of some new trace associated subarachnoid blood products peripherally (axial series 2, image 43). Increased conspicuity of the evolving cytotoxic edema with similar mild mass effect on the right lateral ventricle with approximately 1 mm right to left midline shift. The findings were sent to the Radiology Results [address] at 1331 EST on 1/27/2021to contact the ordering provider for a return call. Ct Head Without Contrast  Result Date: 1/27/2021  Hemorrhagic transformation of large right-sided infarct and increased edema beginning to efface the right lateral ventricle. Critical results were called by Dr. Diana Preston to Jennifer Duenasin on 1/27/2021 at 03:06. Cta Head Neck W Contrast  Result Date: 1/27/2021  Multiple stents within the right middle cerebral artery and beyond the trifurcation that appear patent. Mild stenosis in the V4 portion of the left vertebral artery. Labs and Images reviewed with:  [] Dr. Violeta Caba. Rakesh    [x] Dr. Gt Palacios  [] Dr. Willo Najjar  [] There are no new interval images to review. PHYSICAL EXAM       CONSTITUTIONAL:  Alert and oriented x 3, in no acute distress. GCS 15. Nontoxic. No dysarthria. No aphasia.    HEAD:  normocephalic, atraumatic    EYES:  PERRL, EOMI   ENT:  moist mucous membranes   NECK:  supple, symmetric   LUNGS:  Equal air entry bilaterally, clear   CARDIOVASCULAR:  normal s1 / s2, RRR, distal pulses intact   ABDOMEN:  Soft, no rigidity, normal bowel sounds   NEUROLOGIC:  Mental Status:  A & O x3, Awake             Cranial Nerves:    II: Visual fields:  normal  III: Pupils:  equal, round, reactive to light  III,IV,VI: Extra Ocular Movements: intact  V: Facial sensation:  intact  VII: Facial strength: abnormal - left facial droop    Motor Exam:    Drift:  present - pronator drift left upper extremity  Tone:  normal    Motor exam is 5 out of 5 all extremities with the exception of right upper extremity 4/5 strength. Sensory:    Touch:    Right Upper Extremity:  normal  Left Upper Extremity:  normal  Right Lower Extremity:  normal  Left Lower Extremity:  normal    Coordination/Dysmetria:  Finger to Nose:   Right:  normal  Left:  normal    NIH Stroke Scale Total (if not done complete detailed one below):    1a.  Level of consciousness:  0 - alert; keenly responsive  1b. Level of consciousness questions:  0 - answers both questions correctly  1c. Level of consciousness questions:  0 - performs both tasks correctly  2. Best Gaze:  0 - normal  3. Visual:  0 - no visual loss  4. Facial Palsy:  1 - minor paralysis (flattened nasolabial fold, asymmetric on smiling)  5a. Motor left arm:  1 - drift, limb holds 90 (or 45) degrees but drifts down before full 10 seconds: does not hit bed  5b. Motor right arm:  0 - no drift, limb holds 90 (or 45) degrees for full 10 seconds  6a. Motor left le - no drift; leg holds 30 degree position for full 5 seconds  6b. Motor right le - no drift; leg holds 30 degree position for full 5 seconds  7. Limb Ataxia:  0 - absent  8. Sensory:  0 - normal; no sensory loss  9. Best Language:  0 - no aphasia, normal  10. Dysarthria:  0 - normal  11. Extinction and Inattention:  2 - profound demetrice-inattention or demetrice- inattention to more than one modality. Does not recognize own hand or orients only to one side of space   TOTAL: 4    DRAINS:  [x] There are no drains for Neuro Critical Care to monitor at this time. ASSESSMENT AND PLAN:       The patient is a 54 yo female with a history of breast CA (s/p lumpectomy, chemo and radiation ), HTN, HLD, ARVIND on CPAP and unruptured right MCA aneurysm (treated with web and stent placement on 2020) who presented to [facility]. [facility] as a transfer from [facility] due to concern for a right MCA occlusion.   Found to have acute right MCA territory infarct with petechial hemorrhage. Taken to angio on 1/26 and found to have right MCA sub-occlusive clot extending from distal M1 to proximal M2 treated with mechanical thrombectomy and balloon angioplasty x6, TICI 2C. Hemorrhagic transformation noted on post angio CT Head.     NEUROLOGIC:  - Acute right MCA territory infarct with small hemorrhagic conversion secondary to right M1 sub-occlusive thrombus  - Etiology likely secondary to previously treated right MCA aneurysm  - POD #1 s/p cerebral angiogram showing right MCA sub-occlusive clot extending from distal M1 to proximal M2 treated with mechanical thrombectomy and balloon angioplasty x6, TICI 2C  - Repeat CT Head this afternoon showed stable R MCA infarct with hemorrhagic transformation, mild effacement of lateral ventricle  - CTA Head/Neck showed patent right MCA stents  - Continue Aspirin 325mg QD and Plavix 75mg QD  - Goal -160  - Neuro checks per protocol    CARDIOVASCULAR:  - Goal -160  - PRN Labetalol  - Wean Clevidipine infusion  - Resume home Coreg 12.5mg BID  - Consider restarting low dose Lisinopril based on BP trend  - Troponin <6, EKG sinus arrythmia  - Echo 2018; EF 55-60%  - Continue telemetry    PULMONARY:  - Maintaining O2 sats on nasal canula  - Wean off O2  - Incentive spirometry    RENAL/FLUID/ELECTROLYTE:  - Normal renal functioning   - BUN 3/ Creatinine 0.33  - Monitor I&O; 4841/6792  - IVF: [email or web address] post contrast, stop tonight/tomorrow AM  - Hypokalemia, K 3.4; replace with total 60meq potassium  - Replace electrolytes PRN  - Daily BMP    GI/NUTRITION:  NUTRITION:  No diet orders on file   - Passed nursing bedside swallow  - Start General Diet   - Bowel regimen: Senokot-S daily  - GI prophylaxis: N/A    ID:  - Afebrile, Tmax 37.3C  - Mild leukocytosis possibly reactive, WBC 11.4  - Check procalcitonin  - Urine culture 1/24 negative  - COVID-19 negative  - Continue to monitor for fevers  - Daily CBC    HEME:   - H&H 11.8/34.3  - Platelet count appears adequate  - Daily CBC    ENDOCRINE:  - Continue to monitor blood glucose, goal <180  - Glucose well controlled  - F/U Hemoglobin A1C    OTHER:  - PT/OT/ST  - PM&R consulted  - Code Status: FULL    PROPHYLAXIS:  Stress ulcer: N/A    DVT PROPHYLAXIS:  - SCD sleeves - Thigh High   - No chemoprophylaxis anticoagulation at this time, consider starting in evening if repeat CT Head stable. DISPOSITION:  [x] To remain ICU for close neurological monitoring post angio and in setting of hemorrhagic conversion of R MCA infarct. We will continue to follow along. For any changes in exam or patient status please contact Neuro Critical Care.       POPEYE Farias - [place]  Neuro Critical Care  Pager [phone]  1/27/2021     7:22 AM

## 2021-01-27 NOTE — PROGRESS NOTES
Physical Therapy  DATE: 2021    NAME: Jackie De La Rosa  MRN: [id number]   : 1970    Patient not seen this date for Physical Therapy due to:  [] Blood transfusion in progress  [] Hemodialysis  [] Patient Declined  [] Spine Precautions   [] Strict Bedrest  [] Surgery/ Procedure  [x] Testing: CT for post-op hemorrhage. Ck pm as able      [] Other        [] PT is being discontinued at this time. Patient independent. No further needs. [] PT is being discontinued at this time due to declining physical/ medical status. Therapy is not appropriate at this time.     Edith Reyes, PT

## 2021-01-27 NOTE — PROGRESS NOTES
Endovascular Neurosurgery Progress Note    SUBJECTIVE:   No reported events overnight. Pt this am continues to complain of L face sensation changes. She is anxious about the upcoming procedure. Review of Systems:  CONSTITUTIONAL:  negative for fevers, chills, fatigue and malaise    EYES:  negative for double vision, blurred vision and photophobia     HEENT:  negative for tinnitus, epistaxis and sore throat    RESPIRATORY:  negative for cough, shortness of breath, wheezing    CARDIOVASCULAR:  negative for chest pain, palpitations, syncope, edema    GASTROINTESTINAL:  negative for nausea, vomiting    GENITOURINARY:  negative for incontinence    MUSCULOSKELETAL:  negative for neck or back pain    NEUROLOGICAL:  Negative for weakness and tingling  negative for headaches and dizziness    PSYCHIATRIC:  negative for anxiety      Review of systems otherwise negative. OBJECTIVE:     Vitals:    01/26/21 2200   BP: 136/82   Pulse: 95   Resp: 22   Temp:    SpO2:         General:  Gen: normal habitus, NAD  HEENT: NCAT, mucosa moist  Cvs: RRR, S1 S2 normal  Resp: symmetric unlabored breathing  Abd: s/nd/nt  Ext: no edema  Skin: no lesions seen, warm and dry     Neuro:  Gen: awake and alert, oriented x3. Lang/speech: no aphasia or dysarthria. Follows commands. CN: PERRL, EOMI, VFF, V1-3 intact, face symmetric, hearing intact, shoulder shrug symmetric, tongue midline  Motor: 5/5 R hemibody, L hemibody 4+/5 no drift. Sense: LT intact in all 4 ext. Sensory and visual extinction on L  Coord: FTN and HTS intact b/l  DTR: deferred  Gait: deferred     NIH Stroke Scale:   1a  Level of consciousness: 0 - alert; keenly responsive   1b. LOC questions:  0 - answers both questions correctly   1c. LOC commands: 0 - performs both tasks correctly   2. Best Gaze: 0 - normal   3. Visual: 0 - no visual loss   4. Facial Palsy: 0 - normal symmetric movement   5a.  Motor left arm: 0 - no drift, limb holds 90 (or 45) degrees for full 10 seconds   5b. Motor right arm: 0 - no drift, limb holds 90 (or 45) degrees for full 10 seconds   6a. Motor left le - no drift; leg holds 30 degree position for full 5 seconds   6b  Motor right le - no drift; leg holds 30 degree position for full 5 seconds   7. Limb Ataxia: 0 - absent   8. Sensory: 0 - normal; no sensory loss   9. Best Language:  0 - no aphasia, normal   10. Dysarthria: 0 - normal   11. Extinction and Inattention: 2 - profound demetrice-inattention or demetrice- inattention to more than one modality. Does not recognize own hand or orients only to one side of space              Total:   2      MRS: 1        LABS:   Reviewed. RADIOLOGY:   Images were personally reviewed including:  DSA 2021  Impression:    --anteriorly and inferiorly directed aneurysm in the right M1 trifurcation with previously placed Web device and y-stent. The stents appear widely patent with no in-stent stenosis. The aneurysm remains patent, with the Web device compacted into the    aneurysm dome compared to the prior imaging. Residual aneurysm neck 10.77mm, height 4.19mm, Web Occlusion Scale C.     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      MRI/A brain 2021  1. Large right middle cerebral artery territory infarct involving the   superior right temporal lobe and to a lesser extent the right frontal and   right parietal lobes. 2. Small focus of petechial hemorrhage within the right parietal lobe infarct. 3. Limited evaluation of the right middle cerebral artery given   susceptibility artifact from the patient's known MCA stent.  There is normal   signal identified within the proximal M1 segment as well as M2 branches   beyond the stent and area of susceptibility artifact.  Correlation with the   patient's reported recent outside CT angiogram of the brain is recommended.          ASSESSMENT:   53yo female with pmh of hld, htn,

## 2021-01-27 NOTE — ANESTHESIA POSTPROCEDURE EVALUATION
Department of Anesthesiology  Postprocedure Note    Patient: Philemon Bamberger  MRN: [id number]  YOB: 1970  Date of evaluation: 1/26/2021  Time:  8:30 PM     Procedure Summary     Date: 01/26/21 Room / Location: [address] [place] [address] 83 Procedures    Anesthesia Start: 5217 Anesthesia Stop: 7399    Procedure: IR ANGIOGRAM CAROTID CEREBRAL BILATERAL Diagnosis: (thrombectomy)    Scheduled Providers:  Responsible Provider: Neema Ge MD    Anesthesia Type: MAC, TIVA ASA Status: 3 - Emergent          Anesthesia Type: No value filed. Katie Phase I:      Katie Phase II:      Last vitals: Reviewed and per EMR flowsheets.        Anesthesia Post Evaluation    Patient location during evaluation: ICU  Patient participation: complete - patient participated  Level of consciousness: awake  Pain score: 0  Nausea & Vomiting: no nausea  Cardiovascular status: hypertensive  Respiratory status: room air

## 2021-01-27 NOTE — CARE COORDINATION
Patient lives alone, awaiting Pt/OT evaluations for input. PM&R consulted, will see once therapy notes are charted. OT assessed patient  on 1/25 pre-Angio.   Recommended ARU  PM&R consult ordered

## 2021-01-27 NOTE — PLAN OF CARE
Pt A/Ox4, neuro checks q1 and unchanged. CT/CTA done. Cleviprex off. NS infusing. 500NS bolus and 2gm of Mg given. Potassium supplemented. Tylenol given for continuous headache. Left groin site intact. General diet and up to the commode with standby assistance. No additional needs at this time, will continue to monitor. Son at bedside all day and updated multiple times from the NP.     Problem: Pain:  Goal: Pain level will decrease  Description: Pain level will decrease  Outcome: Ongoing  Goal: Control of acute pain  Description: Control of acute pain  Outcome: Ongoing  Goal: Control of chronic pain  Description: Control of chronic pain  Outcome: Ongoing     Problem: HEMODYNAMIC STATUS  Goal: Patient has stable vital signs and fluid balance  Outcome: Ongoing     Problem: ACTIVITY INTOLERANCE/IMPAIRED MOBILITY  Goal: Mobility/activity is maintained at optimum level for patient  Outcome: Ongoing     Problem: COMMUNICATION IMPAIRMENT  Goal: Ability to express needs and understand communication  Outcome: Ongoing

## 2021-01-28 LAB
ABSOLUTE EOS #: 0.2 K/UL (ref 0–0.44)
ABSOLUTE IMMATURE GRANULOCYTE: 0.03 K/UL (ref 0–0.3)
ABSOLUTE LYMPH #: 1.92 K/UL (ref 1.1–3.7)
ABSOLUTE MONO #: 0.75 K/UL (ref 0.1–1.2)
ANION GAP SERPL CALCULATED.3IONS-SCNC: 8 MMOL/L (ref 9–17)
BASOPHILS # BLD: 0 % (ref 0–2)
BASOPHILS ABSOLUTE: 0.03 K/UL (ref 0–0.2)
BUN BLDV-MCNC: 8 MG/DL (ref 6–20)
BUN/CREAT BLD: ABNORMAL (ref 9–20)
CALCIUM SERPL-MCNC: 8.9 MG/DL (ref 8.6–10.4)
CHLORIDE BLD-SCNC: 111 MMOL/L (ref 98–107)
CO2: 22 MMOL/L (ref 20–31)
CREAT SERPL-MCNC: 0.37 MG/DL (ref 0.5–0.9)
DIFFERENTIAL TYPE: ABNORMAL
EOSINOPHILS RELATIVE PERCENT: 2 % (ref 1–4)
GFR AFRICAN AMERICAN: >60 ML/MIN
GFR NON-AFRICAN AMERICAN: >60 ML/MIN
GFR SERPL CREATININE-BSD FRML MDRD: ABNORMAL ML/MIN/{1.73_M2}
GFR SERPL CREATININE-BSD FRML MDRD: ABNORMAL ML/MIN/{1.73_M2}
GLUCOSE BLD-MCNC: 91 MG/DL (ref 70–99)
HCT VFR BLD CALC: 31.7 % (ref 36.3–47.1)
HEMOGLOBIN: 10.1 G/DL (ref 11.9–15.1)
IMMATURE GRANULOCYTES: 0 %
LYMPHOCYTES # BLD: 22 % (ref 24–43)
MAGNESIUM: 2 MG/DL (ref 1.6–2.6)
MCH RBC QN AUTO: 31.9 PG (ref 25.2–33.5)
MCHC RBC AUTO-ENTMCNC: 31.9 G/DL (ref 28.4–34.8)
MCV RBC AUTO: 100 FL (ref 82.6–102.9)
MONOCYTES # BLD: 9 % (ref 3–12)
NRBC AUTOMATED: 0 PER 100 WBC
PDW BLD-RTO: 13.2 % (ref 11.8–14.4)
PLATELET # BLD: 263 K/UL (ref 138–453)
PLATELET ESTIMATE: ABNORMAL
PMV BLD AUTO: 9.7 FL (ref 8.1–13.5)
POTASSIUM SERPL-SCNC: 3.5 MMOL/L (ref 3.7–5.3)
RBC # BLD: 3.17 M/UL (ref 3.95–5.11)
RBC # BLD: ABNORMAL 10*6/UL
SEG NEUTROPHILS: 66 % (ref 36–65)
SEGMENTED NEUTROPHILS ABSOLUTE COUNT: 5.7 K/UL (ref 1.5–8.1)
SODIUM BLD-SCNC: 141 MMOL/L (ref 135–144)
WBC # BLD: 8.6 K/UL (ref 3.5–11.3)
WBC # BLD: ABNORMAL 10*3/UL

## 2021-01-28 PROCEDURE — 2580000003 HC RX 258: Performed by: STUDENT IN AN ORGANIZED HEALTH CARE EDUCATION/TRAINING PROGRAM

## 2021-01-28 PROCEDURE — 83735 ASSAY OF MAGNESIUM: CPT

## 2021-01-28 PROCEDURE — 6360000002 HC RX W HCPCS: Performed by: NURSE PRACTITIONER

## 2021-01-28 PROCEDURE — C9248 INJ, CLEVIDIPINE BUTYRATE: HCPCS | Performed by: NURSE PRACTITIONER

## 2021-01-28 PROCEDURE — 6370000000 HC RX 637 (ALT 250 FOR IP): Performed by: STUDENT IN AN ORGANIZED HEALTH CARE EDUCATION/TRAINING PROGRAM

## 2021-01-28 PROCEDURE — 6370000000 HC RX 637 (ALT 250 FOR IP): Performed by: PSYCHIATRY & NEUROLOGY

## 2021-01-28 PROCEDURE — 97162 PT EVAL MOD COMPLEX 30 MIN: CPT

## 2021-01-28 PROCEDURE — 99232 SBSQ HOSP IP/OBS MODERATE 35: CPT | Performed by: PSYCHIATRY & NEUROLOGY

## 2021-01-28 PROCEDURE — 80048 BASIC METABOLIC PNL TOTAL CA: CPT

## 2021-01-28 PROCEDURE — 6370000000 HC RX 637 (ALT 250 FOR IP): Performed by: FAMILY MEDICINE

## 2021-01-28 PROCEDURE — 2500000003 HC RX 250 WO HCPCS: Performed by: FAMILY MEDICINE

## 2021-01-28 PROCEDURE — 97530 THERAPEUTIC ACTIVITIES: CPT

## 2021-01-28 PROCEDURE — 36415 COLL VENOUS BLD VENIPUNCTURE: CPT

## 2021-01-28 PROCEDURE — 2060000000 HC ICU INTERMEDIATE R&B

## 2021-01-28 PROCEDURE — 99233 SBSQ HOSP IP/OBS HIGH 50: CPT | Performed by: PSYCHIATRY & NEUROLOGY

## 2021-01-28 PROCEDURE — 97535 SELF CARE MNGMENT TRAINING: CPT

## 2021-01-28 PROCEDURE — 99254 IP/OBS CNSLTJ NEW/EST MOD 60: CPT | Performed by: PHYSICAL MEDICINE & REHABILITATION

## 2021-01-28 PROCEDURE — 85025 COMPLETE CBC W/AUTO DIFF WBC: CPT

## 2021-01-28 RX ORDER — LISINOPRIL 20 MG/1
20 TABLET ORAL DAILY
Status: DISCONTINUED | OUTPATIENT
Start: 2021-01-28 | End: 2021-02-05 | Stop reason: HOSPADM

## 2021-01-28 RX ADMIN — CARVEDILOL 12.5 MG: 12.5 TABLET, FILM COATED ORAL at 08:59

## 2021-01-28 RX ADMIN — ACETAMINOPHEN 650 MG: 325 TABLET ORAL at 03:10

## 2021-01-28 RX ADMIN — ACETAMINOPHEN 650 MG: 325 TABLET ORAL at 21:30

## 2021-01-28 RX ADMIN — ENOXAPARIN SODIUM 40 MG: 40 INJECTION SUBCUTANEOUS at 09:00

## 2021-01-28 RX ADMIN — VENLAFAXINE HYDROCHLORIDE 75 MG: 75 CAPSULE, EXTENDED RELEASE ORAL at 09:01

## 2021-01-28 RX ADMIN — ATORVASTATIN CALCIUM 10 MG: 10 TABLET, FILM COATED ORAL at 21:29

## 2021-01-28 RX ADMIN — ASPIRIN 325 MG: 325 TABLET ORAL at 08:59

## 2021-01-28 RX ADMIN — LISINOPRIL 20 MG: 20 TABLET ORAL at 10:41

## 2021-01-28 RX ADMIN — Medication 10 MG: at 02:39

## 2021-01-28 RX ADMIN — CLOPIDOGREL 75 MG: 75 TABLET, FILM COATED ORAL at 09:01

## 2021-01-28 RX ADMIN — SODIUM CHLORIDE: 9 INJECTION, SOLUTION INTRAVENOUS at 02:40

## 2021-01-28 RX ADMIN — POTASSIUM CHLORIDE 40 MEQ: 1500 TABLET, EXTENDED RELEASE ORAL at 05:10

## 2021-01-28 RX ADMIN — CARVEDILOL 12.5 MG: 12.5 TABLET, FILM COATED ORAL at 21:30

## 2021-01-28 RX ADMIN — SODIUM CHLORIDE, PRESERVATIVE FREE 10 ML: 5 INJECTION INTRAVENOUS at 21:29

## 2021-01-28 ASSESSMENT — PAIN SCALES - GENERAL
PAINLEVEL_OUTOF10: 8
PAINLEVEL_OUTOF10: 5
PAINLEVEL_OUTOF10: 5
PAINLEVEL_OUTOF10: 10
PAINLEVEL_OUTOF10: 5

## 2021-01-28 ASSESSMENT — PAIN DESCRIPTION - FREQUENCY
FREQUENCY: CONTINUOUS
FREQUENCY: CONTINUOUS

## 2021-01-28 ASSESSMENT — PAIN DESCRIPTION - DESCRIPTORS
DESCRIPTORS: ACHING

## 2021-01-28 ASSESSMENT — PAIN DESCRIPTION - LOCATION
LOCATION: HEAD

## 2021-01-28 ASSESSMENT — PAIN DESCRIPTION - PAIN TYPE: TYPE: ACUTE PAIN

## 2021-01-28 NOTE — PROGRESS NOTES
Daily Progress Note  Neuro Critical Care    Patient Name: Trish VASQUEZ  Patient : 1970  Room/Bed: 1910/4781-06  Code Status: FULL  Allergies: Allergies   Allergen Reactions    Adhesive Tape Rash     redness       CHIEF COMPLAINT:      Stroke     INTERVAL HISTORY    Initial Presentation (Admitted 21): The patient is a 53 y.o. female with past medical history significant for breast CA (s/p lumpectomy, chemo and radiation 2018), HTN, HLD, ARVIND on CPAP and unruptured right MCA aneurysm (treated with web and stent placement on 2020) [facility] due to concern for a right MCA occlusion. Patient initially presented to [facility] ED with right-sided headache, left-sided weakness and dizziness. LKW around 2100 last night. Patient was found by her friend around 1200PM with symptoms. CTA Head/Neck reportedly showed an acute right MCA occlusion. Of note, patient recently underwent a diagnostic angiogram on 21 which showed residual filling of the right M1 MCA aneurysm. Transferred to [facility]. Brandie for Neuro Endovascular evaluation. Stroke alert called on arrival to ED.  NIHSS 3; left upper extremity ataxia, left eye hemianopsia of the left visual field, inattention. MRI brain revealed a large right MCA territory infarct with petechial hemorrhage. MRA Head with limited evaluation of right M1 MCA due to artifact. Continued on Aspirin and Plavix. Admitted to the Neuro ICU for further management. Hospital Course:   : Concern for new onset left hemineglect in the evening. Given 1L NS bolus. Goal -200. Repeat MRI Brain showed minimal progression of the right MCA distribution infarct with new areas of restricted diffusion in the right frontal centrum semioval and right posterior MCA, stable minimal petechial hemorrhage.     : Cerebral angiogram revealed right MCA sub-occlusive clot extending from distal M1 to proximal M2 treated with mechanical thrombectomy and balloon angioplasty x6, TICI 2C. CT Head showed large right MCA infarct with hemorrhagic transformation and mild effacement of the lateral ventricle. : Was on cleviprex but weaned off after restarting Coreg 12.5 mg BID. Repeat CT Head re-demonstrated R MCA infarct with hemorrhagic conversion that is not significant. CTA Head and Neck reveals patent stents. Last 24h:   No acute events overnight. Hemodynamically stable. Tolerating diet without difficulty. Patient denies any Chest pain, shortness breath, nausea/vomiting, diarrhea, abdominal pain, dysuria, no weakness.     CURRENT MEDICATIONS:  SCHEDULED MEDICATIONS:   lisinopril  20 mg Oral Daily    sennosides-docusate sodium  2 tablet Oral Daily    carvedilol  12.5 mg Oral BID WC    enoxaparin  40 mg Subcutaneous Daily    ceFAZolin  2,000 mg Intravenous Once    clopidogrel  75 mg Oral Daily    aspirin  325 mg Oral Daily    atorvastatin  10 mg Oral Nightly    venlafaxine  75 mg Oral Daily    sodium chloride flush  10 mL Intravenous 2 times per day     CONTINUOUS INFUSIONS:    PRN MEDICATIONS:   labetalol, potassium chloride **OR** potassium alternative oral replacement **OR** potassium chloride, sodium chloride flush, promethazine **OR** ondansetron, polyethylene glycol, acetaminophen **OR** acetaminophen    VITALS:  Temperature Range: Temp: 98.9 °F (37.2 °C) Temp  Av.5 °F (36.9 °C)  Min: 98 °F (36.7 °C)  Max: 98.9 °F (37.2 °C)  BP Range: Systolic (21AZY), WNI:577 , Min:127 , MAF:013     Diastolic (54EFY), XTP:81, Min:73, Max:110    Pulse Range: Pulse  Av.6  Min: 59  Max: 92  Respiration Range: Resp  Av.7  Min: 11  Max: 25  Current Pulse Ox: SpO2: 96 %  24HR Pulse Ox Range: SpO2  Av.1 %  Min: 92 %  Max: 97 %  Patient Vitals for the past 12 hrs:   BP Temp Temp src Pulse Resp SpO2   21 1300 (!) 160/100 -- -- 83 -- --   21 1200 (!) 150/101 98.9 °F (37.2 °C) Oral 59 22 96 %   21 3074 associated subarachnoid blood products peripherally (axial series 2, image 43). Increased conspicuity of the evolving cytotoxic edema with similar mild mass effect on the right lateral ventricle with approximately 1 mm right to left midline shift. The findings were sent to the Radiology Results [address] at 1331 EST on 1/27/2021to contact the ordering provider for a return call. Ct Head Without Contrast  Result Date: 1/27/2021  Hemorrhagic transformation of large right-sided infarct and increased edema beginning to efface the right lateral ventricle. Critical results were called by Dr. Celine Figueroa to [facility] on 1/27/2021 at 03:06. Cta Head Neck W Contrast  Result Date: 1/27/2021  Multiple stents within the right middle cerebral artery and beyond the trifurcation that appear patent. Mild stenosis in the V4 portion of the left vertebral artery. Labs and Images reviewed with:  [] Dr. Jun Chavez. Rakesh    [x] Dr. Bridget Arroyo  [] Dr. Aden Guadarrama  [] There are no new interval images to review. PHYSICAL EXAM       CONSTITUTIONAL:  Alert and oriented x 3, in no acute distress. GCS 15. Nontoxic. No dysarthria. No aphasia.    HEAD:  normocephalic, atraumatic    EYES:  PERRL, EOMI   ENT:  moist mucous membranes   NECK:  supple, symmetric   LUNGS:  Equal air entry bilaterally, clear   CARDIOVASCULAR:  normal s1 / s2, RRR, distal pulses intact   ABDOMEN:  Soft, no rigidity, normal bowel sounds   NEUROLOGIC:  Mental Status:  A & O x3, Awake             Cranial Nerves:    II: Visual fields:  normal  III: Pupils:  equal, round, reactive to light  III,IV,VI: Extra Ocular Movements: intact  V: Facial sensation:  intact  VII: Facial strength: normal    Motor Exam:    Drift:  present - pronator drift left upper extremity  Tone:  normal    Motor exam is 5 out of 5 all extremities     Sensory:    Touch:    Right Upper Extremity:  normal  Left Upper Extremity:  normal  Right Lower Extremity:  normal  Left Lower Extremity:  Normal  Extinction on the left to visual and touch    Coordination/Dysmetria:  Finger to Nose:   Right:  normal  Left:  normal    NIH Stroke Scale Total (if not done complete detailed one below):    1a.  Level of consciousness:  0 - alert; keenly responsive  1b. Level of consciousness questions:  0 - answers both questions correctly  1c. Level of consciousness questions:  0 - performs both tasks correctly  2. Best Gaze:  0 - normal  3. Visual:  0 - no visual loss  4. Facial Palsy:  0 - normal symmetric movement  5a. Motor left arm:  0 - no drift, limb holds 90 (or 45) degrees for full 10 seconds  5b. Motor right arm:  0 - no drift, limb holds 90 (or 45) degrees for full 10 seconds  6a. Motor left le - no drift; leg holds 30 degree position for full 5 seconds  6b. Motor right le - no drift; leg holds 30 degree position for full 5 seconds  7. Limb Ataxia:  0 - absent  8. Sensory:  0 - normal; no sensory loss  9. Best Language:  0 - no aphasia, normal  10. Dysarthria:  0 - normal  11. Extinction and Inattention:  2 - profound demetrice-inattention or demetrice- inattention to more than one modality. Does not recognize own hand or orients only to one side of space   TOTAL: 2    DRAINS:  [x] There are no drains for Neuro Critical Care to monitor at this time. ASSESSMENT AND PLAN:       The patient is a 52 yo female with a history of breast CA (s/p lumpectomy, chemo and radiation ), HTN, HLD, ARVIND on CPAP and unruptured right MCA aneurysm (treated with web and stent placement on 2020) [facility] due to concern for a right MCA occlusion. Found to have acute right MCA territory infarct with petechial hemorrhage. Taken to angio on  and found to have right MCA sub-occlusive clot extending from distal M1 to proximal M2 treated with mechanical thrombectomy and balloon angioplasty x6, TICI 2C.   Hemorrhagic transformation noted on post angio CT Head. NEUROLOGIC:  - Acute right MCA territory infarct with small hemorrhagic conversion secondary to right M1 sub-occlusive thrombus   - Etiology likely secondary to previously treated right MCA aneurysm   - POD #2 s/p cerebral angiogram showing right MCA sub-occlusive clot extending from distal M1 to proximal M2 treated with mechanical thrombectomy and balloon angioplasty x6, TICI 2C  - Repeat CT Head this afternoon showed stable R MCA infarct with hemorrhagic transformation, mild effacement of lateral ventricle  - CTA Head/Neck showed patent right MCA stents  - Continue Aspirin 325mg QD and Plavix 75mg QD  - Goal -160  - Neuro checks per protocol    CARDIOVASCULAR:  - Goal -160   - Labetalol PRN   - Wean Clevidipine infusion   - Resume home Coreg 12.5mg BID   - Consider restarting low dose Lisinopril based on BP trend  - Troponin <6, EKG sinus arrythmia  - Echo 2018; EF 55-60%  - Continue telemetry    PULMONARY:  - on RA  - Incentive spirometry    RENAL/FLUID/ELECTROLYTE:  - Normal renal functioning   - BUN 8/ Creatinine 0.37  - Replace electrolytes PRN  - Daily BMP    GI/NUTRITION:  NUTRITION:  DIET GENERAL;   - Passed nursing bedside swallow  - Start General Diet   - Bowel regimen: Senokot-S daily  - GI prophylaxis: N/A    ID:  - Afebrile  - Mild leukocytosis possibly reactive, WBC 8.6  - Pro-calcitonin negative at 0.05 and 0.04  - Urine culture 1/24 negative  - COVID-19 negative  - Continue to monitor for fevers  - Daily CBC    HEME:   - H&H 10.1/31.7  - Platelet count appears adequate  - Daily CBC    ENDOCRINE:  - Continue to monitor blood glucose, goal <180  - Glucose well controlled  - F/U Hemoglobin A1C    OTHER:  - PT/OT/ST  - PM&R consulted  - Code Status: FULL    PROPHYLAXIS:  Stress ulcer: N/A    DVT PROPHYLAXIS:  - SCD sleeves - Thigh High   - No chemoprophylaxis anticoagulation at this time, consider starting in evening if repeat CT Head stable. DISPOSITION:  [x] To remain ICU for close neurological monitoring post angio and in setting of hemorrhagic conversion of R MCA infarct. We will continue to follow along. For any changes in exam or patient status please contact Neuro Critical Care.       Genna Huitron DO  Neuro Critical Care  Pager [phone]  1/28/2021     4:43 PM

## 2021-01-28 NOTE — PROGRESS NOTES
Endovascular Neurosurgery Progress Note    SUBJECTIVE:   No reported events overnight. Pt this am has no acute complaints. She denies new symptoms. Review of Systems:  CONSTITUTIONAL:  negative for fevers, chills, fatigue and malaise    EYES:  negative for double vision, blurred vision and photophobia     HEENT:  negative for tinnitus, epistaxis and sore throat    RESPIRATORY:  negative for cough, shortness of breath, wheezing    CARDIOVASCULAR:  negative for chest pain, palpitations, syncope, edema    GASTROINTESTINAL:  negative for nausea, vomiting    GENITOURINARY:  negative for incontinence    MUSCULOSKELETAL:  negative for neck or back pain    NEUROLOGICAL:  Negative for weakness and tingling  negative for headaches and dizziness    PSYCHIATRIC:  negative for anxiety      Review of systems otherwise negative. OBJECTIVE:     Vitals:    01/28/21 1300   BP: (!) 160/100   Pulse: 83   Resp:    Temp:    SpO2:         General:  Gen: normal habitus, NAD  HEENT: NCAT, mucosa moist  Cvs: RRR, S1 S2 normal  Resp: symmetric unlabored breathing  Abd: s/nd/nt  Ext: no edema  Skin: no lesions seen, warm and dry     Neuro:  Gen: awake and alert, oriented x3. Lang/speech: no aphasia or dysarthria. Follows commands. CN: PERRL, EOMI, VFF, V1-3 intact, face symmetric, hearing intact, shoulder shrug symmetric, tongue midline  Motor: 5/5 R hemibody, L hemibody 5/5 no drift. Sense: LT intact in all 4 ext. Sensory and visual extinction on L  Coord: FTN and HTS intact b/l  DTR: deferred  Gait: deferred     NIH Stroke Scale:   1a  Level of consciousness: 0 - alert; keenly responsive   1b. LOC questions:  0 - answers both questions correctly   1c. LOC commands: 0 - performs both tasks correctly   2. Best Gaze: 0 - normal   3. Visual: 0 - no visual loss   4. Facial Palsy: 0 - normal symmetric movement   5a. Motor left arm: 0 - no drift, limb holds 90 (or 45) degrees for full 10 seconds   5b.   Motor right arm: 0 - no drift, limb holds 90 (or 45) degrees for full 10 seconds   6a. Motor left le - no drift; leg holds 30 degree position for full 5 seconds   6b  Motor right le - no drift; leg holds 30 degree position for full 5 seconds   7. Limb Ataxia: 0 - absent   8. Sensory: 0 - normal; no sensory loss   9. Best Language:  0 - no aphasia, normal   10. Dysarthria: 0 - normal   11. Extinction and Inattention: 2 - profound demetrice-inattention or demetrice- inattention to more than one modality. Does not recognize own hand or orients only to one side of space              Total:   2      MRS: 1        LABS:   Reviewed. RADIOLOGY:   Images were personally reviewed including:  CTA head and neck 2021  Multiple stents within the right middle cerebral artery and beyond the   trifurcation that appear patent.       Mild stenosis in the V4 portion of the left vertebral artery. CT Head 2021  Evolving right MCA territory infarct with hemorrhagic conversion.  The   approximately 1 cm parenchymal hemorrhage is not significantly changed   relative to 10 hours prior, but there is suggestion of some new trace   associated subarachnoid blood products peripherally (axial series 2, image   43).     Increased conspicuity of the evolving cytotoxic edema with similar mild mass   effect on the right lateral ventricle with approximately 1 mm right to left   midline shift. DSA 2021  --Right MCA subocclusive clot extending from distal M1 to the proximal M2 superior and inferior divisions. There is collateral flow noted from right KIM to right MCA due to hemodynamically significant subocclusive clot. --This was treated with intra-arterial mechanical thrombectomy with contact aspiration and 3 oh stent retriever. This was followed with balloon angioplasty using 1.5 x 9 mm [place] balloon (x4 inflated to 6 then 8, then 8, then 10 KT), then 2 x 15 mm Quantum apex balloon (x2).   --This resulted in improvement in the flow across the subocclusive occlusion. Total recanalization at TICI 2C. --The prior treated right MCA bifurcation aneurysm currently with no residual noted. MRI/A brain 1/24/2021  1. Large right middle cerebral artery territory infarct involving the   superior right temporal lobe and to a lesser extent the right frontal and   right parietal lobes. 2. Small focus of petechial hemorrhage within the right parietal lobe infarct. 3. Limited evaluation of the right middle cerebral artery given   susceptibility artifact from the patient's known MCA stent.  There is normal   signal identified within the proximal M1 segment as well as M2 branches   beyond the stent and area of susceptibility artifact.  Correlation with the   patient's reported recent outside CT angiogram of the brain is recommended. ASSESSMENT:   51yo female with pmh of hld, htn, breast cancer 2018, gray. 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 DSA 1/21/2021 showed residual filling of the R MCA aneurysm and an incidental L MCA blister aneurysm.     Pt presented 1/24/2021 with acute L hemiweakness, neglect, r gaze pref, found to have acute large R MCA stroke. MRA showed artifact at the aneurysm but no clear LVO. possible in-stent thrombosis following insufficient antithrombotic rx. dSA 1/26/2021 showed partially occlusive thrombus at the R M2 branches at the stent site. S/p MT TICI2c and balloon angioplasty. Repeat CT head 1/27/2021 showed small R hemorrhagic conversion. Repeat ct head following shows stable lesion. CTA shows patent vessels. PLAN:   --continue asa 325 and plavix 75  --continue lipitor 10  --sbp 120-180  --care as per nsicu  --Upon discharge follow up with Dr. Nishi Barnhart in 2 weeks and Dr. Jolly Schmitz in 3 months      Case discussed with Dr. Dawit Spears attending.       Sandra Sherwood MD, PhD    Stroke, [facility] Stroke [address] [facility] Stroke [address]  Electronically signed 1/28/2021 at 4:31 PM

## 2021-01-28 NOTE — PROGRESS NOTES
Endovascular Neurosurgery Progress Note    SUBJECTIVE:   No reported events overnight. Pt this am has no acute complaints. She denies new symptoms. Review of Systems:  CONSTITUTIONAL:  negative for fevers, chills, fatigue and malaise    EYES:  negative for double vision, blurred vision and photophobia     HEENT:  negative for tinnitus, epistaxis and sore throat    RESPIRATORY:  negative for cough, shortness of breath, wheezing    CARDIOVASCULAR:  negative for chest pain, palpitations, syncope, edema    GASTROINTESTINAL:  negative for nausea, vomiting    GENITOURINARY:  negative for incontinence    MUSCULOSKELETAL:  negative for neck or back pain    NEUROLOGICAL:  Negative for weakness and tingling  negative for headaches and dizziness    PSYCHIATRIC:  negative for anxiety      Review of systems otherwise negative. OBJECTIVE:     Vitals:    01/27/21 1910   BP: 138/73   Pulse: 92   Resp: 21   Temp:    SpO2:         General:  Gen: normal habitus, NAD  HEENT: NCAT, mucosa moist  Cvs: RRR, S1 S2 normal  Resp: symmetric unlabored breathing  Abd: s/nd/nt  Ext: no edema  Skin: no lesions seen, warm and dry     Neuro:  Gen: awake and alert, oriented x3. Lang/speech: no aphasia or dysarthria. Follows commands. CN: PERRL, EOMI, VFF, V1-3 intact, face symmetric, hearing intact, shoulder shrug symmetric, tongue midline  Motor: 5/5 R hemibody, L hemibody 4+/5 no drift. Sense: LT intact in all 4 ext. Sensory and visual extinction on L  Coord: FTN and HTS intact b/l  DTR: deferred  Gait: deferred     NIH Stroke Scale:   1a  Level of consciousness: 0 - alert; keenly responsive   1b. LOC questions:  0 - answers both questions correctly   1c. LOC commands: 0 - performs both tasks correctly   2. Best Gaze: 0 - normal   3. Visual: 0 - no visual loss   4. Facial Palsy: 0 - normal symmetric movement   5a. Motor left arm: 0 - no drift, limb holds 90 (or 45) degrees for full 10 seconds   5b.   Motor right arm: 0 - no drift, limb holds 90 (or 45) degrees for full 10 seconds   6a. Motor left le - no drift; leg holds 30 degree position for full 5 seconds   6b  Motor right le - no drift; leg holds 30 degree position for full 5 seconds   7. Limb Ataxia: 0 - absent   8. Sensory: 0 - normal; no sensory loss   9. Best Language:  0 - no aphasia, normal   10. Dysarthria: 0 - normal   11. Extinction and Inattention: 2 - profound demetrice-inattention or demetrice- inattention to more than one modality. Does not recognize own hand or orients only to one side of space              Total:   2      MRS: 1        LABS:   Reviewed. RADIOLOGY:   Images were personally reviewed including:  DSA 2021  --Right MCA subocclusive clot extending from distal M1 to the proximal M2 superior and inferior divisions. There is collateral flow noted from right KIM to right MCA due to hemodynamically significant subocclusive clot. --This was treated with intra-arterial mechanical thrombectomy with contact aspiration and 3 oh stent retriever. This was followed with balloon angioplasty using 1.5 x 9 mm [place] balloon (x4 inflated to 6 then 8, then 8, then 10 KT), then 2 x 15 mm Quantum apex balloon (x2). --This resulted in improvement in the flow across the subocclusive occlusion. Total recanalization at TICI 2C. --The prior treated right MCA bifurcation aneurysm currently with no residual noted. MRI/A brain 2021  1. Large right middle cerebral artery territory infarct involving the   superior right temporal lobe and to a lesser extent the right frontal and   right parietal lobes. 2. Small focus of petechial hemorrhage within the right parietal lobe infarct.    3. Limited evaluation of the right middle cerebral artery given   susceptibility artifact from the patient's known MCA stent.  There is normal   signal identified within the proximal M1 segment as well as M2 branches   beyond the stent and area of susceptibility artifact.  Correlation with the   patient's reported recent outside CT angiogram of the brain is recommended. DSA 1/21/2021  Impression:    --anteriorly and inferiorly directed aneurysm in the right M1 trifurcation with previously placed Web device and y-stent. The stents appear widely patent with no in-stent stenosis. The aneurysm remains patent, with the Web device compacted into the    aneurysm dome compared to the prior imaging. Residual aneurysm neck 10.77mm, height 4.19mm, Web Occlusion Scale C.     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            ASSESSMENT:   53yo female with pmh of hld, htn, breast cancer 2018, gray. 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 DSA 1/21/2021 showed residual filling of the R MCA aneurysm and an incidental L MCA blister aneurysm.     Pt presented 1/24/2021 with acute L hemiweakness, neglect, r gaze pref, found to have acute large R MCA stroke. MRA showed artifact at the aneurysm but no clear LVO. possible in-stent thrombosis following insufficient antithrombotic rx. dSA 1/26/2021 showed partially occlusive thrombus at the R M2 branches at the stent site. S/p MT TICI2c and balloon angioplasty. Repeat CT head 1/27/2021 showed small R hemorrhagic conversion    PLAN:   --restart asa 325 and plavix 75  --no hep gtt at this time. --continue lipitor 10  --repeat CT head ~6h after the first, f/u CTA head and neck repeat. --sbp 120-180  --care as per nsicu  --Upon discharge follow up with Dr. Deandre Buckley in 2 weeks and Dr. Cher Santana in 3 months      Case discussed with Dr. Juma Triplett attending.       Brea Rogers MD, PhD    Stroke, [facility]  [facility]  Electronically signed 1/27/2021 at 7:24 PM

## 2021-01-28 NOTE — PROGRESS NOTES
Physical Therapy    Facility/Department: [address]  Initial Assessment    NAME: Tiera VASQUEZ  : 1970  MRN: [id number]    Chief Complaint   Patient presents with    Headache       Date of Service: 2021    Discharge Recommendations:    Further therapy recommended at discharge and the patient should be able to tolerate at least 3 hours per day over 5 days or 15 hours over 7 days. PT Equipment Recommendations  Other: CTA, requires RW to safetly attempt amb at this time    Assessment   Body structures, Functions, Activity limitations: Decreased functional mobility ; Decreased strength;Decreased endurance;Decreased balance;Decreased safe awareness  Assessment: Pt grossly CGA to [place], amb 90' RW [place], inattention to L side, increased time for all mobility. Pt would benefit from continud acute PT to address deficits. Prognosis: Good  Decision Making: Medium Complexity  PT Education: Plan of Care;PT Role;General Safety;Transfer Training  REQUIRES PT FOLLOW UP: Yes  Activity Tolerance  Activity Tolerance: Patient Tolerated treatment well;Patient limited by fatigue;Patient limited by endurance  Activity Tolerance: increased time for all mobility       Patient Diagnosis(es): The encounter diagnosis was Cerebrovascular accident (CVA) due to occlusion of left middle cerebral artery ([facility] [address] has a past medical history of Aneurysm ([facility] [address] Cancer ([facility] [address] Contact lens/glasses fitting, Fibromyalgia, Former smoker, Hyperlipidemia, Hypertension, and Sleep apnea. has a past surgical history that includes Breast surgery (Left, 2018); Tunneled venous port placement; Hysterectomy (2018); Carpal tunnel release (Bilateral);  section; back surgery; Brain aneurysm surgery (2020); and other surgical history (2021).     Restrictions  Restrictions/Precautions  Restrictions/Precautions: General Precautions, Fall Risk  Required Braces or Orthoses?: No  Position Activity Restriction  Other position/activity 4/5  Strength LLE  Strength LLE: WFL  Comment: 4/5  Strength RUE  Comment: antigravity, see OT  Strength LUE  Comment: antigravity, see OT     Sensation  Overall Sensation Status: WFL  Bed mobility  Supine to Sit: Contact guard assistance  Sit to Supine: Minimal assistance  Scooting: Contact guard assistance  Comment: very slow processing, multipe cues to initiate movement to EOB, verbal and tactile.   Transfers  Sit to Stand: Contact guard assistance  Stand to sit: Contact guard assistance  Ambulation  Ambulation?: Yes  Ambulation 1  Surface: level tile  Device: Rolling Walker  Assistance: Contact guard assistance;Minimal assistance  Quality of Gait: slowed, small steps, hits obstacles on L, decreased attention to safety/ awareness of deficits  Distance: 30', toileted seated, 90'  Stairs/Curb  Stairs?: No     Balance  Posture: Fair  Sitting - Static: Good  Sitting - Dynamic: Good  Standing - Static: Good;-  Standing - Dynamic: Fair;+  Comments: RW used while assessing standing balance  Exercises  Comments: toileted, see OT     Plan   Plan  Times per week: 6-7x/wk  Current Treatment Recommendations: Strengthening, Balance Training, Functional Mobility Training, Transfer Training, Gait Training, Endurance Training, Stair training, Home Exercise Program, Safety Education & Training, Patient/Caregiver Education & Training, Equipment Evaluation, Education, & procurement  Safety Devices  Type of devices: Call light within reach, Nurse notified, Gait belt, Patient at risk for falls, Left in bed, All fall risk precautions in place  Restraints  Initially in place: No    AM-PAC Score  AM-PAC Inpatient Mobility Raw Score : 18 (01/28/21 1206)  AM-PAC Inpatient T-Scale Score : 43.63 (01/28/21 1206)  Mobility Inpatient CMS 0-100% Score: 46.58 (01/28/21 1206)  Mobility Inpatient CMS G-Code Modifier : CK (01/28/21 1206)          Goals  Short term goals  Time Frame for Short term goals: 14 visits  Short term goal 1: Pt will be [place] bed mobility  Short term goal 2: Pt will be [place] transfers  Short term goal 3: Pt will be [place] amb 240' RW or least restrictive AD  Short term goal 4: Pt will navigate flight of steps [place] L rail       Therapy Time   Individual Concurrent Group Co-treatment   Time In 0936         Time Out 1009         Minutes 33         Timed Code Treatment Minutes: 8 Minutes       Mary Shore, PT

## 2021-01-28 NOTE — CONSULTS
Physical Medicine & Rehabilitation  Consult Note      Admitting Physician:   Saskia Bowser MD    Primary Care Provider: POPEYE Norris - CNP     Reason for Consult:  Acute Inpatient Rehabilitation    Chief Complaint: L facial weakness and dysarthria    History of Present Illness:  Referring Provider is requesting an evaluation for appropriate placement upon discharge from acute care. History from chart review and patient. Jackie De La Rosa is a 53 y.o. LHD female admitted to [place] [address] on 1/24/2021. Patient admitted with LUE weakness and visual field impairment. MRI and MRA were significant for R MCA infarct with R parietal petechial hemorrhages. Initial NIHSS 3. She has known history R MCA aneurysm with history of stent placement 8/26/2020. She denies any current pain. She has L dominant hemiparesis.      Review of Systems:  Constitutional: negative for anorexia, chills, fatigue, fevers, sweats and weight loss  Eyes: negative for redness and visual disturbance  Ears, nose, mouth, throat, and face: negative for earaches, sore throat and tinnitus  Respiratory: negative for cough and shortness of breath  Cardiovascular: negative for chest pain, dyspnea and palpitations  Gastrointestinal: negative for abdominal pain, change in bowel habits, constipation, nausea and vomiting  Genitourinary:negative for dysuria, frequency, hesitancy and urinary incontinence  Integument/breast: negative for pruritus and rash  Musculoskeletal:negative for stiff joints  Neurological: negative for dizziness, headaches   Behavioral/Psych: negative for decreased appetite, depression and fatigue       Premorbid function:  Independent    Current function:    PT:  Restrictions/Precautions: General Precautions, Fall Risk  Other position/activity restrictions: up with A, SBP goal 120-160. s/p thrombectomy with balloon angio plasty 1/26   Transfers  Sit to Stand: Contact guard assistance  Stand to sit: Contact guard assistance  Ambulation 1  Surface: level tile  Device: Rolling Walker  Assistance: Contact guard assistance, Minimal assistance  Quality of Gait: slowed, small steps, hits obstacles on L, decreased attention to safety/ awareness of deficits  Distance: 30', toileted seated, 90'    Transfers  Sit to Stand: Contact guard assistance  Stand to sit: Contact guard assistance  Ambulation  Ambulation?: Yes  Ambulation 1  Surface: level tile  Device: Rolling Walker  Assistance: Contact guard assistance, Minimal assistance  Quality of Gait: slowed, small steps, hits obstacles on L, decreased attention to safety/ awareness of deficits  Distance: 30', toileted seated, 90'    Surface: level tile  Ambulation 1  Surface: level tile  Device: Rolling Walker  Assistance: Contact guard assistance, Minimal assistance  Quality of Gait: slowed, small steps, hits obstacles on L, decreased attention to safety/ awareness of deficits  Distance: 30', toileted seated, 90'      OT:   ADL  Feeding: Supervision  Grooming: Contact guard assistance, Setup, Increased time to complete(pt stood sinkside, reaching with L hand for soap and paper towels multiple times. Pt educated on using L hand more often. Pt completed hand hygiene task multiple times, fixated on washing underneath nails. Pt requiring 1 VC to finish/complete hand hygien)  UE Bathing: Contact guard assistance  LE Bathing: Minimal assistance, Increased time to complete  UE Dressing: Minimal assistance, Increased time to complete, Setup(pt donned gown to back side with Min A)  LE Dressing: Minimal assistance, Increased time to complete, Setup(pt requires Mod VCs to intiate donning B slippers)  Toileting: Minimal assistance, Increased time to complete, Setup(Pt completed toileting task requiring Min A for transfers; pt educated to use L hand for pericare and required 3 trials to complete thoroughly.  Pt requires VCs to initiate transfer and pericare)         Balance  Sitting Balance: Stand by assistance  Standing Balance: Contact guard assistance   Standing Balance  Time: ~8 minutes  Activity: standing bedside, at toilet, and sinkside for transfers/ADL tasks  Functional Mobility  Functional - Mobility Device: Rolling Walker  Activity: To/from bathroom, Other  Assist Level: Minimal assistance  Functional Mobility Comments: pt initiatlly requiring Min A d/t L hand sliding off RW. Pt requires VCs for navigation as pt tends to run into items on both R and L sides. Pt with slow functional mobility     Bed mobility  Supine to Sit: Contact guard assistance  Sit to Supine: Minimal assistance  Scooting: Contact guard assistance  Comment: very slow processing, multipe cues to initiate movement to EOB, verbal and tactile. Transfers  Sit to stand: Contact guard assistance  Stand to sit: Contact guard assistance  Transfer Comments: pt standing prior to being instructed; VCs for safety   Toilet Transfers  Toilet - Technique: Stand step  Equipment Used: Grab bars  Toilet Transfer: Contact guard assistance  Toilet Transfers Comments: VCs for hand placement and safety             SLP:      Past Medical History:        Diagnosis Date    Aneurysm ([facility] [address] 2019    right MCA bifurcation aneurysm    Cancer ([facility] [address] 2018    left breast    Contact lens/glasses fitting     Fibromyalgia     Former smoker     Hyperlipidemia     Hypertension     Sepideh Szymanski, CNP    Sleep apnea     C-pap / STATES HAS NOT BEEN WEARING       Past Surgical History:        Procedure Laterality Date    BACK SURGERY      neck fusion & L4-L5 fusion    BRAIN ANEURYSM SURGERY  2020    WEB [employer] EMOBLIZATION SYSTEM MRI CONDTIONAL 3T OK, SAFE IMMEDIATELY.   IR ANGIOGRAM CAROTID CEREBRAL BILATERAL,,S/P coil embolization of cerebral aneurysm    BREAST SURGERY Left 2018    lumpectomy    CARPAL TUNNEL RELEASE Bilateral      SECTION      x 2,  &     HYSTERECTOMY  2018    OTHER SURGICAL HISTORY  2021    DIAGNOSTIC CEREBRAL ANGIOGRAM / Previously treated right MCA M1 bifurcation aneurysm is with a small residual filling noted    TUNNELED VENOUS PORT PLACEMENT      placed 2018, removed 2/2019       Allergies:     Allergies   Allergen Reactions    Adhesive Tape Rash     redness        Current Medications:   Current Facility-Administered Medications: lisinopril (PRINIVIL;ZESTRIL) tablet 20 mg, 20 mg, Oral, Daily  sennosides-docusate sodium (SENOKOT-S) 8.6-50 MG tablet 2 tablet, 2 tablet, Oral, Daily  carvedilol (COREG) tablet 12.5 mg, 12.5 mg, Oral, BID WC  enoxaparin (LOVENOX) injection 40 mg, 40 mg, Subcutaneous, Daily  ceFAZolin (ANCEF) 2000 mg in dextrose 5 % 50 mL IVPB, 2,000 mg, Intravenous, Once  labetalol (NORMODYNE;TRANDATE) injection 10 mg, 10 mg, Intravenous, Q1H PRN  clopidogrel (PLAVIX) tablet 75 mg, 75 mg, Oral, Daily  potassium chloride (KLOR-CON M) extended release tablet 40 mEq, 40 mEq, Oral, PRN **OR** potassium bicarb-citric acid (EFFER-K) effervescent tablet 40 mEq, 40 mEq, Oral, PRN **OR** potassium chloride 10 mEq/100 mL IVPB (Peripheral Line), 10 mEq, Intravenous, PRN  aspirin tablet 325 mg, 325 mg, Oral, Daily  atorvastatin (LIPITOR) tablet 10 mg, 10 mg, Oral, Nightly  venlafaxine (EFFEXOR XR) extended release capsule 75 mg, 75 mg, Oral, Daily  sodium chloride flush 0.9 % injection 10 mL, 10 mL, Intravenous, 2 times per day  sodium chloride flush 0.9 % injection 10 mL, 10 mL, Intravenous, PRN  promethazine (PHENERGAN) tablet 12.5 mg, 12.5 mg, Oral, Q6H PRN **OR** ondansetron (ZOFRAN) injection 4 mg, 4 mg, Intravenous, Q6H PRN  polyethylene glycol (GLYCOLAX) packet 17 g, 17 g, Oral, Daily PRN  acetaminophen (TYLENOL) tablet 650 mg, 650 mg, Oral, Q6H PRN **OR** acetaminophen (TYLENOL) suppository 650 mg, 650 mg, Rectal, Q6H PRN    Social History:  Lives With: Alone  Type of Home: Apartment([facility], 2nd floor of building)  Home Layout: One level  Home Access: Stairs to enter without rails  Entrance Stairs - Number of Steps: 3 PEARL, then 1 flight L rails  Entrance Stairs - Rails: None  Bathroom Shower/Tub: Tub/Shower unit  Bathroom Toilet: Standard  Bathroom Equipment: Shower chair  Bathroom Accessibility: Accessible  Home Equipment: (none)  Receives Help From: Family, Friend(s)  ADL Assistance: Independent  Homemaking Assistance: Independent  Homemaking Responsibilities: Yes  Ambulation Assistance: Independent  Transfer Assistance: Independent  Active : Yes  Mode of Transportation: (jeep)  Education: [employer]  Occupation: Self employed  Type of occupation: [address] [place] [address] Avenue: [email or web address], [email or web address]  Additional Comments: online grocery shopping, able to amb through star if did so  Social History     Socioeconomic History    Marital status:      Spouse name: None    Number of children: None    Years of education: None    Highest education level: None   Occupational History    None   Social Needs    Financial resource strain: None    Food insecurity     Worry: None     Inability: None    Transportation needs     Medical: None     Non-medical: None   Tobacco Use    Smoking status: Former Smoker     Packs/day: 1.00     Years: 15.00     Pack years: 15.00     Types: Cigarettes     Quit date: 2000     Years since quittin.3    Smokeless tobacco: Never Used    Tobacco comment: quit 20 years ago   Substance and Sexual Activity    Alcohol use: None     Comment: drinks lwine     Drug use: Never    Sexual activity: None   Lifestyle    Physical activity     Days per week: None     Minutes per session: None    Stress: None   Relationships    Social connections     Talks on phone: None     Gets together: None     Attends Restorationism service: None     Active member of club or organization: None     Attends meetings of clubs or organizations: None     Relationship status: None    Intimate partner violence     Fear of current or ex partner: None     Emotionally abused: None     Physically abused: None     Forced sexual activity: None   Other Topics Concern    None   Social History Narrative    None       Family History:       Family history unknown: Yes       Diagnostics:    MRI/A brain 1/24/2021  1. Large right middle cerebral artery territory infarct involving the   superior right temporal lobe and to a lesser extent the right frontal and   right parietal lobes. 2. Small focus of petechial hemorrhage within the right parietal lobe infarct. 3. Limited evaluation of the right middle cerebral artery given   susceptibility artifact from the patient's known MCA stent. Bretta [place] is normal   signal identified within the proximal M1 segment as well as M2 branches   beyond the stent and area of susceptibility artifact.  Correlation with the   patient's reported recent outside CT angiogram of the brain is recommended.      DSA 1/21/2021  Impression:    --anteriorly and inferiorly directed aneurysm in the right M1 trifurcation with previously placed Web device and y-stent. The stents appear widely patent with no in-stent stenosis. The aneurysm remains patent, with the Web device compacted into the    aneurysm dome compared to the prior imaging. Residual aneurysm neck 10.77mm, height 4.19mm, Web Occlusion Scale C.     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      Ct Head Wo Contrast  Addendum Date: 1/27/2021    ADDENDUM: Critical results were relayed by Dr. Daron Osgood to ELISHA Sawant on 1/27/2021 at 1351 EST. Result Date: 1/27/2021  Evolving right MCA territory infarct with hemorrhagic conversion. The approximately 1 cm parenchymal hemorrhage is not significantly changed relative to 10 hours prior, but there is suggestion of some new trace associated subarachnoid blood products peripherally (axial series 2, image 43).  Increased conspicuity of the evolving cytotoxic edema with similar mild mass effect on the right lateral ventricle with approximately 1 mm right to left midline shift. The findings were sent to the Radiology Results [address] at 1331 EST on 1/27/2021to contact the ordering provider for a return call.      Ct Head Without Contrast  Result Date: 1/27/2021  Hemorrhagic transformation of large right-sided infarct and increased edema beginning to efface the right lateral ventricle. Critical results were called by Dr. Adalgisa Molina to Mery Burk on 1/27/2021 at 03:06.      Cta Head Neck W Contrast  Result Date: 1/27/2021  Multiple stents within the right middle cerebral artery and beyond the trifurcation that appear patent. Mild stenosis in the V4 portion of the left vertebral artery. CBC:   Recent Labs     01/26/21  0424 01/27/21  0606 01/28/21  0334   WBC 7.2 11.4* 8.6   RBC 3.55* 3.60* 3.17*   HGB 11.5* 11.8* 10.1*   HCT 35.2* 34.3* 31.7*   MCV 99.2 95.3 100.0   RDW 13.0 12.9 13.2    See Reflexed IPF Result 263     BMP:   Recent Labs     01/26/21  0424 01/27/21  0811 01/28/21  0334    137 141   K 3.6* 3.4* 3.5*   * 105 111*   CO2 21 23 22   BUN 10 3* 8   CREATININE 0.36* 0.33* 0.37*   GLUCOSE 106* 117* 91      HbA1c:   Lab Results   Component Value Date    LABA1C 5.0 01/27/2021     BNP: No results for input(s): BNP in the last 72 hours. PT/INR: No results for input(s): PROTIME, INR in the last 72 hours. APTT: No results for input(s): APTT in the last 72 hours. CARDIAC ENZYMES: No results for input(s): CKMB, CKMBINDEX, TROPONINT in the last 72 hours. Invalid input(s): CKTOTAL;3  FASTING LIPID PANEL:  Lab Results   Component Value Date    CHOL 197 01/27/2021    HDL 44 01/27/2021    TRIG 168 (H) 01/27/2021     LIVER PROFILE: No results for input(s): AST, ALT, ALB, BILIDIR, BILITOT, ALKPHOS in the last 72 hours.        Physical Exam:  BP (!) 150/101   Pulse 59   Temp 98.1 °F (36.7 °C) (Oral)   Resp 22   Ht 5' 5\" (1.651 m)   Wt 240 lb 4.8 oz (109 kg)   SpO2 92%   BMI 39.99 kg/m² GEN: Well developed, well nourished, no acute distress. HEENT: NCAT, PERRL, EOMI, mucous membranes pink and moist.  RESP: Lungs clear to auscultation. No rales or rhonchi. Respirations WNL and unlabored. CV: Regular rate rhythm. No murmurs, rubs, or gallops. ABD: soft, non-distended, non-tender. BS+ and equal.  NEURO: A&O x 3. Sensation intact to light touch. DTRs 2+. Impaired L CN VII.   MSK: Functional ROM. Muscle tone and bulk are normal bilaterally. Strength 4+/5 key muscles RUE and BLEs. LUE key muscles 4-/5 proximally, 3/5 L . EXT: No calf tenderness to palpation or edema BLEs. SKIN: Warm dry and intact with good turgor. PSYCH: Mood WNL. Affect WNL. Appropriately interactive. Impression:  1. Ischemic R MCA infarct : on ASa, plavix, lipitor  2. HTN: on lisinopril  3. Depression: on Effexor    Recommendations:    1. Diagnosis:  Acute ischemic CVA with L dominant hemiparesis and L neglect  2. Therapy: Has PT/OT needs  3. Medical Necessity: As above  4. Support: Lives alone but reports supportive adult children and sister  11. Rehab Recommendation: The patient will benefit from acute inpatient rehabilitation once medically stable per primary service. Anticipate she will be able to tolerate 3 hours of therapy per day in rehabilitation. The patient requires multidisciplinary rehabilitation treatment including medical management by a PM&R physician, 24 hour rehabilitation nursing, Physical/Occupational therapy, rehabilitation social work, and nutrition services. Patient and family also require education in post-hospital precautions and home exercise routine, adaptive techniques and deficit compensation strategies, strengthening and conditioning, equipment prescription and instructions in use. 6. DVT Prophylaxis: on Lovenox    It was my pleasure to evaluate María Elena VASQUEZ today. Please call with questions.     Camille Key MD        This note is created with the assistance of a speech recognition program.  While intending to generate a document that actually reflects the content of the visit, the document can still have some errors including those of syntax and sound a like substitutions which may escape proof reading. In such instances, actual meaning can be extrapolated by contextual diversion.

## 2021-01-28 NOTE — PLAN OF CARE
Pt on RA and ambulating independently. Order to transfer. Family member at the bedside. Will continue to monitor.     Problem: Pain:  Goal: Pain level will decrease  Description: Pain level will decrease  Outcome: Ongoing  Goal: Control of acute pain  Description: Control of acute pain  Outcome: Ongoing  Goal: Control of chronic pain  Description: Control of chronic pain  Outcome: Ongoing     Problem: HEMODYNAMIC STATUS  Goal: Patient has stable vital signs and fluid balance  Outcome: Ongoing     Problem: ACTIVITY INTOLERANCE/IMPAIRED MOBILITY  Goal: Mobility/activity is maintained at optimum level for patient  Outcome: Ongoing     Problem: COMMUNICATION IMPAIRMENT  Goal: Ability to express needs and understand communication  Outcome: Ongoing     Problem: Self-Care Deficit:  Goal: Ability to perform activities of daily living will improve  Description: Ability to perform activities of daily living will improve  Outcome: Ongoing  Goal: Able to perform ADL with assistance  Description: Able to perform ADL with assistance  Outcome: Ongoing  Goal: Ability to communicate needs accurately will improve - ADL needs  Description: Ability to communicate needs accurately will improve - ADL needs  Outcome: Ongoing  Goal: Able to use self-care assistive device appropriately  Description: Able to use self-care assistive device appropriately  Outcome: Ongoing     Problem: Psychosocial Distress:  Goal: Absence of psychosocial distress  Description: Absence of psychosocial distress  Outcome: Ongoing  Goal: Ability to cope will improve  Description: Ability to cope will improve  Outcome: Ongoing  Goal: Ability to identify and utilize available support systems will improve  Description: Ability to identify and utilize available support systems will improve  Outcome: Ongoing

## 2021-01-28 NOTE — CARE COORDINATION
Spoke to patient regarding admission to [facility] [place]. She has agreed with plan.   Awaiting PMR input

## 2021-01-28 NOTE — PROGRESS NOTES
Occupational Therapy  Facility/Department: [address]  Daily Treatment Note  NAME: Aditya VASQUEZ  : 1970  MRN: [id number]    Date of Service: 2021    Discharge Recommendations:  Further therapy recommended at discharge. The patient should be able to tolerate at least three hours of therapy per day over 5 days or 15 hours over 7 days. OT Equipment Recommendations  Equipment Needed: Yes  Mobility Devices: Marea Marissa: Rolling    Assessment   Performance deficits / Impairments: Decreased functional mobility ; Decreased endurance;Decreased ADL status; Decreased high-level IADLs;Decreased safe awareness;Decreased balance;Decreased vision/visual deficit; Decreased fine motor control;Decreased coordination  Prognosis: Good  REQUIRES OT FOLLOW UP: Yes  Activity Tolerance  Activity Tolerance: Patient Tolerated treatment well;Treatment limited secondary to decreased cognition  Safety Devices  Safety Devices in place: Yes  Type of devices: Call light within reach;Gait belt;Left in bed;Nurse notified; Bed alarm in place  Restraints  Initially in place: No         Patient Diagnosis(es): The encounter diagnosis was Cerebrovascular accident (CVA) due to occlusion of left middle cerebral artery ([facility] [address] has a past medical history of Aneurysm ([address] Cancer ([address] Contact lens/glasses fitting, Fibromyalgia, Former smoker, Hyperlipidemia, Hypertension, and Sleep apnea. has a past surgical history that includes Breast surgery (Left, 2018); Tunneled venous port placement; Hysterectomy (2018); Carpal tunnel release (Bilateral);  section; back surgery; Brain aneurysm surgery (2020); and other surgical history (2021).     Restrictions  Restrictions/Precautions  Restrictions/Precautions: General Precautions, Fall Risk  Required Braces or Orthoses?: No  Position Activity Restriction  Other position/activity restrictions: up with A, SBP goal 120-160. s/p thrombectomy with balloon angio plasty  Subjective   General  Patient assessed for rehabilitation services?: Yes  Family / Caregiver Present: No  Diagnosis: R MCA CVA occlusion, hx of aneurysm, L weakness  General Comment  Comments: RN ok'd pt for therapy this date. Pt agreeable to session and pleasant/cooperative throughout  Vital Signs  Patient Currently in Pain: Denies     Objective    ADL  Grooming: Contact guard assistance;Setup; Increased time to complete(pt stood sinkside, reaching with L hand for soap and paper towels multiple times. Pt educated on using L hand more often. Pt completed hand hygiene task multiple times, fixated on washing underneath nails. Pt requiring 1 VC to finish/complete hand hygiene. More attention to L noted this date however continued coordination deficits)  UE Dressing: Minimal assistance; Increased time to complete;Setup(pt donned gown to back side with Min A)  LE Dressing: Minimal assistance; Increased time to complete;Setup(pt requires Mod VCs to intiate donning B slippers)  Toileting: Minimal assistance; Increased time to complete;Setup(Pt completed toileting task requiring Min A for transfers; pt educated to use L hand for pericare and required 3 trials to complete thoroughly. Pt requires VCs to initiate transfer and pericare)       Balance  Sitting Balance: Stand by assistance  Standing Balance: Contact guard assistance  Standing Balance  Time: ~8 minutes  Activity: standing bedside, at toilet, and sinkside for transfers/ADL tasks  Functional Mobility  Functional - Mobility Device: Rolling Walker  Activity: To/from bathroom; Other  Assist Level: Minimal assistance  Functional Mobility Comments: pt initiatlly requiring Min A d/t L hand sliding off RW. Pt requires VCs for navigation as pt tends to run into items on both R and L sides.  Pt with slow functional mobility  Toilet Transfers  Toilet - Technique: Stand step  Equipment Used: Grab bars  Toilet Transfer: Contact guard assistance  Toilet Transfers Comments: VCs for hand placement and safety  Bed mobility  Supine to Sit: Contact guard assistance  Sit to Supine: Minimal assistance  Scooting: Contact guard assistance  Comment: pt requires increased time and Max VCs for sup>sit. Pt with extremely slow processing during bed mobility  Transfers  Sit to stand: Contact guard assistance  Stand to sit: Contact guard assistance  Transfer Comments: pt standing prior to being instructed; VCs for safety                   Vision  Occulomotor: Scanning  Vision Comment: pt standing at sink able to visually scan fully to L toward soap and R toward paper towel  Cognition  Overall Cognitive Status: Exceptions  Arousal/Alertness: Delayed responses to stimuli  Following Commands: Follows one step commands with increased time; Follows one step commands with repetition  Attention Span: Attends with cues to redirect  Safety Judgement: Decreased awareness of need for assistance;Decreased awareness of need for safety  Problem Solving: Assistance required to correct errors made;Assistance required to identify errors made;Assistance required to generate solutions;Assistance required to implement solutions  Insights: Decreased awareness of deficits  Initiation: Requires cues for some  Sequencing: Requires cues for some                                        Plan   Plan  Times per week: 4-5x    AM-PAC Score  [facility] Inpatient Daily Activity Raw Score: 19 (01/25/21 1211)  AM-PAC Inpatient ADL T-Scale Score : 40.22 (01/25/21 1211)  ADL Inpatient CMS 0-100% Score: 42.8 (01/25/21 1211)    Goals  Short term goals  Time Frame for Short term goals: Pt will by discharge  Short term goal 1: demo good safety awareness during func mob around room (I)  Short term goal 2: demo bending/reaching func activity while standing with SUP  Short term goal 3: demo ADL UB/LB dressing/bathing activity with mod I, 1 vc, and increased time  Short term goal 4: demo scanning to L x6+ during func activity with 1 vc only to reduce L visual deficit  Short term goal 5: Demo 3-step ADL task with only 1 VC to increase cog and ability to complete ADLs/IADLs (goal updated 01/28/2021)       Therapy Time   Individual Concurrent Group Co-treatment   Time In 0936         Time Out 1011         Minutes 35         Timed Code Treatment Minutes: 23 Minutes       Robbie Hutchison OTR/L

## 2021-01-28 NOTE — PLAN OF CARE
Problem: Pain:  Goal: Pain level will decrease  Description: Pain level will decrease  1/28/2021 0033 by Mu Delgado RN  Outcome: Ongoing    Outcome: Ongoing  Goal: Control of acute pain  Description: Control of acute pain  1/28/2021 0033 by Mu Delgado RN  Outcome: Ongoing  Goal: Control of chronic pain  Description: Control of chronic pain  1/28/2021 0033 by Mu Delgado RN  Outcome: Ongoing     Problem: HEMODYNAMIC STATUS  Goal: Patient has stable vital signs and fluid balance  1/28/2021 0033 by Mu Delgado RN  Outcome: Ongoing     Problem: ACTIVITY INTOLERANCE/IMPAIRED MOBILITY  Goal: Mobility/activity is maintained at optimum level for patient  1/28/2021 0033 by Mu Delgado RN  Outcome: Ongoing    Problem: COMMUNICATION IMPAIRMENT  Goal: Ability to express needs and understand communication  1/28/2021 0033 by Mu Delgado RN  Outcome: Ongoing     Problem: Self-Care Deficit:  Goal: Ability to perform activities of daily living will improve  Description: Ability to perform activities of daily living will improve  Outcome: Ongoing  Goal: Able to perform ADL with assistance  Description: Able to perform ADL with assistance  Outcome: Ongoing  Goal: Ability to communicate needs accurately will improve - ADL needs  Description: Ability to communicate needs accurately will improve - ADL needs  Outcome: Ongoing  Goal: Able to use self-care assistive device appropriately  Description: Able to use self-care assistive device appropriately  Outcome: Ongoing     Problem: Psychosocial Distress:  Goal: Absence of psychosocial distress  Description: Absence of psychosocial distress  Outcome: Ongoing  Goal: Ability to cope will improve  Description: Ability to cope will improve  Outcome: Ongoing  Goal: Ability to identify and utilize available support systems will improve  Description: Ability to identify and utilize available support systems will improve  Outcome: Ongoing

## 2021-01-28 NOTE — BRIEF OP NOTE
[facility]    NEUROENDOVASCULAR SERVICE: POST-OP NOTE: 1/27/2021    Pt Name: Livan Franco  MRN: [id number]  YOB: 1970  Date of Procedure: 1/27/2021  Primary Care Physician: Toney Valencia, APRN - CNP      Pre-Procedural Diagnosis: rule out acute right mca bifurcation thrombus  Post-Procedural Diagnosis: acute right mca bifurcation thrombus      Procedure Performed: diagnostic cerebral angiogram    Surgeon:   Ana Talavera MD    Fellow:  Delmar Winston MD, Ramandeep To MD, PhD     1st Assistant:  Shruthi Good     PRE-PROCEDURAL EXAM:  Prestroke baseline mRS MODIFIED DELORES SCORE: 0 - No symptoms at all. Neurological exam performed and unchanged from initial H&P or consult  CT head ASPECT score: 7    Anesthesia: IV Moderate Sedation  Complications: none    Intra-Operative EXAM:  Neurological exam performed and unchanged from initial H&P or consult    EBL: < less than 50       Cc            Specimens: Were not Obtained  Contrast:     Visipaque 270 low osmolar 54 Cc             Fluoro: estimated 30 min    Findings:  Please see dictated Radiology note for further details  --previously placed atlas y-stent and web embolization device in the right MCA bifurcation, stable from prior imaging. --Right MCA subocclusive clot extending from distal M1 to the proximal M2 superior and inferior divisions. --pial collateral flow from right KIM to right MCA distal branches    POST-PROCEDURAL EXAM :   Stable neurological Exam  Neurological exam performed and unchanged from initial H&P or consult    Closure:  None, left groin access. POST-PROCEDURAL MONITORING : see orders  Disposition: angio suite      Recommendations:  --emergent conversion to thrombectomy to recanalize the occlusion. --additional recs to follow.     Ramandeep To MD PhD fellow    Ana Talavera MD   Pager [phone]  Stroke, [facility] Stroke [address] [place] [address] Comprehensive Stroke [facility]  Electronically signed 1/27/2021 at 8:28 PM

## 2021-01-29 LAB
ABSOLUTE EOS #: 0.39 K/UL (ref 0–0.44)
ABSOLUTE IMMATURE GRANULOCYTE: 0.03 K/UL (ref 0–0.3)
ABSOLUTE LYMPH #: 2.11 K/UL (ref 1.1–3.7)
ABSOLUTE MONO #: 0.57 K/UL (ref 0.1–1.2)
ANION GAP SERPL CALCULATED.3IONS-SCNC: 7 MMOL/L (ref 9–17)
BASOPHILS # BLD: 1 % (ref 0–2)
BASOPHILS ABSOLUTE: 0.05 K/UL (ref 0–0.2)
BUN BLDV-MCNC: 11 MG/DL (ref 6–20)
BUN/CREAT BLD: ABNORMAL (ref 9–20)
CALCIUM SERPL-MCNC: 8.7 MG/DL (ref 8.6–10.4)
CHLORIDE BLD-SCNC: 109 MMOL/L (ref 98–107)
CO2: 23 MMOL/L (ref 20–31)
CREAT SERPL-MCNC: 0.43 MG/DL (ref 0.5–0.9)
DIFFERENTIAL TYPE: ABNORMAL
EOSINOPHILS RELATIVE PERCENT: 5 % (ref 1–4)
GFR AFRICAN AMERICAN: >60 ML/MIN
GFR NON-AFRICAN AMERICAN: >60 ML/MIN
GFR SERPL CREATININE-BSD FRML MDRD: ABNORMAL ML/MIN/{1.73_M2}
GFR SERPL CREATININE-BSD FRML MDRD: ABNORMAL ML/MIN/{1.73_M2}
GLUCOSE BLD-MCNC: 102 MG/DL (ref 70–99)
HCT VFR BLD CALC: 31.9 % (ref 36.3–47.1)
HEMOGLOBIN: 10.3 G/DL (ref 11.9–15.1)
IMMATURE GRANULOCYTES: 0 %
LYMPHOCYTES # BLD: 28 % (ref 24–43)
MAGNESIUM: 1.9 MG/DL (ref 1.6–2.6)
MAGNESIUM: 1.9 MG/DL (ref 1.6–2.6)
MCH RBC QN AUTO: 32.2 PG (ref 25.2–33.5)
MCHC RBC AUTO-ENTMCNC: 32.3 G/DL (ref 28.4–34.8)
MCV RBC AUTO: 99.7 FL (ref 82.6–102.9)
MONOCYTES # BLD: 8 % (ref 3–12)
NRBC AUTOMATED: 0 PER 100 WBC
PDW BLD-RTO: 13.2 % (ref 11.8–14.4)
PLATELET # BLD: 275 K/UL (ref 138–453)
PLATELET ESTIMATE: ABNORMAL
PMV BLD AUTO: 9.6 FL (ref 8.1–13.5)
POTASSIUM SERPL-SCNC: 3.7 MMOL/L (ref 3.7–5.3)
RBC # BLD: 3.2 M/UL (ref 3.95–5.11)
RBC # BLD: ABNORMAL 10*6/UL
SEG NEUTROPHILS: 58 % (ref 36–65)
SEGMENTED NEUTROPHILS ABSOLUTE COUNT: 4.37 K/UL (ref 1.5–8.1)
SODIUM BLD-SCNC: 139 MMOL/L (ref 135–144)
WBC # BLD: 7.5 K/UL (ref 3.5–11.3)
WBC # BLD: ABNORMAL 10*3/UL

## 2021-01-29 PROCEDURE — 83735 ASSAY OF MAGNESIUM: CPT

## 2021-01-29 PROCEDURE — 6370000000 HC RX 637 (ALT 250 FOR IP): Performed by: PSYCHIATRY & NEUROLOGY

## 2021-01-29 PROCEDURE — 6370000000 HC RX 637 (ALT 250 FOR IP): Performed by: STUDENT IN AN ORGANIZED HEALTH CARE EDUCATION/TRAINING PROGRAM

## 2021-01-29 PROCEDURE — 6360000002 HC RX W HCPCS: Performed by: NURSE PRACTITIONER

## 2021-01-29 PROCEDURE — 99223 1ST HOSP IP/OBS HIGH 75: CPT | Performed by: STUDENT IN AN ORGANIZED HEALTH CARE EDUCATION/TRAINING PROGRAM

## 2021-01-29 PROCEDURE — 85025 COMPLETE CBC W/AUTO DIFF WBC: CPT

## 2021-01-29 PROCEDURE — 99233 SBSQ HOSP IP/OBS HIGH 50: CPT | Performed by: PSYCHIATRY & NEUROLOGY

## 2021-01-29 PROCEDURE — 2060000000 HC ICU INTERMEDIATE R&B

## 2021-01-29 PROCEDURE — 97530 THERAPEUTIC ACTIVITIES: CPT

## 2021-01-29 PROCEDURE — 80048 BASIC METABOLIC PNL TOTAL CA: CPT

## 2021-01-29 PROCEDURE — 36415 COLL VENOUS BLD VENIPUNCTURE: CPT

## 2021-01-29 RX ORDER — MAGNESIUM SULFATE 1 G/100ML
1000 INJECTION INTRAVENOUS ONCE
Status: DISCONTINUED | OUTPATIENT
Start: 2021-01-29 | End: 2021-01-29

## 2021-01-29 RX ORDER — ATORVASTATIN CALCIUM 40 MG/1
40 TABLET, FILM COATED ORAL NIGHTLY
Status: DISCONTINUED | OUTPATIENT
Start: 2021-01-29 | End: 2021-02-05 | Stop reason: HOSPADM

## 2021-01-29 RX ORDER — MAGNESIUM SULFATE 1 G/100ML
1000 INJECTION INTRAVENOUS
Status: DISPENSED | OUTPATIENT
Start: 2021-01-29 | End: 2021-01-29

## 2021-01-29 RX ADMIN — CLOPIDOGREL 75 MG: 75 TABLET, FILM COATED ORAL at 08:17

## 2021-01-29 RX ADMIN — CARVEDILOL 12.5 MG: 12.5 TABLET, FILM COATED ORAL at 17:50

## 2021-01-29 RX ADMIN — ASPIRIN 325 MG: 325 TABLET ORAL at 08:17

## 2021-01-29 RX ADMIN — ACETAMINOPHEN 650 MG: 325 TABLET ORAL at 04:26

## 2021-01-29 RX ADMIN — CARVEDILOL 12.5 MG: 12.5 TABLET, FILM COATED ORAL at 08:16

## 2021-01-29 RX ADMIN — DESMOPRESSIN ACETATE 40 MG: 0.2 TABLET ORAL at 21:15

## 2021-01-29 RX ADMIN — LISINOPRIL 20 MG: 20 TABLET ORAL at 08:17

## 2021-01-29 RX ADMIN — ENOXAPARIN SODIUM 40 MG: 40 INJECTION SUBCUTANEOUS at 08:16

## 2021-01-29 RX ADMIN — VENLAFAXINE HYDROCHLORIDE 75 MG: 75 CAPSULE, EXTENDED RELEASE ORAL at 08:17

## 2021-01-29 ASSESSMENT — PAIN DESCRIPTION - PROGRESSION: CLINICAL_PROGRESSION: NOT CHANGED

## 2021-01-29 ASSESSMENT — PAIN DESCRIPTION - FREQUENCY
FREQUENCY: INTERMITTENT
FREQUENCY: CONTINUOUS

## 2021-01-29 ASSESSMENT — PAIN DESCRIPTION - PAIN TYPE: TYPE: ACUTE PAIN;SURGICAL PAIN

## 2021-01-29 ASSESSMENT — PAIN DESCRIPTION - ORIENTATION: ORIENTATION: RIGHT;LEFT

## 2021-01-29 ASSESSMENT — PAIN SCALES - GENERAL: PAINLEVEL_OUTOF10: 8

## 2021-01-29 ASSESSMENT — PAIN DESCRIPTION - ONSET: ONSET: ON-GOING

## 2021-01-29 ASSESSMENT — PAIN DESCRIPTION - DESCRIPTORS: DESCRIPTORS: ACHING

## 2021-01-29 NOTE — PROGRESS NOTES
Endovascular Neurosurgery Progress Note    SUBJECTIVE:   No reported events overnight. Pt this am pt has some neck pain/occipital pain, relieved with cold pack. She denies new focal symptoms. Review of Systems:  CONSTITUTIONAL:  negative for fevers, chills, fatigue and malaise    EYES:  negative for double vision, blurred vision and photophobia     HEENT:  negative for tinnitus, epistaxis and sore throat    RESPIRATORY:  negative for cough, shortness of breath, wheezing    CARDIOVASCULAR:  negative for chest pain, palpitations, syncope, edema    GASTROINTESTINAL:  negative for nausea, vomiting    GENITOURINARY:  negative for incontinence    MUSCULOSKELETAL:  negative for neck or back pain    NEUROLOGICAL:  Negative for weakness and tingling  negative for headaches and dizziness    PSYCHIATRIC:  negative for anxiety      Review of systems otherwise negative. OBJECTIVE:     Vitals:    01/29/21 0400   BP: (!) 157/92   Pulse: 72   Resp: 20   Temp: 98.5 °F (36.9 °C)   SpO2: 96%        General:  Gen: normal habitus, NAD  HEENT: NCAT, mucosa moist  Cvs: RRR, S1 S2 normal  Resp: symmetric unlabored breathing  Abd: s/nd/nt  Ext: no edema  Skin: no lesions seen, warm and dry     Neuro:  Gen: awake and alert, oriented x3. Lang/speech: no aphasia or dysarthria. Follows commands. CN: PERRL, EOMI, VFF, V1-3 intact, face symmetric, hearing intact, shoulder shrug symmetric, tongue midline  Motor: 5/5 R hemibody, L hemibody 5/5 no drift. Sense: LT intact in all 4 ext. Sensory and visual extinction on L  Coord: FTN and HTS intact b/l  DTR: deferred  Gait: deferred     NIH Stroke Scale:   1a  Level of consciousness: 0 - alert; keenly responsive   1b. LOC questions:  0 - answers both questions correctly   1c. LOC commands: 0 - performs both tasks correctly   2. Best Gaze: 0 - normal   3. Visual: 0 - no visual loss   4. Facial Palsy: 0 - normal symmetric movement   5a.  Motor left arm: 0 - no drift, limb holds 90 (or 45) degrees for full 10 seconds   5b. Motor right arm: 0 - no drift, limb holds 90 (or 45) degrees for full 10 seconds   6a. Motor left le - no drift; leg holds 30 degree position for full 5 seconds   6b  Motor right le - no drift; leg holds 30 degree position for full 5 seconds   7. Limb Ataxia: 0 - absent   8. Sensory: 0 - normal; no sensory loss   9. Best Language:  0 - no aphasia, normal   10. Dysarthria: 0 - normal   11. Extinction and Inattention: 2 - profound demetrice-inattention or demetrice- inattention to more than one modality. Does not recognize own hand or orients only to one side of space              Total:   2      MRS: 1        LABS:   Reviewed. RADIOLOGY:   Images were personally reviewed including:  CTA head and neck 2021  Multiple stents within the right middle cerebral artery and beyond the   trifurcation that appear patent.       Mild stenosis in the V4 portion of the left vertebral artery. CT Head 2021  Evolving right MCA territory infarct with hemorrhagic conversion.  The   approximately 1 cm parenchymal hemorrhage is not significantly changed   relative to 10 hours prior, but there is suggestion of some new trace   associated subarachnoid blood products peripherally (axial series 2, image   43).     Increased conspicuity of the evolving cytotoxic edema with similar mild mass   effect on the right lateral ventricle with approximately 1 mm right to left   midline shift. DSA 2021  --Right MCA subocclusive clot extending from distal M1 to the proximal M2 superior and inferior divisions. There is collateral flow noted from right KIM to right MCA due to hemodynamically significant subocclusive clot. --This was treated with intra-arterial mechanical thrombectomy with contact aspiration and 3 oh stent retriever.   This was followed with balloon angioplasty using 1.5 x 9 mm Fenton balloon (x4 inflated to 6 then 8, then 8, then 10 KT), then 2 x 15 mm Quantum apex balloon (x2).  --This resulted in improvement in the flow across the subocclusive occlusion. Total recanalization at TICI 2C. --The prior treated right MCA bifurcation aneurysm currently with no residual noted. MRI/A brain 1/24/2021  1. Large right middle cerebral artery territory infarct involving the   superior right temporal lobe and to a lesser extent the right frontal and   right parietal lobes. 2. Small focus of petechial hemorrhage within the right parietal lobe infarct. 3. Limited evaluation of the right middle cerebral artery given   susceptibility artifact from the patient's known MCA stent.  There is normal   signal identified within the proximal M1 segment as well as M2 branches   beyond the stent and area of susceptibility artifact.  Correlation with the   patient's reported recent outside CT angiogram of the brain is recommended. ASSESSMENT:   51yo female with pmh of hld, htn, breast cancer 2018, gray. 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 DSA 1/21/2021 showed residual filling of the R MCA aneurysm and an incidental L MCA blister aneurysm.     Pt presented 1/24/2021 with acute L hemiweakness, neglect, r gaze pref, found to have acute large R MCA stroke. MRA showed artifact at the aneurysm but no clear LVO. possible in-stent thrombosis following insufficient antithrombotic rx. dSA 1/26/2021 showed partially occlusive thrombus at the R M2 branches at the stent site. S/p MT TICI2c and balloon angioplasty. Repeat CT head 1/27/2021 showed small R hemorrhagic conversion. Repeat ct head following shows stable lesion. CTA shows patent vessels.     PLAN:   --check ct head daily, starting 1/29/2021  --plan for repeat dsa 2/2/2021, eval for changes in R M1 patency  --continue asa 325 and plavix 75  --continue lipitor 10  --sbp 120-180  --care as per nsicu  --Upon discharge follow up with Dr. Syd Diaz in 2 weeks and Dr. Marea Favre in 3 months      Case discussed with Dr. Kori Guerra attending.       Clint Eubanks MD, PhD    Stroke, [facility]  [facility]  Electronically signed 1/29/2021 at 7:56 AM

## 2021-01-29 NOTE — PROGRESS NOTES
Physical Therapy  [address]  Daily [address] Note  NAME: Byron VASQUEZ  : 1970  MRN: [id number]    Date of Service: 2021    Discharge Recommendations: In my professional opinion, this patient could tolerate a total of 3 hours of combined therapies post-acute care. PT Equipment Recommendations  Equipment Needed: (TBD)    Assessment    Pt cooperative, motivated; initially pt stating she doesn't want to go to IP rehab b/c she just wants to go to her own place and be \"independent\", but after participating with PT and realizing the dangers inherent with her L sided neglect, she was agreeable to short term IP rehab to work on her deficits. She lives alone and is currently unsafe for DC alone d/t L sided neglect and decreased awareness of this deficit. Body structures, Functions, Activity limitations: Decreased functional mobility ; Decreased strength;Decreased endurance;Decreased balance;Decreased safe awareness;Decreased vision/visual deficit  Prognosis: Good  Decision Making: Medium Complexity  PT Education: Goals;PT Role;Plan of Care  Barriers to Learning: none  REQUIRES PT FOLLOW UP: Yes     Patient Diagnosis(es): The encounter diagnosis was Cerebrovascular accident (CVA) due to occlusion of left middle cerebral artery ([address] has a past medical history of Aneurysm ([address] Cancer ([address] Contact lens/glasses fitting, Fibromyalgia, Former smoker, Hyperlipidemia, Hypertension, and Sleep apnea. has a past surgical history that includes Breast surgery (Left, 2018); Tunneled venous port placement; Hysterectomy (2018); Carpal tunnel release (Bilateral);  section; back surgery; Brain aneurysm surgery (2020); and other surgical history (2021).     Restrictions  Restrictions/Precautions  Restrictions/Precautions: General Precautions, Fall Risk, Up as Tolerated  Required Braces or Orthoses?: No  Position Activity Restriction   s/p thrombectomy with balloon angio plasty 1/26  Subjective   General  Response To Previous Treatment: Patient with no complaints from previous session. Family / Caregiver Present: Yes(son)  Pain Screening  Patient Currently in Pain: Yes  Pain Assessment  Pain Assessment: 0-10  Pain Level: 5  Patient's Stated Pain Goal: No pain  Pain Type: Acute pain;Surgical pain  Pain Location: Groin  Pain Orientation: Right;Left  Pain Descriptors: Aching; Sore  Pain Frequency: Intermittent  Pain Onset: On-going  Clinical Progression: Not changed  Functional Pain Assessment: Prevents or interferes some active activities and ADLs  Vital Signs  Patient Currently in Pain: Yes  Pre Treatment Pain Screening  Intervention List: Patient able to continue with treatment    Orientation  Orientation  Overall Orientation Status: Within Functional Limits(occasionally slow to respond)    Objective   Bed mobility  Rolling to Left: Supervision  Rolling to Right: Independent  Supine to Sit: Modified independent  Sit to Supine: Independent  Scooting: Supervision  Transfers  Sit to Stand: Contact guard assistance  Stand to sit: Contact guard assistance  Bed to Chair: Contact guard assistance  Stand Pivot Transfers: Contact guard assistance  Comment: (pt often with delayed response ie will say \"ok\" when asked to stand, but then won't stand until given tactile cue)  Ambulation  Ambulation?: Yes  More Ambulation?: Yes  Ambulation 1  Surface: level tile  Device: Rolling Walker  Assistance: Stand by assistance(pt needs cues to avoid objects on her L)  Gait Deviations: Slow Neida; Increased ALLYSON; Decreased step length;Decreased arm swing  Distance: 90'x2  Ambulation 2  Surface - 2: level tile  Device 2: No device  Assistance 2: Contact guard assistance;Minimal assistance  Quality of Gait 2: pt unable to figure out how to turn L to find stairs, required physical assistance to change directions  Gait Deviations: Slow Neida; Increased ALLYSON; Decreased step length  Distance: 90'x4  Stairs/Curb  Stairs?: Yes  Stairs  # Steps : 20  Rails: Left ascending;Right ascending(pt did the stairs twice, once using the R handrail to ascend, once using L handrail to ascend; pt needed assistance to use the handrail on the L both ascending and descending.)  Pts handrail is on the L ascending at home, per son.     Device: No Device  Assistance: Minimal assistance(pt able to do foot over foot to ascend, marked time descending)     Balance  Posture: Good  Sitting - Static: Good  Sitting - Dynamic: Good  Standing - Static: Good  Standing - Dynamic: Fair  Other exercises  Other exercises 1: standing ex with min A+1: up on toes, partial squats, KTC, all with min A+1; tends to lose balance to her L     Goals  Short term goals  Time Frame for Short term goals: 14 visits  Short term goal 1: Pt will be Winston bed mobility  Short term goal 2: Pt will be Winston transfers  Short term goal 3: Pt will be Winston amb 240' RW or least restrictive AD  Short term goal 4: Pt will navigate flight of steps Winston L rail    Plan    Plan  Times per week: 6-7x/wk  Times per day: Daily  Current Treatment Recommendations: Strengthening, Balance Training, Functional Mobility Training, Transfer Training, Gait Training, Endurance Training, Stair training, Home Exercise Program, Safety Education & Training, Patient/Caregiver Education & Training, Equipment Evaluation, Education, & procurement  Safety Devices  Type of devices: Call light within reach, Chair alarm in place, Gait belt, Patient at risk for falls, Left in chair, Nurse notified  Restraints  Initially in place: No     Therapy Time   Individual Concurrent Group Co-treatment   Time In [facility] 47         Time Out 1433         Minutes [address] [place] [address]

## 2021-01-29 NOTE — PLAN OF CARE
Problem: Falls - Risk of:  Goal: Will remain free from falls  Description: Will remain free from falls  Outcome: Met This Shift  Goal: Absence of physical injury  Description: Absence of physical injury  Outcome: Met This Shift     Problem: Pain:  Goal: Pain level will decrease  Description: Pain level will decrease  1/28/2021 2347 by Mu Delgado RN  Outcome: Ongoing  Goal: Control of acute pain  Description: Control of acute pain  1/28/2021 2347 by Mu Delgado RN  Outcome: Ongoing  Goal: Control of chronic pain  Description: Control of chronic pain  1/28/2021 2347 by Mu Delgado RN  Outcome: Ongoing     Problem: HEMODYNAMIC STATUS  Goal: Patient has stable vital signs and fluid balance  1/28/2021 2347 by Mu Delgado RN  Outcome: Ongoing     Problem: ACTIVITY INTOLERANCE/IMPAIRED MOBILITY  Goal: Mobility/activity is maintained at optimum level for patient  1/28/2021 2347 by Mu Delgado RN  Outcome: Ongoing     Problem: COMMUNICATION IMPAIRMENT  Goal: Ability to express needs and understand communication  1/28/2021 2347 by Mu Delgado RN  Outcome: Ongoing     Problem: Self-Care Deficit:  Goal: Ability to perform activities of daily living will improve  Description: Ability to perform activities of daily living will improve  1/28/2021 2347 by Mu Delgado RN  Outcome: Ongoing  Goal: Able to perform ADL with assistance  Description: Able to perform ADL with assistance  1/28/2021 2347 by Mu Delgado RN  Outcome: Ongoing  Goal: Ability to communicate needs accurately will improve - ADL needs  Description: Ability to communicate needs accurately will improve - ADL needs  1/28/2021 2347 by Mu Delgado RN  Outcome: Ongoing  Goal: Able to use self-care assistive device appropriately  Description: Able to use self-care assistive device appropriately  1/28/2021 2347 by Mu Delgado RN  Outcome: Ongoing    Problem: Psychosocial Distress:  Goal: Absence of psychosocial distress  Description: Absence of psychosocial distress  1/28/2021 2347 by Luis Rivera RN  Outcome: Ongoing  Goal: Ability to cope will improve  Description: Ability to cope will improve  1/28/2021 2347 by Luis Rivera RN  Outcome: Ongoing  Goal: Ability to identify and utilize available support systems will improve  Description: Ability to identify and utilize available support systems will improve  1/28/2021 2347 by Luis Rivera RN  Outcome: Ongoing

## 2021-01-29 NOTE — H&P
([facility] 2019    right MCA bifurcation aneurysm    Cancer ([facility] 2018    left breast    Contact lens/glasses fitting     Fibromyalgia     Former smoker     Hyperlipidemia     Hypertension     Delfina Carty CNP    Sleep apnea     C-pap / STATES HAS NOT BEEN WEARING        Past Surgical History:     Past Surgical History:   Procedure Laterality Date    BACK SURGERY      neck fusion & L4-L5 fusion    BRAIN ANEURYSM SURGERY  2020    WEB Pixalate EMOBLIZATION SYSTEM MRI CONDTIONAL 3T OK, SAFE IMMEDIATELY. IR ANGIOGRAM CAROTID CEREBRAL BILATERAL,,S/P coil embolization of cerebral aneurysm    BREAST SURGERY Left 2018    lumpectomy    CARPAL TUNNEL RELEASE Bilateral      SECTION      x 2,  &     HYSTERECTOMY  2018    OTHER SURGICAL HISTORY  2021    DIAGNOSTIC CEREBRAL ANGIOGRAM / Previously treated right MCA M1 bifurcation aneurysm is with a small residual filling noted    TUNNELED VENOUS PORT PLACEMENT      placed , removed 2019        Medications Prior to Admission:     Prior to Admission medications    Medication Sig Start Date End Date Taking? Authorizing Provider   AMITRIPTYLINE HCL PO Take by mouth nightly    Historical Provider, MD   MELOXICAM PO Take by mouth nightly    Historical Provider, MD   aspirin 325 MG tablet Take 1 tablet by mouth daily 20   Kai Tracey, APRN - CNP   lisinopril (PRINIVIL;ZESTRIL) 40 MG tablet Take 40 mg by mouth daily 20   Historical Provider, MD   clonazePAM (KLONOPIN) 0.5 MG tablet Take 0.5 mg by mouth 2 times daily as needed.  20   Historical Provider, MD   venlafaxine (EFFEXOR XR) 75 MG extended release capsule Take 75 mg by mouth daily 20   Historical Provider, MD   hydroCHLOROthiazide (HYDRODIURIL) 25 MG tablet Take 25 mg by mouth daily 20   Historical Provider, MD   Cholecalciferol (VITAMIN D3) 25 MCG (1000 UT) CAPS Take 2,000 Units by mouth nightly     Historical Provider, MD   atorvastatin (LIPITOR) 10 MG tablet Take 10 mg by mouth nightly     Historical Provider, MD   carvedilol (COREG) 12.5 MG tablet Take 12.5 mg by mouth 2 times daily (with meals)    Historical Provider, MD        Allergies:     Adhesive tape    Social History:     Tobacco:    reports that she quit smoking about 20 years ago. Her smoking use included cigarettes. She has a 15.00 pack-year smoking history. She has never used smokeless tobacco.  Alcohol:      has no history on file for alcohol. Drug Use:  reports no history of drug use. Family History:     Family History   Family history unknown: Yes       Review of Systems:     ROS:    Review of Systems    Physical Exam:   BP (!) 152/90   Pulse 67   Temp 99 °F (37.2 °C) (Oral)   Resp 20   Ht 5' 5\" (1.651 m)   Wt 240 lb 4.8 oz (109 kg)   SpO2 94%   BMI 39.99 kg/m²   Temp (24hrs), Av.7 °F (37.1 °C), Min:98.1 °F (36.7 °C), Max:99 °F (37.2 °C)    No results for input(s): POCGLU in the last 72 hours. Intake/Output Summary (Last 24 hours) at 2021 0240  Last data filed at 2021 0600  Gross per 24 hour   Intake 634 ml   Output --   Net 634 ml       General examination:      General Appearance:  alert, well appearing, and in no acute distress  HEENT: Normocephalic, atraumatic, moist mucus membranes  Neck: supple, no carotid bruits, (-) nuchal rigidity  Lungs:  Respirations unlabored, chest wall no deformity, BS normal  Cardiovascular: normal rate, regular rhythm  Abdomen: Soft, nontender, nondistended, normal bowel sounds  Skin: No gross lesions, rashes, bruising or bleeding on exposed skin area  Extremities:  peripheral pulses palpable, clubbing or edema  Psych: normal affect    NEUROLOGIC EXAMINATION    Mental status   Alert and oriented x 3; following all commands;   speech is fluent, no dysarthria, aphasia.       Cranial nerves   II - visual fields intact to confrontation; pupils reactive  III, IV, VI - extraocular muscles intact; no RAFAEL; no nystagmus; no ptosis   V - normal facial sensation                                                               VII - normal facial symmetry                                                             VIII - intact hearing                                                                             IX, X - symmetrical palate elevation                                               XI - symmetrical shoulder shrug                                                       XII - midline tongue without atrophy or fasciculation     Motor function  Strength:   5/5 RUE, 5/5 RLE  5/5 LUE, 5/5  LLE  Normal bulk and tone. Sensory function Intact to touch, pin, vibration, proprioception throughout     Cerebellar Intact finger-nose-finger testing. Intact heel-shin testing. No dysdiadochokinesia present. No tremors                        Reflex function 2/4 symmetric throughout . Downgoing plantar response bilaterally. (-)Montes [place] Oca's sign bilaterally      Gait                  Normal station and gait.   Normal Tandem, tip toes and heel walking       Investigations:      Laboratory Testing:  Recent Results (from the past 24 hour(s))   BASIC METABOLIC PANEL    Collection Time: 01/28/21  3:34 AM   Result Value Ref Range    Glucose 91 70 - 99 mg/dL    BUN 8 6 - 20 mg/dL    CREATININE 0.37 (L) 0.50 - 0.90 mg/dL    Bun/Cre Ratio NOT REPORTED 9 - 20    Calcium 8.9 8.6 - 10.4 mg/dL    Sodium 141 135 - 144 mmol/L    Potassium 3.5 (L) 3.7 - 5.3 mmol/L    Chloride 111 (H) 98 - 107 mmol/L    CO2 22 20 - 31 mmol/L    Anion Gap 8 (L) 9 - 17 mmol/L    GFR Non-African American >60 >60 mL/min    GFR African American >60 >60 mL/min    GFR Comment          GFR Staging NOT REPORTED    CBC WITH AUTO DIFFERENTIAL    Collection Time: 01/28/21  3:34 AM   Result Value Ref Range    WBC 8.6 3.5 - 11.3 k/uL    RBC 3.17 (L) 3.95 - 5.11 m/uL    Hemoglobin 10.1 (L) 11.9 - 15.1 g/dL    Hematocrit 31.7 (L) 36.3 - 47.1 %    .0 82.6 - 102.9 fL    MCH 31.9 25.2 - 33.5 pg    MCHC 31.9 28.4 - 34.8 g/dL    RDW 13.2 11.8 - 14.4 %    Platelets 283 262 - 715 k/uL    MPV 9.7 8.1 - 13.5 fL    NRBC Automated 0.0 0.0 per 100 WBC    Differential Type NOT REPORTED     WBC Morphology NOT REPORTED     RBC Morphology NOT REPORTED     Platelet Estimate NOT REPORTED     Seg Neutrophils 66 (H) 36 - 65 %    Lymphocytes 22 (L) 24 - 43 %    Monocytes 9 3 - 12 %    Eosinophils % 2 1 - 4 %    Basophils 0 0 - 2 %    Immature Granulocytes 0 0 %    Segs Absolute 5.70 1.50 - 8.10 k/uL    Absolute Lymph # 1.92 1.10 - 3.70 k/uL    Absolute Mono # 0.75 0.10 - 1.20 k/uL    Absolute Eos # 0.20 0.00 - 0.44 k/uL    Basophils Absolute 0.03 0.00 - 0.20 k/uL    Absolute Immature Granulocyte 0.03 0.00 - 0.30 k/uL   MAGNESIUM    Collection Time: 01/28/21  3:34 AM   Result Value Ref Range    Magnesium 2.0 1.6 - 2.6 mg/dL         CBC:   Recent Labs     01/26/21  0424 01/27/21  0606 01/28/21  0334   WBC 7.2 11.4* 8.6   HGB 11.5* 11.8* 10.1*    See Reflexed IPF Result 263       BMP:    Recent Labs     01/26/21  0424 01/27/21  0811 01/28/21  0334    137 141   K 3.6* 3.4* 3.5*   * 105 111*   CO2 21 23 22   BUN 10 3* 8   CREATININE 0.36* 0.33* 0.37*   GLUCOSE 106* 117* 91         Lab Results   Component Value Date    CHOL 197 01/27/2021    LDLCHOLESTEROL 119 01/27/2021    HDL 44 01/27/2021    TRIG 168 (H) 01/27/2021    ALT 25 09/02/2020    AST 21 09/02/2020    INR 0.9 01/24/2021    LABA1C 5.0 01/27/2021       IMAGING:    CTA head and neck 1/27/2021  Multiple stents within the right middle cerebral artery and beyond the   trifurcation that appear patent. Mild stenosis in the V4 portion of the left vertebral artery. CT Head 1/27/2021  Evolving right MCA territory infarct with hemorrhagic conversion.   The   approximately 1 cm parenchymal hemorrhage is not significantly changed   relative to 10 hours prior, but there is suggestion of some new trace   associated subarachnoid blood products peripherally (axial series 2, image 37).       Increased conspicuity of the evolving cytotoxic edema with similar mild mass   effect on the right lateral ventricle with approximately 1 mm right to left   midline shift. DSA 1/26/2021  --Right MCA subocclusive clot extending from distal M1 to the proximal M2 superior and inferior divisions. There is collateral flow noted from right KIM to right MCA due to hemodynamically significant subocclusive clot. --This was treated with intra-arterial mechanical thrombectomy with contact aspiration and 3 oh stent retriever. This was followed with balloon angioplasty using 1.5 x 9 mm [place] balloon (x4 inflated to 6 then 8, then 8, then 10 KT), then 2 x 15 mm Quantum apex balloon (x2). --This resulted in improvement in the flow across the subocclusive occlusion. Total recanalization at TICI 2C. --The prior treated right MCA bifurcation aneurysm currently with no residual noted. MRI/A brain 1/24/2021  1. Large right middle cerebral artery territory infarct involving the   superior right temporal lobe and to a lesser extent the right frontal and   right parietal lobes. 2. Small focus of petechial hemorrhage within the right parietal lobe infarct. 3. Limited evaluation of the right middle cerebral artery given   susceptibility artifact from the patient's known MCA stent. There is normal   signal identified within the proximal M1 segment as well as M2 branches   beyond the stent and area of susceptibility artifact.   Correlation with the   patient's reported recent outside CT angiogram of the brain is recommended         Assessment :      Primary Problem  <principal problem not specified>    Active Hospital Problems    Diagnosis Date Noted    History of cerebral aneurysm repair [Z98.890, Z86.79]     Acute right MCA stroke ([address] [I63.511]     Cerebrovascular accident (CVA) due to occlusion of left middle cerebral artery [facility]) [I63.512] 01/24/2021               Plan:     Patient status Admit as inpatient in the  Progressive Unit/Step down    67-year-old female with past medical history of hypertension, hypertension lipidemia breast cancer 2018, ARVIND, s/p weapon stent placement for right MCA aneurysm on 8/26/2020. S/p mechanical thrombectomy for occlusive thrombus of right M2, status post mechanical thrombectomy TK to see and balloon angioplasty.        SBP goal 1 20-1 60   Aspirin 325   Plavix 75   Repeat head CT stat if change in mentation/exam   Labetalol as needed   Resume home med Coreg 12.5 twice daily   Low-dose lisinopril   Echo 2018: EF 50 to60%   PT/OT   PM&R           DVT prophylaxis: Lovenox 40 mg SC  GI prophylaxis: reason for no GI prophylaxis: not indicated   Code status: Full code    Consultations:   IP CONSULT TO NEUROCRITICAL CARE  IP CONSULT TO PHYSICAL MEDICINE REHAB  IP CONSULT TO NEUROLOGY  PT/OT        Flo Urena MD   PGY 2 Neurology Resident  1/29/2021 at 2:40 AM    Copy sent to Dr. Alfredo Cary, APRN - CNP

## 2021-01-29 NOTE — PROGRESS NOTES
Neuro Critical Care Sign Out to Neurology Resident      Date and time: 1/28/2021 11:07 PM  Patient's name:  Rosi Ny  Medical Record Number: [id number]  Patient's account/billing number: [de-identified]  Patient's YOB: 1970  Age: 53 y.o. Date of Admission: 1/24/2021  4:41 PM  Length of stay during current admission: 4    Primary Care Physician: POPEYE Beard CNP    Code Status: Full Code    Mode of physician to physician communication:        [] Via telephone   [x] In person     Date and time of sign-out: 1/28/2021 11:07 PM    Accepting Neurology Resident: Dr. Marcie Juarez    Accepting team's attending: Dr. Christine Feliciano     Patient's current ICU Bed:  [address] [place] [address] [id number]     Patient's assigned bed on floor:  Pending        [] Med-Surg Monitored [x] Step-down         Reason for ICU admission:     Stroke    ICU course summary:     53 y.o. female with PMH significant for breast CA (s/p lumpectomy, chemo and radiation 2018), HTN, HLD, ARVIND on CPAP and unruptured right MCA aneurysm (treated with web and stent placement on 8/26/2020) [facility] to concern for a right MCA occlusion. Patient initially presented to [facility] ED with right-sided headache, left-sided weakness and dizziness. CTA Head/Neck showed an acute right MCA occlusion. Of note, patient recently underwent a diagnostic angiogram on 1/21/21 which showed residual filling of the right M1 MCA aneurysm. Transferred to [facility] for Neuro Endovascular evaluation.  Stroke alert called on arrival to ED.  NIHSS 3; left upper extremity ataxia, left eye hemianopsia of the left visual field, inattention.  MRI brain revealed a large right MCA territory infarct with petechial hemorrhage. MRA Head with limited evaluation of right M1 MCA due to artifact.  Continued on Aspirin and Plavix.   Admitted to the Neuro ICU for further management.       Patient underwent cerebral angiogram by neuro endovascular, which revealed right MCA sub-occlusive clot extending from distal M1 to proximal M2 treated with mechanical thrombectomy and balloon angioplasty x6, TICI 2C. Follow-up CT Head showed large right MCA infarct with hemorrhagic transformation and mild effacement of the lateral ventricle, but patient's physical exam remained stable. Repeat CT Head re-demonstrated R MCA infarct with hemorrhagic conversion that is not significant. CTA Head and Neck reveals patent stents. Patient's blood pressure was managed with Cleviprex while in the neuro ICU, that was discontinued, now on oral medications. Procedures during patient's ICU stay:     Diagnostic cerebral angiogram 1/27/2021    Current Vitals:     BP (!) 150/96   Pulse 70   Temp 98.6 °F (37 °C) (Oral)   Resp 20   Ht 5' 5\" (1.651 m)   Wt 240 lb 4.8 oz (109 kg)   SpO2 97%   BMI 39.99 kg/m²       Cultures:     Blood cultures:                 [x] None drawn      [] Negative             []  Positive (Details:  )  Urine Culture:                   [] None drawn      [x] Negative             []  Positive (Details:  )  Sputum Culture:               [x] None drawn       [] Negative             []  Positive (Details:  )   Endotracheal aspirate:     [x] None drawn       [] Negative             []  Positive (Details:  )       Consults:     1. Neuro endovascular  2. Stroke  3. 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  Continue aspirin 325 mg daily, Plavix 75 mg daily, Lipitor daily  2. Goal SBP 1 20-1 60  3. Neurochecks per protocol  4. Continue Coreg 12.5 mg twice daily, lisinopril 20 mg daily  5. Daily labs  6. PM&R, placement        Above mentioned assessment and plan was discussed by me with the admitting medicine resident. The medicine team assigned to the patient by medicine admitting resident will be following up the patient from now onwards on the floor.      Sebas Lancaster MD  Neuro Critical Care  1/28/2021, 11:07 PM

## 2021-01-30 ENCOUNTER — APPOINTMENT (OUTPATIENT)
Dept: CT IMAGING | Age: 51
DRG: 023 | End: 2021-01-30
Payer: COMMERCIAL

## 2021-01-30 LAB
ABSOLUTE EOS #: 0.22 K/UL (ref 0–0.44)
ABSOLUTE IMMATURE GRANULOCYTE: 0.03 K/UL (ref 0–0.3)
ABSOLUTE LYMPH #: 1.84 K/UL (ref 1.1–3.7)
ABSOLUTE MONO #: 0.54 K/UL (ref 0.1–1.2)
ANION GAP SERPL CALCULATED.3IONS-SCNC: 8 MMOL/L (ref 9–17)
BASOPHILS # BLD: 1 % (ref 0–2)
BASOPHILS ABSOLUTE: 0.03 K/UL (ref 0–0.2)
BUN BLDV-MCNC: 9 MG/DL (ref 6–20)
BUN/CREAT BLD: ABNORMAL (ref 9–20)
CALCIUM SERPL-MCNC: 9.2 MG/DL (ref 8.6–10.4)
CHLORIDE BLD-SCNC: 108 MMOL/L (ref 98–107)
CO2: 22 MMOL/L (ref 20–31)
CREAT SERPL-MCNC: 0.33 MG/DL (ref 0.5–0.9)
DIFFERENTIAL TYPE: ABNORMAL
EOSINOPHILS RELATIVE PERCENT: 3 % (ref 1–4)
GFR AFRICAN AMERICAN: >60 ML/MIN
GFR NON-AFRICAN AMERICAN: >60 ML/MIN
GFR SERPL CREATININE-BSD FRML MDRD: ABNORMAL ML/MIN/{1.73_M2}
GFR SERPL CREATININE-BSD FRML MDRD: ABNORMAL ML/MIN/{1.73_M2}
GLUCOSE BLD-MCNC: 100 MG/DL (ref 70–99)
HCT VFR BLD CALC: 33.7 % (ref 36.3–47.1)
HEMOGLOBIN: 10.9 G/DL (ref 11.9–15.1)
IMMATURE GRANULOCYTES: 1 %
LV EF: 58 %
LVEF MODALITY: NORMAL
LYMPHOCYTES # BLD: 29 % (ref 24–43)
MAGNESIUM: 1.8 MG/DL (ref 1.6–2.6)
MCH RBC QN AUTO: 32.2 PG (ref 25.2–33.5)
MCHC RBC AUTO-ENTMCNC: 32.3 G/DL (ref 28.4–34.8)
MCV RBC AUTO: 99.4 FL (ref 82.6–102.9)
MONOCYTES # BLD: 8 % (ref 3–12)
NRBC AUTOMATED: 0 PER 100 WBC
PDW BLD-RTO: 13.1 % (ref 11.8–14.4)
PLATELET # BLD: ABNORMAL K/UL (ref 138–453)
PLATELET ESTIMATE: ABNORMAL
PLATELET, FLUORESCENCE: NORMAL K/UL (ref 138–453)
PLATELET, IMMATURE FRACTION: NORMAL % (ref 1.1–10.3)
PMV BLD AUTO: ABNORMAL FL (ref 8.1–13.5)
POTASSIUM SERPL-SCNC: 3.5 MMOL/L (ref 3.7–5.3)
RBC # BLD: 3.39 M/UL (ref 3.95–5.11)
RBC # BLD: ABNORMAL 10*6/UL
SEG NEUTROPHILS: 59 % (ref 36–65)
SEGMENTED NEUTROPHILS ABSOLUTE COUNT: 3.78 K/UL (ref 1.5–8.1)
SODIUM BLD-SCNC: 138 MMOL/L (ref 135–144)
WBC # BLD: 6.4 K/UL (ref 3.5–11.3)
WBC # BLD: ABNORMAL 10*3/UL

## 2021-01-30 PROCEDURE — 99233 SBSQ HOSP IP/OBS HIGH 50: CPT | Performed by: PSYCHIATRY & NEUROLOGY

## 2021-01-30 PROCEDURE — 2580000003 HC RX 258: Performed by: STUDENT IN AN ORGANIZED HEALTH CARE EDUCATION/TRAINING PROGRAM

## 2021-01-30 PROCEDURE — 97535 SELF CARE MNGMENT TRAINING: CPT

## 2021-01-30 PROCEDURE — 85055 RETICULATED PLATELET ASSAY: CPT

## 2021-01-30 PROCEDURE — 6370000000 HC RX 637 (ALT 250 FOR IP): Performed by: STUDENT IN AN ORGANIZED HEALTH CARE EDUCATION/TRAINING PROGRAM

## 2021-01-30 PROCEDURE — 99233 SBSQ HOSP IP/OBS HIGH 50: CPT | Performed by: STUDENT IN AN ORGANIZED HEALTH CARE EDUCATION/TRAINING PROGRAM

## 2021-01-30 PROCEDURE — 83735 ASSAY OF MAGNESIUM: CPT

## 2021-01-30 PROCEDURE — 93306 TTE W/DOPPLER COMPLETE: CPT

## 2021-01-30 PROCEDURE — 85025 COMPLETE CBC W/AUTO DIFF WBC: CPT

## 2021-01-30 PROCEDURE — 6370000000 HC RX 637 (ALT 250 FOR IP): Performed by: PSYCHIATRY & NEUROLOGY

## 2021-01-30 PROCEDURE — 36415 COLL VENOUS BLD VENIPUNCTURE: CPT

## 2021-01-30 PROCEDURE — 2060000000 HC ICU INTERMEDIATE R&B

## 2021-01-30 PROCEDURE — 6360000002 HC RX W HCPCS: Performed by: NURSE PRACTITIONER

## 2021-01-30 PROCEDURE — 70450 CT HEAD/BRAIN W/O DYE: CPT

## 2021-01-30 PROCEDURE — 80048 BASIC METABOLIC PNL TOTAL CA: CPT

## 2021-01-30 RX ORDER — SODIUM CHLORIDE 0.9 % (FLUSH) 0.9 %
10 SYRINGE (ML) INJECTION EVERY 12 HOURS SCHEDULED
Status: DISCONTINUED | OUTPATIENT
Start: 2021-01-30 | End: 2021-02-05 | Stop reason: HOSPADM

## 2021-01-30 RX ORDER — SODIUM CHLORIDE 0.9 % (FLUSH) 0.9 %
10 SYRINGE (ML) INJECTION PRN
Status: DISCONTINUED | OUTPATIENT
Start: 2021-01-30 | End: 2021-02-05 | Stop reason: HOSPADM

## 2021-01-30 RX ORDER — LIDOCAINE HYDROCHLORIDE 10 MG/ML
5 INJECTION, SOLUTION INFILTRATION; PERINEURAL ONCE
Status: DISCONTINUED | OUTPATIENT
Start: 2021-01-30 | End: 2021-02-05 | Stop reason: HOSPADM

## 2021-01-30 RX ADMIN — LISINOPRIL 20 MG: 20 TABLET ORAL at 08:07

## 2021-01-30 RX ADMIN — ASPIRIN 325 MG: 325 TABLET ORAL at 08:07

## 2021-01-30 RX ADMIN — ENOXAPARIN SODIUM 40 MG: 40 INJECTION SUBCUTANEOUS at 08:07

## 2021-01-30 RX ADMIN — CARVEDILOL 12.5 MG: 12.5 TABLET, FILM COATED ORAL at 17:23

## 2021-01-30 RX ADMIN — CLOPIDOGREL 75 MG: 75 TABLET, FILM COATED ORAL at 08:07

## 2021-01-30 RX ADMIN — SODIUM CHLORIDE, PRESERVATIVE FREE 10 ML: 5 INJECTION INTRAVENOUS at 08:00

## 2021-01-30 RX ADMIN — DESMOPRESSIN ACETATE 40 MG: 0.2 TABLET ORAL at 21:16

## 2021-01-30 RX ADMIN — CARVEDILOL 12.5 MG: 12.5 TABLET, FILM COATED ORAL at 08:07

## 2021-01-30 RX ADMIN — SODIUM CHLORIDE, PRESERVATIVE FREE 10 ML: 5 INJECTION INTRAVENOUS at 21:16

## 2021-01-30 RX ADMIN — VENLAFAXINE HYDROCHLORIDE 75 MG: 75 CAPSULE, EXTENDED RELEASE ORAL at 08:07

## 2021-01-30 ASSESSMENT — PAIN SCALES - GENERAL: PAINLEVEL_OUTOF10: 7

## 2021-01-30 NOTE — PROGRESS NOTES
[facility] Neurology   IN-PATIENT SERVICE      NEUROLOGY PROGRESS  NOTE            Date:   1/30/2021  Patient name:  Regino Schwab  Date of admission:  1/24/2021  YOB: 1970      Interval History:   Regino Schwab is a  53 y.o. female admitted on 1/24/2021 with Left middle cerebral artery stroke ([address] [I63.512]. This is a follow-up neurology progress note. The patient was seen and examined and the chart was reviewed. Patient denies any other new neurologic concerns. No acute events overnight    History of Present Illness:       Ms. Regino Schwab is a 53 y.o. female presented to [place] as a transfer from [facility] due to concern for right MCA occlusion. Patient initially presented at [facility] with right-sided headache, left-sided weakness and dizziness. CTA head and neck showed acute right MCA occlusion. Patient underwent diagnostic angiogram on 1/21/2021 showing residual filling of the right M1 MCA aneurysm. Patient was transferred to [place], stroke alert was called. NIH was 3 on presentation. MRI brain showed large right MCA territory infarct with petechial hemorrhages. MRA head with limited evaluation of right MCA M1 due to artifact. Patient was admitted under neuro ICU for further management. Patient underwent cerebral angiogram which revealed right MCA subocclusive clot extending from distal M1 to proximal M2 treated with mechanical thrombectomy and balloon angioplasty with TICI 2C. Follow-up CT head showed large right MCA infarct with hemorrhagic transformation and mild effacement of the lateral ventricle. Patient's physical exam was unchanged. Repeat CT head redemonstrated right MCA infarct with hemorrhagic conversion. CTA head and neck reveals patent stent.   Patient was transferred to neuro stepdown on 1/28/2021         Past Medical History:     Past Medical History:   Diagnosis Date    Aneurysm ([address] 08/2019    right MCA bifurcation aneurysm    Cancer ([address] 2018    left breast    Contact lens/glasses fitting     Fibromyalgia     Former smoker     Hyperlipidemia     Hypertension     Kin Kim, CNP    Sleep apnea     C-pap / STATES HAS NOT BEEN WEARING        Past Surgical History:     Past Surgical History:   Procedure Laterality Date    BACK SURGERY      neck fusion & L4-L5 fusion    BRAIN ANEURYSM SURGERY  2020    "[employer] EMOBLIZATION SYSTEM MRI CONDTIONAL 3T OK, SAFE IMMEDIATELY.   IR ANGIOGRAM CAROTID CEREBRAL BILATERAL,,S/P coil embolization of cerebral aneurysm    BREAST SURGERY Left 2018    lumpectomy    CARPAL TUNNEL RELEASE Bilateral      SECTION      x 2,  &     HYSTERECTOMY  2018    OTHER SURGICAL HISTORY  2021    DIAGNOSTIC CEREBRAL ANGIOGRAM / Previously treated right MCA M1 bifurcation aneurysm is with a small residual filling noted    TUNNELED VENOUS PORT PLACEMENT      placed , removed 2019        Medications during admission:      lidocaine 1 % injection  5 mL Intradermal Once    sodium chloride flush  10 mL Intravenous 2 times per day    atorvastatin  40 mg Oral Nightly    lisinopril  20 mg Oral Daily    sennosides-docusate sodium  2 tablet Oral Daily    carvedilol  12.5 mg Oral BID WC    enoxaparin  40 mg Subcutaneous Daily    ceFAZolin  2,000 mg Intravenous Once    clopidogrel  75 mg Oral Daily    aspirin  325 mg Oral Daily    venlafaxine  75 mg Oral Daily    sodium chloride flush  10 mL Intravenous 2 times per day         Physical Exam:   BP (!) 152/95   Pulse 67   Temp 97.8 °F (36.6 °C)   Resp 18   Ht 5' 5\" (1.651 m)   Wt 240 lb 4.8 oz (109 kg)   SpO2 96%   BMI 39.99 kg/m²   Temp (24hrs), Av.1 °F (36.7 °C), Min:97.8 °F (36.6 °C), Max:98.2 °F (36.8 °C)        General examination:      General Appearance:  alert, well appearing, and in no acute distress  HEENT: Normocephalic, atraumatic,   Neck: supple, no carotid bruits,  Lungs:  Respirations unlabored, Cardiovascular: normal rate, regular rhythm  Abdomen: Soft, nontender,  Skin: No gross lesions,  Extremities:  peripheral pulses palpable  Psych: normal affect      Neurological examination:      Mental status   Alert and oriented x 3; following all commands; speech is fluent, no dysarthria, aphasia. Cranial nerves   II - visual fields intact to confrontation; pupils reactive  III, IV, VI - extraocular muscles intact; no RAFAEL; no nystagmus; no ptosis   V - normal facial sensation                                                               VII - normal facial symmetry                                                             VIII - intact hearing                                                                             IX, X - symmetrical palate elevation                                               XI - symmetrical shoulder shrug                                                       XII - midline tongue without atrophy or fasciculation     Motor function  Strength: Left pronator drift  Right upper and lower extremities 5/5  Left upper and lower extremities 5/5     Sensory function Intact to touch throughout sensation visual extinction on the left side     Cerebellar Intact finger-nose-finger testing.       Reflex function  reflexes 2+  Plantars equivocal     Gait                  Not assessed              Diagnostics:      Laboratory Testing:  CBC:   Recent Labs     01/28/21  0334 01/29/21  0333 01/30/21  0707   WBC 8.6 7.5 6.4   HGB 10.1* 10.3* 10.9*    275 See Reflexed IPF Result     BMP:    Recent Labs     01/28/21  0334 01/29/21  0333 01/30/21  0707    139 138   K 3.5* 3.7 3.5*   * 109* 108*   CO2 22 23 22   BUN 8 11 9   CREATININE 0.37* 0.43* 0.33*   GLUCOSE 91 102* 100*         Lab Results   Component Value Date    CHOL 197 01/27/2021    LDLCHOLESTEROL 119 01/27/2021    HDL 44 01/27/2021    TRIG 168 (H) 01/27/2021    ALT 25 09/02/2020    AST 21 09/02/2020    INR 0.9 01/24/2021 LABA1C 5.0 01/27/2021       Imaging/Diagnostics:        CTA head and neck 1/27/2021  Multiple stents within the right middle cerebral artery and beyond the   trifurcation that appear patent.       Mild stenosis in the V4 portion of the left vertebral artery.          CT Head 1/27/2021  Evolving right MCA territory infarct with hemorrhagic conversion.  The   approximately 1 cm parenchymal hemorrhage is not significantly changed   relative to 10 hours prior, but there is suggestion of some new trace   associated subarachnoid blood products peripherally (axial series 2, image   43).     Increased conspicuity of the evolving cytotoxic edema with similar mild mass   effect on the right lateral ventricle with approximately 1 mm right to left   midline shift.          DSA 1/26/2021  --Right MCA subocclusive clot extending from distal M1 to the proximal M2 superior and inferior divisions.  There is collateral flow noted from right KIM to right MCA due to hemodynamically significant subocclusive clot. --This was treated with intra-arterial mechanical thrombectomy with contact aspiration and 3 oh stent retriever.  This was followed with balloon angioplasty using 1.5 x 9 mm Wing balloon (x4 inflated to 6 then 8, then 8, then 10 KT), then 2 x 15 mm Quantum apex balloon (x2). --This resulted in improvement in the flow across the subocclusive occlusion.  Total recanalization at TICI 2C. --The prior treated right MCA bifurcation aneurysm currently with no residual noted.         MRI/A brain 1/24/2021  1. Large right middle cerebral artery territory infarct involving the   superior right temporal lobe and to a lesser extent the right frontal and   right parietal lobes. 2. Small focus of petechial hemorrhage within the right parietal lobe infarct.    3. Limited evaluation of the right middle cerebral artery given   susceptibility artifact from the patient's known MCA stent.  There is normal   signal identified within the proximal M1 segment as well as M2 branches   beyond the stent and area of susceptibility artifact.  Correlation with the   patient's reported recent outside CT angiogram of the brain is recommended          CT head without contrast 1/30/2021  Impression   1.  Right middle cerebral artery distribution subacute infarct with   hemorrhagic transformation.  Amount of hemorrhage has decreased somewhat.       2.  Reduced edema and mass effect associated with infarct.       3.  Interval resolution of subarachnoid hemorrhage.       4.  Other incidental findings as above. Impression:      Large right MCA territory infarct with hemorrhagic transformation secondary to right MCA subocclusive clot extending from distal M1 to proximal M2 treated with mechanical thrombectomy and balloon angioplasty TICI 2C on 1/26/2021  Repeat CT head 1/27/2021-evolving right MCA infarct with hemorrhagic conversion (1 cm parenchymal hemorrhage unchanged compared to 10 hours prior study, some new trace subarachnoid blood products, evolving cytotoxic edema with similar mass-effect on the right lateral ventricle with approximately 1 mm right-to-left midline shift  Repeat CTA head and neck 1/27/2021-multiple stents within right MCA and beyond the trifurcation that appears patent.   Mild stenosis in V4 portion of left vertebral artery        History of unruptured right MCA and resume treated with Web and stent placement on 8/26/2020  History of left breast cancer status post lumpectomy and chemoradiation 2018  Hypertension  Hyperlipidemia  Obstructive sleep apnea on CPAP  Fibromyalgia        HbA1c 5      Plan:     · Neuro endovascular on board, recommend CT head daily starting 1/29/2021  · Plan for repeat DSA on 2/2/2021, to eval for changes in right M1 patency     · Recommend continuing aspirin 325 mg and Plavix 75 mg  · Lipitor 40 mg, goal LDL less than 70  · On Coreg 12.5 mg twice daily, lisinopril 20 mg daily     · Goal -180 as

## 2021-01-30 NOTE — PROGRESS NOTES
Endovascular Neurosurgery Progress Note    SUBJECTIVE:   No reported events overnight. Pt transferred to floor. She denies new focal symptoms. Review of Systems:  CONSTITUTIONAL:  negative for fevers, chills, fatigue and malaise    EYES:  negative for double vision, blurred vision and photophobia     HEENT:  negative for tinnitus, epistaxis and sore throat    RESPIRATORY:  negative for cough, shortness of breath, wheezing    CARDIOVASCULAR:  negative for chest pain, palpitations, syncope, edema    GASTROINTESTINAL:  negative for nausea, vomiting    GENITOURINARY:  negative for incontinence    MUSCULOSKELETAL:  negative for neck or back pain    NEUROLOGICAL:  Negative for weakness and tingling  negative for headaches and dizziness    PSYCHIATRIC:  negative for anxiety      Review of systems otherwise negative. OBJECTIVE:     Vitals:    01/30/21 0745   BP: (!) 152/95   Pulse: 67   Resp: 18   Temp: 97.8 °F (36.6 °C)   SpO2: 96%        General:  Gen: normal habitus, NAD  HEENT: NCAT, mucosa moist  Cvs: RRR, S1 S2 normal  Resp: symmetric unlabored breathing  Abd: s/nd/nt  Ext: no edema  Skin: no lesions seen, warm and dry     Neuro:  Gen: awake and alert, oriented x3. Lang/speech: no aphasia or dysarthria. Follows commands. CN: PERRL, EOMI, VFF, V1-3 intact, face symmetric, hearing intact, shoulder shrug symmetric, tongue midline  Motor: 5/5 R hemibody, L hemibody 5/5 no drift. Sense: LT intact in all 4 ext. Sensory and visual extinction on L  Coord: FTN and HTS intact b/l  DTR: deferred  Gait: deferred     NIH Stroke Scale:   1a  Level of consciousness: 0 - alert; keenly responsive   1b. LOC questions:  0 - answers both questions correctly   1c. LOC commands: 0 - performs both tasks correctly   2. Best Gaze: 0 - normal   3. Visual: 0 - no visual loss   4. Facial Palsy: 0 - normal symmetric movement   5a. Motor left arm: 0 - no drift, limb holds 90 (or 45) degrees for full 10 seconds   5b.   Motor right arm: 0 - no drift, limb holds 90 (or 45) degrees for full 10 seconds   6a. Motor left le - no drift; leg holds 30 degree position for full 5 seconds   6b  Motor right le - no drift; leg holds 30 degree position for full 5 seconds   7. Limb Ataxia: 0 - absent   8. Sensory: 0 - normal; no sensory loss   9. Best Language:  0 - no aphasia, normal   10. Dysarthria: 0 - normal   11. Extinction and Inattention: 2 - profound demetrice-inattention or demetrice- inattention to more than one modality. Does not recognize own hand or orients only to one side of space              Total:   2      MRS: 1        LABS:   Reviewed. RADIOLOGY:   Images were personally reviewed including:  CTA head and neck 2021  Multiple stents within the right middle cerebral artery and beyond the   trifurcation that appear patent.       Mild stenosis in the V4 portion of the left vertebral artery. CT Head 2021  Evolving right MCA territory infarct with hemorrhagic conversion.  The   approximately 1 cm parenchymal hemorrhage is not significantly changed   relative to 10 hours prior, but there is suggestion of some new trace   associated subarachnoid blood products peripherally (axial series 2, image   43).     Increased conspicuity of the evolving cytotoxic edema with similar mild mass   effect on the right lateral ventricle with approximately 1 mm right to left   midline shift. DSA 2021  --Right MCA subocclusive clot extending from distal M1 to the proximal M2 superior and inferior divisions. There is collateral flow noted from right KIM to right MCA due to hemodynamically significant subocclusive clot. --This was treated with intra-arterial mechanical thrombectomy with contact aspiration and 3 oh stent retriever. This was followed with balloon angioplasty using 1.5 x 9 mm Altus balloon (x4 inflated to 6 then 8, then 8, then 10 KT), then 2 x 15 mm Quantum apex balloon (x2).   --This resulted in improvement in the flow across the subocclusive occlusion. Total recanalization at TICI 2C. --The prior treated right MCA bifurcation aneurysm currently with no residual noted. MRI/A brain 1/24/2021  1. Large right middle cerebral artery territory infarct involving the   superior right temporal lobe and to a lesser extent the right frontal and   right parietal lobes. 2. Small focus of petechial hemorrhage within the right parietal lobe infarct. 3. Limited evaluation of the right middle cerebral artery given   susceptibility artifact from the patient's known MCA stent.  There is normal   signal identified within the proximal M1 segment as well as M2 branches   beyond the stent and area of susceptibility artifact.  Correlation with the   patient's reported recent outside CT angiogram of the brain is recommended. ASSESSMENT:   53yo female with pmh of hld, htn, breast cancer 2018, gray. 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 DSA 1/21/2021 showed residual filling of the R MCA aneurysm and an incidental L MCA blister aneurysm.     Pt presented 1/24/2021 with acute L hemiweakness, neglect, r gaze pref, found to have acute large R MCA stroke. MRA showed artifact at the aneurysm but no clear LVO. possible in-stent thrombosis following insufficient antithrombotic rx. dSA 1/26/2021 showed partially occlusive thrombus at the R M2 branches at the stent site. S/p MT TICI2c and balloon angioplasty. Repeat CT head 1/27/2021 showed small R hemorrhagic conversion. Repeat ct head following shows stable lesion. CTA shows patent vessels.     PLAN:   --check ct head daily, starting 1/29/2021  --plan for repeat dsa 2/2/2021, eval for changes in R M1 patency  --continue asa 325 and plavix 75  --continue lipitor 10  --sbp 120-180  --care as per nsicu  --Upon discharge follow up with Dr. Lanny Le in 2 weeks and Dr. Jackelyn Reid in 3 months      Case discussed with Dr. Tremaine Vela attending, followed by dr Ian Coy Samir Cerna MD, PhD    Stroke, [facility] Stroke [facility]  Electronically signed 1/30/2021 at 9:34 AM

## 2021-01-30 NOTE — PROGRESS NOTES
Patient transferred from NICU to [facility]. Oriented to room, aides connect patient to monitor.

## 2021-01-30 NOTE — PROGRESS NOTES
Occupational Therapy  Facility/Department: [facility] NEURO  Daily Treatment Note  NAME: Abhay VASQUEZ  : 1970  MRN: [id number]  Date of Service: 2021  Discharge Recommendations:  Patient would benefit from continued therapy after discharge  OT Equipment Recommendations  Other: CTA  Assessment   Performance deficits / Impairments: Decreased functional mobility ; Decreased endurance;Decreased ADL status; Decreased high-level IADLs;Decreased safe awareness;Decreased balance;Decreased vision/visual deficit; Decreased fine motor control;Decreased coordination  Prognosis: Good  Decision Making: Medium Complexity  OT Education: OT Role;Plan of Care;Transfer Training;Energy Conservation; ADL Adaptive Strategies  REQUIRES OT FOLLOW UP: Yes  Activity Tolerance  Activity Tolerance: Patient Tolerated treatment well  Safety Devices  Safety Devices in place: Yes  Type of devices: Call light within reach;Gait belt;Left in bed;Nurse notified; Bed alarm in place  Restraints  Initially in place: No     Patient Diagnosis(es): The encounter diagnosis was Cerebrovascular accident (CVA) due to occlusion of left middle cerebral artery ([address] has a past medical history of Aneurysm ([address] Cancer ([address] Contact lens/glasses fitting, Fibromyalgia, Former smoker, Hyperlipidemia, Hypertension, and Sleep apnea. has a past surgical history that includes Breast surgery (Left, 2018); Tunneled venous port placement; Hysterectomy (2018); Carpal tunnel release (Bilateral);  section; back surgery; Brain aneurysm surgery (2020); and other surgical history (2021).   Restrictions  Restrictions/Precautions  Restrictions/Precautions: General Precautions, Fall Risk, Up as Tolerated  Required Braces or Orthoses?: No  Position Activity Restriction  Other position/activity restrictions: up with A, SBP goal 120-160. s/p thrombectomy with balloon angio plasty   Subjective   General  Patient assessed for rehabilitation services?: Yes  Family / Caregiver Present: No  Diagnosis: R MCA CVA occlusion, hx of aneurysm, L weakness  General Comment  Comments: RN ok'd pt for therapy this date. Pt agreeable to session and pleasant/cooperative throughout  Vital Signs  Patient Currently in Pain: No   Orientation  Orientation  Overall Orientation Status: Impaired  Orientation Level: Oriented to person;Disoriented to time;Oriented to situation;Oriented to place  Objective    ADL  Grooming: Setup; Increased time to complete;Stand by assistance  UE Bathing: Supervision;Setup; Increased time to complete  LE Bathing: Supervision;Setup; Increased time to complete  UE Dressing: Setup;Supervision; Increased time to complete;Minimal assistance  LE Dressing: Increased time to complete;Setup;Supervision  Toileting: Increased time to complete;Setup;Stand by assistance  Additional Comments: Pt. in supine position upon entering room. Pt. requesting a shower for OT services. Grooming standing at sink SBA + set up with increased time to complete. UB/LB bathe S + set up with use of hand held shower head, shower chair and grab bars. Pt. needing extended time for all bathing, pt. very though. UB dress don/doff gown S + min A to ayde backside gown. LB dressing (socks) S sitting EOB using 4 figure tech. Toileting routine SBA overall + grab bar. Balance  Sitting Balance: Independent  Standing Balance: Stand by assistance  Standing Balance  Time: ~20 minutes  Activity: Grooming standing sink side, ADL transfers, shower. Comment: No LOB  Functional Mobility  Functional - Mobility Device: No device  Activity: To/from bathroom; Other  Assist Level: Contact guard assistance  Functional Mobility Comments: Slow functional mobility, no AE CGA to ensure zero LOB.   Toilet Transfers  Toilet - Technique: Stand step  Equipment Used: Standard toilet  Toilet Transfer: Stand by assistance  Toilet Transfers Comments: VCs for hand placement and safety  Bed mobility  Rolling to Left:

## 2021-01-31 LAB
ABSOLUTE EOS #: 0.32 K/UL (ref 0–0.44)
ABSOLUTE IMMATURE GRANULOCYTE: 0.04 K/UL (ref 0–0.3)
ABSOLUTE LYMPH #: 1.86 K/UL (ref 1.1–3.7)
ABSOLUTE MONO #: 0.7 K/UL (ref 0.1–1.2)
ANION GAP SERPL CALCULATED.3IONS-SCNC: 12 MMOL/L (ref 9–17)
BASOPHILS # BLD: 1 % (ref 0–2)
BASOPHILS ABSOLUTE: 0.05 K/UL (ref 0–0.2)
BUN BLDV-MCNC: 12 MG/DL (ref 6–20)
BUN/CREAT BLD: ABNORMAL (ref 9–20)
CALCIUM SERPL-MCNC: 8.9 MG/DL (ref 8.6–10.4)
CHLORIDE BLD-SCNC: 106 MMOL/L (ref 98–107)
CO2: 23 MMOL/L (ref 20–31)
CREAT SERPL-MCNC: 0.45 MG/DL (ref 0.5–0.9)
DIFFERENTIAL TYPE: ABNORMAL
EOSINOPHILS RELATIVE PERCENT: 4 % (ref 1–4)
GFR AFRICAN AMERICAN: >60 ML/MIN
GFR NON-AFRICAN AMERICAN: >60 ML/MIN
GFR SERPL CREATININE-BSD FRML MDRD: ABNORMAL ML/MIN/{1.73_M2}
GFR SERPL CREATININE-BSD FRML MDRD: ABNORMAL ML/MIN/{1.73_M2}
GLUCOSE BLD-MCNC: 98 MG/DL (ref 70–99)
HCT VFR BLD CALC: 33.4 % (ref 36.3–47.1)
HEMOGLOBIN: 10.9 G/DL (ref 11.9–15.1)
IMMATURE GRANULOCYTES: 1 %
LYMPHOCYTES # BLD: 22 % (ref 24–43)
MAGNESIUM: 1.8 MG/DL (ref 1.6–2.6)
MCH RBC QN AUTO: 32.4 PG (ref 25.2–33.5)
MCHC RBC AUTO-ENTMCNC: 32.6 G/DL (ref 28.4–34.8)
MCV RBC AUTO: 99.4 FL (ref 82.6–102.9)
MONOCYTES # BLD: 8 % (ref 3–12)
NRBC AUTOMATED: 0 PER 100 WBC
PDW BLD-RTO: 13 % (ref 11.8–14.4)
PLATELET # BLD: 334 K/UL (ref 138–453)
PLATELET ESTIMATE: ABNORMAL
PMV BLD AUTO: 9.7 FL (ref 8.1–13.5)
POTASSIUM SERPL-SCNC: 3.3 MMOL/L (ref 3.7–5.3)
RBC # BLD: 3.36 M/UL (ref 3.95–5.11)
RBC # BLD: ABNORMAL 10*6/UL
SEG NEUTROPHILS: 64 % (ref 36–65)
SEGMENTED NEUTROPHILS ABSOLUTE COUNT: 5.32 K/UL (ref 1.5–8.1)
SODIUM BLD-SCNC: 141 MMOL/L (ref 135–144)
WBC # BLD: 8.3 K/UL (ref 3.5–11.3)
WBC # BLD: ABNORMAL 10*3/UL

## 2021-01-31 PROCEDURE — 83735 ASSAY OF MAGNESIUM: CPT

## 2021-01-31 PROCEDURE — 6370000000 HC RX 637 (ALT 250 FOR IP): Performed by: PSYCHIATRY & NEUROLOGY

## 2021-01-31 PROCEDURE — 6370000000 HC RX 637 (ALT 250 FOR IP): Performed by: STUDENT IN AN ORGANIZED HEALTH CARE EDUCATION/TRAINING PROGRAM

## 2021-01-31 PROCEDURE — 36415 COLL VENOUS BLD VENIPUNCTURE: CPT

## 2021-01-31 PROCEDURE — 6370000000 HC RX 637 (ALT 250 FOR IP): Performed by: FAMILY MEDICINE

## 2021-01-31 PROCEDURE — 99233 SBSQ HOSP IP/OBS HIGH 50: CPT | Performed by: STUDENT IN AN ORGANIZED HEALTH CARE EDUCATION/TRAINING PROGRAM

## 2021-01-31 PROCEDURE — 2060000000 HC ICU INTERMEDIATE R&B

## 2021-01-31 PROCEDURE — 80048 BASIC METABOLIC PNL TOTAL CA: CPT

## 2021-01-31 PROCEDURE — 6360000002 HC RX W HCPCS: Performed by: NURSE PRACTITIONER

## 2021-01-31 PROCEDURE — 99233 SBSQ HOSP IP/OBS HIGH 50: CPT | Performed by: PSYCHIATRY & NEUROLOGY

## 2021-01-31 PROCEDURE — 2580000003 HC RX 258: Performed by: STUDENT IN AN ORGANIZED HEALTH CARE EDUCATION/TRAINING PROGRAM

## 2021-01-31 PROCEDURE — 85025 COMPLETE CBC W/AUTO DIFF WBC: CPT

## 2021-01-31 RX ADMIN — SODIUM CHLORIDE, PRESERVATIVE FREE 10 ML: 5 INJECTION INTRAVENOUS at 22:07

## 2021-01-31 RX ADMIN — CARVEDILOL 12.5 MG: 12.5 TABLET, FILM COATED ORAL at 09:04

## 2021-01-31 RX ADMIN — ASPIRIN 325 MG: 325 TABLET ORAL at 09:04

## 2021-01-31 RX ADMIN — ENOXAPARIN SODIUM 40 MG: 40 INJECTION SUBCUTANEOUS at 09:04

## 2021-01-31 RX ADMIN — CARVEDILOL 12.5 MG: 12.5 TABLET, FILM COATED ORAL at 17:59

## 2021-01-31 RX ADMIN — SODIUM CHLORIDE, PRESERVATIVE FREE 10 ML: 5 INJECTION INTRAVENOUS at 22:06

## 2021-01-31 RX ADMIN — CLOPIDOGREL 75 MG: 75 TABLET, FILM COATED ORAL at 09:04

## 2021-01-31 RX ADMIN — DESMOPRESSIN ACETATE 40 MG: 0.2 TABLET ORAL at 22:06

## 2021-01-31 RX ADMIN — SODIUM CHLORIDE, PRESERVATIVE FREE 10 ML: 5 INJECTION INTRAVENOUS at 09:04

## 2021-01-31 RX ADMIN — LISINOPRIL 20 MG: 20 TABLET ORAL at 09:04

## 2021-01-31 RX ADMIN — POTASSIUM CHLORIDE 40 MEQ: 1500 TABLET, EXTENDED RELEASE ORAL at 09:17

## 2021-01-31 RX ADMIN — VENLAFAXINE HYDROCHLORIDE 75 MG: 75 CAPSULE, EXTENDED RELEASE ORAL at 09:05

## 2021-01-31 ASSESSMENT — PAIN DESCRIPTION - RADICULAR PAIN: RADICULAR_PAIN: ABSENT

## 2021-01-31 ASSESSMENT — PAIN SCALES - GENERAL: PAINLEVEL_OUTOF10: 0

## 2021-01-31 NOTE — PROGRESS NOTES
Nutrition Assessment     Type and Reason for Visit: Initial(Length of stay)    Nutrition Recommendations/Plan: Continue general diet as tolerated. Nutrition Assessment:  Pt states appetite is good. Reports consuming majority of her meals. Eating well PTA. Does report she is having loose stools with PO intake. Denies consumption of high fat or greasy foods. Current Nutrition Therapies:    DIET GENERAL; Anthropometric Measures:  · Height: 5' 5\" (165.1 cm)  · Current Body Wt: 240 lb 4.8 oz (109 kg)   · BMI: 40    Nutrition Diagnosis:   No nutrition diagnosis at this time     Nutrition Interventions:   Food and/or Nutrient Delivery:  Continue Current Diet  Nutrition Education/Counseling:  No recommendation at this time   Coordination of Nutrition Care:  No recommendation at this time    Goals:          Nutrition Monitoring and Evaluation:   Behavioral-Environmental Outcomes:  None Identified   Food/Nutrient Intake Outcomes:  None Identified  Physical Signs/Symptoms Outcomes:  Diarrhea     Discharge Planning:     Too soon to determine     Electronically signed by Tessa Pisano MS, RD, LD on 1/31/21 at 10:59 AM EST    Contact: [phone]

## 2021-01-31 NOTE — PROGRESS NOTES
Endovascular Neurosurgery Progress Note    SUBJECTIVE:   No reported events overnight. She denies new focal symptoms. Review of Systems:  CONSTITUTIONAL:  negative for fevers, chills, fatigue and malaise    EYES:  negative for double vision, blurred vision and photophobia     HEENT:  negative for tinnitus, epistaxis and sore throat    RESPIRATORY:  negative for cough, shortness of breath, wheezing    CARDIOVASCULAR:  negative for chest pain, palpitations, syncope, edema    GASTROINTESTINAL:  negative for nausea, vomiting    GENITOURINARY:  negative for incontinence    MUSCULOSKELETAL:  negative for neck or back pain    NEUROLOGICAL:  Negative for weakness and tingling  negative for headaches and dizziness    PSYCHIATRIC:  negative for anxiety      Review of systems otherwise negative. OBJECTIVE:     Vitals:    01/31/21 0430   BP: (!) 148/90   Pulse: 82   Resp: 18   Temp: 98.2 °F (36.8 °C)   SpO2:         General:  Gen: normal habitus, NAD  HEENT: NCAT, mucosa moist  Cvs: RRR, S1 S2 normal  Resp: symmetric unlabored breathing  Abd: s/nd/nt  Ext: no edema  Skin: no lesions seen, warm and dry     Neuro:  Gen: awake and alert, oriented x3. Lang/speech: no aphasia or dysarthria. Follows commands. CN: PERRL, EOMI, VFF, V1-3 intact, face symmetric, hearing intact, shoulder shrug symmetric, tongue midline  Motor: 5/5 R hemibody, L hemibody 5/5 no drift. Sense: LT intact in all 4 ext. Sensory and visual extinction on L  Coord: FTN and HTS intact b/l  DTR: deferred  Gait: deferred     NIH Stroke Scale:   1a  Level of consciousness: 0 - alert; keenly responsive   1b. LOC questions:  0 - answers both questions correctly   1c. LOC commands: 0 - performs both tasks correctly   2. Best Gaze: 0 - normal   3. Visual: 0 - no visual loss   4. Facial Palsy: 0 - normal symmetric movement   5a. Motor left arm: 0 - no drift, limb holds 90 (or 45) degrees for full 10 seconds   5b.   Motor right arm: 0 - no drift, limb holds 90 (or 45) degrees for full 10 seconds   6a. Motor left le - no drift; leg holds 30 degree position for full 5 seconds   6b  Motor right le - no drift; leg holds 30 degree position for full 5 seconds   7. Limb Ataxia: 0 - absent   8. Sensory: 0 - normal; no sensory loss   9. Best Language:  0 - no aphasia, normal   10. Dysarthria: 0 - normal   11. Extinction and Inattention: 2 - profound demetrice-inattention or demetrice- inattention to more than one modality. Does not recognize own hand or orients only to one side of space              Total:   2      MRS: 1        LABS:   Reviewed. RADIOLOGY:   Images were personally reviewed including:  CT head 2021  1.  Right middle cerebral artery distribution subacute infarct with   hemorrhagic transformation.  Amount of hemorrhage has decreased somewhat.       2.  Reduced edema and mass effect associated with infarct.       3.  Interval resolution of subarachnoid hemorrhage.       4.  Other incidental findings as above. CTA head and neck 2021  Multiple stents within the right middle cerebral artery and beyond the   trifurcation that appear patent.       Mild stenosis in the V4 portion of the left vertebral artery. DSA 2021  --Right MCA subocclusive clot extending from distal M1 to the proximal M2 superior and inferior divisions. There is collateral flow noted from right KIM to right MCA due to hemodynamically significant subocclusive clot. --This was treated with intra-arterial mechanical thrombectomy with contact aspiration and 3 oh stent retriever. This was followed with balloon angioplasty using 1.5 x 9 mm [place] balloon (x4 inflated to 6 then 8, then 8, then 10 KT), then 2 x 15 mm Quantum apex balloon (x2). --This resulted in improvement in the flow across the subocclusive occlusion. Total recanalization at TICI 2C. --The prior treated right MCA bifurcation aneurysm currently with no residual noted. MRI/A brain 2021  1.  Large right middle cerebral artery territory infarct involving the   superior right temporal lobe and to a lesser extent the right frontal and   right parietal lobes. 2. Small focus of petechial hemorrhage within the right parietal lobe infarct. 3. Limited evaluation of the right middle cerebral artery given   susceptibility artifact from the patient's known MCA stent.  There is normal   signal identified within the proximal M1 segment as well as M2 branches   beyond the stent and area of susceptibility artifact.  Correlation with the   patient's reported recent outside CT angiogram of the brain is recommended. ASSESSMENT:   51yo female with pmh of hld, htn, breast cancer 2018, gray. 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 DSA 1/21/2021 showed residual filling of the R MCA aneurysm and an incidental L MCA blister aneurysm.     Pt presented 1/24/2021 with acute L hemiweakness, neglect, r gaze pref, found to have acute large R MCA stroke. MRA showed artifact at the aneurysm but no clear LVO. possible in-stent thrombosis following insufficient antithrombotic rx. dSA 1/26/2021 showed partially occlusive thrombus at the R M2 branches at the stent site. S/p MT TICI2c and balloon angioplasty. Repeat CT head 1/27/2021 showed small R hemorrhagic conversion. Repeat ct head following shows stable lesion. CTA shows patent vessels. CT  Head 1/30/2021 resolving bleed and edema. PLAN:   --check ct head 2/1/2021 am, ordered by writer  --plan for repeat dsa 2/2/2021, eval for changes in R M1 patency. Pt has refused the procedure.  Will continue to discuss with pt.  --continue asa 325 and plavix 75  --continue lipitor 10  --sbp 120-180  --care as per nsicu  --Upon discharge follow up with Dr. Nishi Barnhart in 2 weeks and Dr. Jolly Schmitz in 3 months      Case discussed with Dr. Amisha Man attending, followed by dr Janessa Walls MD, PhD    Stroke, [facility]  [address]  Electronically signed 1/31/2021 at 8:05 AM

## 2021-01-31 NOTE — PROGRESS NOTES
Physical Therapy  DATE: 2021    NAME: Richar Barnhart  MRN: [id number]   : 1970    Patient not seen this date for Physical Therapy due to:  [] Blood transfusion in progress  [] Hemodialysis  [] Patient Declined  [] Spine Precautions   [] Strict Bedrest  [] Surgery/ Procedure  [] Testing      [x] Other  -  Per RN pt's eye's are bothering her. Will check on pt tomorrow. [] PT is being discontinued at this time. Patient independent. No further needs. [] PT is being discontinued at this time due to declining physical/ medical status. Therapy is not appropriate at this time.     Jefry Garcia, PTA

## 2021-01-31 NOTE — PROGRESS NOTES
2021         Past Medical History:     Past Medical History:   Diagnosis Date    Aneurysm ([address] 2019    right MCA bifurcation aneurysm    [facility] [address] [address] 2018    left breast    Contact lens/glasses fitting     Fibromyalgia     Former smoker     Hyperlipidemia     Hypertension     Lillette Favors, CNP    Sleep apnea     C-pap / STATES HAS NOT BEEN WEARING        Past Surgical History:     Past Surgical History:   Procedure Laterality Date    BACK SURGERY      neck fusion & L4-L5 fusion    BRAIN ANEURYSM SURGERY  2020    WEB SL EMOBLIZATION SYSTEM MRI CONDTIONAL 3T OK, SAFE IMMEDIATELY.   IR ANGIOGRAM CAROTID CEREBRAL BILATERAL,,S/P coil embolization of cerebral aneurysm    BREAST SURGERY Left 2018    lumpectomy    CARPAL TUNNEL RELEASE Bilateral      SECTION      x 2,  &     HYSTERECTOMY  2018    OTHER SURGICAL HISTORY  2021    DIAGNOSTIC CEREBRAL ANGIOGRAM / Previously treated right MCA M1 bifurcation aneurysm is with a small residual filling noted    TUNNELED VENOUS PORT PLACEMENT      placed , removed 2019        Medications during admission:      lidocaine 1 % injection  5 mL Intradermal Once    sodium chloride flush  10 mL Intravenous 2 times per day    atorvastatin  40 mg Oral Nightly    lisinopril  20 mg Oral Daily    sennosides-docusate sodium  2 tablet Oral Daily    carvedilol  12.5 mg Oral BID WC    enoxaparin  40 mg Subcutaneous Daily    ceFAZolin  2,000 mg Intravenous Once    clopidogrel  75 mg Oral Daily    aspirin  325 mg Oral Daily    venlafaxine  75 mg Oral Daily    sodium chloride flush  10 mL Intravenous 2 times per day         Physical Exam:   BP (!) 155/98   Pulse 82   Temp 98.4 °F (36.9 °C)   Resp 18   Ht 5' 5\" (1.651 m)   Wt 240 lb 4.8 oz (109 kg)   SpO2 97%   BMI 39.99 kg/m²   Temp (24hrs), Av.2 °F (36.8 °C), Min:98 °F (36.7 °C), Max:98.4 °F (36.9 °C)        General examination:      General Appearance:  alert, well appearing, and in no acute distress  HEENT: Normocephalic, atraumatic,   Neck: supple, no carotid bruits,  Lungs:  Respirations unlabored,   Cardiovascular: normal rate, regular rhythm  Abdomen: Soft, nontender,  Skin: No gross lesions,  Extremities:  peripheral pulses palpable  Psych: normal affect      Neurological examination:      Mental status   Alert and oriented x 3; following all commands; speech is fluent, no dysarthria, aphasia. Cranial nerves   II - visual fields intact to confrontation; pupils reactive  III, IV, VI - extraocular muscles intact; no RAFAEL; no nystagmus; no ptosis   V - normal facial sensation                                                               VII - normal facial symmetry                                                             VIII - intact hearing                                                                             IX, X - symmetrical palate elevation                                               XI - symmetrical shoulder shrug                                                       XII - midline tongue without atrophy or fasciculation     Motor function  Strength: Left pronator drift  Right upper and lower extremities 5/5  Left upper and lower extremities 5/5     Sensory function Intact to touch throughout sensation visual extinction on the left side     Cerebellar Intact finger-nose-finger testing.       Reflex function  reflexes 2+  Plantars equivocal     Gait                  Not assessed              Diagnostics:      Laboratory Testing:  CBC:   Recent Labs     01/29/21  0333 01/30/21  0707 01/31/21  0515   WBC 7.5 6.4 8.3   HGB 10.3* 10.9* 10.9*    See Reflexed IPF Result 334     BMP:    Recent Labs     01/29/21  0333 01/30/21  0707 01/31/21  0515    138 141   K 3.7 3.5* 3.3*   * 108* 106   CO2 23 22 23   BUN 11 9 12   CREATININE 0.43* 0.33* 0.45*   GLUCOSE 102* 100* 98         Lab Results   Component Value Date    CHOL 197 01/27/2021 LDLCHOLESTEROL 119 01/27/2021    HDL 44 01/27/2021    TRIG 168 (H) 01/27/2021    ALT 25 09/02/2020    AST 21 09/02/2020    INR 0.9 01/24/2021    LABA1C 5.0 01/27/2021       Imaging/Diagnostics:        CTA head and neck 1/27/2021  Multiple stents within the right middle cerebral artery and beyond the   trifurcation that appear patent.       Mild stenosis in the V4 portion of the left vertebral artery.          CT Head 1/27/2021  Evolving right MCA territory infarct with hemorrhagic conversion.  The   approximately 1 cm parenchymal hemorrhage is not significantly changed   relative to 10 hours prior, but there is suggestion of some new trace   associated subarachnoid blood products peripherally (axial series 2, image   43).     Increased conspicuity of the evolving cytotoxic edema with similar mild mass   effect on the right lateral ventricle with approximately 1 mm right to left   midline shift.          DSA 1/26/2021  --Right MCA subocclusive clot extending from distal M1 to the proximal M2 superior and inferior divisions.  There is collateral flow noted from right KIM to right MCA due to hemodynamically significant subocclusive clot. --This was treated with intra-arterial mechanical thrombectomy with contact aspiration and 3 oh stent retriever.  This was followed with balloon angioplasty using 1.5 x 9 mm [place] balloon (x4 inflated to 6 then 8, then 8, then 10 KT), then 2 x 15 mm Quantum apex balloon (x2). --This resulted in improvement in the flow across the subocclusive occlusion.  Total recanalization at TICI 2C. --The prior treated right MCA bifurcation aneurysm currently with no residual noted.         MRI/A brain 1/24/2021  1. Large right middle cerebral artery territory infarct involving the   superior right temporal lobe and to a lesser extent the right frontal and   right parietal lobes. 2. Small focus of petechial hemorrhage within the right parietal lobe infarct.    3. Limited evaluation of the right middle cerebral artery given   susceptibility artifact from the patient's known MCA stent. Daniel Benavidests is normal   signal identified within the proximal M1 segment as well as M2 branches   beyond the stent and area of susceptibility artifact.  Correlation with the   patient's reported recent outside CT angiogram of the brain is recommended          CT head without contrast 1/30/2021  Impression   1.  Right middle cerebral artery distribution subacute infarct with   hemorrhagic transformation.  Amount of hemorrhage has decreased somewhat.       2.  Reduced edema and mass effect associated with infarct.       3.  Interval resolution of subarachnoid hemorrhage.       4.  Other incidental findings as above. Impression:      Large right MCA territory infarct with hemorrhagic transformation secondary to right MCA subocclusive clot extending from distal M1 to proximal M2 treated with mechanical thrombectomy and balloon angioplasty TICI 2C on 1/26/2021  Repeat CT head 1/27/2021-evolving right MCA infarct with hemorrhagic conversion (1 cm parenchymal hemorrhage unchanged compared to 10 hours prior study, some new trace subarachnoid blood products, evolving cytotoxic edema with similar mass-effect on the right lateral ventricle with approximately 1 mm right-to-left midline shift  Repeat CTA head and neck 1/27/2021-multiple stents within right MCA and beyond the trifurcation that appears patent. Mild stenosis in V4 portion of left vertebral artery        History of unruptured right MCA and resume treated with Web and stent placement on 8/26/2020  History of left breast cancer status post lumpectomy and chemoradiation 2018  Hypertension  Hyperlipidemia  Obstructive sleep apnea on CPAP  Fibromyalgia        HbA1c 5      Plan:     · Neuro endovascular on board, repeat CT head ordered for 2/1/2021  · Plan for repeat DSA on 2/2/2021, to eval for changes in right M1 patency.   Patient currently refusing the

## 2021-01-31 NOTE — PROGRESS NOTES
Upon entering the room and doing bedside report with Ronald Lind RN, patient wanted writer to know that she is concerned about having a digital subtraction angio on Tuesday 02/02/2021. Pt feels that if the CTs show no bleed, there should not be a DSA (digital subtraction  Angiogram). Pt wanted writer to make a note in her chart concerning this issue. Writer will continue to monitor.

## 2021-01-31 NOTE — PROCEDURES
PATIENT HEALTH QUESTIONNAIRE-9                                            (PHQ-9)    Over the past 2 weeks, how often have you been bothered by any of the following problems?     -------------------------------------------------------------------------------------------------------------------  1. Little interest or pleasure in doing things   0x 1 2 3  -------------------------------------------------------------------------------------------------------------------  2. Feeling down, depressed or hopeless   0 1 2x 3  Yes, Barbara had difficult few years  -------------------------------------------------------------------------------------------------------------------  3. Trouble falling or staying asleep, or sleeping too much  0 1 2 3x  Have trouble sleeping everyday.  -------------------------------------------------------------------------------------------------------------------  4. Feeling tired or having little energy   0 1 2x 3  Very frequently I dont have any energy, Im tired a lot.  -------------------------------------------------------------------------------------------------------------------  5. Poor appetite or overeating    0x 1 2 3  -------------------------------------------------------------------------------------------------------------------  6. Feeling bad about yourself--or that you are      A failure or have let yourself or family down. 0x 1 2 3   ------------------------------------------------------------------------------------------------------------------  7. Trouble concentrating on things, such as reading        the newspaper or watching T.V.     0x 1 2 3  ------------------------------------------------------------------------------------------------------------------  8. Moving or speaking so slowly that other people could noticed?         Or the opposite-- being so fidgety or restless that you have been  0 1 2 3x      moving around a lot more than usual  Left neglect  -------------------------------------------------------------------------------------------------------------------------------------------  9. Thoughts that you would be better off dead or of hurting   0x 1 2 3       yourself in some way  ------------------------------------------------------------------------------------------------------------------------------------------              ---------------------------------------------------                   Total score______10_________    ---------------------------------------------------------------------------------------------------------------------------------------------  If you checked off any problems, how difficult have these problems made it for you to do your work, take care of things at home, or get along with other people?      Not difficult at all    ______       Somewhat difficult ______    Very difficult  _____    Extremely difficult ______

## 2021-01-31 NOTE — FLOWSHEET NOTE
Assessment:  Patient is a 53year old female in room 548. Patient asked  to leave Advanced Directive Documents. 01/31/21 1338   Encounter Summary   Services provided to: Patient   Referral/Consult From: Physician   Support System Family members   Continue Visiting   (1/31/2021)   Complexity of Encounter Moderate   Length of Encounter 15 minutes   Spiritual Assessment Completed Yes   Advance Care Planning Yes   Advance Directives (For Healthcare)   Healthcare Directive No, patient does not have an advance directive for healthcare treatment   Information on Healthcare Directives Requested Yes   Patient Requests Assistance Yes, will do independently   Advance Directives Unable to complete; Documents given;Pt. not interested at this time   Intervention:   left AD documents    Outcome:  Patient expressed gratitude      Plan:  Chaplains will remain available for spiritual and emotional support as needed.

## 2021-02-01 ENCOUNTER — APPOINTMENT (OUTPATIENT)
Dept: CT IMAGING | Age: 51
DRG: 023 | End: 2021-02-01
Payer: COMMERCIAL

## 2021-02-01 LAB
ABSOLUTE EOS #: 0.34 K/UL (ref 0–0.44)
ABSOLUTE IMMATURE GRANULOCYTE: 0.05 K/UL (ref 0–0.3)
ABSOLUTE LYMPH #: 2.02 K/UL (ref 1.1–3.7)
ABSOLUTE MONO #: 0.94 K/UL (ref 0.1–1.2)
ANION GAP SERPL CALCULATED.3IONS-SCNC: 9 MMOL/L (ref 9–17)
BASOPHILS # BLD: 0 % (ref 0–2)
BASOPHILS ABSOLUTE: 0.03 K/UL (ref 0–0.2)
BUN BLDV-MCNC: 13 MG/DL (ref 6–20)
BUN/CREAT BLD: ABNORMAL (ref 9–20)
CALCIUM SERPL-MCNC: 9.4 MG/DL (ref 8.6–10.4)
CHLORIDE BLD-SCNC: 107 MMOL/L (ref 98–107)
CO2: 24 MMOL/L (ref 20–31)
CREAT SERPL-MCNC: 0.47 MG/DL (ref 0.5–0.9)
DIFFERENTIAL TYPE: ABNORMAL
EOSINOPHILS RELATIVE PERCENT: 4 % (ref 1–4)
GFR AFRICAN AMERICAN: >60 ML/MIN
GFR NON-AFRICAN AMERICAN: >60 ML/MIN
GFR SERPL CREATININE-BSD FRML MDRD: ABNORMAL ML/MIN/{1.73_M2}
GFR SERPL CREATININE-BSD FRML MDRD: ABNORMAL ML/MIN/{1.73_M2}
GLUCOSE BLD-MCNC: 107 MG/DL (ref 70–99)
HCT VFR BLD CALC: 34.8 % (ref 36.3–47.1)
HEMOGLOBIN: 11.4 G/DL (ref 11.9–15.1)
IMMATURE GRANULOCYTES: 1 %
LYMPHOCYTES # BLD: 21 % (ref 24–43)
MAGNESIUM: 2 MG/DL (ref 1.6–2.6)
MCH RBC QN AUTO: 32.2 PG (ref 25.2–33.5)
MCHC RBC AUTO-ENTMCNC: 32.8 G/DL (ref 28.4–34.8)
MCV RBC AUTO: 98.3 FL (ref 82.6–102.9)
MONOCYTES # BLD: 10 % (ref 3–12)
NRBC AUTOMATED: 0 PER 100 WBC
PDW BLD-RTO: 13.2 % (ref 11.8–14.4)
PLATELET # BLD: 365 K/UL (ref 138–453)
PLATELET ESTIMATE: ABNORMAL
PMV BLD AUTO: 9.4 FL (ref 8.1–13.5)
POTASSIUM SERPL-SCNC: 3.9 MMOL/L (ref 3.7–5.3)
RBC # BLD: 3.54 M/UL (ref 3.95–5.11)
RBC # BLD: ABNORMAL 10*6/UL
SEG NEUTROPHILS: 64 % (ref 36–65)
SEGMENTED NEUTROPHILS ABSOLUTE COUNT: 6.07 K/UL (ref 1.5–8.1)
SODIUM BLD-SCNC: 140 MMOL/L (ref 135–144)
WBC # BLD: 9.5 K/UL (ref 3.5–11.3)
WBC # BLD: ABNORMAL 10*3/UL

## 2021-02-01 PROCEDURE — 2060000000 HC ICU INTERMEDIATE R&B

## 2021-02-01 PROCEDURE — 85025 COMPLETE CBC W/AUTO DIFF WBC: CPT

## 2021-02-01 PROCEDURE — 80048 BASIC METABOLIC PNL TOTAL CA: CPT

## 2021-02-01 PROCEDURE — 6370000000 HC RX 637 (ALT 250 FOR IP): Performed by: PSYCHIATRY & NEUROLOGY

## 2021-02-01 PROCEDURE — 70450 CT HEAD/BRAIN W/O DYE: CPT

## 2021-02-01 PROCEDURE — 97535 SELF CARE MNGMENT TRAINING: CPT

## 2021-02-01 PROCEDURE — 99233 SBSQ HOSP IP/OBS HIGH 50: CPT | Performed by: PSYCHIATRY & NEUROLOGY

## 2021-02-01 PROCEDURE — 2580000003 HC RX 258: Performed by: STUDENT IN AN ORGANIZED HEALTH CARE EDUCATION/TRAINING PROGRAM

## 2021-02-01 PROCEDURE — 6370000000 HC RX 637 (ALT 250 FOR IP): Performed by: STUDENT IN AN ORGANIZED HEALTH CARE EDUCATION/TRAINING PROGRAM

## 2021-02-01 PROCEDURE — APPSS30 APP SPLIT SHARED TIME 16-30 MINUTES: Performed by: NURSE PRACTITIONER

## 2021-02-01 PROCEDURE — 83735 ASSAY OF MAGNESIUM: CPT

## 2021-02-01 PROCEDURE — 6360000002 HC RX W HCPCS: Performed by: NURSE PRACTITIONER

## 2021-02-01 PROCEDURE — 36415 COLL VENOUS BLD VENIPUNCTURE: CPT

## 2021-02-01 PROCEDURE — 99232 SBSQ HOSP IP/OBS MODERATE 35: CPT | Performed by: PSYCHIATRY & NEUROLOGY

## 2021-02-01 PROCEDURE — 99232 SBSQ HOSP IP/OBS MODERATE 35: CPT | Performed by: PHYSICAL MEDICINE & REHABILITATION

## 2021-02-01 RX ADMIN — CLOPIDOGREL 75 MG: 75 TABLET, FILM COATED ORAL at 10:05

## 2021-02-01 RX ADMIN — SODIUM CHLORIDE, PRESERVATIVE FREE 10 ML: 5 INJECTION INTRAVENOUS at 21:53

## 2021-02-01 RX ADMIN — CARVEDILOL 12.5 MG: 12.5 TABLET, FILM COATED ORAL at 21:53

## 2021-02-01 RX ADMIN — VENLAFAXINE HYDROCHLORIDE 75 MG: 75 CAPSULE, EXTENDED RELEASE ORAL at 10:05

## 2021-02-01 RX ADMIN — ENOXAPARIN SODIUM 40 MG: 40 INJECTION SUBCUTANEOUS at 10:05

## 2021-02-01 RX ADMIN — CARVEDILOL 12.5 MG: 12.5 TABLET, FILM COATED ORAL at 10:05

## 2021-02-01 RX ADMIN — DESMOPRESSIN ACETATE 40 MG: 0.2 TABLET ORAL at 21:53

## 2021-02-01 RX ADMIN — SODIUM CHLORIDE, PRESERVATIVE FREE 10 ML: 5 INJECTION INTRAVENOUS at 10:05

## 2021-02-01 RX ADMIN — SODIUM CHLORIDE, PRESERVATIVE FREE 10 ML: 5 INJECTION INTRAVENOUS at 21:54

## 2021-02-01 RX ADMIN — LISINOPRIL 20 MG: 20 TABLET ORAL at 10:05

## 2021-02-01 RX ADMIN — ASPIRIN 325 MG: 325 TABLET ORAL at 10:05

## 2021-02-01 ASSESSMENT — PAIN SCALES - GENERAL
PAINLEVEL_OUTOF10: 0
PAINLEVEL_OUTOF10: 0

## 2021-02-01 NOTE — CARE COORDINATION
Transitional plan\"   Met with Dr Aarti Finney she relates patient is appropriate for IP Rehab however patient would like closer to home and she believes choice is [facility], I met with patient at bedside and he would like referral to [facility], referral sent    [phone] Call from Giuliana Salcedo at [facility] they do not take insurance    [phone] met with patient at bedside notified her ins does not cover [facility] she would like referral to [employer] and [facility]

## 2021-02-01 NOTE — PROGRESS NOTES
Physical Medicine & Rehabilitation  Progress Note        Admitting Physician: Beatris Castillo MD    Primary Care Provider: POPEYE Velez - CNP     Chief Complaint: L facial weakness and dysarthria    Brief History: This is a follow up to the initial consult on Ms. Herminio Ellis is a 53 y.o. left handed female who was admitted to [facility] on 1/24/2021 with Headache    Patient with R MCA infarct and R parietal petechial hemorrhages. Neuro endovascular planning repeat DSA 2/2/21 to evaluate MCA patency. Subjective: The patient is resting comfortably. The patient's mobility is improved. Patient is ambulating well and denies any current issues with pain or sleep. ROS:  Constitutional: negative for anorexia, chills, fatigue, fevers, sweats and weight loss  Eyes: negative for redness and visual disturbance  Ears, nose, mouth, throat, and face: negative for earaches, epistaxis, sore throat and tinnitus  Respiratory: negative for cough and shortness of breath  Cardiovascular: negative for chest pain, dyspnea and palpitations  Gastrointestinal: negative for abdominal pain, change in bowel habits, constipation, nausea and vomiting  Genitourinary:negative for dysuria, frequency, hesitancy and urinary incontinence  Integument/breast: negative for pruritus and rash  Musculoskeletal:negative for stiff joints  Neurological: negative for dizziness, headaches   Behavioral/Psych: negative for decreased appetite, depression and fatigue    Rehabilitation:   Progressing in therapies.     PT:  Restrictions/Precautions: General Precautions, Fall Risk, Up as Tolerated  Other position/activity restrictions: up with A, SBP goal 120-160. s/p thrombectomy with balloon angio plasty 1/26   Transfers  Sit to Stand: Contact guard assistance  Stand to sit: Contact guard assistance  Bed to Chair: Contact guard assistance  Stand Pivot Transfers: Contact guard assistance  Comment: (pt often with delayed response ie will say \"ok\" when asked to stand, but then won't stand until given tactile cue)  Ambulation 1  Surface: level tile  Device: [address]: Stand by assistance(pt needs cues to avoid objects on her L)  Quality of Gait: slowed, small steps, hits obstacles on L, decreased attention to safety/ awareness of deficits  Gait Deviations: Slow Neida, Increased ALLYSON, Decreased step length, Decreased arm swing  Distance: 90'x2    Transfers  Sit to Stand: Contact guard assistance  Stand to sit: Contact guard assistance  Bed to Chair: Contact guard assistance  Stand Pivot Transfers: Contact guard assistance  Comment: (pt often with delayed response ie will say \"ok\" when asked to stand, but then won't stand until given tactile cue)  Ambulation  Ambulation?: Yes  More Ambulation?: Yes  Ambulation 1  Surface: level tile  Device: Rolling Walker  Assistance: Stand by assistance(pt needs cues to avoid objects on her L)  Quality of Gait: slowed, small steps, hits obstacles on L, decreased attention to safety/ awareness of deficits  Gait Deviations: Slow Neida, Increased ALLYSON, Decreased step length, Decreased arm swing  Distance: 90'x2    Surface: level tile  Ambulation 1  Surface: level tile  Device: Rolling Walker  Assistance: Stand by assistance(pt needs cues to avoid objects on her L)  Quality of Gait: slowed, small steps, hits obstacles on L, decreased attention to safety/ awareness of deficits  Gait Deviations: Slow Neida, Increased ALLYSON, Decreased step length, Decreased arm swing  Distance: 90'x2    OT:  ADL  Feeding: Supervision  Grooming: Setup, Increased time to complete, Stand by assistance  UE Bathing: Supervision, Setup, Increased time to complete  LE Bathing: Supervision, Setup, Increased time to complete  UE Dressing: Setup, Supervision, Increased time to complete, Minimal assistance  LE Dressing: Increased time to complete, Setup, Supervision  Toileting: Increased time to complete, Setup, Stand by assistance  Additional Comments: Pt. in supine position upon entering room. Pt. requesting a shower for OT services. Grooming standing at sink SBA + set up with increased time to complete. UB/LB bathe S + set up with use of hand held shower head, shower chair and grab bars. Pt. needing extended time for all bathing, pt. very though. UB dress don/doff gown S + min A to ayde backside gown. LB dressing (socks) S sitting EOB using [employer] tech. Toileting routine SBA overall + grab bar. Balance  Sitting Balance: Independent  Standing Balance: Stand by assistance   Standing Balance  Time: ~20 minutes  Activity: Grooming standing sink side, ADL transfers, shower. Comment: No LOB  Functional Mobility  Functional - Mobility Device: No device  Activity: To/from bathroom, Other  Assist Level: Contact guard assistance  Functional Mobility Comments: Slow functional mobility, no AE CGA to ensure zero LOB. Bed mobility  Rolling to Left: Independent  Rolling to Right: Independent  Supine to Sit: Modified independent  Sit to Supine: Independent  Scooting: Supervision  Comment: Needing occasional cues for initation of movements, pt. was very motivated to shower and brush teeth. Bed rail/HOB slightly elevated. Transfers  Sit to stand: Contact guard assistance  Stand to sit: Contact guard assistance  Transfer Comments: pt standing prior to being instructed; VCs for safety   Toilet Transfers  Toilet - Technique: Stand step  Equipment Used: Standard toilet  Toilet Transfer: Stand by assistance  Toilet Transfers Comments: VCs for hand placement and safety             SLP:      Objective:  BP (!) 165/105   Pulse 79   Temp 97.9 °F (36.6 °C)   Resp 17   Ht 5' 5\" (1.651 m)   Wt 240 lb 4.8 oz (109 kg)   SpO2 96%   BMI 39.99 kg/m²       GEN: well developed, well nourished, in NAD  HEENT: NCAT, PERRL, EOMI, mucous membranes pink and moist  CV: RRR, no murmurs, rubs or gallops  PULM: CTAB, no rales or rhonchi.  Respirations WNL and unlabored  ABD: soft, NT, ND, BS+ and equal  NEURO: A&O x3. Sensation intact to light touch. MSK: Functional ROM all extremities . Strength 5/5 key muscles all extremities. EXTREMITIES: No calf tenderness to palpation bilaterally. No edema BLEs  SKIN: warm dry and intact with good turgor  PSYCH: appropriately interactive. Affect WNL.      Current Medications:   Current Facility-Administered Medications: lidocaine 1 % injection 5 mL, 5 mL, Intradermal, Once  sodium chloride flush 0.9 % injection 10 mL, 10 mL, Intravenous, 2 times per day  sodium chloride flush 0.9 % injection 10 mL, 10 mL, Intravenous, PRN  atorvastatin (LIPITOR) tablet 40 mg, 40 mg, Oral, Nightly  lisinopril (PRINIVIL;ZESTRIL) tablet 20 mg, 20 mg, Oral, Daily  sennosides-docusate sodium (SENOKOT-S) 8.6-50 MG tablet 2 tablet, 2 tablet, Oral, Daily  carvedilol (COREG) tablet 12.5 mg, 12.5 mg, Oral, BID WC  enoxaparin (LOVENOX) injection 40 mg, 40 mg, Subcutaneous, Daily  ceFAZolin (ANCEF) 2000 mg in dextrose 5 % 50 mL IVPB, 2,000 mg, Intravenous, Once  labetalol (NORMODYNE;TRANDATE) injection 10 mg, 10 mg, Intravenous, Q1H PRN  clopidogrel (PLAVIX) tablet 75 mg, 75 mg, Oral, Daily  potassium chloride (KLOR-CON M) extended release tablet 40 mEq, 40 mEq, Oral, PRN **OR** potassium bicarb-citric acid (EFFER-K) effervescent tablet 40 mEq, 40 mEq, Oral, PRN **OR** potassium chloride 10 mEq/100 mL IVPB (Peripheral Line), 10 mEq, Intravenous, PRN  aspirin tablet 325 mg, 325 mg, Oral, Daily  venlafaxine (EFFEXOR XR) extended release capsule 75 mg, 75 mg, Oral, Daily  sodium chloride flush 0.9 % injection 10 mL, 10 mL, Intravenous, 2 times per day  sodium chloride flush 0.9 % injection 10 mL, 10 mL, Intravenous, PRN  promethazine (PHENERGAN) tablet 12.5 mg, 12.5 mg, Oral, Q6H PRN **OR** ondansetron (ZOFRAN) injection 4 mg, 4 mg, Intravenous, Q6H PRN  polyethylene glycol (GLYCOLAX) packet 17 g, 17 g, Oral, Daily PRN  acetaminophen (TYLENOL) tablet 650 mg, 650 mg, Oral, Q6H PRN **OR** acetaminophen (TYLENOL) suppository 650 mg, 650 mg, Rectal, Q6H PRN      Diagnostics:     CBC:   Recent Labs     01/30/21  0707 01/31/21  0515 02/01/21  0501   WBC 6.4 8.3 9.5   RBC 3.39* 3.36* 3.54*   HGB 10.9* 10.9* 11.4*   HCT 33.7* 33.4* 34.8*   MCV 99.4 99.4 98.3   RDW 13.1 13.0 13.2   PLT See Reflexed IPF Result [id number]     BMP:   Recent Labs     01/30/21  0707 01/31/21  0515 02/01/21  0501    141 140   K 3.5* 3.3* 3.9   * 106 107   CO2 22 23 24   BUN 9 12 13   CREATININE 0.33* 0.45* 0.47*     BNP: No results for input(s): BNP in the last 72 hours. PT/INR: No results for input(s): PROTIME, INR in the last 72 hours. APTT: No results for input(s): APTT in the last 72 hours. CARDIAC ENZYMES: No results for input(s): CKMB, CKMBINDEX, TROPONINT in the last 72 hours. Invalid input(s): CKTOTAL;3  FASTING LIPID PANEL:  Lab Results   Component Value Date    CHOL 197 01/27/2021    HDL 44 01/27/2021    TRIG 168 (H) 01/27/2021     LIVER PROFILE: No results for input(s): AST, ALT, ALB, BILIDIR, BILITOT, ALKPHOS in the last 72 hours. Impression/Plan:    1. Ischemic R MCA infarct : on ASA, plavix, lipitor  2. HTN: on lisinopril  3. Depression: on Effexor    Recommendation:  1. Patient ambulating 90 feet x 2 and is supervision/SBA for ADLs with occupational therapy. She has progressed and is now no longer appropriate for acute rehab. She reports having good support at home and from a PM&R standpoint can likely discharge home with home healthcare and outpatient PM&R follow up.    2. DVT Prophylaxis: on Lovenox        Electronically signed by Lang Rossi MD on 2/1/2021 at 11:18 AM       This note is created with the assistance of a speech recognition program.  While intending to generate a document that actually reflects the content of the visit, the document can still have some errors including those of syntax and sound a like substitutions which may escape proof reading. In such instances, actual meaning can be extrapolated by contextual diversion.

## 2021-02-01 NOTE — PROGRESS NOTES
NEUROLOGY INPATIENT PROGRESS NOTE    2/1/2021         Current Exam:     Chart reviewed. Discussed with RN. Patient is doing well this morning, she has no acute complaints. She reports continued left sided facial numbness and some decreased sensation to the fingertips of her left hand. She has now agreeable to cerebral angiogram and this is being planned through the endovascular service. Follow-up CT head is pending for today. Brief History:    Terral Dancer is a  53 y.o. female with H/O right MCA bifurcation aneurysm, breast cancer, HLD, HTN, who was admitted as a transfer from [facility] on 1/24/2021 due to concern for right MCA occlusion. She initially presented to [facility] with right-sided headache, left-sided weakness and dizziness. CTA head neck showed acute right MCA occlusion. She underwent diagnostic angiogram on 1/21/2021 showing residual filling of the right M1 MCA aneurysm and she was transferred to [facility] [address] Stroke alert was called, NIH 3 on presentation. MRI brain with large right MCA territory infarct with petechial hemorrhages, MRA head with limited evaluation of the right MCA M1 due to artifact. She has initially admitted to the neuro ICU and underwent a cerebral angiogram showing right MCA subocclusive clot extending from distal M1 to proximal M2 and treated with mechanical thrombectomy and balloon angioplasty with TICI 2C. Follow-up CT head with large right MCA infarct with hemorrhagic transformation and mild effacement of the lateral ventricle. Another repeat CT head demonstrated right MCA infarct with hemorrhagic conversion. CTA head neck shows patent stent. She was transferred to neuro stepdown under the care of the general neurology service on 1/28/2021. No current facility-administered medications on file prior to encounter.       Current Outpatient Medications on File Prior to Encounter   Medication Sig Dispense Refill    AMITRIPTYLINE HCL PO Take by mouth nightly      MELOXICAM PO Take by mouth nightly      aspirin 325 MG tablet Take 1 tablet by mouth daily 30 tablet 3    lisinopril (PRINIVIL;ZESTRIL) 40 MG tablet Take 40 mg by mouth daily      clonazePAM (KLONOPIN) 0.5 MG tablet Take 0.5 mg by mouth 2 times daily as needed.  venlafaxine (EFFEXOR XR) 75 MG extended release capsule Take 75 mg by mouth daily      hydroCHLOROthiazide (HYDRODIURIL) 25 MG tablet Take 25 mg by mouth daily      Cholecalciferol (VITAMIN D3) 25 MCG (1000 UT) CAPS Take 2,000 Units by mouth nightly       atorvastatin (LIPITOR) 10 MG tablet Take 10 mg by mouth nightly       carvedilol (COREG) 12.5 MG tablet Take 12.5 mg by mouth 2 times daily (with meals)         Allergies: María Elena VASQUEZ is allergic to adhesive tape. Past Medical History:   Diagnosis Date    Aneurysm ([facility] [address] 2019    right MCA bifurcation aneurysm    Cancer ([facility] [address] 2018    left breast    Contact lens/glasses fitting     Fibromyalgia     Former smoker     Hyperlipidemia     Hypertension     Lazarus Nieto CNP    Sleep apnea     C-pap / STATES HAS NOT BEEN WEARING       Past Surgical History:   Procedure Laterality Date    BACK SURGERY      neck fusion & L4-L5 fusion    BRAIN ANEURYSM SURGERY  2020    [employer] EMOBLIZATION [employer] MRI CONDTIONAL 3T OK, SAFE IMMEDIATELY. IR ANGIOGRAM CAROTID CEREBRAL BILATERAL,,S/P coil embolization of cerebral aneurysm    BREAST SURGERY Left 2018    lumpectomy    CARPAL TUNNEL RELEASE Bilateral      SECTION      x 2,  &     HYSTERECTOMY  2018    OTHER SURGICAL HISTORY  2021    DIAGNOSTIC CEREBRAL ANGIOGRAM / Previously treated right MCA M1 bifurcation aneurysm is with a small residual filling noted    TUNNELED VENOUS PORT PLACEMENT      placed , removed 2019       Social History: María Elena VASQUEZ  reports that she quit smoking about 20 years ago. Her smoking use included cigarettes. She has a 15.00 pack-year smoking history.  She has never used smokeless tobacco. She reports that she does not use drugs. Family History   Family history unknown: Yes       Objective:   BP (!) 147/88   Pulse 63   Temp 97.2 °F (36.2 °C) (Oral)   Resp 19   Ht 5' 5\" (1.651 m)   Wt 240 lb 4.8 oz (109 kg)   SpO2 99%   BMI 39.99 kg/m²     Blood pressure range: Systolic (22NMW), VMX:969 , Min:140 , XUR:230   ; Diastolic (42XLC), NYM:66, Min:88, Max:103      Review of Systems:  Constitutional  Negative for fever and chills    HEENT  Negative for ear discharge, ear pain, nosebleed    Eyes  Negative for photophobia, pain and discharge    Respiratory  Negative for hemoptysis and sputum    Cardiovascular  Negative for orthopnea, claudication and PND    Gastrointestinal  Negative for abdominal pain, diarrhea, blood in stool    Musculoskeletal  Negative for joint pain, negative for myalgia    Skin  Negative for rash or itching    Endo/heme/allergies  Negative for polydipsia, environmental allergy    Psychiatric/behavioral  Negative for suicidal ideation. Patient is not anxious        NEUROLOGIC EXAMINATION  GENERAL  Appears comfortable and in no distress   HEENT  NC/ AT   NECK  Supple   MENTAL STATUS:  Alert, oriented, intact memory, no confusion, normal speech, normal language, no hallucination or delusion   CRANIAL NERVES: II     -      Visual fields intact to confrontation  III,IV,VI -  EOMs full, no afferent defect, no RAFAEL, no ptosis  V     -     decreased left-sided facial sensation  VII    -     Normal facial symmetry  VIII   -     Intact hearing  IX,X -     Symmetrical palate  XI    -     Symmetrical shoulder shrug  XII   -     Midline tongue, no atrophy    MOTOR FUNCTION:  significant for good strength of grade 5/5 in bilateral proximal and distal muscle groups of both upper and lower extremities with normal bulk, normal tone and no involuntary movements, no tremor. Left arm drift.    SENSORY FUNCTION:  Decrease sensation left side of face and fingertips of left hand   CEREBELLAR FUNCTION:  Intact fine motor control over upper limbs   REFLEX FUNCTION:  Symmetric, no perverted reflex, brisk withdraw bilaterally with Babinski testing   STATION and GAIT  Not tested       Data:    Lab Results:   CBC:   Recent Labs     01/30/21  0707 01/31/21  0515 02/01/21  0501   WBC 6.4 8.3 9.5   HGB 10.9* 10.9* 11.4*   PLT See Reflexed IPF Result [id number]     BMP:    Recent Labs     01/30/21  0707 01/31/21  0515 02/01/21  0501    141 140   K 3.5* 3.3* 3.9   * 106 107   CO2 22 23 24   BUN 9 12 13   CREATININE 0.33* 0.45* 0.47*   GLUCOSE 100* 98 107*         Lab Results   Component Value Date    CHOL 197 01/27/2021    LDLCHOLESTEROL 119 01/27/2021    HDL 44 01/27/2021    TRIG 168 (H) 01/27/2021    ALT 25 09/02/2020    AST 21 09/02/2020    INR 0.9 01/24/2021    LABA1C 5.0 01/27/2021           Diagnostic data reviewed:  CTA head and neck 1/27/2021  Multiple stents within the right middle cerebral artery and beyond the   trifurcation that appear patent.       Mild stenosis in the V4 portion of the left vertebral artery.      CT Head 1/27/2021  Evolving right MCA territory infarct with hemorrhagic conversion.  The   approximately 1 cm parenchymal hemorrhage is not significantly changed   relative to 10 hours prior, but there is suggestion of some new trace   associated subarachnoid blood products peripherally (axial series 2, image   43).     Increased conspicuity of the evolving cytotoxic edema with similar mild mass   effect on the right lateral ventricle with approximately 1 mm right to left   midline shift.      DSA 1/26/2021  --Right MCA subocclusive clot extending from distal M1 to the proximal M2 superior and inferior divisions.  There is collateral flow noted from right KIM to right MCA due to hemodynamically significant subocclusive clot.   --This was treated with intra-arterial mechanical thrombectomy with contact aspiration and 3 oh stent retriever.  This was followed with balloon angioplasty endovascular; SBP controlled today  -PT/OT/ST; will plan to discharge to IP rehab  -DVT prophylaxis; on Lovenox    Please note that this note was generated using a voice recognition dictation software. Although every effort was made to ensure the accuracy of this automated transcription, some errors in transcription may have occurred.

## 2021-02-01 NOTE — PROGRESS NOTES
ENDOVASCULAR NEUROSURGERY PROGRESS NOTE  2/1/2021 9:52 AM  Subjective:   Admit Date: 1/24/2021  PCP: Georgia MarchPOPEYE - CNP    No new acute events. Objective:   Vitals: BP (!) 147/88   Pulse 63   Temp 97.2 °F (36.2 °C) (Oral)   Resp 19   Ht 5' 5\" (1.651 m)   Wt 240 lb 4.8 oz (109 kg)   SpO2 99%   BMI 39.99 kg/m²   General appearance: Lying in bed, NAD. HEENT: Atraumatic. Neck: Neck is supple. Lungs: No respiratory distress noted. Heart: normal sinus rhythm on tele. Aurelia Pa Extremities: No lower limb edema noted. Neurologic:  awake, following simple commands appropriately, able to name simple objects, intact comprehension, no dysarthria noted. CN: Has intact extraocular muscles movements, no facial droop noted  MOTOR: Has good strength in both upper and lower extremities, moving both upper and lower extremities against gravity with no drift. SENSORY: Intact sensation to simple touch bilaterally in both upper and lower extremities. Left-sided body numbness noted. Sensory neglect noted to the left body side for bilateral sensory stimuli. COORDINATION: Intact finger-nose test bilaterally with no dysmetria noted.     Medications and labs:   Scheduled Meds:   lidocaine 1 % injection  5 mL Intradermal Once    sodium chloride flush  10 mL Intravenous 2 times per day    atorvastatin  40 mg Oral Nightly    lisinopril  20 mg Oral Daily    sennosides-docusate sodium  2 tablet Oral Daily    carvedilol  12.5 mg Oral BID WC    enoxaparin  40 mg Subcutaneous Daily    ceFAZolin  2,000 mg Intravenous Once    clopidogrel  75 mg Oral Daily    aspirin  325 mg Oral Daily    venlafaxine  75 mg Oral Daily    sodium chloride flush  10 mL Intravenous 2 times per day     Continuous Infusions:  CBC:   Recent Labs     01/30/21  0707 01/31/21  0515 02/01/21  0501   WBC 6.4 8.3 9.5   HGB 10.9* 10.9* 11.4*   PLT See Reflexed IPF Result [id number]     BMP:    Recent Labs     01/30/21  0707 01/31/21  0515 02/01/21  0501    141 140   K 3.5* 3.3* 3.9   * 106 107   CO2 22 23 24   BUN 9 12 13   CREATININE 0.33* 0.45* 0.47*   GLUCOSE 100* 98 107*     Hepatic: No results for input(s): AST, ALT, ALB, BILITOT, ALKPHOS in the last 72 hours. Troponin: No results for input(s): TROPONINI in the last 72 hours. BNP: No results for input(s): BNP in the last 72 hours. Lipids: No results for input(s): CHOL, HDL in the last 72 hours. Invalid input(s): LDLCALCU  INR: No results for input(s): INR in the last 72 hours. CT head 1/30/2021  1.  Right middle cerebral artery distribution subacute infarct with   hemorrhagic transformation.  Amount of hemorrhage has decreased somewhat.       2.  Reduced edema and mass effect associated with infarct.       3.  Interval resolution of subarachnoid hemorrhage.       4.  Other incidental findings as above.      CTA head and neck 1/27/2021  Multiple stents within the right middle cerebral artery and beyond the   trifurcation that appear patent.       Mild stenosis in the V4 portion of the left vertebral artery.      DSA 1/26/2021  --Right MCA subocclusive clot extending from distal M1 to the proximal M2 superior and inferior divisions.  There is collateral flow noted from right KIM to right MCA due to hemodynamically significant subocclusive clot. --This was treated with intra-arterial mechanical thrombectomy with contact aspiration and 3 oh stent retriever.  This was followed with balloon angioplasty using 1.5 x 9 mm [place] balloon (x4 inflated to 6 then 8, then 8, then 10 KT), then 2 x 15 mm Quantum apex balloon (x2). --This resulted in improvement in the flow across the subocclusive occlusion.  Total recanalization at TICI 2C. --The prior treated right MCA bifurcation aneurysm currently with no residual noted.     MRI/A brain 1/24/2021  1.  Large right middle cerebral artery territory infarct involving the   superior right temporal lobe and to a lesser extent the right frontal and   right parietal lobes. 2. Small focus of petechial hemorrhage within the right parietal lobe infarct. 3. Limited evaluation of the right middle cerebral artery given   susceptibility artifact from the patient's known MCA stent.  There is normal   signal identified within the proximal M1 segment as well as M2 branches   beyond the stent and area of susceptibility artifact.  Correlation with the   patient's reported recent outside CT angiogram of the brain is recommended.          Assessment and Recommendations:   51yo female with pmh of hld, htn, breast cancer 2018, gray. 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 DSA 1/21/2021 showed residual filling of the R MCA aneurysm and an incidental L MCA blister aneurysm.     Pt presented 1/24/2021 with acute L hemiweakness, neglect, r gaze pref, found to have acute large R MCA stroke. MRA showed artifact at the aneurysm but no clear LVO. possible in-stent thrombosis following insufficient antithrombotic rx. dSA 1/26/2021 showed partially occlusive thrombus at the R M2 branches at the stent site. S/p MT TICI2c and balloon angioplasty.     Repeat CT head 1/27/2021 showed small R hemorrhagic conversion. Repeat ct head following shows stable lesion. CTA shows patent vessels. CT  Head 1/30/2021 resolving bleed and edema. Cerebral angiogram with IV moderate sedation. Risks and benefits discussed including but not limited to bruising, stroke, sah, death, retroperitoneal hematoma, femoral pseudoaneurysm, lower ext and renal as well as peripheral vasc compromise discussed - informed consent obtained at bedside from the patient. PLAN:  --Follow-up ct head 2/1/2021 am  --plan for repeat dsa 2/2/2021, eval for changes in R M1 patency.   --continue asa 325 and plavix 75  --continue lipitor 10  --sbp 120-180  --care as per neurology team  --Upon discharge follow up with Dr. April Foster in 2 weeks and Dr. Rg in 3 months        Mele Sabrina Frye MD  Stroke, [facility]  [id number]  Electronically signed 2/1/2021 at 9:52 AM

## 2021-02-01 NOTE — PROGRESS NOTES
Occupational Therapy  Facility/Department: [facility] NEURO  Daily Treatment Note  NAME: Venita VASQUEZ  : 1970  MRN: [id number]    Date of Service: 2021    Discharge Recommendations:  Further therapy recommended at discharge. The patient should be able to tolerate at least three hours of therapy per day over 5 days or 15 hours over 7 days. Pt would benefit from cont therapy in order to address cognitive concerns, L visual neglect and decreased independence. Assessment   Performance deficits / Impairments: Decreased functional mobility ; Decreased endurance;Decreased ADL status; Decreased high-level IADLs;Decreased safe awareness;Decreased balance;Decreased fine motor control;Decreased strength;Decreased cognition;Decreased vision/visual deficit  Prognosis: Good  OT Education: OT Role;Transfer Training;ADL Adaptive Strategies;Precautions  Patient Education: purpose of OT; proper hand and foot placement; L visual scanning; safety precautions  Barriers to Learning: pt demo F carry over with increased cues  REQUIRES OT FOLLOW UP: Yes  Activity Tolerance  Activity Tolerance: Patient Tolerated treatment well  Safety Devices  Safety Devices in place: Yes  Type of devices: Patient at risk for falls; Left in bed;Call light within reach;Nurse notified(RN agreeable to no bed alarm)         Patient Diagnosis(es): The encounter diagnosis was Cerebrovascular accident (CVA) due to occlusion of left middle cerebral artery ([address] has a past medical history of Aneurysm ([address] Cancer ([address] Contact lens/glasses fitting, Fibromyalgia, Former smoker, Hyperlipidemia, Hypertension, and Sleep apnea. has a past surgical history that includes Breast surgery (Left, 2018); Tunneled venous port placement; Hysterectomy (2018); Carpal tunnel release (Bilateral);   section; back surgery; Brain aneurysm surgery (2020); and other surgical history (01/21/2021). Restrictions  Restrictions/Precautions  Restrictions/Precautions: General Precautions, Fall Risk, Up as Tolerated  Required Braces or Orthoses?: No  Position Activity Restriction  Other position/activity restrictions: up with A, SBP goal 120-160. s/p thrombectomy with balloon angio plasty 1/26  Subjective   General  Chart Reviewed: Yes  Patient assessed for rehabilitation services?: Yes  Family / Caregiver Present: Yes(pt sister)  Diagnosis: R MCA CVA occlusion, hx of aneurysm, L weakness  General Comment  Comments: RN and pt agreeable to therapy this day  Vital Signs  Patient Currently in Pain: Denies   Orientation  Orientation  Overall Orientation Status: Within Functional Limits  Objective    ADL  Grooming: Stand by assistance;Setup; Increased time to complete(to brush hair seated; wash face standing)  UE Bathing: Minimal assistance;Setup;Verbal cueing(req assist with back)  LE Bathing: Stand by assistance;Setup; Increased time to complete(seated/standing)  UE Dressing: Moderate assistance;Setup;Verbal cueing(to doff/don gown x2 req increased assist for threading orientation)  LE Dressing: Modified independent ;Supervision;Setup(to don/doff slippers)  Additional Comments: Pt supine in bed at start of session requesting shower this day. L side neglect noted throughout session pt req increased cues for visual scanning and safety. Balance  Sitting Balance: Modified independent   Standing Balance: Stand by assistance  Standing Balance  Time: Pt tolerated approx 15-18 min  Activity: static/dynamic standing during ADLs  Comment: without AD  Functional Mobility  Functional - Mobility Device: No device  Activity: To/from bathroom  Assist Level: Stand by assistance  Functional Mobility Comments: 0 LOB noted  Shower Transfers  Shower - Transfer From: Sarita [employer] Rosalba - Transfer Type: To and From  Shower - Transfer To:  Shower seat with back  Shower - Technique: Ambulating  Shower Transfers: Stand by assistance  Shower Transfers Comments: utilizing grab bars  Bed mobility  Supine to Sit: Modified independent  Sit to Supine: Modified independent  Scooting: Modified independent  Transfers  Stand Step Transfers: Stand by assistance  Sit to stand: Stand by assistance  Stand to sit: Stand by assistance  Transfer Comments: without AD utilizing grab bars intermitently  Cognition  Overall Cognitive Status: Exceptions  Arousal/Alertness: Delayed responses to stimuli  Following Commands: Follows multistep commands with increased time; Follows multistep commands with repitition  Attention Span: Attends with cues to redirect  Safety Judgement: Decreased awareness of need for assistance;Decreased awareness of need for safety  Problem Solving: Assistance required to correct errors made;Assistance required to identify errors made;Assistance required to implement solutions;Decreased awareness of errors;Assistance required to generate solutions  Insights: Not aware of deficits  Initiation: Requires cues for some  Sequencing: Requires cues for some     Pt minimally impulsive during session req increased cues for safety. 0 LOB noted however pt at increased risk for falls sec to L side visual neglect. At session end pt supine in bed with call light in reach and all needs met. RN aware.    Plan   Plan  Times per week: 4-5x   Cont POC    Goals  Short term goals  Time Frame for Short term goals: Pt will by discharge  Short term goal 1: demo good safety awareness during func mob around room (I)  Short term goal 2: demo bending/reaching func activity while standing with SUP  Short term goal 3: demo ADL UB/LB dressing/bathing activity with mod I, 1 vc, and increased time  Short term goal 4: demo scanning to L x6+ during func activity with 1 vc only to reduce L visual deficit  Short term goal 5: Demo 3-step ADL task with only 1 VC to increase cog and ability to complete ADLs/IADLs (goal updated 01/28/2021)       Therapy Time   Individual Concurrent Group Co-treatment   Time In 1366         Time Out 1434         Minutes 46         Timed Code Treatment Minutes: [facility], COOMBS/L

## 2021-02-01 NOTE — PROGRESS NOTES
CT called for me to bring her down for her CT. I received a phone call from another patient's family which took 20 minutes. I will not have time to take the patient down to CT. I will pass this on to the first shift RN.

## 2021-02-02 ENCOUNTER — APPOINTMENT (OUTPATIENT)
Dept: INTERVENTIONAL RADIOLOGY/VASCULAR | Age: 51
DRG: 023 | End: 2021-02-02
Payer: COMMERCIAL

## 2021-02-02 LAB
ABO/RH: NORMAL
ABSOLUTE EOS #: 0.27 K/UL (ref 0–0.44)
ABSOLUTE IMMATURE GRANULOCYTE: 0.04 K/UL (ref 0–0.3)
ABSOLUTE LYMPH #: 1.77 K/UL (ref 1.1–3.7)
ABSOLUTE MONO #: 0.74 K/UL (ref 0.1–1.2)
ANION GAP SERPL CALCULATED.3IONS-SCNC: 11 MMOL/L (ref 9–17)
ANTIBODY SCREEN: NEGATIVE
ARM BAND NUMBER: NORMAL
BASOPHILS # BLD: 1 % (ref 0–2)
BASOPHILS ABSOLUTE: 0.05 K/UL (ref 0–0.2)
BLOOD BANK SPECIMEN: NORMAL
BUN BLDV-MCNC: 15 MG/DL (ref 6–20)
BUN/CREAT BLD: ABNORMAL (ref 9–20)
CALCIUM SERPL-MCNC: 9.6 MG/DL (ref 8.6–10.4)
CHLORIDE BLD-SCNC: 108 MMOL/L (ref 98–107)
CO2: 24 MMOL/L (ref 20–31)
COLLAGEN ADENOSINE-5'-DIPHOSPHATE (ADP) TIME: >300 SEC (ref 67–112)
COLLAGEN EPINEPHRINE TIME: 156 SEC (ref 85–172)
CREAT SERPL-MCNC: 0.44 MG/DL (ref 0.5–0.9)
DIFFERENTIAL TYPE: ABNORMAL
EOSINOPHILS RELATIVE PERCENT: 4 % (ref 1–4)
EXPIRATION DATE: NORMAL
GFR AFRICAN AMERICAN: >60 ML/MIN
GFR NON-AFRICAN AMERICAN: >60 ML/MIN
GFR SERPL CREATININE-BSD FRML MDRD: ABNORMAL ML/MIN/{1.73_M2}
GFR SERPL CREATININE-BSD FRML MDRD: ABNORMAL ML/MIN/{1.73_M2}
GLUCOSE BLD-MCNC: 100 MG/DL (ref 70–99)
HCT VFR BLD CALC: 34.8 % (ref 36.3–47.1)
HEMOGLOBIN: 11 G/DL (ref 11.9–15.1)
IMMATURE GRANULOCYTES: 1 %
LYMPHOCYTES # BLD: 23 % (ref 24–43)
MAGNESIUM: 1.9 MG/DL (ref 1.6–2.6)
MCH RBC QN AUTO: 31.6 PG (ref 25.2–33.5)
MCHC RBC AUTO-ENTMCNC: 31.6 G/DL (ref 28.4–34.8)
MCV RBC AUTO: 100 FL (ref 82.6–102.9)
MONOCYTES # BLD: 10 % (ref 3–12)
NRBC AUTOMATED: 0 PER 100 WBC
PDW BLD-RTO: 13.3 % (ref 11.8–14.4)
PLATELET # BLD: 395 K/UL (ref 138–453)
PLATELET ESTIMATE: ABNORMAL
PLATELET FUNCTION INTERP: ABNORMAL
PMV BLD AUTO: 9.6 FL (ref 8.1–13.5)
POTASSIUM SERPL-SCNC: 3.8 MMOL/L (ref 3.7–5.3)
RBC # BLD: 3.48 M/UL (ref 3.95–5.11)
RBC # BLD: ABNORMAL 10*6/UL
SEG NEUTROPHILS: 61 % (ref 36–65)
SEGMENTED NEUTROPHILS ABSOLUTE COUNT: 4.72 K/UL (ref 1.5–8.1)
SODIUM BLD-SCNC: 143 MMOL/L (ref 135–144)
WBC # BLD: 7.6 K/UL (ref 3.5–11.3)
WBC # BLD: ABNORMAL 10*3/UL

## 2021-02-02 PROCEDURE — 99152 MOD SED SAME PHYS/QHP 5/>YRS: CPT | Performed by: PSYCHIATRY & NEUROLOGY

## 2021-02-02 PROCEDURE — C1769 GUIDE WIRE: HCPCS

## 2021-02-02 PROCEDURE — 6370000000 HC RX 637 (ALT 250 FOR IP): Performed by: PSYCHIATRY & NEUROLOGY

## 2021-02-02 PROCEDURE — 2060000000 HC ICU INTERMEDIATE R&B

## 2021-02-02 PROCEDURE — 6370000000 HC RX 637 (ALT 250 FOR IP): Performed by: STUDENT IN AN ORGANIZED HEALTH CARE EDUCATION/TRAINING PROGRAM

## 2021-02-02 PROCEDURE — 99232 SBSQ HOSP IP/OBS MODERATE 35: CPT | Performed by: PSYCHIATRY & NEUROLOGY

## 2021-02-02 PROCEDURE — 97535 SELF CARE MNGMENT TRAINING: CPT

## 2021-02-02 PROCEDURE — 2709999900 HC NON-CHARGEABLE SUPPLY

## 2021-02-02 PROCEDURE — 36415 COLL VENOUS BLD VENIPUNCTURE: CPT

## 2021-02-02 PROCEDURE — 36224 PLACE CATH CAROTD ART: CPT | Performed by: PSYCHIATRY & NEUROLOGY

## 2021-02-02 PROCEDURE — 86850 RBC ANTIBODY SCREEN: CPT

## 2021-02-02 PROCEDURE — 2580000003 HC RX 258: Performed by: STUDENT IN AN ORGANIZED HEALTH CARE EDUCATION/TRAINING PROGRAM

## 2021-02-02 PROCEDURE — 83735 ASSAY OF MAGNESIUM: CPT

## 2021-02-02 PROCEDURE — 6360000004 HC RX CONTRAST MEDICATION: Performed by: STUDENT IN AN ORGANIZED HEALTH CARE EDUCATION/TRAINING PROGRAM

## 2021-02-02 PROCEDURE — 6360000002 HC RX W HCPCS: Performed by: STUDENT IN AN ORGANIZED HEALTH CARE EDUCATION/TRAINING PROGRAM

## 2021-02-02 PROCEDURE — 85576 BLOOD PLATELET AGGREGATION: CPT

## 2021-02-02 PROCEDURE — C1894 INTRO/SHEATH, NON-LASER: HCPCS

## 2021-02-02 PROCEDURE — 85025 COMPLETE CBC W/AUTO DIFF WBC: CPT

## 2021-02-02 PROCEDURE — B3131ZZ FLUOROSCOPY OF RIGHT COMMON CAROTID ARTERY USING LOW OSMOLAR CONTRAST: ICD-10-PCS | Performed by: PSYCHIATRY & NEUROLOGY

## 2021-02-02 PROCEDURE — B3161ZZ FLUOROSCOPY OF RIGHT INTERNAL CAROTID ARTERY USING LOW OSMOLAR CONTRAST: ICD-10-PCS | Performed by: PSYCHIATRY & NEUROLOGY

## 2021-02-02 PROCEDURE — 86901 BLOOD TYPING SEROLOGIC RH(D): CPT

## 2021-02-02 PROCEDURE — 80048 BASIC METABOLIC PNL TOTAL CA: CPT

## 2021-02-02 PROCEDURE — 86900 BLOOD TYPING SEROLOGIC ABO: CPT

## 2021-02-02 PROCEDURE — B41F1ZZ FLUOROSCOPY OF RIGHT LOWER EXTREMITY ARTERIES USING LOW OSMOLAR CONTRAST: ICD-10-PCS | Performed by: PSYCHIATRY & NEUROLOGY

## 2021-02-02 PROCEDURE — APPSS30 APP SPLIT SHARED TIME 16-30 MINUTES: Performed by: NURSE PRACTITIONER

## 2021-02-02 PROCEDURE — 97530 THERAPEUTIC ACTIVITIES: CPT

## 2021-02-02 PROCEDURE — C1887 CATHETER, GUIDING: HCPCS

## 2021-02-02 RX ORDER — FENTANYL CITRATE 50 UG/ML
INJECTION, SOLUTION INTRAMUSCULAR; INTRAVENOUS
Status: COMPLETED | OUTPATIENT
Start: 2021-02-02 | End: 2021-02-02

## 2021-02-02 RX ORDER — ACETAMINOPHEN 325 MG/1
650 TABLET ORAL EVERY 4 HOURS PRN
Status: DISCONTINUED | OUTPATIENT
Start: 2021-02-02 | End: 2021-02-05 | Stop reason: HOSPADM

## 2021-02-02 RX ORDER — MIDAZOLAM HYDROCHLORIDE 2 MG/2ML
INJECTION, SOLUTION INTRAMUSCULAR; INTRAVENOUS
Status: COMPLETED | OUTPATIENT
Start: 2021-02-02 | End: 2021-02-02

## 2021-02-02 RX ORDER — HEPARIN SODIUM 5000 [USP'U]/ML
INJECTION, SOLUTION INTRAVENOUS; SUBCUTANEOUS
Status: COMPLETED | OUTPATIENT
Start: 2021-02-02 | End: 2021-02-02

## 2021-02-02 RX ORDER — IODIXANOL 270 MG/ML
150 INJECTION, SOLUTION INTRAVASCULAR
Status: COMPLETED | OUTPATIENT
Start: 2021-02-02 | End: 2021-02-02

## 2021-02-02 RX ADMIN — CEFAZOLIN SODIUM 2000 MG: 10 INJECTION, POWDER, FOR SOLUTION INTRAVENOUS at 11:54

## 2021-02-02 RX ADMIN — CLOPIDOGREL 75 MG: 75 TABLET, FILM COATED ORAL at 18:03

## 2021-02-02 RX ADMIN — SODIUM CHLORIDE, PRESERVATIVE FREE 10 ML: 5 INJECTION INTRAVENOUS at 20:08

## 2021-02-02 RX ADMIN — CARVEDILOL 12.5 MG: 12.5 TABLET, FILM COATED ORAL at 18:02

## 2021-02-02 RX ADMIN — IODIXANOL 50 ML: 270 INJECTION, SOLUTION INTRAVASCULAR at 12:59

## 2021-02-02 RX ADMIN — CARVEDILOL 12.5 MG: 12.5 TABLET, FILM COATED ORAL at 08:18

## 2021-02-02 RX ADMIN — DESMOPRESSIN ACETATE 40 MG: 0.2 TABLET ORAL at 20:07

## 2021-02-02 RX ADMIN — VENLAFAXINE HYDROCHLORIDE 75 MG: 75 CAPSULE, EXTENDED RELEASE ORAL at 08:18

## 2021-02-02 RX ADMIN — SODIUM CHLORIDE, PRESERVATIVE FREE 10 ML: 5 INJECTION INTRAVENOUS at 08:19

## 2021-02-02 RX ADMIN — FENTANYL CITRATE 50 MCG: 50 INJECTION, SOLUTION INTRAMUSCULAR; INTRAVENOUS at 12:03

## 2021-02-02 RX ADMIN — HEPARIN SODIUM 2000 UNITS: 5000 INJECTION INTRAVENOUS; SUBCUTANEOUS at 12:10

## 2021-02-02 RX ADMIN — ASPIRIN 325 MG: 325 TABLET ORAL at 18:02

## 2021-02-02 RX ADMIN — FENTANYL CITRATE 25 MCG: 50 INJECTION, SOLUTION INTRAMUSCULAR; INTRAVENOUS at 12:09

## 2021-02-02 RX ADMIN — MIDAZOLAM HYDROCHLORIDE 1 MG: 1 INJECTION, SOLUTION INTRAMUSCULAR; INTRAVENOUS at 12:03

## 2021-02-02 RX ADMIN — LISINOPRIL 20 MG: 20 TABLET ORAL at 08:18

## 2021-02-02 RX ADMIN — MIDAZOLAM HYDROCHLORIDE 0.5 MG: 1 INJECTION, SOLUTION INTRAMUSCULAR; INTRAVENOUS at 12:09

## 2021-02-02 ASSESSMENT — PAIN SCALES - GENERAL
PAINLEVEL_OUTOF10: 0

## 2021-02-02 NOTE — PROGRESS NOTES
ENDOVASCULAR NEUROSURGERY PROGRESS NOTE  2/2/2021 7:59 AM  Subjective:   Admit Date: 1/24/2021  PCP: Chicho Eduardo, APRN - CNP    No new acute events. Patient was evaluated by ophthalmology service for contact lens. No contact lens noted. Objective:   Vitals: BP (!) 153/91   Pulse 66   Temp 98 °F (36.7 °C) (Oral)   Resp 18   Ht 5' 5\" (1.651 m)   Wt 240 lb 4.8 oz (109 kg)   SpO2 94%   BMI 39.99 kg/m²   General appearance: Lying in bed, NAD. HEENT: Atraumatic. Neck: Neck is supple. Lungs: No respiratory distress noted. Heart: normal sinus rhythm on tele. Yady Libman Extremities: No lower limb edema noted. Neurologic:  awake, following simple commands appropriately, able to name simple objects, intact comprehension, no dysarthria noted. CN: Has intact extraocular muscles movements, no facial droop noted  MOTOR: Has good strength in both upper and lower extremities, moving both upper and lower extremities against gravity with no drift. SENSORY: Intact sensation to simple touch bilaterally in both upper and lower extremities. Left-sided body numbness noted. Sensory neglect noted to the left body side for bilateral sensory stimuli.     Medications and labs:   Scheduled Meds:   lidocaine 1 % injection  5 mL Intradermal Once    sodium chloride flush  10 mL Intravenous 2 times per day    atorvastatin  40 mg Oral Nightly    lisinopril  20 mg Oral Daily    sennosides-docusate sodium  2 tablet Oral Daily    carvedilol  12.5 mg Oral BID WC    enoxaparin  40 mg Subcutaneous Daily    ceFAZolin  2,000 mg Intravenous Once    clopidogrel  75 mg Oral Daily    aspirin  325 mg Oral Daily    venlafaxine  75 mg Oral Daily    sodium chloride flush  10 mL Intravenous 2 times per day     Continuous Infusions:  CBC:   Recent Labs     01/31/21  0515 02/01/21  0501 02/02/21  0607   WBC 8.3 9.5 7.6   HGB 10.9* 11.4* 11.0*    365 395     BMP:    Recent Labs     01/31/21  0515 02/01/21  0501 02/02/21  5351  140 143   K 3.3* 3.9 3.8    107 108*   CO2 23 24 24   BUN 12 13 15   CREATININE 0.45* 0.47* 0.44*   GLUCOSE 98 107* 100*     Hepatic: No results for input(s): AST, ALT, ALB, BILITOT, ALKPHOS in the last 72 hours. Troponin: No results for input(s): TROPONINI in the last 72 hours. BNP: No results for input(s): BNP in the last 72 hours. Lipids: No results for input(s): CHOL, HDL in the last 72 hours. Invalid input(s): LDLCALCU  INR: No results for input(s): INR in the last 72 hours. CT head 1/30/2021  1.  Right middle cerebral artery distribution subacute infarct with   hemorrhagic transformation.  Amount of hemorrhage has decreased somewhat.       2.  Reduced edema and mass effect associated with infarct.       3.  Interval resolution of subarachnoid hemorrhage.       4.  Other incidental findings as above.      CTA head and neck 1/27/2021  Multiple stents within the right middle cerebral artery and beyond the   trifurcation that appear patent.       Mild stenosis in the V4 portion of the left vertebral artery.      DSA 1/26/2021  --Right MCA subocclusive clot extending from distal M1 to the proximal M2 superior and inferior divisions.  There is collateral flow noted from right KIM to right MCA due to hemodynamically significant subocclusive clot. --This was treated with intra-arterial mechanical thrombectomy with contact aspiration and 3 oh stent retriever.  This was followed with balloon angioplasty using 1.5 x 9 mm Palmer balloon (x4 inflated to 6 then 8, then 8, then 10 KT), then 2 x 15 mm Quantum apex balloon (x2). --This resulted in improvement in the flow across the subocclusive occlusion.  Total recanalization at TICI 2C. --The prior treated right MCA bifurcation aneurysm currently with no residual noted.     MRI/A brain 1/24/2021  1.  Large right middle cerebral artery territory infarct involving the   superior right temporal lobe and to a lesser extent the right frontal and right parietal lobes. 2. Small focus of petechial hemorrhage within the right parietal lobe infarct. 3. Limited evaluation of the right middle cerebral artery given   susceptibility artifact from the patient's known MCA stent.  There is normal   signal identified within the proximal M1 segment as well as M2 branches   beyond the stent and area of susceptibility artifact.  Correlation with the   patient's reported recent outside CT angiogram of the brain is recommended.          Assessment and Recommendations:   53yo female with pmh of hld, htn, breast cancer 2018, gray. 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 DSA 1/21/2021 showed residual filling of the R MCA aneurysm and an incidental L MCA blister aneurysm.     Pt presented 1/24/2021 with acute L hemiweakness, neglect, r gaze pref, found to have acute large R MCA stroke. MRA showed artifact at the aneurysm but no clear LVO. possible in-stent thrombosis following insufficient antithrombotic rx. dSA 1/26/2021 showed partially occlusive thrombus at the R M2 branches at the stent site. S/p MT TICI2c and balloon angioplasty.     Repeat CT head 1/27/2021 showed small R hemorrhagic conversion. Repeat ct head following shows stable lesion. CTA shows patent vessels. CT  Head 1/30/2021 resolving bleed and edema. Cerebral angiogram with IV moderate sedation. Risks and benefits discussed including but not limited to bruising, stroke, sah, death, retroperitoneal hematoma, femoral pseudoaneurysm, lower ext and renal as well as peripheral vasc compromise discussed - informed consent obtained at bedside from the patient. CT head on 2/1/2021 reviewed, right hemorrhagic IPH stable.     PLAN:  --N.p.o. for DSA today, eval for changes in R M1 patency  --continue asa 325 and plavix 75 daily  --continue lipitor 10  --sbp 120-180  --care as per neurology team  --Upon discharge follow up with Dr. Cloretta Siemens in 2 weeks and Dr. Rg in 3 jordy Teixeira MD  Stroke, [facility]  [id number]  Electronically signed 2/2/2021 at 7:59 AM

## 2021-02-02 NOTE — SEDATION DOCUMENTATION
Report from [facility]FFILI[facility] WITH [facility]

## 2021-02-02 NOTE — OP NOTE
[facility]    NEUROENDOVASCULAR SERVICE: POST-OP NOTE: 2/2/2021    Pt Name: Roseann Keller  MRN: [id number]  YOB: 1970  Date of Procedure: 2/2/2021  Primary Care Physician: POPEYE Barboza - CNP    Pre-Procedural Diagnosis: Follow-up angiogram for right MCA M1/M2 subocclusive thrombus recently treated with thrombectomy/angioplasty. Post-Procedural Diagnosis: Right MCA M1/M2 vessels patent, small residual thrombus noted in left MCA m2 posterior division. Procedure Performed:Diagnostic Cerebral Angiogram    Surgeon:   Jose Maria Gray MD    Fellow:  Yael Castillo MD     1st Assistant:  Renee Alexis    PRE-PROCEDURAL EXAM:  Prestroke baseline mRS MODIFIED DELORES SCORE: 2 - Slight disability:  unable to carry out all previous activities, but able to look after own affairs without assistance. Neurological exam performed and unchanged from initial H&P or consult    Anesthesia: IV Moderate Sedation  Complications: none    Intra-Operative EXAM:  Neurological exam performed and unchanged from initial H&P or consult    EBL: < Minimal      Cc            Specimens: Were not Obtained  Contrast:     Visipaque 270 low osmolar 50 Cc             Fluoro: 9.7 min    Findings:  Please see dictated Radiology note for further details  --The prior noted right MCA M1/M2 subocclusive clot significantly improved/resolved. There is a small residual noted subocclusive clot in left MCA M2 posterior division not hemodynamically significant.  ----The prior treated right MCA bifurcation aneurysm with Web device currently with no residual noted with Ritika Winston class at 1.     Tomas score: class I    POST-PROCEDURAL EXAM :   Stable neurological Exam  Neurological exam performed and unchanged from initial H&P or consult    Closure:  right Vascade 5   F    POST-PROCEDURAL MONITORING : see orders  Disposition: Neuro Stepdown    Recommendations:  Back to neurology floor  Do not bend right leg for 3 hours.  Groin checks per protocol. Neuro checks per icu protocol. Peripheral pulse checks per protocol. ICU management per NSICU team.   SBP goal 100-140  Resume aspirin and plavix starting tomorrow.   Upon discharge follow up with Dr. Dion Stevenson in 2 weeks and Dr. Mayur Medina in 3 months with  MRA head with and without contrast.    MD Eleni Morrissey MD   Pager: [phone]  Stroke, [facility]  [address]  Electronically signed 2/2/2021 at 1:11 PM

## 2021-02-02 NOTE — PROGRESS NOTES
NEUROLOGY INPATIENT PROGRESS NOTE    2/2/2021         Current Exam:     Chart reviewed. Discussed with RN. Repeat CT head done last night with decreased cytotoxic edemand and blood products in R MCA distribution along with no new hemorrhage. Patient is doing well this morning, she has no acute complaints. Continued left facial numbness. Plan for angio with endovascular today. Brief History:    Carolina Salas is a  53 y.o. female with H/O right MCA bifurcation aneurysm, breast cancer, HLD, HTN, who was admitted as a transfer from [facility] on 1/24/2021 due to concern for right MCA occlusion. She initially presented to [facility] with right-sided headache, left-sided weakness and dizziness. CTA head neck showed acute right MCA occlusion. She underwent diagnostic angiogram on 1/21/2021 showing residual filling of the right M1 MCA aneurysm and she was transferred to [facility]. Stroke alert was called, NIH 3 on presentation. MRI brain with large right MCA territory infarct with petechial hemorrhages, MRA head with limited evaluation of the right MCA M1 due to artifact. She has initially admitted to the neuro ICU and underwent a cerebral angiogram showing right MCA subocclusive clot extending from distal M1 to proximal M2 and treated with mechanical thrombectomy and balloon angioplasty with TICI 2C. Follow-up CT head with large right MCA infarct with hemorrhagic transformation and mild effacement of the lateral ventricle. Another repeat CT head demonstrated right MCA infarct with hemorrhagic conversion. CTA head neck shows patent stent. She was transferred to neuro stepdown under the care of the general neurology service on 1/28/2021. No current facility-administered medications on file prior to encounter.       Current Outpatient Medications on File Prior to Encounter   Medication Sig Dispense Refill    AMITRIPTYLINE HCL PO Take by mouth nightly      MELOXICAM PO Take by mouth nightly      aspirin 325 MG tablet Take 1 tablet by mouth daily 30 tablet 3    lisinopril (PRINIVIL;ZESTRIL) 40 MG tablet Take 40 mg by mouth daily      clonazePAM (KLONOPIN) 0.5 MG tablet Take 0.5 mg by mouth 2 times daily as needed.  venlafaxine (EFFEXOR XR) 75 MG extended release capsule Take 75 mg by mouth daily      hydroCHLOROthiazide (HYDRODIURIL) 25 MG tablet Take 25 mg by mouth daily      Cholecalciferol (VITAMIN D3) 25 MCG (1000 UT) CAPS Take 2,000 Units by mouth nightly       atorvastatin (LIPITOR) 10 MG tablet Take 10 mg by mouth nightly       carvedilol (COREG) 12.5 MG tablet Take 12.5 mg by mouth 2 times daily (with meals)         Allergies: María Elena VASQUEZ is allergic to adhesive tape. Past Medical History:   Diagnosis Date    Aneurysm ([facility] [address] 2019    right MCA bifurcation aneurysm    Cancer ([facility] [address] 2018    left breast    Contact lens/glasses fitting     Fibromyalgia     Former smoker     Hyperlipidemia     Hypertension     Robbi Hornerer, CNP    Sleep apnea     C-pap / STATES HAS NOT BEEN WEARING       Past Surgical History:   Procedure Laterality Date    BACK SURGERY      neck fusion & L4-L5 fusion    BRAIN ANEURYSM SURGERY  2020    [employer] EMOBLIZATION SYSTEM MRI CONDTIONAL 3T OK, SAFE IMMEDIATELY. IR ANGIOGRAM CAROTID CEREBRAL BILATERAL,,S/P coil embolization of cerebral aneurysm    BREAST SURGERY Left 2018    lumpectomy    CARPAL TUNNEL RELEASE Bilateral      SECTION      x 2,  &     HYSTERECTOMY  2018    OTHER SURGICAL HISTORY  2021    DIAGNOSTIC CEREBRAL ANGIOGRAM / Previously treated right MCA M1 bifurcation aneurysm is with a small residual filling noted    TUNNELED VENOUS PORT PLACEMENT      placed , removed 2019       Social History: María Elena VASQUEZ  reports that she quit smoking about 20 years ago. Her smoking use included cigarettes. She has a 15.00 pack-year smoking history.  She has never used smokeless tobacco. She reports that she does not use drugs. Family History   Family history unknown: Yes       Objective:   BP (!) 143/82   Pulse 55   Temp 98.2 °F (36.8 °C) (Oral)   Resp 18   Ht 5' 5\" (1.651 m)   Wt 240 lb 4.8 oz (109 kg)   SpO2 99%   BMI 39.99 kg/m²     Blood pressure range: Systolic (30HNJ), XYI:401 , Min:137 , ISY:523   ; Diastolic (58EHZ), NQ, Min:76, Max:98      Review of Systems:  Constitutional  Negative for fever and chills    HEENT  Negative for ear discharge, ear pain, nosebleed    Eyes  Negative for photophobia, pain and discharge    Respiratory  Negative for hemoptysis and sputum    Cardiovascular  Negative for orthopnea, claudication and PND    Gastrointestinal  Negative for abdominal pain, diarrhea, blood in stool    Musculoskeletal  Negative for joint pain, negative for myalgia    Skin  Negative for rash or itching    Endo/heme/allergies  Negative for polydipsia, environmental allergy    Psychiatric/behavioral  Negative for suicidal ideation. Patient is not anxious        NEUROLOGIC EXAMINATION  GENERAL  Appears comfortable and in no distress   HEENT  NC/ AT   NECK  Supple   MENTAL STATUS:  Alert, oriented, intact memory, no confusion, normal speech, normal language, no hallucination or delusion   CRANIAL NERVES: II     -      Visual fields intact to confrontation  III,IV,VI -  EOMs full, no afferent defect, no RAFAEL, no ptosis  V     -     decreased left-sided facial sensation  VII    -     Normal facial symmetry  VIII   -     Intact hearing  IX,X -     Symmetrical palate  XI    -     Symmetrical shoulder shrug  XII   -     Midline tongue, no atrophy    MOTOR FUNCTION:  significant for good strength of grade 5/5 in bilateral proximal and distal muscle groups of both upper and lower extremities with normal bulk, normal tone and no involuntary movements, no tremor. Left arm drift.    SENSORY FUNCTION:  Decrease sensation left side of face and fingertips of left hand   CEREBELLAR FUNCTION:  Intact fine motor control over upper limbs   REFLEX FUNCTION:  Symmetric, no perverted reflex, brisk withdraw bilaterally with Babinski testing   STATION and GAIT  Not tested       Data:    Lab Results:   CBC:   Recent Labs     01/31/21  0515 02/01/21  0501 02/02/21  0607   WBC 8.3 9.5 7.6   HGB 10.9* 11.4* 11.0*    365 395     BMP:    Recent Labs     01/31/21  0515 02/01/21  0501 02/02/21  0607    140 143   K 3.3* 3.9 3.8    107 108*   CO2 23 24 24   BUN 12 13 15   CREATININE 0.45* 0.47* 0.44*   GLUCOSE 98 107* 100*         Lab Results   Component Value Date    CHOL 197 01/27/2021    LDLCHOLESTEROL 119 01/27/2021    HDL 44 01/27/2021    TRIG 168 (H) 01/27/2021    ALT 25 09/02/2020    AST 21 09/02/2020    INR 0.9 01/24/2021    LABA1C 5.0 01/27/2021           Diagnostic data reviewed:  CTA head and neck 1/27/2021  Multiple stents within the right middle cerebral artery and beyond the   trifurcation that appear patent.       Mild stenosis in the V4 portion of the left vertebral artery.      CT Head 1/27/2021  Evolving right MCA territory infarct with hemorrhagic conversion.  The   approximately 1 cm parenchymal hemorrhage is not significantly changed   relative to 10 hours prior, but there is suggestion of some new trace   associated subarachnoid blood products peripherally (axial series 2, image   43).     Increased conspicuity of the evolving cytotoxic edema with similar mild mass   effect on the right lateral ventricle with approximately 1 mm right to left   midline shift.      DSA 1/26/2021  --Right MCA subocclusive clot extending from distal M1 to the proximal M2 superior and inferior divisions.  There is collateral flow noted from right KIM to right MCA due to hemodynamically significant subocclusive clot.   --This was treated with intra-arterial mechanical thrombectomy with contact aspiration and 3 oh stent retriever.  This was followed with balloon angioplasty using 1.5 x 9 mm Marcus balloon (x4 inflated to 6 then 8, then 8, then 10 KT), then 2 x 15 mm Quantum apex balloon (x2). --This resulted in improvement in the flow across the subocclusive occlusion.  Total recanalization at TICI 2C. --The prior treated right MCA bifurcation aneurysm currently with no residual noted. MRI/A brain 1/24/2021  1. Large right middle cerebral artery territory infarct involving the   superior right temporal lobe and to a lesser extent the right frontal and   right parietal lobes. 2. Small focus of petechial hemorrhage within the right parietal lobe infarct. 3. Limited evaluation of the right middle cerebral artery given   susceptibility artifact from the patient's known MCA stent.  There is normal   signal identified within the proximal M1 segment as well as M2 branches   beyond the stent and area of susceptibility artifact.  Correlation with the   patient's reported recent outside CT angiogram of the brain is recommended        CT head without contrast 1/30/2021  Impression   1.  Right middle cerebral artery distribution subacute infarct with   hemorrhagic transformation.  Amount of hemorrhage has decreased somewhat.       2.  Reduced edema and mass effect associated with infarct.       3.  Interval resolution of subarachnoid hemorrhage.       4.  Other incidental findings as above. Repeat CT head (2/1/21) -  Decreased conspicuity of the evolving cytotoxic edema and the associated   small amount of intraparenchymal blood products in the right MCA   distribution.  No new hemorrhage, and no new foci of altered attenuation   within the remainder of the brain parenchyma.       Ventricles remain normal in caliber.  No herniation or significant mass   effect.              Impression:  -Large right MCA territory infarct with hemorrhagic transformation status post MT and balloon angioplasty TICI 2C on 1/26/2021  -History of unruptured right MCA aneurysm treated with web and stent placement on 8/26/2020  -History of left breast cancer status post lobectomy and chemoradiation 2018  -Comorbid hypertension, hyperlipidemia    Plan:  -Repeat DSA today to eval for changes in right M1 patency   -Continue aspirin 325 mg and Plavix 75 mg daily  -Continue Lipitor 40 mg nightly  -SBP goal 120-180 as per neuro endovascular; SBP controlled today  -PT/OT/ST; patient to decide home vs SNF  -DVT prophylaxis; on Lovenox    **ADDENDUM: Angiogram done with no intervention needed. Patient feeling well post procedure. Will await her discharge decision; hopeful for discharge tomorrow. Please note that this note was generated using a voice recognition dictation software. Although every effort was made to ensure the accuracy of this automated transcription, some errors in transcription may have occurred.

## 2021-02-02 NOTE — PROGRESS NOTES
Physical Therapy  Facility/Department: [facility] NEURO  Daily Treatment Note  NAME: Padmini VASQUEZ  : 1970  MRN: [id number]    Date of Service: 2021    Discharge Recommendations: In my professional opinion, this patient could tolerate a total of 3 hours of combined therapies post-acute care. PT Equipment Recommendations  Equipment Needed: No    Assessment    Pt alert, cooperative, motivated; improved ability to tend to her L this date, but still delayed and has to really concentrate; she did miss seeing a hallway to her L as instructed and passed it, having to be told to turn around in order to find it; she also neglected to put her LUE into her sweater when donning, and to take her L arm out of the sleeve when removing the sweater. Continues to need IP rehab for safety d/t L sided neglect, but definitely improving. Body structures, Functions, Activity limitations: Decreased functional mobility ; Decreased endurance;Decreased balance;Decreased safe awareness;Decreased vision/visual deficit  Prognosis: Good  Decision Making: Medium Complexity  PT Education: Goals;PT Role;Plan of Care  Barriers to Learning: none  REQUIRES PT FOLLOW UP: Yes  Activity Tolerance  Activity Tolerance: Patient Tolerated treatment well  Activity Tolerance: pt still very slow to initiate movement     Patient Diagnosis(es): The encounter diagnosis was Cerebrovascular accident (CVA) due to occlusion of left middle cerebral artery ([address] has a past medical history of Aneurysm ([address] Cancer ([address] Contact lens/glasses fitting, Fibromyalgia, Former smoker, Hyperlipidemia, Hypertension, and Sleep apnea. has a past surgical history that includes Breast surgery (Left, 2018); Tunneled venous port placement; Hysterectomy (2018); Carpal tunnel release (Bilateral);   section; back surgery; Brain aneurysm surgery (2020); and other surgical history (01/21/2021). Restrictions  Restrictions/Precautions  Restrictions/Precautions: General Precautions, Fall Risk, Up as Tolerated  Required Braces or Orthoses?: No  Position Activity Restriction  Other position/activity restrictions: up with A, SBP goal 120-160. s/p thrombectomy with balloon angio plasty 1/26; pt to have diagnostic angio today (2/2/21)  Subjective   General  Response To Previous Treatment: Patient with no complaints from previous session. Family / Caregiver Present: Yes(dtr)  Pain Screening  Patient Currently in Pain: Denies  Vital Signs  WFL       Orientation  Orientation  Overall Orientation Status: Within Functional Limits(oriented but slow to respond and occasionally says things that are incorrect ie she insists she has contact lenses in even though she can't see clearly and an opthamologist told her that she doesn't have her lenses in)     Objective   Bed mobility  Rolling to Left: Independent  Rolling to Right: Independent  Supine to Sit: Independent  Sit to Supine: Independent  Scooting: Independent  Comment: slow to initiate movement but didn't require tactile cues--did require verbal cues  Transfers  Sit to Stand: Supervision  Stand to sit: Supervision  Stand Pivot Transfers: Supervision  Ambulation  Ambulation?: Yes  Ambulation 1  Surface: level tile  Device: No Device  Assistance: Stand by assistance  Gait Deviations: Slow Neida; Increased ALLYSON  Distance: 90'x 8  Stairs/Curb  Stairs?: Yes  Stairs  # Steps : 10  Rails: Left ascending(Pt automatically reached for the R hand rail even though hers is on the L at home--needed verbal cues to find the L handrail)  Device: No Device  Assistance: Stand by assistance  Comment: verbal cues to turn around, and use the HR on the R descending (she only has 1 HR at home)     Balance  Posture: Good  Sitting - Static: Good  Sitting - Dynamic: Good  Standing - Static: Good  Standing - Dynamic: Fair;+  Other exercises  Other exercises 1: standing ex with CGa+1: up on toes, partial squats, KTC x 10 reps; A/P lunges x 10 reps ea LE with min A+1     Goals  Short term goals  Time Frame for Short term goals: 14 visits  Short term goal 1: Pt will be Winston bed mobility  Short term goal 2: Pt will be Winston transfers  Short term goal 3: Pt will be Winston amb 240' RW or least restrictive AD  Short term goal 4: Pt will navigate flight of steps Winston L rail    Plan    Plan  Times per week: 6-7x/wk  Times per day: Daily  Current Treatment Recommendations: Strengthening, Balance Training, Functional Mobility Training, Transfer Training, Gait Training, Endurance Training, Stair training, Home Exercise Program, Safety Education & Training, Patient/Caregiver Education & Training, Equipment Evaluation, Education, & procurement  Safety Devices  Type of devices: Call light within reach, Gait belt, Patient at risk for falls, Left in bed  Restraints  Initially in place: No     Therapy Time   Individual Concurrent Group Co-treatment   Time In [facility]         Time Out 1012         Minutes 47                 Greta Mcgarry, [address] [facility]

## 2021-02-02 NOTE — CARE COORDINATION
Transition planning  Spoke with pt and daughter in regards to choices for discharge . Viridiana STRATTON  Described options. Pt wants to help family feel safe. Lists given for Homecare and SNF- Pt  request time to discuss and they will let us know.

## 2021-02-02 NOTE — CONSULTS
Ophthalmology    54 y/o woman who wears extended wear contact lenses thinks they should be removed and has tried to do so without success. She is able to read fine print OU without difficulty. On exam I do not see a CL in either eye. Topical anesthesia applied and attempt made to remove lenses from each eye without finding a lens. She is comfortable and there is no ocular sign of inflammation. Fluorescein dye shows no CL stain or abrasion. Adv:  Topical antibiotics if the eyes become infected. Otherwise no need to manipulate the eyes further. It should be noted that I was contacted on [employer] 2/1 at a few minutes after 7 PM.  I was not on call the previous day when the consult request came in and another dr was responsible on that date. A number of the nursing staff should have been able to tell if a CL was indeed in place. Please contact again if further issue arises.     Luis Antonio Bonner MD  [phone]

## 2021-02-02 NOTE — PROGRESS NOTES
Occupational Therapy  Facility/Department: [facility] NEURO  Daily Treatment Note  NAME: Trish VASQUEZ  : 1970  MRN: [id number]    Date of Service: 2021    Discharge Recommendations:  Patient would benefit from continued therapy after discharge       Assessment   Performance deficits / Impairments: Decreased ADL status; Decreased high-level IADLs;Decreased safe awareness;Decreased fine motor control;Decreased strength;Decreased cognition;Decreased posture  Prognosis: Good  OT Education: OT Role;Orientation;Plan of Care;Precautions; ADL Adaptive Strategies  Patient Education: good return  REQUIRES OT FOLLOW UP: Yes  Activity Tolerance  Activity Tolerance: Patient Tolerated treatment well  Safety Devices  Type of devices: Left in bed;Call light within reach;Nurse notified         Patient Diagnosis(es): The encounter diagnosis was Cerebrovascular accident (CVA) due to occlusion of left middle cerebral artery ([facility] [address] has a past medical history of Aneurysm ([facility] [address] Cancer ([facility] [address] Contact lens/glasses fitting, Fibromyalgia, Former smoker, Hyperlipidemia, Hypertension, and Sleep apnea. has a past surgical history that includes Breast surgery (Left, 2018); Tunneled venous port placement; Hysterectomy (2018); Carpal tunnel release (Bilateral);  section; back surgery; Brain aneurysm surgery (2020); and other surgical history (2021).     Restrictions  Restrictions/Precautions  Restrictions/Precautions: General Precautions, Fall Risk, Up as Tolerated  Required Braces or Orthoses?: No  Position Activity Restriction  Other position/activity restrictions: up with A, SBP goal 120-160. s/p thrombectomy with balloon angio plasty ; pt to have diagnostic angio today (21)  Subjective   General  Chart Reviewed: Yes  Patient assessed for rehabilitation services?: Yes  Family / Caregiver Present: Yes(Daughter)  Diagnosis: R MCA CVA occlusion, hx of aneurysm, L weakness  General Comment  Comments: RN ok'd pt for therapy this date. Pt agreeable to session and pleasant/cooperative throughout  Vital Signs  Patient Currently in Pain: Denies   Orientation  Orientation  Overall Orientation Status: Within Functional Limits  Objective    ADL  Simple grooming: Stand by assistance;Setup; Increased time to complete  Balance  Sitting Balance: Modified independent   Standing Balance: Stand by assistance  Standing Balance  Time: ~15 min  Activity: Pt stood at sink to complete simple grooming. Pt stood EOB to complete reaching and retriving. Comment: without AD  Functional Mobility  Functional - Mobility Device: No device  Assist Level: Stand by assistance  Bed mobility  Rolling to Right: Modified independent  Supine to Sit: Modified independent  Sit to Supine: Modified independent  Scooting: Modified independent  Transfers  Sit to stand: Stand by assistance  Stand to sit: Stand by assistance  Cognition  Overall Cognitive Status: Exceptions  Arousal/Alertness: Delayed responses to stimuli  Following Commands: Follows multistep commands with increased time; Follows multistep commands with repitition  Safety Judgement: Decreased awareness of need for assistance;Decreased awareness of need for safety  Insights: Decreased awareness of deficits  Initiation: Requires cues for some  Pt demo flat affect and  L side neglect noted. Pt was supine in bed upon arrival. Pt transferred to bathroom from EOB. Pt completed simple grooming standing at sink. Pt stood in room to complete reaching and retrieving (crossing midline, bending at the waist, use of BUE, visual scanning used). Pt ed on CVA symptoms and signs of CVA-good return. Pt retired to bed with safety precautions in place and call light within reach.    Plan   Plan  Times per week: 4-5x  Goals  Short term goals  Time Frame for Short term goals: Pt will by discharge  Short term goal 1: demo good safety awareness during func mob around room (I)  Short term goal 2: demo bending/reaching func activity while standing with SUP  Short term goal 3: demo ADL UB/LB dressing/bathing activity with mod I, 1 vc, and increased time  Short term goal 4: demo scanning to L x6+ during func activity with 1 vc only to reduce L visual deficit  Short term goal 5: Demo 3-step ADL task with only 1 VC to increase cog and ability to complete ADLs/IADLs (goal updated 01/28/2021)       Therapy Time   Individual Concurrent Group Co-treatment   Time In 1019         Time Out 1048         Minutes 29         Timed Code Treatment Minutes: 220 Meagan Pappas

## 2021-02-03 LAB
ABSOLUTE EOS #: 0.29 K/UL (ref 0–0.44)
ABSOLUTE IMMATURE GRANULOCYTE: 0.05 K/UL (ref 0–0.3)
ABSOLUTE LYMPH #: 1.95 K/UL (ref 1.1–3.7)
ABSOLUTE MONO #: 0.66 K/UL (ref 0.1–1.2)
ANION GAP SERPL CALCULATED.3IONS-SCNC: 11 MMOL/L (ref 9–17)
BASOPHILS # BLD: 0 % (ref 0–2)
BASOPHILS ABSOLUTE: 0.03 K/UL (ref 0–0.2)
BUN BLDV-MCNC: 15 MG/DL (ref 6–20)
BUN/CREAT BLD: ABNORMAL (ref 9–20)
CALCIUM SERPL-MCNC: 9.1 MG/DL (ref 8.6–10.4)
CHLORIDE BLD-SCNC: 108 MMOL/L (ref 98–107)
CO2: 23 MMOL/L (ref 20–31)
CREAT SERPL-MCNC: 0.42 MG/DL (ref 0.5–0.9)
DIFFERENTIAL TYPE: ABNORMAL
EOSINOPHILS RELATIVE PERCENT: 3 % (ref 1–4)
GFR AFRICAN AMERICAN: >60 ML/MIN
GFR NON-AFRICAN AMERICAN: >60 ML/MIN
GFR SERPL CREATININE-BSD FRML MDRD: ABNORMAL ML/MIN/{1.73_M2}
GFR SERPL CREATININE-BSD FRML MDRD: ABNORMAL ML/MIN/{1.73_M2}
GLUCOSE BLD-MCNC: 94 MG/DL (ref 70–99)
HCT VFR BLD CALC: 34.3 % (ref 36.3–47.1)
HEMOGLOBIN: 11.2 G/DL (ref 11.9–15.1)
IMMATURE GRANULOCYTES: 1 %
LYMPHOCYTES # BLD: 23 % (ref 24–43)
MAGNESIUM: 1.9 MG/DL (ref 1.6–2.6)
MCH RBC QN AUTO: 32 PG (ref 25.2–33.5)
MCHC RBC AUTO-ENTMCNC: 32.7 G/DL (ref 28.4–34.8)
MCV RBC AUTO: 98 FL (ref 82.6–102.9)
MONOCYTES # BLD: 8 % (ref 3–12)
NRBC AUTOMATED: 0 PER 100 WBC
PDW BLD-RTO: 13.2 % (ref 11.8–14.4)
PLATELET # BLD: 385 K/UL (ref 138–453)
PLATELET ESTIMATE: ABNORMAL
PMV BLD AUTO: 9.5 FL (ref 8.1–13.5)
POTASSIUM SERPL-SCNC: 3.9 MMOL/L (ref 3.7–5.3)
RBC # BLD: 3.5 M/UL (ref 3.95–5.11)
RBC # BLD: ABNORMAL 10*6/UL
SEG NEUTROPHILS: 65 % (ref 36–65)
SEGMENTED NEUTROPHILS ABSOLUTE COUNT: 5.59 K/UL (ref 1.5–8.1)
SODIUM BLD-SCNC: 142 MMOL/L (ref 135–144)
WBC # BLD: 8.6 K/UL (ref 3.5–11.3)
WBC # BLD: ABNORMAL 10*3/UL

## 2021-02-03 PROCEDURE — 80048 BASIC METABOLIC PNL TOTAL CA: CPT

## 2021-02-03 PROCEDURE — 85025 COMPLETE CBC W/AUTO DIFF WBC: CPT

## 2021-02-03 PROCEDURE — 92523 SPEECH SOUND LANG COMPREHEN: CPT

## 2021-02-03 PROCEDURE — 2580000003 HC RX 258: Performed by: STUDENT IN AN ORGANIZED HEALTH CARE EDUCATION/TRAINING PROGRAM

## 2021-02-03 PROCEDURE — 6370000000 HC RX 637 (ALT 250 FOR IP): Performed by: STUDENT IN AN ORGANIZED HEALTH CARE EDUCATION/TRAINING PROGRAM

## 2021-02-03 PROCEDURE — 6370000000 HC RX 637 (ALT 250 FOR IP): Performed by: PSYCHIATRY & NEUROLOGY

## 2021-02-03 PROCEDURE — 36415 COLL VENOUS BLD VENIPUNCTURE: CPT

## 2021-02-03 PROCEDURE — APPSS30 APP SPLIT SHARED TIME 16-30 MINUTES: Performed by: NURSE PRACTITIONER

## 2021-02-03 PROCEDURE — 99233 SBSQ HOSP IP/OBS HIGH 50: CPT | Performed by: PSYCHIATRY & NEUROLOGY

## 2021-02-03 PROCEDURE — 97116 GAIT TRAINING THERAPY: CPT

## 2021-02-03 PROCEDURE — 83735 ASSAY OF MAGNESIUM: CPT

## 2021-02-03 PROCEDURE — 6360000002 HC RX W HCPCS: Performed by: NURSE PRACTITIONER

## 2021-02-03 PROCEDURE — 1200000000 HC SEMI PRIVATE

## 2021-02-03 PROCEDURE — 99232 SBSQ HOSP IP/OBS MODERATE 35: CPT | Performed by: PSYCHIATRY & NEUROLOGY

## 2021-02-03 RX ADMIN — ENOXAPARIN SODIUM 40 MG: 40 INJECTION SUBCUTANEOUS at 08:59

## 2021-02-03 RX ADMIN — VENLAFAXINE HYDROCHLORIDE 75 MG: 75 CAPSULE, EXTENDED RELEASE ORAL at 08:59

## 2021-02-03 RX ADMIN — DESMOPRESSIN ACETATE 40 MG: 0.2 TABLET ORAL at 20:25

## 2021-02-03 RX ADMIN — SODIUM CHLORIDE, PRESERVATIVE FREE 10 ML: 5 INJECTION INTRAVENOUS at 09:00

## 2021-02-03 RX ADMIN — LISINOPRIL 20 MG: 20 TABLET ORAL at 08:59

## 2021-02-03 RX ADMIN — CLOPIDOGREL 75 MG: 75 TABLET, FILM COATED ORAL at 08:59

## 2021-02-03 RX ADMIN — ASPIRIN 325 MG: 325 TABLET ORAL at 08:59

## 2021-02-03 RX ADMIN — CARVEDILOL 12.5 MG: 12.5 TABLET, FILM COATED ORAL at 18:15

## 2021-02-03 RX ADMIN — CARVEDILOL 12.5 MG: 12.5 TABLET, FILM COATED ORAL at 08:59

## 2021-02-03 RX ADMIN — SODIUM CHLORIDE, PRESERVATIVE FREE 10 ML: 5 INJECTION INTRAVENOUS at 20:25

## 2021-02-03 ASSESSMENT — PAIN SCALES - GENERAL
PAINLEVEL_OUTOF10: 0
PAINLEVEL_OUTOF10: 2

## 2021-02-03 ASSESSMENT — PAIN DESCRIPTION - LOCATION: LOCATION: HEAD

## 2021-02-03 ASSESSMENT — PAIN DESCRIPTION - PAIN TYPE: TYPE: ACUTE PAIN

## 2021-02-03 NOTE — CARE COORDINATION
Family friend Cory Sung at [facility] to give a few more choices for SNF.    [facility]- referral sent  [facility]- referral sent  . Celina Cortes [facility]

## 2021-02-03 NOTE — PROGRESS NOTES
Physical Therapy  Facility/Department: [address] NEURO  Daily Treatment Note  NAME: Tiera VASQUEZ  : 1970  MRN: [id number]    Date of Service: 2/3/2021    Discharge Recommendations:  Continue to assess pending progress (possibly need for continued OP speech therapy post discharge for mild cognitive deficits)  PT Equipment Recommendations  Equipment Needed: No    Assessment   Body structures, Functions, Activity limitations: Decreased functional mobility ; Decreased endurance;Decreased balance;Decreased safe awareness;Decreased vision/visual deficit  Assessment: Pt demo's flat affect, SBA to Independent for all mobility, pt had difficulty placing LUE into robe sleeve and required frequent cues for balance activity  Prognosis: Good  Decision Making: Medium Complexity  PT Education: Goals;PT Role;Plan of Care  Barriers to Learning: none  REQUIRES PT FOLLOW UP: Yes  Activity Tolerance  Activity Tolerance: Patient Tolerated treatment well     Patient Diagnosis(es): The encounter diagnosis was Cerebrovascular accident (CVA) due to occlusion of left middle cerebral artery ([address] has a past medical history of Aneurysm ([address] Cancer ([address] Contact lens/glasses fitting, Fibromyalgia, Former smoker, Hyperlipidemia, Hypertension, and Sleep apnea. has a past surgical history that includes Breast surgery (Left, 2018); Tunneled venous port placement; Hysterectomy (2018); Carpal tunnel release (Bilateral);  section; back surgery; Brain aneurysm surgery (2020); and other surgical history (2021).     Restrictions  Restrictions/Precautions  Restrictions/Precautions: General Precautions, Fall Risk, Up as Tolerated  Required Braces or Orthoses?: No  Position Activity Restriction  Other position/activity restrictions: up with A, SBP goal 120-160. s/p thrombectomy with balloon angio plasty ; pt to have diagnostic angio today (21)  Subjective   General  Response To Previous Treatment: Patient with no Safety Education & Training, Patient/Caregiver Education & Training, Equipment Evaluation, Education, & procurement  Safety Devices  Type of devices: Call light within reach, Gait belt, Patient at risk for falls, Left in bed  Restraints  Initially in place: No     Therapy Time   Individual Concurrent Group Co-treatment   Time In 1202         Time Out 1216         Minutes 14          Time coded minutes [address], PTA

## 2021-02-03 NOTE — PROGRESS NOTES
Speech Language Pathology  Facility/Department: [facility] NEURO  Initial Speech/Language/Cognitive Assessment    NAME: Akshat VASQUEZ  : 1970   MRN: [id number]  ADMISSION DATE: 2021  ADMITTING DIAGNOSIS: has S/P coil embolization of cerebral aneurysm; Aneurysm ([address] Dizziness; Family history of brain aneurysm; History of cervical spinal surgery; History of breast cancer; Middle cerebral artery aneurysm; Cerebrovascular accident (CVA) due to occlusion of left middle cerebral artery ([address] Acute right MCA stroke ([address] and History of cerebral aneurysm repair on their problem list.  DATE ONSET: 2021    Date of Eval: 2/3/2021   Evaluating Therapist: Riccardo Delgado    RECENT RESULTS  CT OF HEAD/MRI:   Impression   Decreased conspicuity of the evolving cytotoxic edema and the associated   small amount of intraparenchymal blood products in the right MCA   distribution.  No new hemorrhage, and no new foci of altered attenuation   within the remainder of the brain parenchyma.       Ventricles remain normal in caliber.  No herniation or significant mass   effect.             Primary Complaint: Ms. Agata Summers is a 53 y.o. female presented to [place] as a transfer from [facility] due to concern for right MCA occlusion. Patient initially presented at [facility] with right-sided headache, left-sided weakness and dizziness. CTA head and neck showed acute right MCA occlusion. Patient underwent diagnostic angiogram on 2021 showing residual filling of the right M1 MCA aneurysm. Patient was transferred to [place], stroke alert was called. NIH was 3 on presentation. MRI brain showed large right MCA territory infarct with petechial hemorrhages. MRA head with limited evaluation of right MCA M1 due to artifact. Patient was admitted under neuro ICU for further management.   Patient underwent cerebral angiogram which revealed right MCA subocclusive clot extending from distal M1 to proximal M2 treated with mechanical thrombectomy and balloon angioplasty with TICI 2C. Follow-up CT head showed large right MCA infarct with hemorrhagic transformation and mild effacement of the lateral ventricle. Patient's physical exam was unchanged. Repeat CT head redemonstrated right MCA infarct with hemorrhagic conversion. CTA head and neck reveals patent stent. Patient was transferred to neuro stepdown on 1/28/2021    Pain:  Pain Assessment  Pain Assessment: 0-10  Pain Level: 0  Patient's Stated Pain Goal: No pain  Pain Type: Acute pain, Surgical pain  Pain Location: Head  Pain Orientation: Right, Left  Pain Descriptors: Aching, Sore  Pain Frequency: Intermittent  Pain Onset: On-going  Clinical Progression: Not changed  Functional Pain Assessment: Prevents or interferes some active activities and ADLs  Non-Pharmaceutical Pain Intervention(s): Cold applied, Environmental changes, Relaxation techniques, Repositioned, Rest  Response to Pain Intervention: None  Multiple Pain Sites: No  RASS Score: Alert and calm    Assessment:         Pt presents with mild to moderate cognitive deficits characterized by impaired sequencing, task insight, thought flexibility, and word generation. Pt. Presents with no dysarthria, no O/M deficits at this time. ST to follow up and provide treatment to address noted deficits. Education provided. Further therapy recommended at discharge. Recommendations:  Requires SLP Intervention: Yes  Duration/Frequency of Treatment: 3-x week  D/C Recommendations: Further therapy recommended at discharge.          Plan:   Goals:  Short-term Goals  Goal 1: Patient will recall 3-5 units without distraction with 90% accuracy  Goal 2: Patient will utilize memory compensatory strategies to aid in recall  Goal 3: Patient will complete task insight tasks with 90% accuracy  Goal 4: Patient will complete thought flexibility tasks with 90% accuracy  Goal 5: Patient will complete 3-4 unit verbal sequencing tasks with 90% accuracy  Goal 6: Patient will generate 8 units in a category with 90% accuracy   Patient/family involved in developing goals and treatment plan: Yes    Subjective:   Previous level of function and limitations: Patient had a job and did drive. General  Chart Reviewed: Yes  Family / Caregiver Present: Yes(friend)  Social/Functional History  Lives With: Alone  Vision  Vision: Within Functional Limits  Hearing  Hearing: Within functional limits           Objective:     Oral/Motor  Oral Motor: Within functional limits       Motor Speech  Motor Speech: Within Functional Limits     Cognition:      Attention  Attention: Within Functional Limits  Memory  Memory: Exceptions to WFL(immediate recall: 3/3, /5, 3/3)  Short-term Memory: (delayed recall: 3/3, 3/3)  Problem Solving  Problem Solving: Exceptions to [facility]  Abstract Reasoning  Abstract Reasoning: Exceptions to [facility] [place]  Divergent Thinking: Mild(named 6 animals in 30 seconds)  Safety/Judgement  Safety/Judgement: Exceptions to [facility] [place]  Impulsive: Mild(task insight: 2/3)  Flexibility of Thought: Mild(3/3, 2/3)   Sequencin/4 (moderate)       Prognosis:  Speech Therapy Prognosis  Prognosis: Good  Prognosis Considerations: Age  Individuals consulted  Consulted and agree with results and recommendations: Patient    Education:  Patient Education: Yes  Patient Education Response: Verbalizes understanding          Therapy Time:   Individual Concurrent Group Co-treatment   Time In 912         Time Out 09         Minutes 14                 Completed by:  Thanh Cameron  Clinician    Cosigned By: Apryl Chahal S.CCC/SLP        2/3/2021 9:48 AM

## 2021-02-03 NOTE — PROGRESS NOTES
ENDOVASCULAR NEUROSURGERY PROGRESS NOTE  2/3/2021 2:25 PM  Subjective:   Admit Date: 1/24/2021  PCP: Rob Bhat, APRN - CNP    No new acute events. Underwent follow-up diagnostic cerebral angiogram on 2/2/2021    Objective:   Vitals: /76   Pulse 62   Temp 97.9 °F (36.6 °C) (Oral)   Resp 20   Ht 5' 5\" (1.651 m)   Wt 240 lb 4.8 oz (109 kg)   SpO2 90%   BMI 39.99 kg/m²   General appearance: Lying in bed, NAD. HEENT: Atraumatic. Neck: Neck is supple. Lungs: No respiratory distress noted. Heart: normal sinus rhythm on tele. Darrin Dirk Extremities: No lower limb edema noted. Neurologic:  awake, following simple commands appropriately, able to name simple objects, intact comprehension, no dysarthria noted. CN: Has intact extraocular muscles movements, no facial droop noted  MOTOR: Has good strength in both upper and lower extremities, moving both upper and lower extremities against gravity with no drift. SENSORY: Intact sensation to simple touch bilaterally in both upper and lower extremities. Left-sided body numbness noted. Sensory neglect noted to the left body side for bilateral sensory stimuli.     Medications and labs:   Scheduled Meds:   lidocaine 1 % injection  5 mL Intradermal Once    sodium chloride flush  10 mL Intravenous 2 times per day    atorvastatin  40 mg Oral Nightly    lisinopril  20 mg Oral Daily    sennosides-docusate sodium  2 tablet Oral Daily    carvedilol  12.5 mg Oral BID WC    enoxaparin  40 mg Subcutaneous Daily    clopidogrel  75 mg Oral Daily    aspirin  325 mg Oral Daily    venlafaxine  75 mg Oral Daily    sodium chloride flush  10 mL Intravenous 2 times per day     Continuous Infusions:  CBC:   Recent Labs     02/01/21  0501 02/02/21  0607 02/03/21  0515   WBC 9.5 7.6 8.6   HGB 11.4* 11.0* 11.2*    395 385     BMP:    Recent Labs     02/01/21  0501 02/02/21  0607 02/03/21  0515    143 142   K 3.9 3.8 3.9    108* 108*   CO2 24 24 23   BUN 13 15 15   CREATININE 0.47* 0.44* 0.42*   GLUCOSE 107* 100* 94     Hepatic: No results for input(s): AST, ALT, ALB, BILITOT, ALKPHOS in the last 72 hours. Troponin: No results for input(s): TROPONINI in the last 72 hours. BNP: No results for input(s): BNP in the last 72 hours. Lipids: No results for input(s): CHOL, HDL in the last 72 hours. Invalid input(s): LDLCALCU  INR: No results for input(s): INR in the last 72 hours. CT head 1/30/2021  1.  Right middle cerebral artery distribution subacute infarct with   hemorrhagic transformation.  Amount of hemorrhage has decreased somewhat.       2.  Reduced edema and mass effect associated with infarct.       3.  Interval resolution of subarachnoid hemorrhage.       4.  Other incidental findings as above.      CTA head and neck 1/27/2021  Multiple stents within the right middle cerebral artery and beyond the   trifurcation that appear patent.       Mild stenosis in the V4 portion of the left vertebral artery.      DSA 1/26/2021  --Right MCA subocclusive clot extending from distal M1 to the proximal M2 superior and inferior divisions.  There is collateral flow noted from right KIM to right MCA due to hemodynamically significant subocclusive clot. --This was treated with intra-arterial mechanical thrombectomy with contact aspiration and 3 oh stent retriever.  This was followed with balloon angioplasty using 1.5 x 9 mm Dodson balloon (x4 inflated to 6 then 8, then 8, then 10 KT), then 2 x 15 mm Quantum apex balloon (x2). --This resulted in improvement in the flow across the subocclusive occlusion.  Total recanalization at TICI 2C. --The prior treated right MCA bifurcation aneurysm currently with no residual noted.     MRI/A brain 1/24/2021  1. Large right middle cerebral artery territory infarct involving the   superior right temporal lobe and to a lesser extent the right frontal and   right parietal lobes.    2. Small focus of petechial hemorrhage within the right parietal lobe infarct. 3. Limited evaluation of the right middle cerebral artery given   susceptibility artifact from the patient's known MCA stent.  There is normal   signal identified within the proximal M1 segment as well as M2 branches   beyond the stent and area of susceptibility artifact.  Correlation with the   patient's reported recent outside CT angiogram of the brain is recommended.          Assessment and Recommendations:   53yo female with pmh of hld, htn, breast cancer 2018, gray. 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 DSA 1/21/2021 showed residual filling of the R MCA aneurysm and an incidental L MCA blister aneurysm.     Pt presented 1/24/2021 with acute L hemiweakness, neglect, r gaze pref, found to have acute large R MCA stroke. MRA showed artifact at the aneurysm but no clear LVO. possible in-stent thrombosis following insufficient antithrombotic rx. dSA 1/26/2021 showed partially occlusive thrombus at the R M2 branches at the stent site. S/p MT TICI2c and balloon angioplasty.     Repeat CT head 1/27/2021 showed small R hemorrhagic conversion. Repeat ct head following shows stable lesion. CTA shows patent vessels. CT  Head 1/30/2021 resolving bleed and edema. CT head on 2/1/2021 reviewed, right hemorrhagic IPH stable. S/p follow-up DSA on 2/2/2021   Impression:   --The prior noted right MCA M1/M2 subocclusive clot?significantly improved/resolved. There is a small residual noted subocclusive clot in left MCA M2 posterior division not hemodynamically significant. --The prior treated right MCA bifurcation aneurysm with Web device currently with no residual noted with Isabell Arleth class at 1.      PLAN:  --continue asa 325 and plavix 75 daily  --continue lipitor 10  --sbp 120-180  --care as per neurology team  --Upon discharge follow up with Dr. Lanny Le in 2 weeks and Dr. Rg in 3 months        Jorge Pop MD  Stroke, Neurocritical Care & [facility]  [address] [place]  Electronically signed 2/3/2021 at 2:25 PM

## 2021-02-03 NOTE — DISCHARGE INSTR - COC
Continuity of Care Form    Patient Name: Susan Davis   :  1970  MRN:  [id number]    [facility] date:  2021  Discharge date:  2021    Code Status Order: Full Code   Advance Directives: No    Advance Care Flowsheet Documentation       Date/Time Healthcare Directive Type of Healthcare Directive Copy in [address] Agent's Name Healthcare Agent's Phone Number    21 1338  No, patient does not have an advance directive for healthcare treatment -- -- -- -- --    21  No, patient does not have an advance directive for healthcare treatment -- -- -- -- --    21  No, patient does not have an advance directive for healthcare treatment -- -- -- -- --            Admitting Physician:  Larry Dimas MD  PCP: POPEYE Izaguirre CNP    Discharging Nurse: [facility] Unit/Room#: 8450/6257-00  Discharging Unit Phone Number: ***    Emergency Contact:   Extended Emergency Contact Information  Primary Emergency Contact: Monica Luciano 40 Phone: [phone]  Relation: Child  Preferred language: English   needed? No    Past Surgical History:  Past Surgical History:   Procedure Laterality Date    BACK SURGERY      neck fusion & L4-L5 fusion    BRAIN ANEURYSM SURGERY  2020    G-CON EMOBLIZATION SYSTEM MRI CONDTIONAL 3T OK, SAFE IMMEDIATELY. IR ANGIOGRAM CAROTID CEREBRAL BILATERAL,,S/P coil embolization of cerebral aneurysm    BREAST SURGERY Left 2018    lumpectomy    CARPAL TUNNEL RELEASE Bilateral      SECTION      x 2,  &     HYSTERECTOMY  2018    OTHER SURGICAL HISTORY  2021    DIAGNOSTIC CEREBRAL ANGIOGRAM / Previously treated right MCA M1 bifurcation aneurysm is with a small residual filling noted    TUNNELED VENOUS PORT PLACEMENT      placed , removed 2019       Immunization History: There is no immunization history on file for this patient.     Active Problems:  Patient Active Problem List   Diagnosis Code    S/P coil embolization of cerebral aneurysm Z98.890    Aneurysm ([facility] I72.9    Dizziness R42    Family history of brain aneurysm Z82.49    History of cervical spinal surgery Z98.890    History of breast cancer Z85.3    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    Cerebrovascular accident (CVA) due to occlusion of left middle cerebral artery (HCC) I20.009    Acute right MCA stroke (HCC) I63.511    History of cerebral aneurysm repair Z98.890, Z86.79       Isolation/Infection:   Isolation            No Isolation          Patient Infection Status       Infection Onset Added Last Indicated Last Indicated By Review Planned Expiration Resolved Resolved By    None active    Resolved    COVID-19 Rule Out 08/21/20 08/21/20 08/22/20 Covid-19 Ambulatory (Ordered)   08/23/20 Rule-Out Test Resulted            Nurse Assessment:  Last Vital Signs: /76   Pulse 62   Temp 97.9 °F (36.6 °C) (Oral)   Resp 20   Ht 5' 5\" (1.651 m)   Wt 240 lb 4.8 oz (109 kg)   SpO2 90%   BMI 39.99 kg/m²     Last documented pain score (0-10 scale): Pain Level: 0  Last Weight:   Wt Readings from Last 1 Encounters:   01/25/21 240 lb 4.8 oz (109 kg)     Mental Status:  oriented, alert and occasionally appears to not follow what you are asking    IV Access:  - None    Nursing Mobility/ADLs:  Walking   Independent  Transfer  Independent  Bathing  Assisted  Dressing  Independent  Toileting  Independent  Feeding  Independent  Med Admin  Assisted  Med Delivery   whole    Wound Care Documentation and Therapy:  Puncture Femoral Anterior;Proximal;Right (Active)   Wound Assessment Clean;Dry; Intact 02/03/21 1200   Closure Adhesive bandage 02/03/21 1200   Drainage Amount None 02/03/21 1200   Odor None 02/03/21 1200   Dressing Status Clean;Dry; Intact 02/03/21 1200   Number of days:         Elimination:  Continence:   · Bowel:  Yes  · Bladder: Yes  Urinary Catheter: None   Colostomy/Ileostomy/Ileal Conduit: No       Date of Last BM: 2/1/21      Intake/Output Summary (Last 24 hours) at 2/3/2021 1402  Last data filed at 2/3/2021 0900  Gross per 24 hour   Intake 1270 ml   Output --   Net 1270 ml     I/O last 3 completed shifts: In: 772 [P.O.:870]  Out: -     Safety Concerns: At Risk for Falls    Impairments/Disabilities:      questionable comprehension at times    Nutrition Therapy:  Current Nutrition Therapy:   - Oral Diet:  General    Routes of Feeding: Oral  Liquids: No Restrictions  Daily Fluid Restriction: no  Last Modified Barium Swallow with Video (Video Swallowing Test): not done    Treatments at the Time of Hospital Discharge:   Respiratory Treatments: ***  Oxygen Therapy:  is not on home oxygen therapy. Ventilator:    - No ventilator support    Rehab Therapies: Speech/Language Therapy and  Physical therapy  Weight Bearing Status/Restrictions: no weight bearing resr  Other Medical Equipment (for information only, NOT a DME order):  None  Other Treatments: ***    Patient's personal belongings (please select all that are sent with patient):  Albert    RN SIGNATURE:  Electronically signed by Leah Ramirez RN on 2/3/21 at 2:17 PM EST    CASE MANAGEMENT/SOCIAL WORK SECTION    Inpatient Status Date: ***    Readmission Risk Assessment Score:  Readmission Risk              Risk of Unplanned Readmission:        11           Discharging to Facility/ Agency   · Name:   · Address:  · Phone:  · Fax:    Dialysis Facility (if applicable)   · Name:  · Address:  · Dialysis Schedule:  · Phone:  · Fax:    / signature: {Esignature:[id number]}    PHYSICIAN SECTION    Prognosis: Good    Condition at Discharge: Stable    Rehab Potential (if transferring to Rehab): Good    Recommended Labs or Other Treatments After Discharge: Follow up with PCP, neurology, endovascular.     Physician Certification: I certify the above information and transfer of Darlene Miller  is necessary for the continuing treatment of the diagnosis listed and that she requires Home Care for greater 30 days. Update Admission H&P: Patient with right MCA stroke s/p mechanical thrombectomy and balloon angioplasty. Continue ASA 325mg and Plavix 75mg daily along with statin therapy. Will need home care.     PHYSICIAN SIGNATURE:  Bianca Bagley MD

## 2021-02-03 NOTE — CARE COORDINATION
Spoke to Justine Tavares patients friend at bedside and Rommel Carney on the speaker phone. Explained that the patient will be most likely discharged today and we need a decision [facility] or SNF. After lengthy conversation they all have decided on a SNF as the best option at this time. List provided and first three choices given  1  [facility] - not accepting caresource now  2. [address] a=hasn't looked over referral yet  [address] Justine Tavares family friend of patient has come to [facility] stating that they have done some research  and found that [facility] has covid and does not want patient to go there. Patient agrees. Justine Tavares informs me that they are thinking that maybe patient will go home with her sister. [facility] to look over referral.    Kaiden Gaston at [facility] desk stating they they are think that she will probably go to her sisters house. Asked for address so I can print out a [facility], [facility]. list.  Justine Tavares tells me that patient's sister lives in [place]. I informed her that patient will have to stay within the state lines in order to receive services.

## 2021-02-03 NOTE — PROGRESS NOTES
NEUROLOGY INPATIENT PROGRESS NOTE    2/3/2021         Current Exam:     Chart reviewed. Discussed with RN. Patient doing well this morning with no acute complaints. DSA yesterday with significantly improved/resolved right MCA M1/M2 clot, prior treated R MCA bifurcation aneurysm with no residual noted. Awaiting approval from [facility]. Brief History:    Savanna Frederick is a  53 y.o. female with H/O right MCA bifurcation aneurysm, breast cancer, HLD, HTN, who was admitted as a transfer from [facility] on 1/24/2021 due to concern for right MCA occlusion. She initially presented to [facility] with right-sided headache, left-sided weakness and dizziness. CTA head neck showed acute right MCA occlusion. She underwent diagnostic angiogram on 1/21/2021 showing residual filling of the right M1 MCA aneurysm and she was transferred to [facility]. Stroke alert was called, NIH 3 on presentation. MRI brain with large right MCA territory infarct with petechial hemorrhages, MRA head with limited evaluation of the right MCA M1 due to artifact. She has initially admitted to the neuro ICU and underwent a cerebral angiogram showing right MCA subocclusive clot extending from distal M1 to proximal M2 and treated with mechanical thrombectomy and balloon angioplasty with TICI 2C. Follow-up CT head with large right MCA infarct with hemorrhagic transformation and mild effacement of the lateral ventricle. Another repeat CT head demonstrated right MCA infarct with hemorrhagic conversion. CTA head neck shows patent stent. She was transferred to neuro stepdown under the care of the general neurology service on 1/28/2021. No current facility-administered medications on file prior to encounter.       Current Outpatient Medications on File Prior to Encounter   Medication Sig Dispense Refill    AMITRIPTYLINE HCL PO Take by mouth nightly      MELOXICAM PO Take by mouth nightly      aspirin 325 MG tablet Take 1 tablet by mouth daily 30 tablet 3    lisinopril (PRINIVIL;ZESTRIL) 40 MG tablet Take 40 mg by mouth daily      clonazePAM (KLONOPIN) 0.5 MG tablet Take 0.5 mg by mouth 2 times daily as needed.  venlafaxine (EFFEXOR XR) 75 MG extended release capsule Take 75 mg by mouth daily      hydroCHLOROthiazide (HYDRODIURIL) 25 MG tablet Take 25 mg by mouth daily      Cholecalciferol (VITAMIN D3) 25 MCG (1000 UT) CAPS Take 2,000 Units by mouth nightly       atorvastatin (LIPITOR) 10 MG tablet Take 10 mg by mouth nightly       carvedilol (COREG) 12.5 MG tablet Take 12.5 mg by mouth 2 times daily (with meals)         Allergies: María Elena VASQUEZ is allergic to adhesive tape. Past Medical History:   Diagnosis Date    Aneurysm ([facility] [address] 2019    right MCA bifurcation aneurysm    Cancer ([facility] [address] 2018    left breast    Contact lens/glasses fitting     Fibromyalgia     Former smoker     Hyperlipidemia     Hypertension     Tommye Judy, CNP    Sleep apnea     C-pap / STATES HAS NOT BEEN WEARING       Past Surgical History:   Procedure Laterality Date    BACK SURGERY      neck fusion & L4-L5 fusion    BRAIN ANEURYSM SURGERY  2020    RPOBLIZATION SYSTEM MRI CONDTIONAL 3T OK, SAFE IMMEDIATELY. IR ANGIOGRAM CAROTID CEREBRAL BILATERAL,,S/P coil embolization of cerebral aneurysm    BREAST SURGERY Left 2018    lumpectomy    CARPAL TUNNEL RELEASE Bilateral      SECTION      x 2,  &     HYSTERECTOMY  2018    OTHER SURGICAL HISTORY  2021    DIAGNOSTIC CEREBRAL ANGIOGRAM / Previously treated right MCA M1 bifurcation aneurysm is with a small residual filling noted    TUNNELED VENOUS PORT PLACEMENT      placed , removed 2019       Social History: María Elena VASQUEZ  reports that she quit smoking about 20 years ago. Her smoking use included cigarettes. She has a 15.00 pack-year smoking history. She has never used smokeless tobacco. She reports that she does not use drugs. Family History   Family history unknown:  Yes Objective:   BP (!) 133/92   Pulse 71   Temp 97.4 °F (36.3 °C) (Oral)   Resp 22   Ht 5' 5\" (1.651 m)   Wt 240 lb 4.8 oz (109 kg)   SpO2 93%   BMI 39.99 kg/m²     Blood pressure range: Systolic (32QBD), MHR:450 , Min:119 , KQI:179   ; Diastolic (32SJP), WZB:17, Min:72, Max:99      Review of Systems:  Constitutional  Negative for fever and chills    HEENT  Negative for ear discharge, ear pain, nosebleed    Eyes  Negative for photophobia, pain and discharge    Respiratory  Negative for hemoptysis and sputum    Cardiovascular  Negative for orthopnea, claudication and PND    Gastrointestinal  Negative for abdominal pain, diarrhea, blood in stool    Musculoskeletal  Negative for joint pain, negative for myalgia    Skin  Negative for rash or itching    Endo/heme/allergies  Negative for polydipsia, environmental allergy    Psychiatric/behavioral  Negative for suicidal ideation. Patient is not anxious        NEUROLOGIC EXAMINATION  GENERAL  Appears comfortable and in no distress   HEENT  NC/ AT   NECK  Supple   MENTAL STATUS:  Alert, oriented, intact memory, no confusion, normal speech, normal language, no hallucination or delusion   CRANIAL NERVES: II     -      Visual fields intact to confrontation  III,IV,VI -  EOMs full, no afferent defect, no RAFAEL, no ptosis  V     -     decreased left-sided facial sensation  VII    -     Normal facial symmetry  VIII   -     Intact hearing  IX,X -     Symmetrical palate  XI    -     Symmetrical shoulder shrug  XII   -     Midline tongue, no atrophy    MOTOR FUNCTION:  significant for good strength of grade 5/5 in bilateral proximal and distal muscle groups of both upper and lower extremities with normal bulk, normal tone and no involuntary movements, no tremor. Left arm drift.    SENSORY FUNCTION:  Sensation intact   CEREBELLAR FUNCTION:  Intact fine motor control over upper limbs   REFLEX FUNCTION:  Symmetric, no perverted reflex, brisk withdraw bilaterally with Babinski testing   STATION and GAIT  Not tested       Data:    Lab Results:   CBC:   Recent Labs     02/01/21  0501 02/02/21  0607 02/03/21  0515   WBC 9.5 7.6 8.6   HGB 11.4* 11.0* 11.2*    395 385     BMP:    Recent Labs     02/01/21  0501 02/02/21  0607 02/03/21  0515    143 142   K 3.9 3.8 3.9    108* 108*   CO2 24 24 23   BUN 13 15 15   CREATININE 0.47* 0.44* 0.42*   GLUCOSE 107* 100* 94         Lab Results   Component Value Date    CHOL 197 01/27/2021    LDLCHOLESTEROL 119 01/27/2021    HDL 44 01/27/2021    TRIG 168 (H) 01/27/2021    ALT 25 09/02/2020    AST 21 09/02/2020    INR 0.9 01/24/2021    LABA1C 5.0 01/27/2021           Diagnostic data reviewed:  CTA head and neck 1/27/2021  Multiple stents within the right middle cerebral artery and beyond the   trifurcation that appear patent.       Mild stenosis in the V4 portion of the left vertebral artery.      CT Head 1/27/2021  Evolving right MCA territory infarct with hemorrhagic conversion.  The   approximately 1 cm parenchymal hemorrhage is not significantly changed   relative to 10 hours prior, but there is suggestion of some new trace   associated subarachnoid blood products peripherally (axial series 2, image   43).     Increased conspicuity of the evolving cytotoxic edema with similar mild mass   effect on the right lateral ventricle with approximately 1 mm right to left   midline shift.      DSA 1/26/2021  --Right MCA subocclusive clot extending from distal M1 to the proximal M2 superior and inferior divisions.  There is collateral flow noted from right KIM to right MCA due to hemodynamically significant subocclusive clot. --This was treated with intra-arterial mechanical thrombectomy with contact aspiration and 3 oh stent retriever.  This was followed with balloon angioplasty using 1.5 x 9 mm Polo balloon (x4 inflated to 6 then 8, then 8, then 10 KT), then 2 x 15 mm Quantum apex balloon (x2).   --This resulted in improvement in the flow MCA territory infarct with hemorrhagic transformation status post MT and balloon angioplasty TICI 2C on 1/26/2021  -History of unruptured right MCA aneurysm treated with web and stent placement on 8/26/2020  -History of left breast cancer status post lobectomy and chemoradiation 2018  -Comorbid hypertension, hyperlipidemia    Plan:  -Continue aspirin 325 mg and Plavix 75 mg daily  -Continue Lipitor 40 mg nightly  -SBP goal 120-180 as per neuro endovascular; SBP controlled today  -PT/OT/ST; awaiting approval from SNF  -DVT prophylaxis; on Lovenox    Please note that this note was generated using a voice recognition dictation software. Although every effort was made to ensure the accuracy of this automated transcription, some errors in transcription may have occurred.

## 2021-02-04 LAB
ABSOLUTE EOS #: 0.28 K/UL (ref 0–0.44)
ABSOLUTE IMMATURE GRANULOCYTE: 0.05 K/UL (ref 0–0.3)
ABSOLUTE LYMPH #: 1.75 K/UL (ref 1.1–3.7)
ABSOLUTE MONO #: 0.72 K/UL (ref 0.1–1.2)
ANION GAP SERPL CALCULATED.3IONS-SCNC: 12 MMOL/L (ref 9–17)
BASOPHILS # BLD: 1 % (ref 0–2)
BASOPHILS ABSOLUTE: 0.05 K/UL (ref 0–0.2)
BUN BLDV-MCNC: 13 MG/DL (ref 6–20)
BUN/CREAT BLD: ABNORMAL (ref 9–20)
CALCIUM SERPL-MCNC: 9.2 MG/DL (ref 8.6–10.4)
CHLORIDE BLD-SCNC: 106 MMOL/L (ref 98–107)
CO2: 23 MMOL/L (ref 20–31)
CREAT SERPL-MCNC: 0.46 MG/DL (ref 0.5–0.9)
DIFFERENTIAL TYPE: ABNORMAL
EOSINOPHILS RELATIVE PERCENT: 4 % (ref 1–4)
GFR AFRICAN AMERICAN: >60 ML/MIN
GFR NON-AFRICAN AMERICAN: >60 ML/MIN
GFR SERPL CREATININE-BSD FRML MDRD: ABNORMAL ML/MIN/{1.73_M2}
GFR SERPL CREATININE-BSD FRML MDRD: ABNORMAL ML/MIN/{1.73_M2}
GLUCOSE BLD-MCNC: 99 MG/DL (ref 70–99)
HCT VFR BLD CALC: 35.6 % (ref 36.3–47.1)
HEMOGLOBIN: 11.4 G/DL (ref 11.9–15.1)
IMMATURE GRANULOCYTES: 1 %
LYMPHOCYTES # BLD: 22 % (ref 24–43)
MAGNESIUM: 2 MG/DL (ref 1.6–2.6)
MCH RBC QN AUTO: 32.6 PG (ref 25.2–33.5)
MCHC RBC AUTO-ENTMCNC: 32 G/DL (ref 28.4–34.8)
MCV RBC AUTO: 101.7 FL (ref 82.6–102.9)
MONOCYTES # BLD: 9 % (ref 3–12)
NRBC AUTOMATED: 0 PER 100 WBC
PDW BLD-RTO: 13.1 % (ref 11.8–14.4)
PLATELET # BLD: 403 K/UL (ref 138–453)
PLATELET ESTIMATE: ABNORMAL
PMV BLD AUTO: 9.6 FL (ref 8.1–13.5)
POTASSIUM SERPL-SCNC: 3.8 MMOL/L (ref 3.7–5.3)
RBC # BLD: 3.5 M/UL (ref 3.95–5.11)
RBC # BLD: ABNORMAL 10*6/UL
SEG NEUTROPHILS: 63 % (ref 36–65)
SEGMENTED NEUTROPHILS ABSOLUTE COUNT: 4.95 K/UL (ref 1.5–8.1)
SODIUM BLD-SCNC: 141 MMOL/L (ref 135–144)
WBC # BLD: 7.8 K/UL (ref 3.5–11.3)
WBC # BLD: ABNORMAL 10*3/UL

## 2021-02-04 PROCEDURE — 83735 ASSAY OF MAGNESIUM: CPT

## 2021-02-04 PROCEDURE — 99232 SBSQ HOSP IP/OBS MODERATE 35: CPT | Performed by: PSYCHIATRY & NEUROLOGY

## 2021-02-04 PROCEDURE — 2580000003 HC RX 258: Performed by: STUDENT IN AN ORGANIZED HEALTH CARE EDUCATION/TRAINING PROGRAM

## 2021-02-04 PROCEDURE — 1200000000 HC SEMI PRIVATE

## 2021-02-04 PROCEDURE — 6360000002 HC RX W HCPCS: Performed by: NURSE PRACTITIONER

## 2021-02-04 PROCEDURE — 6370000000 HC RX 637 (ALT 250 FOR IP): Performed by: PSYCHIATRY & NEUROLOGY

## 2021-02-04 PROCEDURE — 6370000000 HC RX 637 (ALT 250 FOR IP): Performed by: STUDENT IN AN ORGANIZED HEALTH CARE EDUCATION/TRAINING PROGRAM

## 2021-02-04 PROCEDURE — APPSS30 APP SPLIT SHARED TIME 16-30 MINUTES: Performed by: NURSE PRACTITIONER

## 2021-02-04 PROCEDURE — 80048 BASIC METABOLIC PNL TOTAL CA: CPT

## 2021-02-04 PROCEDURE — 85025 COMPLETE CBC W/AUTO DIFF WBC: CPT

## 2021-02-04 PROCEDURE — 97130 THER IVNTJ EA ADDL 15 MIN: CPT

## 2021-02-04 PROCEDURE — 99233 SBSQ HOSP IP/OBS HIGH 50: CPT | Performed by: PSYCHIATRY & NEUROLOGY

## 2021-02-04 PROCEDURE — 36415 COLL VENOUS BLD VENIPUNCTURE: CPT

## 2021-02-04 PROCEDURE — 97129 THER IVNTJ 1ST 15 MIN: CPT

## 2021-02-04 RX ADMIN — CARVEDILOL 12.5 MG: 12.5 TABLET, FILM COATED ORAL at 08:58

## 2021-02-04 RX ADMIN — LISINOPRIL 20 MG: 20 TABLET ORAL at 08:58

## 2021-02-04 RX ADMIN — DESMOPRESSIN ACETATE 40 MG: 0.2 TABLET ORAL at 21:05

## 2021-02-04 RX ADMIN — VENLAFAXINE HYDROCHLORIDE 75 MG: 75 CAPSULE, EXTENDED RELEASE ORAL at 08:58

## 2021-02-04 RX ADMIN — CARVEDILOL 12.5 MG: 12.5 TABLET, FILM COATED ORAL at 18:11

## 2021-02-04 RX ADMIN — CLOPIDOGREL 75 MG: 75 TABLET, FILM COATED ORAL at 08:58

## 2021-02-04 RX ADMIN — ASPIRIN 325 MG: 325 TABLET ORAL at 08:58

## 2021-02-04 RX ADMIN — ENOXAPARIN SODIUM 40 MG: 40 INJECTION SUBCUTANEOUS at 08:59

## 2021-02-04 RX ADMIN — SODIUM CHLORIDE, PRESERVATIVE FREE 10 ML: 5 INJECTION INTRAVENOUS at 21:05

## 2021-02-04 RX ADMIN — SODIUM CHLORIDE, PRESERVATIVE FREE 10 ML: 5 INJECTION INTRAVENOUS at 08:59

## 2021-02-04 ASSESSMENT — PAIN DESCRIPTION - PROGRESSION: CLINICAL_PROGRESSION: NOT CHANGED

## 2021-02-04 ASSESSMENT — PAIN SCALES - GENERAL
PAINLEVEL_OUTOF10: 0
PAINLEVEL_OUTOF10: 1
PAINLEVEL_OUTOF10: 1

## 2021-02-04 NOTE — PROGRESS NOTES
NEUROLOGY INPATIENT PROGRESS NOTE    2/4/2021         Current Exam:     Chart reviewed. Discussed with RN. Awaiting placement. She has no acute complaints. Denies any further numbness to the left side. Brief History:    Rosi Ny is a  53 y.o. female with H/O right MCA bifurcation aneurysm, breast cancer, HLD, HTN, who was admitted as a transfer from [facility] on 1/24/2021 due to concern for right MCA occlusion. She initially presented to [facility] with right-sided headache, left-sided weakness and dizziness. CTA head neck showed acute right MCA occlusion. She underwent diagnostic angiogram on 1/21/2021 showing residual filling of the right M1 MCA aneurysm and she was transferred to [facility]. Stroke alert was called, NIH 3 on presentation. MRI brain with large right MCA territory infarct with petechial hemorrhages, MRA head with limited evaluation of the right MCA M1 due to artifact. She has initially admitted to the neuro ICU and underwent a cerebral angiogram showing right MCA subocclusive clot extending from distal M1 to proximal M2 and treated with mechanical thrombectomy and balloon angioplasty with TICI 2C. Follow-up CT head with large right MCA infarct with hemorrhagic transformation and mild effacement of the lateral ventricle. Another repeat CT head demonstrated right MCA infarct with hemorrhagic conversion. CTA head neck shows patent stent. She was transferred to neuro stepdown under the care of the general neurology service on 1/28/2021. A repeat angio was done on 2/2 showing significant improvement/resoluation of right MCA clot, no residual noted in prior treated right MCA aneurysm. No current facility-administered medications on file prior to encounter.       Current Outpatient Medications on File Prior to Encounter   Medication Sig Dispense Refill    AMITRIPTYLINE HCL PO Take by mouth nightly      MELOXICAM PO Take by mouth nightly      aspirin 325 MG tablet Take 1 tablet by mouth daily 30 tablet 3    lisinopril (PRINIVIL;ZESTRIL) 40 MG tablet Take 40 mg by mouth daily      clonazePAM (KLONOPIN) 0.5 MG tablet Take 0.5 mg by mouth 2 times daily as needed.  venlafaxine (EFFEXOR XR) 75 MG extended release capsule Take 75 mg by mouth daily      hydroCHLOROthiazide (HYDRODIURIL) 25 MG tablet Take 25 mg by mouth daily      Cholecalciferol (VITAMIN D3) 25 MCG (1000 UT) CAPS Take 2,000 Units by mouth nightly       atorvastatin (LIPITOR) 10 MG tablet Take 10 mg by mouth nightly       carvedilol (COREG) 12.5 MG tablet Take 12.5 mg by mouth 2 times daily (with meals)         Allergies: María Elena VASQUEZ is allergic to adhesive tape. Past Medical History:   Diagnosis Date    Aneurysm ([facility] [address] 2019    right MCA bifurcation aneurysm    Cancer ([facility] [address] 2018    left breast    Contact lens/glasses fitting     Fibromyalgia     Former smoker     Hyperlipidemia     Hypertension     Varsha Cheney CNP    Sleep apnea     C-pap / STATES HAS NOT BEEN WEARING       Past Surgical History:   Procedure Laterality Date    BACK SURGERY      neck fusion & L4-L5 fusion    BRAIN ANEURYSM SURGERY  2020    [employer] EMOBLIZATION SYSTEM MRI CONDTIONAL 3T OK, SAFE IMMEDIATELY. IR ANGIOGRAM CAROTID CEREBRAL BILATERAL,,S/P coil embolization of cerebral aneurysm    BREAST SURGERY Left 2018    lumpectomy    CARPAL TUNNEL RELEASE Bilateral      SECTION      x 2,  &     HYSTERECTOMY  2018    OTHER SURGICAL HISTORY  2021    DIAGNOSTIC CEREBRAL ANGIOGRAM / Previously treated right MCA M1 bifurcation aneurysm is with a small residual filling noted    TUNNELED VENOUS PORT PLACEMENT      placed , removed 2019       Social History: María Elena VASQUEZ  reports that she quit smoking about 20 years ago. Her smoking use included cigarettes. She has a 15.00 pack-year smoking history. She has never used smokeless tobacco. She reports that she does not use drugs.     Family History   Family history unknown: Yes       Objective:   /83   Pulse 67   Temp 97.9 °F (36.6 °C) (Oral)   Resp 18   Ht 5' 5\" (1.651 m)   Wt 240 lb 4.8 oz (109 kg)   SpO2 99%   BMI 39.99 kg/m²     Blood pressure range: Systolic (71QEE), GDJ:670 , Min:114 , LAQUITA:153   ; Diastolic (55MTQ), NUA:50, Min:73, Max:90      Review of Systems:  Constitutional  Negative for fever and chills    HEENT  Negative for ear discharge, ear pain, nosebleed    Eyes  Negative for photophobia, pain and discharge    Respiratory  Negative for hemoptysis and sputum    Cardiovascular  Negative for orthopnea, claudication and PND    Gastrointestinal  Negative for abdominal pain, diarrhea, blood in stool    Musculoskeletal  Negative for joint pain, negative for myalgia    Skin  Negative for rash or itching    Endo/heme/allergies  Negative for polydipsia, environmental allergy    Psychiatric/behavioral  Negative for suicidal ideation. Patient is not anxious        NEUROLOGIC EXAMINATION  GENERAL  Appears comfortable and in no distress   HEENT  NC/ AT   NECK  Supple   MENTAL STATUS:  Alert, oriented, intact memory, no confusion, normal speech, normal language, no hallucination or delusion   CRANIAL NERVES: II     -      Visual fields intact to confrontation  III,IV,VI -  EOMs full, no afferent defect, no RAFAEL, no ptosis  V     -     decreased left-sided facial sensation  VII    -     Normal facial symmetry  VIII   -     Intact hearing  IX,X -     Symmetrical palate  XI    -     Symmetrical shoulder shrug  XII   -     Midline tongue, no atrophy    MOTOR FUNCTION:  significant for good strength of grade 5/5 in bilateral proximal and distal muscle groups of both upper and lower extremities with normal bulk, normal tone and no involuntary movements, no tremor. Left arm drift.    SENSORY FUNCTION:  Sensation intact   CEREBELLAR FUNCTION:  Intact fine motor control over upper limbs   REFLEX FUNCTION:  Symmetric, no perverted reflex, brisk withdraw bilaterally with the flow across the subocclusive occlusion.  Total recanalization at TICI 2C. --The prior treated right MCA bifurcation aneurysm currently with no residual noted. REPEAT DSA 2/2/21-  --The prior noted right MCA M1/M2 subocclusive clot significantly improved/resolved. There is a small residual noted subocclusive clot in left MCA M2 posterior division not hemodynamically significant.  ----The prior treated right MCA bifurcation aneurysm with Web device currently with no residual noted with Starlet Fern class at 1. MRI/A brain 1/24/2021  1. Large right middle cerebral artery territory infarct involving the   superior right temporal lobe and to a lesser extent the right frontal and   right parietal lobes. 2. Small focus of petechial hemorrhage within the right parietal lobe infarct. 3. Limited evaluation of the right middle cerebral artery given   susceptibility artifact from the patient's known MCA stent.  There is normal   signal identified within the proximal M1 segment as well as M2 branches   beyond the stent and area of susceptibility artifact.  Correlation with the   patient's reported recent outside CT angiogram of the brain is recommended        CT head without contrast 1/30/2021  Impression   1.  Right middle cerebral artery distribution subacute infarct with   hemorrhagic transformation.  Amount of hemorrhage has decreased somewhat.       2.  Reduced edema and mass effect associated with infarct.       3.  Interval resolution of subarachnoid hemorrhage.       4.  Other incidental findings as above. Repeat CT head (2/1/21) -  Decreased conspicuity of the evolving cytotoxic edema and the associated   small amount of intraparenchymal blood products in the right MCA   distribution.  No new hemorrhage, and no new foci of altered attenuation   within the remainder of the brain parenchyma.       Ventricles remain normal in caliber.  No herniation or significant mass   effect. Impression:  -Large right MCA territory infarct with hemorrhagic transformation status post MT and balloon angioplasty TICI 2C on 1/26/2021  -History of unruptured right MCA aneurysm treated with web and stent placement on 8/26/2020  -History of left breast cancer status post lobectomy and chemoradiation 2018  -Comorbid hypertension, hyperlipidemia    Plan:  -Continue aspirin 325 mg and Plavix 75 mg daily  -Continue Lipitor 40 mg nightly  -SBP goal 120-180 as per neuro endovascular; SBP controlled today  -PT/OT/ST; awaiting approval from SNF  -DVT prophylaxis; on Lovenox    Please note that this note was generated using a voice recognition dictation software. Although every effort was made to ensure the accuracy of this automated transcription, some errors in transcription may have occurred.

## 2021-02-04 NOTE — PLAN OF CARE
Problem: Pain:  Goal: Pain level will decrease  Description: Pain level will decrease  Outcome: Ongoing  Goal: Control of acute pain  Description: Control of acute pain  Outcome: Ongoing  Goal: Control of chronic pain  Description: Control of chronic pain  Outcome: Ongoing     Problem: HEMODYNAMIC STATUS  Goal: Patient has stable vital signs and fluid balance  Outcome: Ongoing     Problem: ACTIVITY INTOLERANCE/IMPAIRED MOBILITY  Goal: Mobility/activity is maintained at optimum level for patient  Outcome: Ongoing     Problem: COMMUNICATION IMPAIRMENT  Goal: Ability to express needs and understand communication  Outcome: Ongoing     Problem: Self-Care Deficit:  Goal: Ability to perform activities of daily living will improve  Description: Ability to perform activities of daily living will improve  Outcome: Ongoing  Goal: Able to perform ADL with assistance  Description: Able to perform ADL with assistance  Outcome: Ongoing  Goal: Ability to communicate needs accurately will improve - ADL needs  Description: Ability to communicate needs accurately will improve - ADL needs  Outcome: Ongoing  Goal: Able to use self-care assistive device appropriately  Description: Able to use self-care assistive device appropriately  Outcome: Ongoing     Problem: Psychosocial Distress:  Goal: Absence of psychosocial distress  Description: Absence of psychosocial distress  Outcome: Ongoing  Goal: Ability to cope will improve  Description: Ability to cope will improve  Outcome: Ongoing  Goal: Ability to identify and utilize available support systems will improve  Description: Ability to identify and utilize available support systems will improve  Outcome: Ongoing     Problem: Falls - Risk of:  Goal: Will remain free from falls  Description: Will remain free from falls  Outcome: Ongoing  Goal: Absence of physical injury  Description: Absence of physical injury  Outcome: Ongoing

## 2021-02-04 NOTE — PLAN OF CARE
Problem: Pain:  Goal: Pain level will decrease  Description: Pain level will decrease  2/4/2021 1624 by Herson Candelaria RN  Outcome: Ongoing  2/4/2021 0849 by David Gonzalez RN  Outcome: Ongoing  Goal: Control of acute pain  Description: Control of acute pain  2/4/2021 1624 by Herson Candelaria RN  Outcome: Ongoing  2/4/2021 0849 by David Gonzalez RN  Outcome: Ongoing  Goal: Control of chronic pain  Description: Control of chronic pain  2/4/2021 1624 by Herson Candelaria RN  Outcome: Ongoing  2/4/2021 0849 by David Gonzalez RN  Outcome: Ongoing     Problem: HEMODYNAMIC STATUS  Goal: Patient has stable vital signs and fluid balance  2/4/2021 1624 by Herson Candelaria RN  Outcome: Ongoing  2/4/2021 0849 by David Gonzalez RN  Outcome: Ongoing     Problem: ACTIVITY INTOLERANCE/IMPAIRED MOBILITY  Goal: Mobility/activity is maintained at optimum level for patient  2/4/2021 1624 by Herson Candelaria RN  Outcome: Ongoing  2/4/2021 0849 by David Gonzalez RN  Outcome: Ongoing     Problem: COMMUNICATION IMPAIRMENT  Goal: Ability to express needs and understand communication  [id number] by Herson Candelaria RN  Outcome: Ongoing  2/4/2021 0849 by David Gonzalez RN  Outcome: Ongoing     Problem: Self-Care Deficit:  Goal: Ability to perform activities of daily living will improve  Description: Ability to perform activities of daily living will improve  2/4/2021 1624 by Herson Candelaria RN  Outcome: Ongoing  2/4/2021 0849 by David Gonzalez RN  Outcome: Ongoing  Goal: Able to perform ADL with assistance  Description: Able to perform ADL with assistance  2/4/2021 1624 by Herson Candelaria RN  Outcome: Ongoing  2/4/2021 0849 by David Gonzalez RN  Outcome: Ongoing  Goal: Ability to communicate needs accurately will improve - ADL needs  Description: Ability to communicate needs accurately will improve - ADL needs  2/4/2021 1624 by Herson Candelaria RN  Outcome: Ongoing  2/4/2021 0849 by David Gonzalez RN  Outcome: Ongoing  Goal: Able to use self-care assistive device appropriately  Description: Able to use self-care assistive device appropriately  2/4/2021 1624 by Mahamed Yo RN  Outcome: Ongoing  2/4/2021 0849 by Cinthia Desouza RN  Outcome: Ongoing     Problem: Psychosocial Distress:  Goal: Absence of psychosocial distress  Description: Absence of psychosocial distress  2/4/2021 1624 by Mahamed Yo RN  Outcome: Ongoing  2/4/2021 0849 by Cinthia Desouza RN  Outcome: Ongoing  Goal: Ability to cope will improve  Description: Ability to cope will improve  2/4/2021 1624 by Mahamed Yo RN  Outcome: Ongoing  2/4/2021 0849 by Cinthia Desouza RN  Outcome: Ongoing  Goal: Ability to identify and utilize available support systems will improve  Description: Ability to identify and utilize available support systems will improve  2/4/2021 1624 by Mahamed Yo RN  Outcome: Ongoing  2/4/2021 0849 by Cinthia Desouza RN  Outcome: Ongoing     Problem: Falls - Risk of:  Goal: Will remain free from falls  Description: Will remain free from falls  2/4/2021 1624 by Mahamed Yo RN  Outcome: Ongoing  2/4/2021 0849 by Cinthia Desouza RN  Outcome: Ongoing  Goal: Absence of physical injury  Description: Absence of physical injury  2/4/2021 1624 by Mahamed Yo RN  Outcome: Ongoing  2/4/2021 0849 by Cinthia Desouza RN  Outcome: Ongoing

## 2021-02-04 NOTE — CARE COORDINATION
Writer called Mariela Carey at [facility] to check on precert, no answer yet

## 2021-02-04 NOTE — PROGRESS NOTES
Speech Language Pathology  Speech Language Pathology  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 Recall: immediate recall: 3/3 independently; delayed recall: 3/3  Independently, 3/3 independently; immediate memory for five-unit sequences: 7/10 increasing to 10/10 with repetition      Problem Solving/Reasoning: listing 2 uses for an object: 9/10 increasing to 10/10 with moderate verbal cues; listing 8 members in a category: 8/8  Independently, 8/8  Independently, 8/8  Independently; stating 2 situational problems: 10/10 independently      Spoke with sister and provided education re: tasks to assist with cognition that can be done at home as well as compensatory strategies to aid in recall and safety skills at home. Plan:  [x] Continue  services    [] Discharge from :      Discharge recommendations: [] Inpatient Rehab   [] [place]   [] Outpatient Therapy  [] Follow up at trauma clinic   [x] Other: Further therapy recommended at discharge.         Treatment Completed by:  Ranjeet Boateng  Clinician    Cosigned By: Lindsey Freedman M. S. BRET/SLP

## 2021-02-05 VITALS
TEMPERATURE: 98.7 F | BODY MASS INDEX: 40.04 KG/M2 | OXYGEN SATURATION: 95 % | HEART RATE: 62 BPM | SYSTOLIC BLOOD PRESSURE: 131 MMHG | HEIGHT: 65 IN | WEIGHT: 240.3 LBS | DIASTOLIC BLOOD PRESSURE: 65 MMHG | RESPIRATION RATE: 17 BRPM

## 2021-02-05 LAB
ABSOLUTE EOS #: 0.25 K/UL (ref 0–0.44)
ABSOLUTE IMMATURE GRANULOCYTE: 0.04 K/UL (ref 0–0.3)
ABSOLUTE LYMPH #: 1.91 K/UL (ref 1.1–3.7)
ABSOLUTE MONO #: 0.69 K/UL (ref 0.1–1.2)
ANION GAP SERPL CALCULATED.3IONS-SCNC: 9 MMOL/L (ref 9–17)
BASOPHILS # BLD: 1 % (ref 0–2)
BASOPHILS ABSOLUTE: 0.05 K/UL (ref 0–0.2)
BUN BLDV-MCNC: 12 MG/DL (ref 6–20)
BUN/CREAT BLD: ABNORMAL (ref 9–20)
CALCIUM SERPL-MCNC: 9.8 MG/DL (ref 8.6–10.4)
CHLORIDE BLD-SCNC: 107 MMOL/L (ref 98–107)
CO2: 25 MMOL/L (ref 20–31)
CREAT SERPL-MCNC: 0.41 MG/DL (ref 0.5–0.9)
DIFFERENTIAL TYPE: ABNORMAL
EOSINOPHILS RELATIVE PERCENT: 3 % (ref 1–4)
GFR AFRICAN AMERICAN: >60 ML/MIN
GFR NON-AFRICAN AMERICAN: >60 ML/MIN
GFR SERPL CREATININE-BSD FRML MDRD: ABNORMAL ML/MIN/{1.73_M2}
GFR SERPL CREATININE-BSD FRML MDRD: ABNORMAL ML/MIN/{1.73_M2}
GLUCOSE BLD-MCNC: 96 MG/DL (ref 70–99)
HCT VFR BLD CALC: 35.8 % (ref 36.3–47.1)
HEMOGLOBIN: 11.3 G/DL (ref 11.9–15.1)
IMMATURE GRANULOCYTES: 1 %
LYMPHOCYTES # BLD: 26 % (ref 24–43)
MAGNESIUM: 2 MG/DL (ref 1.6–2.6)
MCH RBC QN AUTO: 32.1 PG (ref 25.2–33.5)
MCHC RBC AUTO-ENTMCNC: 31.6 G/DL (ref 28.4–34.8)
MCV RBC AUTO: 101.7 FL (ref 82.6–102.9)
MONOCYTES # BLD: 9 % (ref 3–12)
NRBC AUTOMATED: 0 PER 100 WBC
PDW BLD-RTO: 13 % (ref 11.8–14.4)
PLATELET # BLD: 404 K/UL (ref 138–453)
PLATELET ESTIMATE: ABNORMAL
PMV BLD AUTO: 9.6 FL (ref 8.1–13.5)
POTASSIUM SERPL-SCNC: 3.8 MMOL/L (ref 3.7–5.3)
RBC # BLD: 3.52 M/UL (ref 3.95–5.11)
RBC # BLD: ABNORMAL 10*6/UL
SEG NEUTROPHILS: 60 % (ref 36–65)
SEGMENTED NEUTROPHILS ABSOLUTE COUNT: 4.46 K/UL (ref 1.5–8.1)
SODIUM BLD-SCNC: 141 MMOL/L (ref 135–144)
WBC # BLD: 7.4 K/UL (ref 3.5–11.3)
WBC # BLD: ABNORMAL 10*3/UL

## 2021-02-05 PROCEDURE — 97110 THERAPEUTIC EXERCISES: CPT

## 2021-02-05 PROCEDURE — 83735 ASSAY OF MAGNESIUM: CPT

## 2021-02-05 PROCEDURE — 99232 SBSQ HOSP IP/OBS MODERATE 35: CPT | Performed by: PSYCHIATRY & NEUROLOGY

## 2021-02-05 PROCEDURE — 97116 GAIT TRAINING THERAPY: CPT

## 2021-02-05 PROCEDURE — 80048 BASIC METABOLIC PNL TOTAL CA: CPT

## 2021-02-05 PROCEDURE — 6370000000 HC RX 637 (ALT 250 FOR IP): Performed by: PSYCHIATRY & NEUROLOGY

## 2021-02-05 PROCEDURE — 85025 COMPLETE CBC W/AUTO DIFF WBC: CPT

## 2021-02-05 PROCEDURE — APPSS30 APP SPLIT SHARED TIME 16-30 MINUTES: Performed by: NURSE PRACTITIONER

## 2021-02-05 PROCEDURE — 99239 HOSP IP/OBS DSCHRG MGMT >30: CPT | Performed by: NURSE PRACTITIONER

## 2021-02-05 PROCEDURE — 99239 HOSP IP/OBS DSCHRG MGMT >30: CPT | Performed by: PSYCHIATRY & NEUROLOGY

## 2021-02-05 PROCEDURE — 2580000003 HC RX 258: Performed by: STUDENT IN AN ORGANIZED HEALTH CARE EDUCATION/TRAINING PROGRAM

## 2021-02-05 PROCEDURE — 6360000002 HC RX W HCPCS: Performed by: NURSE PRACTITIONER

## 2021-02-05 PROCEDURE — 6370000000 HC RX 637 (ALT 250 FOR IP): Performed by: STUDENT IN AN ORGANIZED HEALTH CARE EDUCATION/TRAINING PROGRAM

## 2021-02-05 PROCEDURE — 99233 SBSQ HOSP IP/OBS HIGH 50: CPT | Performed by: PSYCHIATRY & NEUROLOGY

## 2021-02-05 PROCEDURE — 36415 COLL VENOUS BLD VENIPUNCTURE: CPT

## 2021-02-05 RX ORDER — LISINOPRIL 20 MG/1
20 TABLET ORAL DAILY
Qty: 30 TABLET | Refills: 3 | Status: SHIPPED | OUTPATIENT
Start: 2021-02-05

## 2021-02-05 RX ORDER — ATORVASTATIN CALCIUM 40 MG/1
40 TABLET, FILM COATED ORAL NIGHTLY
Qty: 30 TABLET | Refills: 3 | Status: SHIPPED | OUTPATIENT
Start: 2021-02-05

## 2021-02-05 RX ORDER — CLOPIDOGREL BISULFATE 75 MG/1
75 TABLET ORAL DAILY
Qty: 30 TABLET | Refills: 3 | Status: SHIPPED | OUTPATIENT
Start: 2021-02-05 | End: 2021-08-09 | Stop reason: SDUPTHER

## 2021-02-05 RX ADMIN — CARVEDILOL 12.5 MG: 12.5 TABLET, FILM COATED ORAL at 08:19

## 2021-02-05 RX ADMIN — CARVEDILOL 12.5 MG: 12.5 TABLET, FILM COATED ORAL at 17:15

## 2021-02-05 RX ADMIN — LISINOPRIL 20 MG: 20 TABLET ORAL at 08:19

## 2021-02-05 RX ADMIN — CLOPIDOGREL 75 MG: 75 TABLET, FILM COATED ORAL at 08:19

## 2021-02-05 RX ADMIN — VENLAFAXINE HYDROCHLORIDE 75 MG: 75 CAPSULE, EXTENDED RELEASE ORAL at 08:18

## 2021-02-05 RX ADMIN — SODIUM CHLORIDE, PRESERVATIVE FREE 10 ML: 5 INJECTION INTRAVENOUS at 08:19

## 2021-02-05 RX ADMIN — ENOXAPARIN SODIUM 40 MG: 40 INJECTION SUBCUTANEOUS at 08:18

## 2021-02-05 RX ADMIN — ASPIRIN 325 MG: 325 TABLET ORAL at 08:19

## 2021-02-05 ASSESSMENT — PAIN DESCRIPTION - PROGRESSION
CLINICAL_PROGRESSION: NOT CHANGED

## 2021-02-05 NOTE — PLAN OF CARE
Problem: Pain:  Goal: Pain level will decrease  Description: Pain level will decrease  2/5/2021 0506 by John Garcia RN  Outcome: Ongoing  2/4/2021 1624 by Madison Wetzel RN  Outcome: Ongoing  Goal: Control of acute pain  Description: Control of acute pain  2/5/2021 0506 by John Garcia RN  Outcome: Ongoing  2/4/2021 1624 by Madison Wetzel RN  Outcome: Ongoing  Goal: Control of chronic pain  Description: Control of chronic pain  2/5/2021 0506 by John Garcia RN  Outcome: Ongoing  2/4/2021 1624 by Madison Wetzel RN  Outcome: Ongoing     Problem: HEMODYNAMIC STATUS  Goal: Patient has stable vital signs and fluid balance  2/5/2021 0506 by John Garcia RN  Outcome: Ongoing  2/4/2021 1624 by Madison Wetzel RN  Outcome: Ongoing     Problem: ACTIVITY INTOLERANCE/IMPAIRED MOBILITY  Goal: Mobility/activity is maintained at optimum level for patient  2/5/2021 0506 by John Garcia RN  Outcome: Ongoing  2/4/2021 1624 by Madison Wetzel RN  Outcome: Ongoing     Problem: ACTIVITY INTOLERANCE/IMPAIRED MOBILITY  Goal: Mobility/activity is maintained at optimum level for patient  2/5/2021 0506 by John Garcia RN  Outcome: Ongoing  2/4/2021 1624 by Madison Wetzel RN  Outcome: Ongoing     Problem: COMMUNICATION IMPAIRMENT  Goal: Ability to express needs and understand communication  2/5/2021 0506 by John Garcia RN  Outcome: Ongoing  2/4/2021 1624 by Madison Wetzel RN  Outcome: Ongoing     Problem: Self-Care Deficit:  Goal: Ability to perform activities of daily living will improve  Description: Ability to perform activities of daily living will improve  2/5/2021 0506 by John Garcia RN  Outcome: Ongoing  2/4/2021 1624 by Madison Wetzel RN  Outcome: Ongoing  Goal: Able to perform ADL with assistance  Description: Able to perform ADL with assistance  2/5/2021 0506 by John Garcia RN  Outcome: Ongoing  2/4/2021 1624 by Madison Wetzel RN  Outcome: Ongoing  Goal: Ability to communicate needs accurately will

## 2021-02-05 NOTE — PROGRESS NOTES
ENDOVASCULAR NEUROSURGERY PROGRESS NOTE  2/5/2021 8:49 AM  Subjective:   Admit Date: 1/24/2021  PCP: POPEYE Mcdaniel - CNP    No new acute events. Underwent follow-up diagnostic cerebral angiogram on 2/2/2021    Objective:   Vitals: /60   Pulse 58   Temp 98.2 °F (36.8 °C) (Oral)   Resp 18   Ht 5' 5\" (1.651 m)   Wt 240 lb 4.8 oz (109 kg)   SpO2 97%   BMI 39.99 kg/m²   General appearance: Lying in bed, NAD. HEENT: Atraumatic. Neck: Neck is supple. Lungs: No respiratory distress noted. Heart: normal sinus rhythm on tele. Lesle Gulling Extremities: No lower limb edema noted. Neurologic:  awake, following simple commands appropriately, able to name simple objects, intact comprehension, no dysarthria noted. CN: Has intact extraocular muscles movements, no facial droop noted  MOTOR: Has good strength in both upper and lower extremities, moving both upper and lower extremities against gravity with no drift. SENSORY: Intact sensation to simple touch bilaterally in both upper and lower extremities. Left-sided body numbness noted. Sensory neglect noted to the left body side for bilateral sensory stimuli.     Medications and labs:   Scheduled Meds:   lidocaine 1 % injection  5 mL Intradermal Once    sodium chloride flush  10 mL Intravenous 2 times per day    atorvastatin  40 mg Oral Nightly    lisinopril  20 mg Oral Daily    sennosides-docusate sodium  2 tablet Oral Daily    carvedilol  12.5 mg Oral BID WC    enoxaparin  40 mg Subcutaneous Daily    clopidogrel  75 mg Oral Daily    aspirin  325 mg Oral Daily    venlafaxine  75 mg Oral Daily    sodium chloride flush  10 mL Intravenous 2 times per day     Continuous Infusions:  CBC:   Recent Labs     02/03/21  0515 02/04/21  0615 02/05/21 0521   WBC 8.6 7.8 7.4   HGB 11.2* 11.4* 11.3*    403 404     BMP:    Recent Labs     02/03/21  0515 02/04/21 0615 02/05/21 0521    141 141   K 3.9 3.8 3.8   * 106 107   CO2 23 23 25   BUN parietal lobe infarct. 3. Limited evaluation of the right middle cerebral artery given   susceptibility artifact from the patient's known MCA stent.  There is normal   signal identified within the proximal M1 segment as well as M2 branches   beyond the stent and area of susceptibility artifact.  Correlation with the   patient's reported recent outside CT angiogram of the brain is recommended.          Assessment and Recommendations:   53yo female with pmh of hld, htn, breast cancer 2018, gray. 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 DSA 1/21/2021 showed residual filling of the R MCA aneurysm and an incidental L MCA blister aneurysm.     Pt presented 1/24/2021 with acute L hemiweakness, neglect, r gaze pref, found to have acute large R MCA stroke. MRA showed artifact at the aneurysm but no clear LVO. possible in-stent thrombosis following insufficient antithrombotic rx. dSA 1/26/2021 showed partially occlusive thrombus at the R M2 branches at the stent site. S/p MT TICI2c and balloon angioplasty.     Repeat CT head 1/27/2021 showed small R hemorrhagic conversion. Repeat ct head following shows stable lesion. CTA shows patent vessels. CT  Head 1/30/2021 resolving bleed and edema. CT head on 2/1/2021 reviewed, right hemorrhagic IPH stable. S/p follow-up DSA on 2/2/2021   Impression:   --The prior noted right MCA M1/M2 subocclusive clot?significantly improved/resolved. There is a small residual noted subocclusive clot in left MCA M2 posterior division not hemodynamically significant. --The prior treated right MCA bifurcation aneurysm with Web device currently with no residual noted with Inder Nunez class at 1. Neuro exam is stable. No new acute events. She is pending discharge.     PLAN:  --continue asa 325 and plavix 75 daily  --continue lipitor 10  --sbp 120-180  --care as per neurology team  --Upon discharge follow up with Dr. Angela Young in 2 weeks and Dr. Rg in 3 jordy Boyer MD  Stroke, [facility]  [address] [place]  Electronically signed 2/5/2021 at 8:49 AM

## 2021-02-05 NOTE — PROGRESS NOTES
Physical Therapy  Facility/Department: [address] ONC/MED SURG  Daily Treatment Note  NAME: Vinay Jones  : 1970  MRN: [id number]    Date of Service: 2021    Discharge Recommendations:  Patient would benefit from continued therapy after discharge        Assessment   Body structures, Functions, Activity limitations: Decreased endurance;Decreased safe awareness  Assessment: Physically, patient is doing very well, little to no balance deficits or strength issues. Able to ambulate without device. Will continue PT treatments for independence. PT Education: Goals;PT Role;Plan of Care; Functional Mobility Training;Home Exercise Program;Gait Training;General Safety  REQUIRES PT FOLLOW UP: Yes  Activity Tolerance  Activity Tolerance: Patient Tolerated treatment well     Patient Diagnosis(es): The encounter diagnosis was Cerebrovascular accident (CVA) due to occlusion of left middle cerebral artery ([facility] [address] has a past medical history of Aneurysm ([facility] [address] Cancer ([facility] [address] Contact lens/glasses fitting, Fibromyalgia, Former smoker, Hyperlipidemia, Hypertension, and Sleep apnea. has a past surgical history that includes Breast surgery (Left, 2018); Tunneled venous port placement; Hysterectomy (2018); Carpal tunnel release (Bilateral);  section; back surgery; Brain aneurysm surgery (2020); and other surgical history (2021). Restrictions  Restrictions/Precautions  Restrictions/Precautions: Fall Risk, Up as Tolerated  Required Braces or Orthoses?: No  Position Activity Restriction  Other position/activity restrictions: up with A, s/p thrombectomy with balloon angio plasty ; angiogram done  Subjective   General  Chart Reviewed: Yes  Response To Previous Treatment: Patient with no complaints from previous session.   Family / Caregiver Present: Yes(sister)  Subjective  Subjective: RN and pt agreed to PT, pt awake in bed upon arrival and denies pain or headache this date  Pain Screening  Patient Currently in Pain: Denies  Vital Signs  Patient Currently in Pain: Denies       Orientation  Orientation  Overall Orientation Status: Within Functional Limits  Cognition   Cognition  Overall Cognitive Status: Exceptions  Following Commands: Follows one step commands consistently  Attention Span: Appears intact  Problem Solving: Assistance required to generate solutions;Assistance required to implement solutions  Objective   Bed mobility  Supine to Sit: Independent  Sit to Supine: Independent  Transfers  Sit to Stand: Independent  Stand to sit: Independent  Ambulation  Ambulation?: Yes  Ambulation 1  Surface: level tile  Device: No Device  Assistance: Stand by assistance  Gait Deviations: Slow Neida  Distance: 200'  Ambulation 2  Surface - 2: level tile  Device 2: No device  Assistance 2: Stand by assistance  Quality of Gait 2: Gait done with combined lateral head turns, then eyes moving from ceiling to floor, ceiling to floor. No losses of balance with this challenge. With eyes steady, no visual challenges, resisted gait done with patient needing cues repeated on what to do, how to resist perterbations to balance given on both sides, anterior, posterior. Distance: 200'     Balance  Sitting - Static: Good  Sitting - Dynamic: Good  Standing - Static: Good  Standing - Dynamic: Fair;+  Exercises  Comments: Standing hip abduction x15, heel/toe raises x15, hip extensions x15 reps. Goals  Short term goals  Time Frame for Short term goals: 14 visits  Short term goal 1: Pt will be [place] bed mobility. STG 1 met  Short term goal 2: Pt will be [place] transfers. STG 2 met.   Short term goal 3: Pt will be [place] amb 240' RW or least restrictive AD  Short term goal 4: Pt will navigate flight of steps Winston L rail    Plan    Plan  Times per week: 6-7x/wk  Times per day: Daily  Current Treatment Recommendations: Strengthening, Balance Training, Functional Mobility Training, Transfer Training, Gait Training, Endurance Training, Stair training, Home Exercise Program, Safety Education & Training, Patient/Caregiver Education & Training, Equipment Evaluation, Education, & procurement  Safety Devices  Type of devices: Call light within reach, Gait belt, Patient at risk for falls, Left in bed, Nurse notified  Restraints  Initially in place: No     Therapy Time   Individual Concurrent Group Co-treatment   Time In 1134         Time Out 1158         Minutes 24         Timed Code Treatment Minutes: 24 Minutes       Roopa Jimenez PT

## 2021-02-05 NOTE — PROGRESS NOTES
NEUROLOGY INPATIENT PROGRESS NOTE    2/5/2021         Current Exam:     Chart reviewed. Discussed with RN. Awaiting placement. She has no acute complaints. Denies any further numbness to the left side. Brief History:    Regino Schwab is a  53 y.o. female with H/O right MCA bifurcation aneurysm, breast cancer, HLD, HTN, who was admitted as a transfer from [facility] on 1/24/2021 due to concern for right MCA occlusion. She initially presented to [facility] with right-sided headache, left-sided weakness and dizziness. CTA head neck showed acute right MCA occlusion. She underwent diagnostic angiogram on 1/21/2021 showing residual filling of the right M1 MCA aneurysm and she was transferred to [facility]. Stroke alert was called, [facility] 3 on presentation. MRI brain with large right MCA territory infarct with petechial hemorrhages, MRA head with limited evaluation of the right MCA M1 due to artifact. She has initially admitted to the neuro ICU and underwent a cerebral angiogram showing right MCA subocclusive clot extending from distal M1 to proximal M2 and treated with mechanical thrombectomy and balloon angioplasty with TICI 2C. Follow-up CT head with large right MCA infarct with hemorrhagic transformation and mild effacement of the lateral ventricle. Another repeat CT head demonstrated right MCA infarct with hemorrhagic conversion. CTA head neck shows patent stent. She was transferred to neuro stepdown under the care of the general neurology service on 1/28/2021. A repeat angio was done on 2/2 showing significant improvement/resoluation of right MCA clot, no residual noted in prior treated right MCA aneurysm. No current facility-administered medications on file prior to encounter.       Current Outpatient Medications on File Prior to Encounter   Medication Sig Dispense Refill    AMITRIPTYLINE HCL PO Take by mouth nightly      MELOXICAM PO Take by mouth nightly      aspirin 325 MG tablet Take 1 tablet by mouth daily 30 tablet 3    lisinopril (PRINIVIL;ZESTRIL) 40 MG tablet Take 40 mg by mouth daily      clonazePAM (KLONOPIN) 0.5 MG tablet Take 0.5 mg by mouth 2 times daily as needed.  venlafaxine (EFFEXOR XR) 75 MG extended release capsule Take 75 mg by mouth daily      hydroCHLOROthiazide (HYDRODIURIL) 25 MG tablet Take 25 mg by mouth daily      Cholecalciferol (VITAMIN D3) 25 MCG (1000 UT) CAPS Take 2,000 Units by mouth nightly       atorvastatin (LIPITOR) 10 MG tablet Take 10 mg by mouth nightly       carvedilol (COREG) 12.5 MG tablet Take 12.5 mg by mouth 2 times daily (with meals)         Allergies: María Elena VASQUEZ is allergic to adhesive tape. Past Medical History:   Diagnosis Date    Aneurysm ([facility] [address] 2019    right MCA bifurcation aneurysm    Cancer ([facility] [address] 2018    left breast    Contact lens/glasses fitting     Fibromyalgia     Former smoker     Hyperlipidemia     Hypertension     Abhijeet Butterfield, CNP    Sleep apnea     C-pap / STATES HAS NOT BEEN WEARING       Past Surgical History:   Procedure Laterality Date    BACK SURGERY      neck fusion & L4-L5 fusion    BRAIN ANEURYSM SURGERY  2020    Culture[employer]IZATION SYSTEM MRI CONDTIONAL 3T OK, SAFE IMMEDIATELY. IR ANGIOGRAM CAROTID CEREBRAL BILATERAL,,S/P coil embolization of cerebral aneurysm    BREAST SURGERY Left 2018    lumpectomy    CARPAL TUNNEL RELEASE Bilateral      SECTION      x 2,  &     HYSTERECTOMY  2018    OTHER SURGICAL HISTORY  2021    DIAGNOSTIC CEREBRAL ANGIOGRAM / Previously treated right MCA M1 bifurcation aneurysm is with a small residual filling noted    TUNNELED VENOUS PORT PLACEMENT      placed , removed 2019       Social History: María Elena VASQUEZ  reports that she quit smoking about 20 years ago. Her smoking use included cigarettes. She has a 15.00 pack-year smoking history. She has never used smokeless tobacco. She reports that she does not use drugs.     Family History   Family history Babinski testing   STATION and GAIT  Not tested       Data:    Lab Results:   CBC:   Recent Labs     02/03/21  0515 02/04/21  0615 02/05/21  0521   WBC 8.6 7.8 7.4   HGB 11.2* 11.4* 11.3*    403 404     BMP:    Recent Labs     02/03/21  0515 02/04/21  0615 02/05/21  0521    141 141   K 3.9 3.8 3.8   * 106 107   CO2 23 23 25   BUN 15 13 12   CREATININE 0.42* 0.46* 0.41*   GLUCOSE 94 99 96         Lab Results   Component Value Date    CHOL 197 01/27/2021    LDLCHOLESTEROL 119 01/27/2021    HDL 44 01/27/2021    TRIG 168 (H) 01/27/2021    ALT 25 09/02/2020    AST 21 09/02/2020    INR 0.9 01/24/2021    LABA1C 5.0 01/27/2021           Diagnostic data reviewed:  CTA head and neck 1/27/2021  Multiple stents within the right middle cerebral artery and beyond the   trifurcation that appear patent.       Mild stenosis in the V4 portion of the left vertebral artery.      CT Head 1/27/2021  Evolving right MCA territory infarct with hemorrhagic conversion.  The   approximately 1 cm parenchymal hemorrhage is not significantly changed   relative to 10 hours prior, but there is suggestion of some new trace   associated subarachnoid blood products peripherally (axial series 2, image   43).     Increased conspicuity of the evolving cytotoxic edema with similar mild mass   effect on the right lateral ventricle with approximately 1 mm right to left   midline shift.      DSA 1/26/2021  --Right MCA subocclusive clot extending from distal M1 to the proximal M2 superior and inferior divisions.  There is collateral flow noted from right KIM to right MCA due to hemodynamically significant subocclusive clot. --This was treated with intra-arterial mechanical thrombectomy with contact aspiration and 3 oh stent retriever.  This was followed with balloon angioplasty using 1.5 x 9 mm [place] balloon (x4 inflated to 6 then 8, then 8, then 10 KT), then 2 x 15 mm Quantum apex balloon (x2).   --This resulted in improvement in the flow across the subocclusive occlusion.  Total recanalization at TICI 2C. --The prior treated right MCA bifurcation aneurysm currently with no residual noted. REPEAT DSA 2/2/21-  --The prior noted right MCA M1/M2 subocclusive clot significantly improved/resolved. There is a small residual noted subocclusive clot in left MCA M2 posterior division not hemodynamically significant.  ----The prior treated right MCA bifurcation aneurysm with Web device currently with no residual noted [facility]. MRI/A brain 1/24/2021  1. Large right middle cerebral artery territory infarct involving the   superior right temporal lobe and to a lesser extent the right frontal and   right parietal lobes. 2. Small focus of petechial hemorrhage within the right parietal lobe infarct. 3. Limited evaluation of the right middle cerebral artery given   susceptibility artifact from the patient's known MCA stent.  There is normal   signal identified within the proximal M1 segment as well as M2 branches   beyond the stent and area of susceptibility artifact.  Correlation with the   patient's reported recent outside CT angiogram of the brain is recommended        CT head without contrast 1/30/2021  Impression   1.  Right middle cerebral artery distribution subacute infarct with   hemorrhagic transformation.  Amount of hemorrhage has decreased somewhat.       2.  Reduced edema and mass effect associated with infarct.       3.  Interval resolution of subarachnoid hemorrhage.       4.  Other incidental findings as above. Repeat CT head (2/1/21) -  Decreased conspicuity of the evolving cytotoxic edema and the associated   small amount of intraparenchymal blood products in the right MCA   distribution.  No new hemorrhage, and no new foci of altered attenuation   within the remainder of the brain parenchyma.       Ventricles remain normal in caliber.  No herniation or significant mass   effect.              Impression:  -Large

## 2021-02-05 NOTE — DISCHARGE SUMMARY
[facility]  [address] [facility]    Discharge Summary     Patient ID: Carolina Salas  :  1970   MRN: [id number]     ACCOUNT:  [de-identified]   Patient's PCP: [place] MarchPOPEYE CNP  Admit Date: 2021   Discharge Date: 2021     Length of Stay: 12  Code Status:  Full Code  Admitting Physician: Markie Salcedo MD  Discharge Physician: POPEYE Wolf CNP     Active Discharge Diagnoses:     Hospital Problem Lists:  Active Problems:    Cerebrovascular accident (CVA) due to occlusion of left middle cerebral artery ([address]    Acute right MCA stroke ([facility] [address]    History of cerebral aneurysm repair  Resolved Problems:    * No resolved hospital problems. *      Admission Condition:  poor     Discharged Condition: stable    Hospital Stay:     Hospital Course:  Carolina Salas is a 53 y.o. female who was admitted for the management of stroke. She was admitted as a transfer from [facility] on 2021 due to concern for right MCA occlusion. She initially presented to [facility] with right-sided headache, left-sided weakness and dizziness. CTA head neck showed acute right MCA occlusion. She underwent diagnostic angiogram on 2021 showing residual filling of the right M1 MCA aneurysm and she was transferred to [facility]. Stroke alert was called, NIH 3 on presentation. MRI brain with large right MCA territory infarct with petechial hemorrhages, MRA head with limited evaluation of the right MCA M1 due to artifact. She has initially admitted to the neuro ICU and underwent a cerebral angiogram showing right MCA subocclusive clot extending from distal M1 to proximal M2 and treated with mechanical thrombectomy and balloon angioplasty with TICI 2C. Follow-up CT head with large right MCA infarct with hemorrhagic transformation and mild effacement of the lateral ventricle. Another repeat CT head demonstrated right MCA infarct with hemorrhagic conversion.   CTA head neck shows patent stent.  She was transferred to neuro stepdown under the care of the general neurology service on 1/28/2021. A repeat angio was done on 2/2 showing significant improvement/resoluation of right MCA clot, no residual noted in prior treated right MCA aneurysm. Per endovascular recommendations the patient will continue on ASA 325mg and Plavix 75mg daily as well as statin therapy. During her admission ophthalmology was consulted due to concern of inability to get contact lenses out by the patient, but per his examination there were no contact lenses present. On day of discharge patient is alert, oriented, and has no acute complaints. She will need to follow up with her PCP, neurology, and endovascular upon discharge. She was planning for SNF placement but patient and family did not want to wait for acceptance any longer from facility so decision was made for patient to go home with family. She will go home with home care.       Significant therapeutic interventions: s/p mechanical thrombectomy TICI 2C and balloon angioplasty    Significant Diagnostic Studies:   Labs / Micro:  CBC:   Lab Results   Component Value Date    WBC 7.4 02/05/2021    RBC 3.52 02/05/2021    HGB 11.3 02/05/2021    HCT 35.8 02/05/2021    .7 02/05/2021    MCH 32.1 02/05/2021    MCHC 31.6 02/05/2021    RDW 13.0 02/05/2021     02/05/2021     BMP:    Lab Results   Component Value Date    GLUCOSE 96 02/05/2021     02/05/2021    K 3.8 02/05/2021     02/05/2021    CO2 25 02/05/2021    ANIONGAP 9 02/05/2021    BUN 12 02/05/2021    CREATININE 0.41 02/05/2021    BUNCRER NOT REPORTED 02/05/2021    CALCIUM 9.8 02/05/2021    LABGLOM >60 02/05/2021    GFRAA >60 02/05/2021    GFR      02/05/2021    GFR NOT REPORTED 02/05/2021         Radiology:    Echo Complete 2d W Doppler W Color    Result Date: 1/31/2021  Transthoracic Echocardiography Report (TTE)  Patient Name [address] Date of Study               01/30/2021               K Date of      1970  Gender                      Female  Birth   Age          53 year(s)  Race                           Room Number  2834        Height:                     65 inch, 165.1 cm   Corporate ID [id number]    Weight:                     240 pounds, 108.9 kg  #   Patient Acct [de-identified]   BSA:          2.14 m^2      BMI:       39.94  #                                                               kg/m^2   MR #         [id number]     Daquan Sapp   Accession #  [id number]  Interpreting Physician      Jet Waldron   Fellow                   Referring Nurse                           Practitioner   Interpreting             Referring Physician         Kwasi Valdivia  Fellow  Type of Study   TTE procedure:2D Echocardiogram, M-Mode, Doppler, Color Doppler, Bubble  Study. Procedure Date Date: 01/30/2021 Start: 10:48 AM Study Location: [facility] Technical Quality: Fair visualization Indications:Stroke. History / Tech. Comments: Procedure explained to patient. Patient Status: Inpatient Height: 65 inches Weight: 240.01 pounds BSA: 2.14 m^2 BMI: 39.94 kg/m^2 HR: 77 bpm CONCLUSIONS Summary Global left ventricular systolic function is normal. Estimated ejection fraction is 55-60% . Calculated EF via Gilbert's method is 58 %. No wall motion abnormality seen. Normal diastolic filling. Normal right ventricular size and function. No significant valvular regurgitation or stenosis seen. Negative bubble study, no shunt noted via injection of agitated saline. Signature ----------------------------------------------------------------------------  Electronically signed by Jet Waldron(Interpreting physician) on  01/31/2021 08:01 AM ---------------------------------------------------------------------------- FINDINGS Left Atrium Left atrial size is at the upper limits of normal. Inter-atrial septum appears so be intact. No shunt seen by color Doppler.  Negative bubble study, no shunt noted via injection of agitated saline. Left Ventricle Left ventricle is normal in size. Global left ventricular systolic function is normal. Estimated ejection fraction is 55 % . Calculated EF via Gilbert's method is 58 %. Normal left ventricular wall thickness. No wall motion abnormality seen. Normal diastolic filling. Right Atrium Right atrium is normal in size. Right Ventricle Normal right ventricular size and function. Mitral Valve Normal mitral valve structure. Mild mitral regurgitation. No mitral stenosis. Aortic Valve Aortic valve is sclerotic but opens well. Aortic valve is trileaflet. Trivial aortic insufficiency. No aortic stenosis. Tricuspid Valve Normal tricuspid valve leaflets. Mild tricuspid regurgitation. No tricuspid stenosis. Estimated right ventricular systolic pressure is 39 mmHg. Pulmonic Valve Pulmonic valve is normal in structure and function. No pulmonic insufficiency. No evidence of pulmonic stenosis. Pericardial Effusion No pericardial effusion. Miscellaneous Normal aortic root dimension. The ascending aorta is normal in size. E/E' average = 14.7. IVC normal diameter & inspiratory collapse indicating normal RA filling pressure .  M-mode / 2D Measurements & Calculations:   LVIDd:4.5 cm(3.7 - 5.6 cm)       Diastolic ANLUX ml  LVIDs:3 cm(2.2 - 4.0 cm)         Systolic WCCJUO:09 ml  IVSd:1 cm(0.6 - 1.1 cm)          Aortic Root:3.2 cm(2.0 - 3.7 cm)  LVPWd:1.1 cm(0.6 - 1.1 cm)       LA Dimension: 4.1 cm(1.9 - 4.0 cm)  Fractional Shortenin.33 %    LA volume/Index: 67.87 ml /32m^2  Calculated LVEF (%): 58.56 %     LVOT:1.9 cm                                   RVDd:3.5 cm   Mitral:                                 Aortic   Valve Area (P1/2-Time): 3.49 cm^2       Peak Velocity: 1.74 m/s  Peak E-Wave: 1.22 m/s                   Mean Velocity: 1.20 m/s  Peak A-Wave: 1.05 m/s                   Peak Gradient: 12.11 mmHg  E/A Ratio: 1.16                         Mean Gradient: 6 mmHg  Peak Gradient: 5.95 mmHg  Mean Gradient: 2 mmHg  Deceleration Time: 204 msec             Area (continuity): 2.42 cm^2  P1/2t: 63 msec                          AV VTI: 35.5 cm   Area (continuity): 2.54 cm^2  Mean Velocity: 0.71 m/s   Tricuspid:                              Pulmonic:   Estimated RVSP: 39 mmHg                 Peak Velocity: 1.24 m/s  Peak TR Velocity: 2.93 m/s              Peak Gradient: 6.15 mmHg  Peak TR Gradient: 34.3396 mmHg  Estimated RA Pressure: 5 mmHg                                           Estimated PASP: 39.34 mmHg  Diastology / Tissue Doppler Septal Wall E' velocity:0.10 m/s Septal Wall E/E':12.7 Lateral Wall E' velocity:0.07 m/s Lateral Wall E/E':16.7    Ct Head Wo Contrast    Result Date: 2/1/2021  EXAMINATION: CT OF THE HEAD WITHOUT CONTRAST  2/1/2021 1:18 pm TECHNIQUE: CT of the head was performed without the administration of intravenous contrast. Dose modulation, iterative reconstruction, and/or weight based adjustment of the mA/kV was utilized to reduce the radiation dose to as low as reasonably achievable. COMPARISON: 01/30/2021, 01/27/2021 HISTORY: ORDERING SYSTEM PROVIDED HISTORY: eval stroke TECHNOLOGIST PROVIDED HISTORY: eval stroke Is the patient pregnant?->No Reason for Exam: FINDINGS: BRAIN/VENTRICLES: Decreased conspicuity of the cytotoxic edema associated with the evolving infarct in the right MCA distribution. The amount of parenchymal hemorrhage has mildly decreased in conspicuity. There are no new foci of hemorrhage. Gray-white differentiation in the left hemisphere and posterior fossa is maintained. Ventricles are normal in caliber. ORBITS: The visualized portion of the orbits demonstrate no acute abnormality. SINUSES: The visualized paranasal sinuses and mastoid air cells demonstrate no acute abnormality. SOFT TISSUES/SKULL:  No acute abnormality of the visualized skull or soft tissues.      Decreased conspicuity of the evolving cytotoxic edema and the associated small amount of intraparenchymal blood products in the right MCA distribution. No new hemorrhage, and no new foci of altered attenuation within the remainder of the brain parenchyma. Ventricles remain normal in caliber. No herniation or significant mass effect. Ct Head Wo Contrast    Result Date: 1/30/2021  EXAMINATION: CT OF THE HEAD WITHOUT CONTRAST  1/30/2021 8:40 am TECHNIQUE: CT of the head was performed without the administration of intravenous contrast. Dose modulation, iterative reconstruction, and/or weight based adjustment of the mA/kV was utilized to reduce the radiation dose to as low as reasonably achievable. COMPARISON: 27 January 2021 HISTORY: ORDERING SYSTEM PROVIDED HISTORY: follow up on stroke TECHNOLOGIST PROVIDED HISTORY: follow up on stroke Is the patient pregnant?->No Reason for Exam: follow up on stroke, headache Acuity: Unknown Type of Exam: Unknown FINDINGS: BRAIN/VENTRICLES: Again noted is a subacute infarct in the right middle cerebral artery distribution with effacement of the sulci subtle mass effect and hemorrhagic transformation. Central hemorrhage is slightly decreased compared to prior examination. Overlying subarachnoid hemorrhage is not redemonstrated. No hydrocephalus or midline shift is noted. No new areas of hemorrhage. Patient has findings compatible with prior right M1 intervention and embolization. No new areas of hemorrhage are noted. Right to left midline shift has resolved. Chronic white matter changes noted. ORBITS: The visualized portion of the orbits demonstrate no acute abnormality. SINUSES: The visualized paranasal sinuses and mastoid air cells demonstrate no acute abnormality. SOFT TISSUES/SKULL:  No acute abnormality of the visualized skull or soft tissues. 1.  Right middle cerebral artery distribution subacute infarct with hemorrhagic transformation. Amount of hemorrhage has decreased somewhat.  2.  Reduced edema and mass effect associated with infarct. 3.  Interval resolution of subarachnoid hemorrhage. 4.  Other incidental findings as above. Ct Head Wo Contrast    Addendum Date: 1/27/2021    ADDENDUM: Critical results were relayed by Dr. Cony Monson to ELISHA Álvarez on 1/27/2021 at 1351 EST. Result Date: 1/27/2021  EXAMINATION: CT OF THE HEAD WITHOUT CONTRAST  1/27/2021 9:44 am TECHNIQUE: CT of the head was performed without the administration of intravenous contrast. Dose modulation, iterative reconstruction, and/or weight based adjustment of the mA/kV was utilized to reduce the radiation dose to as low as reasonably achievable. COMPARISON: 01/27/2021, 01/25/2021 HISTORY: ORDERING SYSTEM PROVIDED HISTORY: post op hemorrhage, repeat CT TECHNOLOGIST PROVIDED HISTORY: post op hemorrhage, repeat CT Is the patient pregnant?->No Reason for Exam: post op hemorrhage , repeat cT FINDINGS: BRAIN/VENTRICLES: No significant change in size of the predominant area of parenchymal hemorrhage in the inferior mid right frontal lobe relative to 10 hours prior measuring up to approximately 1 cm. There is suggestion of a small volume of associated subarachnoid blood products peripherally most conspicuous on axial series 2, image 43. Continued increased conspicuity of the cytotoxic edema related to the evolving right MCA territory infarct, most conspicuous within the temporal lobe and to a lesser degree in the frontoparietal lobes. There is similar mass effect on the right lateral ventricle and approximately 1 mm of right-to-left midline shift. There are no new foci of altered attenuation within brain parenchyma. No hydrocephalus. ORBITS: The visualized portion of the orbits demonstrate no acute abnormality. SINUSES: The visualized paranasal sinuses and mastoid air cells demonstrate no acute abnormality. SOFT TISSUES/SKULL:  No acute abnormality of the visualized skull or soft tissues.      Evolving right MCA territory infarct with hemorrhagic conversion. The approximately 1 cm parenchymal hemorrhage is not significantly changed relative to 10 hours prior, but there is suggestion of some new trace associated subarachnoid blood products peripherally (axial series 2, image 43). Increased conspicuity of the evolving cytotoxic edema with similar mild mass effect on the right lateral ventricle with approximately 1 mm right to left midline shift. The findings were sent to the Radiology Results [address] at 1331 EST on 1/27/2021to contact the ordering provider for a return call. Ct Head Without Contrast    Result Date: 1/27/2021  EXAMINATION: CT OF THE HEAD WITHOUT CONTRAST  1/27/2021 2:44 am TECHNIQUE: CT of the head was performed without the administration of intravenous contrast. Dose modulation, iterative reconstruction, and/or weight based adjustment of the mA/kV was utilized to reduce the radiation dose to as low as reasonably achievable. COMPARISON: 01/25/2021 HISTORY: ORDERING SYSTEM PROVIDED HISTORY: post procedure TECHNOLOGIST PROVIDED HISTORY: post procedure Is the patient pregnant?->No Reason for Exam: post procedure Acuity: Acute Type of Exam: Initial FINDINGS: BRAIN/VENTRICLES: Again noted is large infarct throughout most of right temporal lobe with some extension superiorly into frontal and parietal lobes. Hypodensity and associated edema has become more pronounced as expected with evolving effacement of the right lateral ventricle. There is no significant leftward midline shift identified at this time. There is new hemorrhagic transformation with 1 cm parenchymal hemorrhage at the inferior mid right frontal lobe. ORBITS: The visualized portion of the orbits demonstrate no acute abnormality. SINUSES: The visualized paranasal sinuses and mastoid air cells demonstrate no acute abnormality. SOFT TISSUES/SKULL:  No acute abnormality of the visualized skull or soft tissues.      Hemorrhagic transformation of large right-sided infarct and increased edema beginning to efface the right lateral ventricle. Critical results were called by Dr. Nakul Diaz to Ewa Chaulenard on 1/27/2021 at 03:06. Ct Head Wo Contrast    Result Date: 1/25/2021  EXAMINATION: CT OF THE HEAD WITHOUT CONTRAST  1/25/2021 6:56 am TECHNIQUE: CT of the head was performed without the administration of intravenous contrast. Dose modulation, iterative reconstruction, and/or weight based adjustment of the mA/kV was utilized to reduce the radiation dose to as low as reasonably achievable. COMPARISON: Noncontrast MRI brain performed 12.5 hours earlier. HISTORY: ORDERING SYSTEM PROVIDED HISTORY: right MCA stroke, re-eval for hemorrhagic conversion TECHNOLOGIST PROVIDED HISTORY: right MCA stroke, re-eval for hemorrhagic conversion Decision Support Exception->Emergency Medical Condition (MA) Is the patient pregnant?->No Reason for Exam: right MCA stroke, re-eval for hemorrhagic conversion FINDINGS: BRAIN/VENTRICLES: Evolving frontotemporal hypodensity is evident on the right. No hemorrhagic conversion is evident at this time. M1/M junction region Y stent and aneurysm occlusion device noted. The remainder the brain is unremarkable. ORBITS: The visualized portion of the orbits demonstrate no acute abnormality. SINUSES: The visualized paranasal sinuses and mastoid air cells demonstrate no acute abnormality. SOFT TISSUES/SKULL:  No acute abnormality of the visualized skull or soft tissues. Evolving infarct in the right frontotemporal region. No evidence of hemorrhagic conversion at this time. Mra Head Wo Contrast    Result Date: 1/26/2021  EXAMINATION: MRA OF THE HEAD WITHOUT CONTRAST 1/25/2021 4:45 pm TECHNIQUE: MRA of the head was performed utilizing time-of-flight imaging with MIP images. No intravenous contrast was administered.  COMPARISON: MRA from 01/24/2021 HISTORY: ORDERING SYSTEM PROVIDED HISTORY: concern for stroke TECHNOLOGIST PROVIDED HISTORY: concern for stroke Is the patient pregnant?->No FINDINGS: ANTERIOR CIRCULATION: Artifact from stent device in the right middle cerebral artery obscures the area of the bifurcation. The other portions of the right middle cerebral artery are patent. The left middle cerebral artery is patent. Both anterior cerebral arteries are patent. The intracranial segments of the internal carotid arteries are patent. POSTERIOR CIRCULATION: No significant stenosis of the vertebral, basilar, or posterior cerebral arteries. Unchanged MRA head since 01/24. Mra Head Wo Contrast    Result Date: 1/24/2021  EXAMINATION: MRI OF THE BRAIN WITHOUT CONTRAST AND MRA HEAD WITHOUT CONTRAST 1/24/2021 6:12 pm TECHNIQUE: Multiplanar multisequence MRI of the brain was performed without the administration of intravenous contrast. MRA of the head was performed utilizing time-of-flight imaging with MIP images. No intravenous contrast was administered. COMPARISON: None. HISTORY: ORDERING SYSTEM PROVIDED HISTORY: left mca occlusion on ct TECHNOLOGIST PROVIDED HISTORY: left mca occlusion on ct Decision Support Exception->Emergency Medical Condition (MA) Is the patient pregnant?->No; ORDERING SYSTEM PROVIDED HISTORY: stroke alert TECHNOLOGIST PROVIDED HISTORY: -SIRD stroke alert Is the patient pregnant?->No Left-sided weakness, headache and photophobia with acute onset 1 day prior. Abnormal CT brain and CT angiogram brain demonstrating a right MCA occlusion. FINDINGS: MRI BRAIN: INTRACRANIAL STRUCTURES/VENTRICLES: There is extensive restricted diffusion involving the cortex and subcortical white matter of the superior right temporal lobe and to a lesser extent right frontal and right parietal lobes consistent with an acute right MCA infarct. There is a small amount of blooming on gradient imaging within the cortex of the right parietal lobe consistent with petechial hemorrhage. There is no mass effect or midline shift.  There is no Diagnosis: right M1 middle cerebral artery bifurcation aneurysm status post prior stent and Web device embolization, incidental left middle cerebral artery aneurysm Procedures: 2. Intravenous moderate sedation X 104 minutes 3. Selective right common carotid artery (CCA) angiogram 4. Selective right internal carotid artery (ICA) cerebral angiogram 7. Selective right vertebral artery (VA) cerebral angiogram 8. Selective left common carotid artery (CCA) angiogram 9. Selective left internal carotid artery (ICA) cerebral angiogram 10. Selective left external carotid artery (ECA) angiogram 11. Selective left vertebral artery (VA) cerebral angiogram 12. Selective left costocervical artery angiogram 13. Selective left subclavian artery angiogram 14. Selective right subclavian artery angiogram 15. Selective right dorsal scapular artery angiogram 16. Selective right thyrocervical artery angiogram 17. 3d rotational angiography 18. Right common femoral artery (CFA) angiogram Neurointerventionalist: Bolivar Bernstein MD, MS [place]: Beba Hooper MD PhD [facility] MD Contrast: 120 cc of Visipaque-270. Fluoroscopy time: 26.4 minutes Access: Right common femoral artery. Comparison: 8/26/2020 Consent: After explaining the risks and benefits to the patient and the patient's family, including but not limited to stroke, coma, death, vessel injury, dissection, tear, occlusion, and X-ray dye allergic type reaction, a signed consent form was obtained. Indication and Clinical History: 53year old female with past medical history of hyperlipidemia, hypertension, breast cancer 2018, obstructive sleep apnea. Patient is followed for right middle cerebral artery aneurysm status post Web and stent placement 8/26/2020. Patient presents for follow up cerebral angiogram. Anesthesia: Anesthesia/sedation initiated at: 1211 Anesthesia/sedation completed at: 1355 Total anesthesia/sedation: 104 mins. Local anesthesia with lidocaine.  IV moderate sedation with Versed and Fentanyl IV moderate sedation was supervised by the attending physician. The patient was independently monitored by a registered nurse assigned to the Department of Radiology using automated blood pressure, EKG and pulse oximetry. The detailed Conscious Record is  permanently stored in the [address] Description and findings: The patient's right groin was prepped and draped in standard sterile fashion and under local anesthesia with conscious sedation. Fluoroscopy was used to localize the right common femoral artery, which was then accessed with a micropuncture technique, and  a 5 [place]i intravascular sheath was placed within the right common femoral artery, establishing arterial access using Seldinger technique. A 5 [place]i multipurpose catheter was then advanced over a guide wire to the level of the aortic arch. Right CCA technique: The right common carotid artery was selectively catheterized under fluoroscopic guidance and digital subtraction angiography images were obtained in biplane projections of the right cervical carotid artery. Interpretation: The right common carotid artery injection demonstrates normal antegrade flow into the external and internal carotid arteries with normal filling of the external carotid artery branches. The right cervical ICA is mildly tortuous. Course and caliber of the  cervical portion of the right internal carotid artery are unremarkable. Further inspection demonstrates no evidence of dissection, stenosis, aneurysm, or other vascular abnormality within the right CCA into the cervical segment of the right ICA. Right ICA technique: The right internal carotid artery was then selectively catheterized, and digital subtraction angiography of the intracranial right internal carotid circulation was performed in frontal, and lateral projections.  Interpretation: There is normal antegrade filling of the distal internal carotid artery, ophthalmic artery, anterior cerebral artery, middle cerebral artery and the distal branches. There is a prominent right posterior communicating artery, with a functionally fetal right posterior cerebral artery. There is an anteriorly and inferiorly directed aneurysm in the right M1 trifurcation with previously placed Web device and y-stent. The stents appear widely patent with no in-stent stenosis. The aneurysm remains patent, with the Web device compacted into the aneurysm dome compared to the prior imaging. Residual aneurysm neck 10.77mm, height 4.19mm, Web Occlusion Scale C. Inspection of the remaining right internal carotid circulation revealed no other evidence of cerebral aneurysm, arteriovenous malformation, or arterial stenosis. Capillary and venous phase images were also unremarkable, with no evidence of veno-occlusive disease. 3-D rotational right internal carotid cerebral angiogram: A 3-D rotation of cerebral angiogram was performed with a right ICA injection using a power injector to better visualize the aneurysmal morphology and for surgical planning. This was interpreted on a separate workstation. Information obtained from the 3-D rotational angiogram could not be obtained from other noninvasive diagnostic studies done prior to the procedure. Left CCA technique: The left common carotid artery was selectively catheterized under fluoroscopic guidance and digital subtraction angiography images were obtained in biplane projections of the left cervical common carotid artery. Interpretation: The left common carotid artery injection demonstrates normal antegrade flow into the external and internal carotid arteries with normal filling of the external carotid artery branches. The left cervical ICA is tortuous. Caliber and lumen contour of the cervical portion of the left internal carotid artery are otherwise unremarkable.  Further inspection demonstrates no other evidence of dissection, stenosis, aneurysm, or other vascular abnormality within the left CCA into the cervical segment of the left ICA. Left ICA technique: The left internal carotid artery was then selectively catheterized, and digital subtraction angiography of the intracranial left internal carotid artery circulation was performed in frontal and lateral projections. Interpretation: There is normal antegrade filling of the distal internal carotid artery, ophthalmic artery, anterior cerebral artery, middle cerebral artery and distal branches. The superior left M2 middle cerebral artery is dominant. 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 Inspection of the remaining left internal carotid artery circulation revealed no other evidence of cerebral aneurysm, arteriovenous malformation, or arterial stenosis. Capillary and venous phase images were also unremarkable, with no evidence of veno-occlusive disease. 3-D rotational left internal carotid cerebral angiogram: A 3-D rotation of cerebral angiogram was performed with a left ICA injection using a power injector to better visualize the aneurysmal morphology and for surgical planning. This was interpreted on a separate workstation. Information obtained from the 3-D  rotational angiogram could not be obtained from other noninvasive diagnostic studies done prior to the procedure. Left VA technique: The left subclavian artery was then selectively catheterized with roadmap guidance. Digital subtraction angiography of the intracranial left vertebrobasilar run-off was obtained in frontal and lateral projections. Interpretation: Image quality is poor, with marked contamination from the left ICA. The left vertebral artery injection demonstrates normal antegrade opacification of the left vertebral artery, including the cervical segment, basilar artery, and respective branches.  There was no clear evidence of cerebral aneurysm, arteriovenous IV moderate sedation Neurointerventionalist: Agusto Bhatia MD [place]: Filipe Singh MD Comparison: None Fluoroscopy time: 9.7 minutes Contrast: 50 cc Visipaque-270 Side of Access: Right femoral. Closure device: Vascade Sedation: IV moderate sedation Vascular access removed at: 12:50 PM Consent: After explaining the risks and benefits to the patient and the patient's family, including but not limited to stroke, coma, death, vessel injury, dissection, tear, occlusion, and X-ray dye allergic type reaction, a signed consent form was obtained. Anesthesia: IV moderate sedation was supervised by Dr. Cristy Dhillon. The patient was independently monitored by a Registered Nurse assigned to the Department of Radiology?using automated blood pressure, EKG and pulse oximetry. ? The detailed Conscious Record is permanently  stored in the [facility]. The following is the conscious sedation record including Start and End times: Fentanyl and Versed from 11:45 AM to  12:50 PM . Clinical History: This is a 23-year-old female with past medical history including hypertension, hyperlipidemia, prior history of breast cancer. She had the right MCA bifurcation aneurysm treatment with stent assisted WEB in August 2020. She recently had  a follow-up diagnostic cerebral angiogram with a right MCA M1/M2 subocclusive thrombus intra-arterial thrombectomy and balloon angioplasty. She was referred today for a follow-up diagnostic cerebral angiogram. Description and findings: The patient's right groin was prepped and draped in standard sterile fashion and under local anesthesia with conscious sedation, the right common femoral artery was accessed with a micropuncture needle technique, and a 5 [place]an intravascular sheath was placed within the right common femoral artery using Seldinger technique, establishing arterial access.  A 5 [place]an multipurpose catheter was then advanced over a guide wire to the level of the aortic arch, and used to selectively catheterize the right common carotid artery. Right CCA technique: The right common carotid artery was selectively catheterized under fluoroscopic guidance and digital subtraction angiography images were obtained in biplane projections of the right cervical carotid artery. Interpretation: The right common carotid artery injection demonstrates normal antegrade flow into the external and internal carotid arteries with normal filling of the external carotid artery branches. Course and caliber of the cervical portion of the right internal carotid artery are unremarkable. Further inspection demonstrates no evidence of dissection, stenosis, aneurysm, or other vascular abnormality within the right CCA into the cervical segment of the right ICA. Right ICA technique: The right internal carotid artery was then selectively catheterized, and digital subtraction angiography of the intracranial right internal carotid circulation was performed in frontal, and lateral projections. Interpretation: There is normal antegrade filling of the distal internal carotid artery, ophthalmic artery, anterior cerebral artery, middle cerebral artery and the distal branches. The previously placed WEB device with 2 Neuroform [place] stents in the right MCA M1 bifurcation aneurysm is currently with no aneurysm residual noted with Willia Herter class at 1. The previously noted right MCA M1/M2 subocclusive clot extending to anterior and posterior divisions is currently significantly improved with a small residual subocclusive not hemodynamically significant clot noted in the posterior MCA M2 division. Otherwise Inspection of the remaining right internal carotid circulation revealed no other evidence of cerebral aneurysm, arteriovenous malformation, or arterial stenosis. Capillary and venous phase images were also unremarkable, with no evidence of veno-occlusive disease.  Of note there is a blush noted in right MCA territory/l luxury perfusion consistent with the right MCA ischemic stroke noted on CT head. Right CFA technique: A right common femoral artery angiogram was performed and demonstrated arterial catheterization proximal to the bifurcation. There was no evidence of dissection or occlusion within the right common femoral artery. A 5F Vascade closure device was used to establish hemostasis at the right common femoral artery access site. No immediate complications were experienced. Patient was re-examined after the procedure with no change noted in their neurologic examination, with distal pulses present. The patient was subsequently discharged from the neurointerventional suite. Impression: --The prior noted? right MCA?M1/M2? subocclusive clot?significantly improved/resolved. There is a small residual noted subocclusive clot in left MCA M2 posterior division not hemodynamically significant. --The prior treated right MCA bifurcation aneurysm with Web device currently with no residual noted with Brina Eaves class at 1. Dr. Yajaira Castaneda dictated this invasive procedure. Soni Torres MD was present for all procedural and imaging components of this case. Examination was reviewed and reported findings confirmed and edited by Soni Torres MD. Soni Torres MD supervised and interpreted this procedure. Ir Angiogram Carotid C Erebral Bilateral    Result Date: 1/29/2021  Date of Service: January 26, 2021. Diagnosis: Right MCA M1/M2 subocclusive thrombus. Procedure: Mechanical Thrombectomy for acute Right distal MCA M1/M2 subocclusive thrombus. Patient arrived to the angio suite at: 8:05 AM Puncture obtained at: 8:58 AM Vascular access was removed at: 3:06 PM [place]: MD Elayne Madison MD PhD Contrast: 144 cc Of Visipaque-270 Fluoroscopy time: 97.3 minutes (contrast and fluoroscopy time includes both diagnostic and thrombectomy procedure time) Procedures: 1.  Trans-arterial endovascular mechanical thrombectomy using ADAPT technique with contact aspiration, followed by Solitaire device with Combined Suctioning for acute right MCA M1/M2 clot 2. Superselective right H1pckgg catheter angiograms 3. Right Common Carotid Artery Angiogram 4. Left Common Femoral Artery Angiogram Neurointerventionalist: Dr. Erik Moore Access: Left Common Femoral Artery Anesthesia: MAC Indication and Clinical Hx: This is a 50-year-old female with past medical history including hypertension, hyperlipidemia, history of breast cancer. She underwent a right MCA bifurcation aneurysm treatment with stent assisted (using 2 Neuroform [place] stents) WEB device. She presented with left-sided neglect. She was referred for diagnostic cerebral angiogram with possible intervention. Consent: Patient was consented with risks and benefits of including bleeding/infection/stroke/death. Please refer to diagnostic cerebral angiogram for further details about diagnostic of procedure. Technique and Interpretations: the patient was placed in a supine position by the stroke and anesthesia team. Using 300 cm glide exchange wire. The select catheter and the 5 [place] Islander short sheath was exchanged with 8 [place] Islander 10 cm short sheath and balloon guide catheter. Right ICA technique: The right internal carotid artery was then selectively catheterized, and digital subtraction angiography of the intracranial right internal carotid circulation was performed in frontal, and lateral projections. Right ICA Interpretation: There is normal antegrade filling of the distal internal carotid artery, ophthalmic artery, anterior cerebral artery, middle cerebral artery and the distal branches. There is functional fetal right posterior cerebral artery noted. The previously placed WEB device and two Neuroform [place] stents in the right MCA M1 bifurcation aneurysm into superior and inferior MCA M2 branches are present. There is subocclusive clot/thrombus noted in the distal right MCA M1/M2 in the previously deployed devices.  There is anterograde flow in both M2 branches distal to the thrombus. There is collateral flow noted from right KIM to right MCA due to hemodynamically significant subocclusive clot. Inspection of the remaining right internal  carotid circulation revealed no other evidence of cerebral aneurysm, arteriovenous malformation, or arterial stenosis. Capillary and venous phase images were also unremarkable, with no evidence of veno-occlusive disease MECHANICAL THROMBECTOMY OF right MCA M1/M2 subocclusive CLOT:(First attempt) Super Selective MCA Micro-catheter Angiogram Technique: Under fluoroscopic guidance the right MCA M1 was gently crossed using Synchro2 soft microwire and Rebar 18 microcatheter. This system was advanced through Jet D penumbra reperfusion catheter through  the balloon guide catheter. The micro-wire was removed and the micro-catheter was flushed slowly. Then under fluoroscopic guidance the jet D penumbra reperfusion catheter was advanced forward to the face the clot. Negative pressure through the jet D penumbra reperfusion catheter was performed using the penumbra aspiration pump for 3 minutes. The BGC was inflated. Then jet D penumbra reperfusion catheter was taken slowly under fluoroscopic guidance into the balloon guide catheter with negative pressure from the balloon side port manual aspiration using 60 cc syringe. The BGC was double flushed and the balloon was deflated. Post first pass run demonstrated recanalization (TICI 2A) from (TICI 2A). MECHANICAL THROMBECTOMY OF right MCA M1/M2 subocclusive CLOT:( Second attempt) Super Selective MCA Micro-catheter Angiogram Technique: Under fluoroscopic guidance the right MCA M1 was gently crossed using Synchro2 soft microwire and Rebar 18 microcatheter. This system was advanced through jet D penumbra reperfusion catheter through  the balloon guide catheter. The micro-wire was removed and the micro-catheter was flushed slowly.  Then, the Solitaire 4 x 40 mm device was advanced under fluoroscopic guidance and was unsheathed spanning the right MCA clot. The Device was kept open for 5 minutes. The jet D penumbra reperfusion was advanced forward to engulf about one third of the stent retriever. The Jet D penumbra reperfusion  was connected to a penumbra aspiration pump. After attempting to remove the Solitaire stent retriever as one piece with the reperfusion catheter as was noted that there was resistance encountered trying to  cross the previously deployed stents during withdrawal of the device. Subsequently the Solitaire device was re-sheathed again again inside the body. Then the BGC was inflated. Then jet D penumbra reperfusion catheter was taken slowly under fluoroscopic guidance into the balloon guide catheter with negative pressure from the balloon side port manual aspiration using 60 cc syringe. The BGC was double flushed and the balloon was deflated. Post second pass run demonstrated recanalization (TICI 0) from (TICI 2A). MECHANICAL THROMBECTOMY OF right MCA M1/M2 subocclusive CLOT:( Third attempt) Super Selective MCA Micro-catheter Angiogram Technique: Under fluoroscopic guidance the right MCA M1 was gently crossed using Synchro2 soft microwire and Rebar 18 microcatheter. This system was advanced through jet D penumbra reperfusion catheter through  the balloon guide catheter. The micro-wire was removed and the micro-catheter was flushed slowly. Then under fluoroscopic guidance the jet D penumbra reperfusion catheter was advanced forward to the face the clot. Negative pressure through the jet D penumbra reperfusion catheter was performed using the penumbra aspiration pump for 3 minutes. The BGC was inflated. Then jet D penumbra reperfusion catheter was taken slowly under fluoroscopic guidance into the balloon guide catheter with negative pressure from the balloon side port manual aspiration using 60 cc syringe. The BGC was double flushed and the balloon was deflated.  Post third pass run demonstrated recanalization (TICI 2A) from (TICI 0). Then the decision was to proceed for intracranial balloon angioplasty Intra-arterial balloon angioplasty to macerate the clot: Super Selective MCA Micro-catheter Angiogram Technique: Under fluoroscopic guidance the right MCA M1 was gently crossed using Synchro2 300 cm exchange microwire and Rebar 18 microcatheter. Then the microcatheter was removed. Then the [place] balloon 1.5 x 9 mm was advanced over Synchro 2 300 cm exchanged microguidewire (over the wire). Then slowly under fluoroscopic guidance the [place] balloon was inflated x4 (up to 4 KT, 8:18 AM, 8:18 AM, then 10 KT respectively). This resulted in adequate maceration of the clot. Then the [place] balloon was removed. After further evaluation of angiographic runs at [place] 2 x 15 mm balloon was advanced over the Synchro 2 microwire ([place]) and was subsequently inflated under fluoroscopic guidance X2. This resulted in further maceration of the clot with recanalization at [facility]. The left common femoral artery was then evaluated via femoral angiogram via the introducer sheath for possible use of closure device. The final images were reviewed, and the introducer sheath was removed. Hemostasis was maintained using Angio-Seal 8 Faroese device followed by manual pressure for 5 minutes. Impression: --Right MCA subocclusive clot extending from distal M1 to the proximal M2 superior and inferior divisions. There is collateral flow noted from right KIM to right MCA due to hemodynamically significant subocclusive clot. --The right MCA occlusion was treated with intra-arterial mechanical?thrombectomy? with contact aspiration X 2 and Solitaire stent retriever 4 x 40 mm X2. This was followed with balloon angioplasty using 1.5 x 9 mm [place] balloon (x4?inflated to 6 then 8, then 8, then 10 KT), then 2 x 15 mm Quantum apex balloon? (x2). --This resulted in improvement in the flow across the subocclusive occlusion. Total recanalization at TICI 2C. --The prior treated right MCA bifurcation aneurysm? with Web device? currently with no residual noted with [facility] class at 1. Dr Ciarra Mcgarry dictated this invasive procedure. Dr. Ailyn Trevizo was present for all procedural and imaging components of this case. Examination was reviewed and reported findings confirmed and evaluated by Dr. Ailyn Trevizo. Spring Chino MD Final report electronically signed by Spring Chino M.D. on 1/29/2021 3:44 PM    Ir Angiogram Carotid C [place] Bilateral    Result Date: 1/29/2021  Date of Service: 1/26/2021 Procedure: Diagnostic cerebral angiogram Patient arrived to the angio suite at: 0805 Puncture obtained at: [address] [place] [address] Procedure completed: 7437 Diagnosis: in-stent acute near occlusive thrombus of the right M1 middle cerebral artery bifurcation Procedures: 1. Intravenous moderate sedation 2. Selective right common carotid artery (CCA) angiogram 3. Selective right internal carotid artery (ICA) cerebral angiogram Neurointerventionalist: Spring Chino MD [place]: Deshawn Ng MD PhD Robert Berumen MD Contrast: 54 cc of Visipaque-270. Fluoroscopy time: approximately 30 minutes Access: Right common femoral artery. Comparison: 1/21/2021 Consent: After explaining the risks and benefits to the patient and the patient's family, including but not limited to stroke, coma, death, vessel injury, dissection, tear, occlusion, and X-ray dye allergic type reaction, a signed consent form was obtained. Indication and Clinical History: 53year old female with past medical history of hyperlipidemia, hypertension, breast cancer 2018, obstructive sleep apnea, right middle cerebral artery aneurysm status post web and stent placement 8/26/2020 with Dr. Courtney Green. ? 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 Patient presented 1/24/2021 with acute large right middle cerebral artery stroke.  Plan for cerebral angiogram to more clearly characterize possible occlusion at the right middle cerebral artery stent site. Anesthesia: Anesthesia/sedation initiated at: 0853 Anesthesia/sedation completed at: 0950 Total anesthesia/sedation: 57 mins. Local anesthesia with lidocaine. IV moderate sedation with Versed and Fentanyl IV moderate sedation was supervised by the attending physician. The patient was independently monitored by a registered nurse assigned to the Department of Radiology using automated blood pressure, EKG and pulse oximetry. The detailed Conscious Record is  permanently stored in the [facility]. Delsa Severance Description and findings: The patient's left groin was prepped and draped in standard sterile fashion and under local anesthesia with conscious sedation. Fluoroscopy was used to localize the left common femoral artery, which was then accessed with a micropuncture technique, and a  5 Lao intravascular sheath was placed within the left common femoral artery, establishing arterial access using Seldinger technique. A 5 Lao multipurpose catheter was then advanced over a guide wire to the level of the aortic arch. Right CCA technique: The right common carotid artery was selectively catheterized under fluoroscopic guidance and digital subtraction angiography images were obtained in biplane projections of the right cervical carotid artery. Interpretation: The right common carotid artery injection demonstrates normal antegrade flow into the external and internal carotid arteries with normal filling of the external carotid artery branches. Course and caliber of the cervical portion of the right internal carotid artery are unremarkable. Further inspection demonstrates no evidence of dissection, stenosis, aneurysm, or other vascular abnormality within the right CCA into the cervical segment of the right ICA. Right ICA technique:  The right internal carotid artery was then selectively catheterized, and digital subtraction angiography of the intracranial right internal carotid circulation was performed in frontal, and lateral projections. Interpretation: There is normal antegrade filling of the distal internal carotid artery, ophthalmic artery, anterior cerebral artery, middle cerebral artery and the distal branches. There is a prominent right posterior communicating artery with a functionally fetal right posterior cerebral artery. The previously placed web device and two Y atlas stents in the right M1 middle cerebral artery extending to the superior and inferior M2 branches are present, stable from prior imaging. In the interval time there is presence of thrombus with near total occlusion of the middle cerebral artery bifurcation. There is anterograde flow in both M2 branches distal to the thrombus. There is slowed flow in the distal frontal and parietal M4 branches of the right middle cerebral artery, with pial collateralization seen from the right anterior cerebral artery. Inspection of the remaining right internal carotid circulation revealed no other evidence of cerebral aneurysm, arteriovenous malformation, or arterial stenosis. Capillary and venous phase images were also unremarkable, with no evidence of veno-occlusive disease. No closure device was placed, procedure was immediately converted to intervention to attempt thrombectomy of the subocclusive clot. Impression: --previously placed atlas y-stent and web embolization device in the right MCA bifurcation, stable from prior imaging. --Right MCA in-stent near occlusive clot extending from distal M1 to the proximal M2 superior and inferior divisions. ? --pial?collateral flow from right KIM to right MCA? distal branches Dr. Sriram Dorsey dictated this invasive procedure. Dr Harley Shane was present for all procedural and imaging components of this case. Examination was reviewed and reported findings confirmed and evaluated by Dr. Harley Shane.   Harley Shane MD Final report electronically signed by Roosevelt Mortensen M.D. on 1/29/2021 3:23 PM    Cta Head Neck W Contrast    Result Date: 1/27/2021  EXAMINATION: CTA OF THE HEAD AND NECK WITH CONTRAST 1/27/2021 12:45 pm: TECHNIQUE: CTA of the head and neck was performed with the administration of intravenous contrast. Multiplanar reformatted images are provided for review. MIP images are provided for review. Stenosis of the internal carotid arteries measured using NASCET criteria. Dose modulation, iterative reconstruction, and/or weight based adjustment of the mA/kV was utilized to reduce the radiation dose to as low as reasonably achievable. COMPARISON: None. HISTORY: ORDERING SYSTEM PROVIDED HISTORY: post procedure TECHNOLOGIST PROVIDED HISTORY: post procedure FINDINGS: CTA NECK: AORTIC ARCH/ARCH VESSELS: No dissection or arterial injury. No significant stenosis of the brachiocephalic or subclavian arteries. CAROTID ARTERIES: No dissection, arterial injury, or hemodynamically significant stenosis by NASCET criteria. VERTEBRAL ARTERIES: No dissection, arterial injury, or significant stenosis. SOFT TISSUES: The lung apices are clear. No cervical or superior mediastinal lymphadenopathy. The larynx and pharynx are unremarkable. No acute abnormality of the salivary and thyroid glands. BONES: No acute osseous abnormality. CTA HEAD: ANTERIOR CIRCULATION: Internal carotid arteries are patent. The anterior cerebral arteries are patent. The left middle cerebral artery is patent. Multiple stents are seen within the right middle cerebral artery and beyond the trifurcation. The stents appear patent. POSTERIOR CIRCULATION: There is mild stenosis in the V4 portion of the left vertebral artery. The right vertebral artery is patent. The basilar artery is patent. The posterior cerebral arteries are patent. OTHER: No dural venous sinus thrombosis on this non-dedicated study. BRAIN: No mass effect or midline shift. No extra-axial fluid collection.  The gray-white differentiation is maintained. Multiple stents within the right middle cerebral artery and beyond the trifurcation that appear patent. Mild stenosis in the V4 portion of the left vertebral artery. Mri Brain Wo Contrast    Result Date: 1/24/2021  EXAMINATION: MRI OF THE BRAIN WITHOUT CONTRAST AND MRA HEAD WITHOUT CONTRAST 1/24/2021 6:12 pm TECHNIQUE: Multiplanar multisequence MRI of the brain was performed without the administration of intravenous contrast. MRA of the head was performed utilizing time-of-flight imaging with MIP images. No intravenous contrast was administered. COMPARISON: None. HISTORY: ORDERING SYSTEM PROVIDED HISTORY: left mca occlusion on ct TECHNOLOGIST PROVIDED HISTORY: left mca occlusion on ct Decision Support Exception->Emergency Medical Condition (MA) Is the patient pregnant?->No; ORDERING SYSTEM PROVIDED HISTORY: stroke alert TECHNOLOGIST PROVIDED HISTORY: -SIRD stroke alert Is the patient pregnant?->No Left-sided weakness, headache and photophobia with acute onset 1 day prior. Abnormal CT brain and CT angiogram brain demonstrating a right MCA occlusion. FINDINGS: MRI BRAIN: INTRACRANIAL STRUCTURES/VENTRICLES: There is extensive restricted diffusion involving the cortex and subcortical white matter of the superior right temporal lobe and to a lesser extent right frontal and right parietal lobes consistent with an acute right MCA infarct. There is a small amount of blooming on gradient imaging within the cortex of the right parietal lobe consistent with petechial hemorrhage. There is no mass effect or midline shift. There is no ventriculomegaly. There is no abnormal extra-axial fluid collection identified. There is no sellar or suprasellar mass present. ORBITS: Limited evaluation of the orbits is unremarkable. SINUSES: The paranasal sinuses and mastoid air cells are clear.  BONES/SOFT TISSUES: Bone marrow signal intensity is normal. MRA HEAD: ANTERIOR CIRCULATION: Intracranial segments of the internal carotid arteries are normal in appearance. There is susceptibility artifact in the location of the patient's known right distal middle cerebral artery stent. Susceptibility artifact is similar to the previous exam from 12/01/2020. There is signal within distal right MCA branches. The left middle cerebral artery is normal in appearance. The anterior cerebral arteries are normal in appearance. POSTERIOR CIRCULATION: The distal vertebral arteries are normal in appearance. The basilar artery is normal.  No significant stenosis or aneurysm is identified. The posterior cerebral arteries are normal in appearance. 1. Large right middle cerebral artery territory infarct involving the superior right temporal lobe and to a lesser extent the right frontal and right parietal lobes. 2. Small focus of petechial hemorrhage within the right parietal lobe infarct. 3. Limited evaluation of the right middle cerebral artery given susceptibility artifact from the patient's known MCA stent. There is normal signal identified within the proximal M1 segment as well as M2 branches beyond the stent and area of susceptibility artifact. Correlation with the patient's reported recent outside CT angiogram of the brain is recommended.      Mri Limited Brain    Result Date: 1/25/2021  EXAMINATION: MRI OF THE BRAIN WITHOUT CONTRAST 1/25/2021 4:45 pm: TECHNIQUE: Multiplanar multisequence MRI of the brain was performed without the administration of intravenous contrast. COMPARISON: CT head 01/25/2021, MRI brain 01/24/2021, MR angiogram 01/24/2021 HISTORY: ORDERING SYSTEM PROVIDED HISTORY: worsening mental status; concern for worsening stroke TECHNOLOGIST PROVIDED HISTORY: worsening mental status; concern for worsening stroke Is the patient pregnant?->No; ORDERING SYSTEM PROVIDED HISTORY: concern for stroke TECHNOLOGIST PROVIDED HISTORY: concern for stroke Is the patient pregnant?->No FINDINGS: MRI BRAIN: INTRACRANIAL STRUCTURES/VENTRICLES: Redemonstration of the acute to subacute infarct involving much of the right MCA distribution. This predominantly involves the cortex, subcortical white matter of the right superior temporal lobe and  the right frontal and right parietal lobes. Evidence of T2 FLAIR hyperintensity identified corresponding to acute to subacute component. There are new foci of minimal restricted diffusion identified involving the right frontal centrum semiovale new from the prior. In addition there is minimal progression identified along the right posterior frontal lobe predominantly cortical based. Stable areas of petechial hemorrhage identified right posterior frontal parietal region. Since the prior exam, there is slight increased prominence of the venous structures identified on the gradient echo images, most pronounced along the tentorial leaflet and involving the cortical vein superiorly near the apex/vertex. Mild focal mass effect from the right MCA distribution infarct identified. Evidence of diminished gradient echo signal involving the right MCA the site of previous aneurysm repair. Basal cisterns are patent. ORBITS: The visualized portion of the orbits demonstrate no acute abnormality. SINUSES: The visualized paranasal sinuses and mastoid air cells are well aerated. BONES/SOFT TISSUES: The bone marrow signal intensity appears unremarkable on the images provided. Minimal progression of the right MCA distribution infarct with new areas of restricted diffusion involving the right frontal centrum semiovale and involving the right posterior MCA distribution. Minimal stable areas of petechial hemorrhage. Post aneurysm repair identified right MCA Slight prominence of the venous structures on the gradient echo images when compared to prior exam.  Question this could be related to slow flow.   This is new from the prior exam.         Consultations:    Consults:     Final Specialist Recommendations/Findings:   IP CONSULT TO NEUROCRITICAL CARE  IP CONSULT TO PHYSICAL MEDICINE REHAB  IP CONSULT TO NEUROLOGY  IP CONSULT TO SPIRITUAL SERVICES  IP CONSULT TO OPHTHALMOLOGY  IP CONSULT TO HOME CARE NEEDS      The patient was seen and examined on day of discharge and this discharge summary is in conjunction with any daily progress note from day of discharge. Discharge plan:     Disposition: Home    Physician Follow Up:     Denis Buckner MD  [address]  [address] [place]  [phone]    Schedule an appointment as soon as possible for a visit in 3 weeks  Stroke rehab follow up    Ayah Keith, [address] [facility] [address]  [place] [facility]  [phone]    Schedule an appointment as soon as possible for a visit      Jay Jay Herr MD  [address] [place] [address] [address] [facility]  [phone]    On 2/19/2021  1:30pm    Brandie Alonzo MD  [address]  [phone]    On 2/24/2021  2:30pm    Arleen Yates MD  [address]  [address] [facility]  [phone]    On 5/5/2021  10:00am       Requiring Further Evaluation/Follow Up POST HOSPITALIZATION/Incidental Findings: Follow up with your providers as listed above. Diet: regular diet    Activity: As tolerated    Instructions to Patient: Take meds as prescribed.     Discharge Medications:      Medication List      START taking these medications    clopidogrel 75 MG tablet  Commonly known as: PLAVIX  Take 1 tablet by mouth daily        CHANGE how you take these medications    atorvastatin 40 MG tablet  Commonly known as: LIPITOR  Take 1 tablet by mouth nightly  What changed:   · medication strength  · how much to take     lisinopril 20 MG tablet  Commonly known as: PRINIVIL;ZESTRIL  Take 1 tablet by mouth daily  What changed:   · medication strength  · how much to take        CONTINUE taking these medications    AMITRIPTYLINE HCL PO     aspirin 325 MG tablet  Take 1 tablet by mouth daily     carvedilol 12.5 MG tablet  Commonly known as: COREG     clonazePAM 0.5 MG tablet  Commonly known as: KLONOPIN     hydroCHLOROthiazide 25 MG tablet  Commonly known as: HYDRODIURIL     MELOXICAM PO     venlafaxine 75 MG extended release capsule  Commonly known as: EFFEXOR XR     Vitamin D3 25 MCG (1000 UT) Caps           Where to Get Your Medications      These medications were sent to [address]  [address]    Phone: [phone]   · atorvastatin 40 MG tablet  · clopidogrel 75 MG tablet  · lisinopril 20 MG tablet         Time Spent on discharge is  39 mins in patient examination, evaluation, counseling as well as medication reconciliation, prescriptions for required medications, discharge plan and follow up. Electronically signed by   POPEYE Bae CNP  2/5/2021  4:12 PM      Thank you POPEYE Child CNP for the opportunity to be involved in this patient's care.

## 2021-02-05 NOTE — PROGRESS NOTES
ENDOVASCULAR NEUROSURGERY PROGRESS NOTE  2/4/2021 7:31 PM  Subjective:   Admit Date: 1/24/2021  PCP: POPEYE Barraza - CNP    No new acute events. Underwent follow-up diagnostic cerebral angiogram on 2/2/2021    Objective:   Vitals: /83   Pulse 66   Temp 98.1 °F (36.7 °C) (Oral)   Resp 16   Ht 5' 5\" (1.651 m)   Wt 240 lb 4.8 oz (109 kg)   SpO2 95%   BMI 39.99 kg/m²   General appearance: Lying in bed, NAD. HEENT: Atraumatic. Neck: Neck is supple. Lungs: No respiratory distress noted. Heart: normal sinus rhythm on tele. [place] Extremities: No lower limb edema noted. Neurologic:  awake, following simple commands appropriately, able to name simple objects, intact comprehension, no dysarthria noted. CN: Has intact extraocular muscles movements, no facial droop noted  MOTOR: Has good strength in both upper and lower extremities, moving both upper and lower extremities against gravity with no drift. SENSORY: Intact sensation to simple touch bilaterally in both upper and lower extremities. Left-sided body numbness noted. Sensory neglect noted to the left body side for bilateral sensory stimuli.     Medications and labs:   Scheduled Meds:   lidocaine 1 % injection  5 mL Intradermal Once    sodium chloride flush  10 mL Intravenous 2 times per day    atorvastatin  40 mg Oral Nightly    lisinopril  20 mg Oral Daily    sennosides-docusate sodium  2 tablet Oral Daily    carvedilol  12.5 mg Oral BID WC    enoxaparin  40 mg Subcutaneous Daily    clopidogrel  75 mg Oral Daily    aspirin  325 mg Oral Daily    venlafaxine  75 mg Oral Daily    sodium chloride flush  10 mL Intravenous 2 times per day     Continuous Infusions:  CBC:   Recent Labs     02/02/21  0607 02/03/21  0515 02/04/21  0615   WBC 7.6 8.6 7.8   HGB 11.0* 11.2* 11.4*    385 403     BMP:    Recent Labs     02/02/21  0607 02/03/21  0515 02/04/21  0615    142 141   K 3.8 3.9 3.8   * 108* 106   CO2 24 23 23   BUN 15 15 13   CREATININE 0.44* 0.42* 0.46*   GLUCOSE 100* 94 99     Hepatic: No results for input(s): AST, ALT, ALB, BILITOT, ALKPHOS in the last 72 hours. Troponin: No results for input(s): TROPONINI in the last 72 hours. BNP: No results for input(s): BNP in the last 72 hours. Lipids: No results for input(s): CHOL, HDL in the last 72 hours. Invalid input(s): LDLCALCU  INR: No results for input(s): INR in the last 72 hours. CT head 1/30/2021  1.  Right middle cerebral artery distribution subacute infarct with   hemorrhagic transformation.  Amount of hemorrhage has decreased somewhat.       2.  Reduced edema and mass effect associated with infarct.       3.  Interval resolution of subarachnoid hemorrhage.       4.  Other incidental findings as above.      CTA head and neck 1/27/2021  Multiple stents within the right middle cerebral artery and beyond the   trifurcation that appear patent.       Mild stenosis in the V4 portion of the left vertebral artery.      DSA 1/26/2021  --Right MCA subocclusive clot extending from distal M1 to the proximal M2 superior and inferior divisions.  There is collateral flow noted from right KIM to right MCA due to hemodynamically significant subocclusive clot. --This was treated with intra-arterial mechanical thrombectomy with contact aspiration and 3 oh stent retriever.  This was followed with balloon angioplasty using 1.5 x 9 mm [place] balloon (x4 inflated to 6 then 8, then 8, then 10 KT), then 2 x 15 mm Quantum apex balloon (x2). --This resulted in improvement in the flow across the subocclusive occlusion.  Total recanalization at TICI 2C. --The prior treated right MCA bifurcation aneurysm currently with no residual noted.     MRI/A brain 1/24/2021  1. Large right middle cerebral artery territory infarct involving the   superior right temporal lobe and to a lesser extent the right frontal and   right parietal lobes.    2. Small focus of petechial hemorrhage within the right parietal lobe infarct. 3. Limited evaluation of the right middle cerebral artery given   susceptibility artifact from the patient's known MCA stent.  There is normal   signal identified within the proximal M1 segment as well as M2 branches   beyond the stent and area of susceptibility artifact.  Correlation with the   patient's reported recent outside CT angiogram of the brain is recommended.          Assessment and Recommendations:   53yo female with pmh of hld, htn, breast cancer 2018, gray. 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 DSA 1/21/2021 showed residual filling of the R MCA aneurysm and an incidental L MCA blister aneurysm.     Pt presented 1/24/2021 with acute L hemiweakness, neglect, r gaze pref, found to have acute large R MCA stroke. MRA showed artifact at the aneurysm but no clear LVO. possible in-stent thrombosis following insufficient antithrombotic rx. dSA 1/26/2021 showed partially occlusive thrombus at the R M2 branches at the stent site. S/p MT TICI2c and balloon angioplasty.     Repeat CT head 1/27/2021 showed small R hemorrhagic conversion. Repeat ct head following shows stable lesion. CTA shows patent vessels. CT  Head 1/30/2021 resolving bleed and edema. CT head on 2/1/2021 reviewed, right hemorrhagic IPH stable. S/p follow-up DSA on 2/2/2021   Impression:   --The prior noted right MCA M1/M2 subocclusive clot?significantly improved/resolved. There is a small residual noted subocclusive clot in left MCA M2 posterior division not hemodynamically significant. --The prior treated right MCA bifurcation aneurysm with Web device currently with no residual noted with Inder Nunez class at 1. Neuro exam is stable. No new acute events. She is pending discharge.     PLAN:  --continue asa 325 and plavix 75 daily  --continue lipitor 10  --sbp 120-180  --care as per neurology team  --Upon discharge follow up with Dr. Angela Young in 2 weeks and Dr. Rg in 3 jordy Anderson MD  Stroke, [facility]  [address] [place]  Electronically signed 2/4/2021 at 7:31 PM

## 2021-02-06 NOTE — CARE COORDINATION
..    Stroke Follow up Phone Call    Helmaurice this is Grace Neely from [facility] stroke team calling to see how you are doing since your d/c. Medication List      START taking these medications    clopidogrel 75 MG tablet  Commonly known as: PLAVIX  Take 1 tablet by mouth daily        CHANGE how you take these medications    atorvastatin 40 MG tablet  Commonly known as: LIPITOR  Take 1 tablet by mouth nightly  What changed:   · medication strength  · how much to take     lisinopril 20 MG tablet  Commonly known as: PRINIVIL;ZESTRIL  Take 1 tablet by mouth daily  What changed:   · medication strength  · how much to take        CONTINUE taking these medications    AMITRIPTYLINE HCL PO     aspirin 325 MG tablet  Take 1 tablet by mouth daily     carvedilol 12.5 MG tablet  Commonly known as: COREG     clonazePAM 0.5 MG tablet  Commonly known as: KLONOPIN     hydroCHLOROthiazide 25 MG tablet  Commonly known as: HYDRODIURIL     MELOXICAM PO     venlafaxine 75 MG extended release capsule  Commonly known as: EFFEXOR XR     Vitamin D3 25 MCG (1000 UT) Caps           Where to Get Your Medications      These medications were sent to [facility] [address] [facility], [address] [place] [address], [place] [address]    Phone: [phone]   · atorvastatin 40 MG tablet  · clopidogrel 75 MG tablet  · lisinopril 20 MG tablet         Can you tell me what medications you are taking? [x]   Yes  []   No    Do you have any questions about your medications? []   Yes  [x]   No    All medications reviewed with patient   []  Pt provided Pharmacist contact information [phone]     Do you have a follow up apt. With the neurologist?   [x]   Yes  []   No  Provided number to [facility] [phone]    Do you now your risk factors for stroke? [x]   Yes  []   No    Can you tell me the signs and symptoms of stroke?   [x]   Yes  []   No  FAST instructions provided    Do if you know what to do if you were having signs/symptoms of stroke? [x]   Yes  []   No  Instructions to call 911 provided    Our goal is to provide the very best stroke care, on a scale of 1 to 10 with 10 as the very best who would you rank the care you received?  10        What can we do better?  Nothing      Electronically signed by Teofilo Berumen RN on 2/6/21 at 12:04 PM EST

## 2021-02-13 NOTE — PROGRESS NOTES
Physician Progress Note      Rajesh Frausto  CSN #:                  [id number]  :                       1970  ADMIT DATE:       2021 4:41 PM  [address] [place] DATE:        2021 6:30 PM  RESPONDING  PROVIDER #:        Charlene Marx MD          QUERY TEXT:    Pt admitted with  right MCA territory infarct . Pt noted to have a diagnostic   angio on 2021 . If possible, please document in progress notes and   discharge summary:    The medical record reflects the following:  Risk Factors: HTN, HLD  Clinical Indicators: left-sided weakness, headache, photophobia,  -    cerebral angiogram which revealed right MCA subocclusive clot extending from   distal M1 to proximal M2  Treatment: Stent, CT and CTA head Endovasc consult,    [phone]  Options provided:  -- Right MCA territory infarctas an postoperative complication  -- Right MCA territory infarct as an expected/inherent condition that occurred   postoperatively and not a complication  -- Right MCA territory infarct related to other incidental risk factor (please   specify) occurring following surgery and not a complication, Please document   incidental risk factor. -- Other - I will add my own diagnosis  -- Disagree - Not applicable / Not valid  -- Disagree - Clinically unable to determine / Unknown  -- Refer to Clinical Documentation Reviewer    PROVIDER RESPONSE TEXT:    Clot related to being off clopidogrel and not to any recent procedure such as   the 2021 diagnostic angiogram. She has been discharged from her    hospital visit after thrombectomy on aspirin and clopidogrel.     Query created by: Ailyn Jeronimo on 2/10/2021 7:44 AM      Electronically signed by:  Charlene Marx MD 2021 2:56 PM

## 2021-02-15 ENCOUNTER — OFFICE VISIT (OUTPATIENT)
Dept: PHYSICAL MEDICINE AND REHAB | Age: 51
End: 2021-02-15
Payer: COMMERCIAL

## 2021-02-15 VITALS
SYSTOLIC BLOOD PRESSURE: 124 MMHG | HEIGHT: 65 IN | TEMPERATURE: 98.4 F | HEART RATE: 69 BPM | WEIGHT: 219.8 LBS | DIASTOLIC BLOOD PRESSURE: 86 MMHG | BODY MASS INDEX: 36.62 KG/M2

## 2021-02-15 DIAGNOSIS — I63.511 ACUTE RIGHT MCA STROKE (HCC): Primary | ICD-10-CM

## 2021-02-15 PROCEDURE — 99214 OFFICE O/P EST MOD 30 MIN: CPT | Performed by: PHYSICAL MEDICINE & REHABILITATION

## 2021-02-15 NOTE — PROGRESS NOTES
[facility]  [facility] PHYSICAL MEDICINE AND REHABILITATION  [address] [address]  Dept: [phone]  Dept Fax: [phone]    Outpatient Followup Note    María Elena VASQUEZ, 49 y.o., female, presents for follow up c/o of Follow-up  . HPI:     HPI  Patient who was seen at [facility]. [facility] on 1/28/21 after sustaining R MCA infarct with R parietal hemorrhages on 1/24/21 causing LUE weakness, L hemineglect, and visual field impairment. She discharged home from the hospital 2/5/21 and has been searching for a home health provider since then. She found [address] [place] [address] accepts her insurance and is accepting new patients and would like orders for home health. She presents with a friend today and is staying at her daughter's but expressing interest in returning to her own condo. Her condo has 17 stairs to enter. She is currently performing 2 flights of stairs at her daughter's house. She and her friend endorse some visuospatial impairments. She has developed a strategy for managing her medications including sticker coded organization. She has already considered having cameras or life alert type system installed at home. She is not planning to drive yet but is interested in returning to work in her [id number] when she is able. Stroke    Affected Side: left dominant  Handedness: left handed  Therapy Status: Physical/Occupational/speech therapy to eval and treat with home health  Dysphagia: Absent   Aphasia: None, but some dysarthria  Sleep:WNL  Daytime Focus/Attention:Impaired and not treated - having trouble completing tasks and getting distracted  Bowel: Spontaneous  : Spontaneous  Mood: Depressed and Psychologist  Support: children / family / friends to help, daughter's house for now.   Spasticity: None  Edema: None  DME: None  Shoulder Pain: None     Past Medical History:   Diagnosis Date    Aneurysm ([facility] [address] 08/2019    right MCA bifurcation aneurysm    [facility]    Contact lens/glasses fitting     Fibromyalgia     Former smoker     Hyperlipidemia     Hypertension     Kusum Fam, CNP    Sleep apnea     C-pap / STATES HAS NOT BEEN WEARING      Past Surgical History:   Procedure Laterality Date    BACK SURGERY      neck fusion & L4-L5 fusion    BRAIN ANEURYSM SURGERY  2020    WEB Armor[employer] EMOBLIZATION SYSTEM MRI CONDTIONAL 3T OK, SAFE IMMEDIATELY.   IR ANGIOGRAM CAROTID CEREBRAL BILATERAL,,S/P coil embolization of cerebral aneurysm    BREAST SURGERY Left 2018    lumpectomy    CARPAL TUNNEL RELEASE Bilateral      SECTION      x 2,  &     HYSTERECTOMY  2018    OTHER SURGICAL HISTORY  2021    DIAGNOSTIC CEREBRAL ANGIOGRAM / Previously treated right MCA M1 bifurcation aneurysm is with a small residual filling noted    TUNNELED VENOUS PORT PLACEMENT      placed , removed 2019     Family History   Family history unknown: Yes     Social History     Socioeconomic History    Marital status:      Spouse name: None    Number of children: None    Years of education: None    Highest education level: None   Occupational History    None   Social Needs    Financial resource strain: None    Food insecurity     Worry: None     Inability: None    Transportation needs     Medical: None     Non-medical: None   Tobacco Use    Smoking status: Former Smoker     Packs/day: 1.00     Years: 15.00     Pack years: 15.00     Types: Cigarettes     Quit date: 2000     Years since quittin.4    Smokeless tobacco: Never Used    Tobacco comment: quit 20 years ago   Substance and Sexual Activity    Alcohol use: None     Comment: drinks lwine     Drug use: Never    Sexual activity: None   Lifestyle    Physical activity     Days per week: None     Minutes per session: None    Stress: None   Relationships    Social connections     Talks on phone: None     Gets together: None     Attends Pentecostal service: None     Active member of club or organization: None     Attends meetings of clubs or organizations: None     Relationship status: None    Intimate partner violence     Fear of current or ex partner: None     Emotionally abused: None     Physically abused: None     Forced sexual activity: None   Other Topics Concern    None   Social History Narrative    None       Current Outpatient Medications   Medication Sig Dispense Refill    atorvastatin (LIPITOR) 40 MG tablet Take 1 tablet by mouth nightly 30 tablet 3    lisinopril (PRINIVIL;ZESTRIL) 20 MG tablet Take 1 tablet by mouth daily 30 tablet 3    clopidogrel (PLAVIX) 75 MG tablet Take 1 tablet by mouth daily 30 tablet 3    aspirin 325 MG tablet Take 1 tablet by mouth daily 30 tablet 3    clonazePAM (KLONOPIN) 0.5 MG tablet Take 0.5 mg by mouth 2 times daily as needed.  venlafaxine (EFFEXOR XR) 75 MG extended release capsule Take 75 mg by mouth daily      hydroCHLOROthiazide (HYDRODIURIL) 25 MG tablet Take 25 mg by mouth daily      Cholecalciferol (VITAMIN D3) 25 MCG (1000 UT) CAPS Take 2,000 Units by mouth nightly       carvedilol (COREG) 12.5 MG tablet Take 12.5 mg by mouth 2 times daily (with meals)      AMITRIPTYLINE HCL PO Take by mouth nightly      MELOXICAM PO Take by mouth nightly       No current facility-administered medications for this visit. Allergies   Allergen Reactions    Adhesive Tape Rash     redness       Subjective:      Review of Systems  Constitutional: Negative for fever, chills and unexpected weight change. HENT: Negative for trouble swallowing. Respiratory: Negative for cough and shortness of breath. Cardiovascular: Negative for chest pain. Endocrine: Negative for polyuria. Genitourinary: Negative for dysuria, urgency, frequency, incontinence and difficulty urinating. Gastrointestinal: Negative for constipation or diarrhea. Musculoskeletal: Negative for arthralgias.    Neurological: Negative for headaches, numbness, or tingling. Psychiatric: Positive for depressed mood or anxiety. Endorses some relationship issues while staying at her daughter's. Objective:     Physical Exam  /86   Pulse 69   Temp 98.4 °F (36.9 °C) (Temporal)   Ht 5' 5\" (1.651 m)   Wt 219 lb 12.8 oz (99.7 kg)   BMI 36.58 kg/m²   Constitutional: She appears well-developed and well-nourished. In no distress. HEENT: NCAT, PERRL, EOMI. Mucous membranes pink and moist.  Pulmonary/Chest: Respirations WNL and unlabored. MSK: Functional ROM all extremities. Strength 5/5 key muscles all extremities. Neurological: She is alert and oriented to person, place, and time. DTRs 2+ and symmetric BUE and BLEs. CNs III-XII intact. Mild L neglect, L visual field cut deficit. Exhibiting intermittent speech issues with words such as \"reservations\"  Skin: Skin is warm dry and intact with good turgor. Psychiatric: She has a normal mood and affect. Her behavior is normal. Thought content normal.   Nursing note and vitals reviewed. Diagnostic Studies:   MRI OF THE BRAIN WITHOUT CONTRAST AND MRA HEAD WITHOUT CONTRAST       1/24/2021 6:12 pm       TECHNIQUE:   Multiplanar multisequence MRI of the brain was performed without the   administration of intravenous contrast.       MRA of the head was performed utilizing time-of-flight imaging with MIP   images. No intravenous contrast was administered.       COMPARISON:   None.       HISTORY:   ORDERING SYSTEM PROVIDED HISTORY: left mca occlusion on ct   TECHNOLOGIST PROVIDED HISTORY:   left mca occlusion on ct   Decision Support Exception->Emergency Medical Condition (MA)   Is the patient pregnant?->No; ORDERING SYSTEM PROVIDED HISTORY: stroke alert   TECHNOLOGIST PROVIDED HISTORY:   -SIRD   stroke alert   Is the patient pregnant?->No       Left-sided weakness, headache and photophobia with acute onset 1 day prior.    Abnormal CT brain and CT angiogram brain demonstrating a right MCA occlusion.       FINDINGS:   MRI BRAIN:       INTRACRANIAL STRUCTURES/VENTRICLES: There is extensive restricted diffusion   involving the cortex and subcortical white matter of the superior right   temporal lobe and to a lesser extent right frontal and right parietal lobes   consistent with an acute right MCA infarct. Shamar Jeniffer is a small amount of   blooming on gradient imaging within the cortex of the right parietal lobe   consistent with petechial hemorrhage. Shamar Jeniffer is no mass effect or midline   shift.       There is no ventriculomegaly. Shamar Jeniffer is no abnormal extra-axial fluid   collection identified.  There is no sellar or suprasellar mass present.       ORBITS: Limited evaluation of the orbits is unremarkable.       SINUSES: The paranasal sinuses and mastoid air cells are clear.       BONES/SOFT TISSUES: Bone marrow signal intensity is normal.       MRA HEAD:       ANTERIOR CIRCULATION: Intracranial segments of the internal carotid arteries   are normal in appearance.  There is susceptibility artifact in the location   of the patient's known right distal middle cerebral artery stent. Susceptibility artifact is similar to the previous exam from 12/01/2020. There is signal within distal right MCA branches.  The left middle cerebral   artery is normal in appearance.  The anterior cerebral arteries are normal in   appearance.       POSTERIOR CIRCULATION: The distal vertebral arteries are normal in   appearance.  The basilar artery is normal.  No significant stenosis or   aneurysm is identified.  The posterior cerebral arteries are normal in   appearance.           Impression   1. Large right middle cerebral artery territory infarct involving the   superior right temporal lobe and to a lesser extent the right frontal and   right parietal lobes. 2. Small focus of petechial hemorrhage within the right parietal lobe infarct.    3. Limited evaluation of the right middle cerebral artery given   susceptibility artifact from the patient's known MCA stent. Tessa Lindsey is normal   signal identified within the proximal M1 segment as well as M2 branches   beyond the stent and area of susceptibility artifact.  Correlation with the   patient's reported recent outside CT angiogram of the brain is recommended. Assessment:       Diagnosis Orders   1. Acute right MCA stroke [facility])  Ambulatory referral to Home Health        Plan:      Discussed at length with the patient her prognosis and current functional status. She has been able to pay her bills and perform household tasks such as making meals and taking out the garbage. She would benefit from home health therapy    Plan for drivers evaluation when she is ready to start driving again. Has friends and family providing transportation to doctor appointments currently    Provided with contact information for Stroke Calvin Reardon This Encounter   Procedures    Ambulatory referral to [address] [place] [address] José Miguel Yi     Referral Priority:   Routine     Referral Type:   Consult for Advice and Opinion     Referral Reason:   Continuity of Care     Number of Visits Requested:   1     No orders of the defined types were placed in this encounter. Return in about 4 weeks (around 3/15/2021). I spent a total of 30 minutes on the day of the visit. Electronically signedby Antoinette Pendleton MD on 2/15/2021 at 2:31 PM.     Please note that this chartwas generated using voice recognition Dragon dictation software. Although everyeffort was made to ensure the accuracy of this automated transcription, some errorsin transcription may have occurred.

## 2021-02-19 ENCOUNTER — OFFICE VISIT (OUTPATIENT)
Dept: NEUROLOGY | Age: 51
End: 2021-02-19
Payer: COMMERCIAL

## 2021-02-19 VITALS
HEIGHT: 65 IN | WEIGHT: 219 LBS | HEART RATE: 65 BPM | DIASTOLIC BLOOD PRESSURE: 79 MMHG | SYSTOLIC BLOOD PRESSURE: 121 MMHG | BODY MASS INDEX: 36.49 KG/M2 | OXYGEN SATURATION: 98 %

## 2021-02-19 DIAGNOSIS — I67.1 MIDDLE CEREBRAL ARTERY ANEURYSM: ICD-10-CM

## 2021-02-19 DIAGNOSIS — I67.1 CEREBRAL ANEURYSM: Primary | ICD-10-CM

## 2021-02-19 DIAGNOSIS — I63.511 RIGHT MIDDLE CEREBRAL ARTERY STROKE (HCC): ICD-10-CM

## 2021-02-19 PROCEDURE — 99214 OFFICE O/P EST MOD 30 MIN: CPT | Performed by: STUDENT IN AN ORGANIZED HEALTH CARE EDUCATION/TRAINING PROGRAM

## 2021-02-19 RX ORDER — GABAPENTIN 100 MG/1
100 CAPSULE ORAL DAILY
Qty: 270 CAPSULE | Refills: 1 | Status: SHIPPED | OUTPATIENT
Start: 2021-02-19 | End: 2021-04-02 | Stop reason: SDUPTHER

## 2021-02-19 NOTE — PROGRESS NOTES
Endovascular Neurosurgery clinic follow-up note    Pt Name: Darlene Miller  MRN: [id number]  YOB: 1970  Date of evaluation: 2/19/2021  Primary Care Physician: POPEYE Newsome - CNP  Reason for evaluation: This is a follow-up for right MCA aneurysm treated with stent assisted WEB. SUBJECTIVE:   History of Chief Complaint:    Darlene Miller is a 49 y.o. female with past medical history including hld, htn, breast cancer 2018, gray. Patient was recently following for right MCA aneurysm s/p web and stent placement 8/26/2020. DSA 1/21/2021 showed residual filling of the R MCA aneurysm and an incidental L MCA blister aneurysm. She then presented on 1/24/2021 with acute L hemiweakness, neglect, r gaze pref, found to have acute large R MCA stroke. MRA showed artifact at the aneurysm but no clear LVO. possible in-stent thrombosis following insufficient antithrombotic rx. dSA 1/26/2021 showed partially occlusive thrombus at the R M2 branches at the stent site. S/p MT TICI2c and balloon angioplasty.     Repeat CT head 1/27/2021 showed small R hemorrhagic conversion. Repeat ct head following shows stable lesion. CTA shows patent vessels. CT [facility] 1/30/2021 resolving bleed and edema.     CT head on 2/1/2021 reviewed, right hemorrhagic IPH stable.     S/p follow-up DSA on 2/2/2021   Impression:   --The prior noted right MCA M1/M2 subocclusive clot significantly improved/resolved. There is a small residual noted subocclusive clot in left MCA M2 posterior division not hemodynamically significant. --The prior treated right MCA bifurcation aneurysm with Web device currently with no residual noted with sougou class at 1.      She presented today to the clinic for a follow-up. She is doing good. She is on dual antiplatelet therapy with aspirin 325 mg Plavix 75 mg daily. She was accompanied by her friend. No new weakness. No new speech difficulty.   No new complaints.       Allergies  is allergic to adhesive tape.  Medications  Prior to Admission medications    Medication Sig Start Date End Date Taking? Authorizing Provider   atorvastatin (LIPITOR) 40 MG tablet Take 1 tablet by mouth nightly 21   POPEYE Liu CNP   lisinopril (PRINIVIL;ZESTRIL) 20 MG tablet Take 1 tablet by mouth daily 21   POPEYE Liu CNP   clopidogrel (PLAVIX) 75 MG tablet Take 1 tablet by mouth daily 21   POPEYE Liu CNP   AMITRIPTYLINE HCL PO Take by mouth nightly    Historical Provider, MD   MELOXICAM PO Take by mouth nightly    Historical Provider, MD   aspirin 325 MG tablet Take 1 tablet by mouth daily 20   POPEYE Oneal CNP   clonazePAM (KLONOPIN) 0.5 MG tablet Take 0.5 mg by mouth 2 times daily as needed. 20   Historical Provider, MD   venlafaxine (EFFEXOR XR) 75 MG extended release capsule Take 75 mg by mouth daily 20   Historical Provider, MD   hydroCHLOROthiazide (HYDRODIURIL) 25 MG tablet Take 25 mg by mouth daily 20   Historical Provider, MD   Cholecalciferol (VITAMIN D3) 25 MCG (1000 UT) CAPS Take 2,000 Units by mouth nightly     Historical Provider, MD   carvedilol (COREG) 12.5 MG tablet Take 12.5 mg by mouth 2 times daily (with meals)    Historical Provider, MD    Scheduled Meds:  Continuous Infusions:  PRN Meds:.  Past Medical History   has a past medical history of Aneurysm ([facility] [address] Cancer ([facility] [address] Contact lens/glasses fitting, Fibromyalgia, Former smoker, Hyperlipidemia, Hypertension, and Sleep apnea. Past Surgical History   has a past surgical history that includes Breast surgery (Left, 2018); Tunneled venous port placement; Hysterectomy (2018); Carpal tunnel release (Bilateral);  section; back surgery; Brain aneurysm surgery (2020); and other surgical history (2021). Social History   reports that she quit smoking about 20 years ago. Her smoking use included cigarettes. She has a 15.00 pack-year smoking history.  She has never used smokeless tobacco.   has no history on file for alcohol. reports no history of drug use. Family History  Family history is unknown by patient. Review of Systems:  CONSTITUTIONAL:  negative for fevers, chills, fatigue and malaise    EYES:  negative for double vision, blurred vision and photophobia     HEENT:  negative for tinnitus, epistaxis and sore throat    RESPIRATORY:  negative for cough, shortness of breath, wheezing    CARDIOVASCULAR:  negative for chest pain, palpitations, syncope, edema    GASTROINTESTINAL:  negative for nausea, vomiting    GENITOURINARY:  negative for incontinence    MUSCULOSKELETAL:  negative for neck or back pain    NEUROLOGICAL:  Negative for weakness and tingling  negative for headaches and dizziness    PSYCHIATRIC:  negative for anxiety      Review of systems otherwise negative. OBJECTIVE:   Vitals: There were no vitals taken for this visit. General appearance: NAD. HEENT: Atraumatic. Neck: Neck is supple. Lungs: No respiratory distress noted. Heart: normal sinus rhythm on tele. Annia Salm Extremities: No lower limb edema noted. Neurologic:  awake, following simple commands appropriately, able to name simple objects, intact comprehension, no dysarthria noted. CN: Has intact extraocular muscles movements, no facial droop noted  MOTOR: Has good strength in both upper and lower extremities, moving both upper and lower extremities against gravity with no drift. SENSORY: Intact sensation to simple touch bilaterally in both upper and lower extremities. Left-sided body numbness noted. Sensory neglect noted to the left body side for bilateral sensory stimuli. LABS:   No results for input(s): WBC, HGB, HCT, PLT, NA, K, CL, CO2, BUN, CREATININE, MG, PHOS, CALCIUM, PTT, INR, AST, ALT, BILITOT, BILIDIR, NITRU, COLORU, BACTERIA in the last 72 hours.     Invalid input(s): PT, WBCU, RBCU, LEUKOCYTESUA  No results for input(s): ALKPHOS, ALT, AST, BILITOT, BILIDIR, LABALBU, AMYLASE, LIPASE in the last 72 hours. RADIOLOGY:   Images were personally reviewed including:  CT head 1/30/2021  1.  Right middle cerebral artery distribution subacute infarct with   hemorrhagic transformation.  Amount of hemorrhage has decreased somewhat.       2.  Reduced edema and mass effect associated with infarct.       3.  Interval resolution of subarachnoid hemorrhage.       4.  Other incidental findings as above.      CTA head and neck 1/27/2021  Multiple stents within the right middle cerebral artery and beyond the   trifurcation that appear patent.       Mild stenosis in the V4 portion of the left vertebral artery.      DSA 1/26/2021  --Right MCA subocclusive clot extending from distal M1 to the proximal M2 superior and inferior divisions.  There is collateral flow noted from right KIM to right MCA due to hemodynamically significant subocclusive clot. --This was treated with intra-arterial mechanical thrombectomy with contact aspiration and 3 oh stent retriever.  This was followed with balloon angioplasty using 1.5 x 9 mm Higdon balloon (x4 inflated to 6 then 8, then 8, then 10 KT), then 2 x 15 mm Quantum apex balloon (x2). --This resulted in improvement in the flow across the subocclusive occlusion.  Total recanalization at TICI 2C. --The prior treated right MCA bifurcation aneurysm currently with no residual noted.     MRI/A brain 1/24/2021  1. Large right middle cerebral artery territory infarct involving the   superior right temporal lobe and to a lesser extent the right frontal and   right parietal lobes. 2. Small focus of petechial hemorrhage within the right parietal lobe infarct.    3. Limited evaluation of the right middle cerebral artery given   susceptibility artifact from the patient's known MCA stent.  There is normal   signal identified within the proximal M1 segment as well as M2 branches   beyond the stent and area of susceptibility artifact.  Correlation with the   patient's reported recent outside CT angiogram of the brain is recommended.             IMPRESSIONS:     81-year-old female with past medical history including hld, htn, breast cancer 2018, gray. Patient was recently following for right MCA aneurysm s/p web and stent placement 8/26/2020. DSA 1/21/2021 showed residual filling of the R MCA aneurysm and an incidental L MCA blister aneurysm. She then presented on 1/24/2021 with acute L hemiweakness, neglect, r gaze pref, found to have acute large R MCA stroke. MRA showed artifact at the aneurysm but no clear LVO. possible in-stent thrombosis following insufficient antithrombotic rx. dSA 1/26/2021 showed partially occlusive thrombus at the R M2 branches at the stent site. S/p MT TICI2c and balloon angioplasty.     Repeat CT head 1/27/2021 showed small R hemorrhagic conversion. Repeat ct head following shows stable lesion. CTA shows patent vessels. CT [facility] 1/30/2021 resolving bleed and edema.     CT head on 2/1/2021 reviewed, right hemorrhagic IPH stable.     S/p follow-up DSA on 2/2/2021   Impression:   --The prior noted right MCA M1/M2 subocclusive clot significantly improved/resolved. There is a small residual noted subocclusive clot in left MCA M2 posterior division not hemodynamically significant. --The prior treated right MCA bifurcation aneurysm with Web device currently with no residual noted with Babcock Buckhead class at 1.         She presented today to the clinic for a follow-up. She is doing good. She is on dual antiplatelet therapy with aspirin 325 mg Plavix 75 mg daily. She was accompanied by her friend. No new weakness. No new speech difficulty. No new complaints. She describes left upper and left lower extremity hand and foot numbness/burning sensation especially in contact with cold water.   I will start gabapentin 100 mg daily for likely neuropathic pain.       PLANS:     --continue asa 325 and plavix 75 daily  --continue lipitor 40  --Systolic blood pressure goal normotensive 100-140. --Start gabapentin 100 mg daily. --MRI head with and without contrast in 3 months.  --follow up with Dr. Marcial Mtz in 2 months and Dr. Rg in August 2021     Thank you for the interesting evaluation.      Marleny Little MD  Pager [phone]  Stroke, [facility]  [id number] [place]  Electronically signed 2/19/2021 at 1:50 PM

## 2021-02-22 ENCOUNTER — TELEPHONE (OUTPATIENT)
Dept: PHYSICAL MEDICINE AND REHAB | Age: 51
End: 2021-02-22

## 2021-02-22 NOTE — TELEPHONE ENCOUNTER
Just wanted to keep you informed that due to the patient's insurance ([employer])  So far I cannot find anyone that takes her insurance. I have a call out to [facility] [phone]. this is the last one on the list of providers. I had to leave a message to call me back. But in speaking with [employer], was told that there is nothing they can do to facilitate any home health and there is no care coordinator within the company for patients, no liasons, ect. .  Not sure what else can be done. Will keep you posted. Very disappointing for this patient.

## 2021-02-23 NOTE — TELEPHONE ENCOUNTER
She found out that is incorrect and I spoke with [facility] health. They take caresource but not the marketplace caresource which is what she has. No one in this area is taking. I am waiting for one more call that I have out with [facility] but if they dont accept her insurance she will not be able to get home health. [employer] was not helpful at all when I spoke with them.

## 2021-02-23 NOTE — TELEPHONE ENCOUNTER
Spoke with [facility] and they take the insurance but is not taking on new patients. I have exhausted all home health care options for this patient with her insurance.

## 2021-02-23 NOTE — TELEPHONE ENCOUNTER
[facility] will take her insurance. I faxed everything needed to them. They will review. However, pt has a large deduct.  $3800 that has to be paid first and then a 10 copay for each and every visit to her home. They spoke with patient and she agreed to do this. They are reviewing her information and if there are any issues they will get back to me.

## 2021-02-24 ENCOUNTER — TELEPHONE (OUTPATIENT)
Dept: PHYSICAL MEDICINE AND REHAB | Age: 51
End: 2021-02-24

## 2021-02-24 NOTE — TELEPHONE ENCOUNTER
Edward Contreras from [facility] called to let us know that they are going to take on Cindi Acosta as a patient and services will start next week. Insurance only will allow 20 visits for each. Will have to try and get auth for more visits afterwards. They will let us know.

## 2021-03-01 ENCOUNTER — TELEPHONE (OUTPATIENT)
Dept: PHYSICAL MEDICINE AND REHAB | Age: 51
End: 2021-03-01

## 2021-03-01 NOTE — TELEPHONE ENCOUNTER
BILL Madsen went to see patient this morning for the first time. She said that the patient told her \"my family does not want me to be by myself\" . Richar Pandey showed her a text msg from her sister that she wanted to be there when the nurse came but Richar Pandey didn't tell her when the nurse was coming. Richar Pandey asked the nurse to talk to her sister. Someone from the family stays with her at night. They told her not to cook anything when home alone. BILL Madsen said that Richar Pandey is alert and oriented, walking and ambulating around the house safely, BP was good. She had showered and put on makeup. [facility] is asking if she was missing something or should she be concerned like the family was saying about her being alone during the day? Nursing will be going once a week and then PT/OT will start this week sometime but she didn't know for sure when that was scheduled.

## 2021-03-02 ENCOUNTER — TELEPHONE (OUTPATIENT)
Dept: PHYSICAL MEDICINE AND REHAB | Age: 51
End: 2021-03-02

## 2021-03-02 NOTE — TELEPHONE ENCOUNTER
Macie Regalado, PT with [facility], said she went to see [facility] today for eval and she is independent with all ADL's inside and outside the home. There are no PT needs at this time. This will be sent to us in writing as well.

## 2021-03-15 ENCOUNTER — TELEPHONE (OUTPATIENT)
Dept: PHYSICAL MEDICINE AND REHAB | Age: 51
End: 2021-03-15

## 2021-03-16 ENCOUNTER — OFFICE VISIT (OUTPATIENT)
Dept: PHYSICAL MEDICINE AND REHAB | Age: 51
End: 2021-03-16
Payer: COMMERCIAL

## 2021-03-16 DIAGNOSIS — I63.511 ACUTE RIGHT MCA STROKE (HCC): Primary | ICD-10-CM

## 2021-03-16 PROCEDURE — 99212 OFFICE O/P EST SF 10 MIN: CPT | Performed by: PHYSICAL MEDICINE & REHABILITATION

## 2021-03-16 NOTE — PROGRESS NOTES
[facility]  [facility] PHYSICAL MEDICINE AND REHABILITATION  [address] [address]  Dept: [phone]  Dept Fax: [phone]    Outpatient Followup Note    María Elena VASQUEZ, 49 y.o., female, presents for follow up c/o of Follow-up  . HPI:     HPI  Patient with recent R MCA CVA being seen in follow up today. She is doing well with home therapy and reports [facility] are having to find harder things for me to do. \" She is back in her home with friends/family staying overnight. She has obtained a life alert system. She is functioning well independently during the times that she is home alone. She denies any issues with sleep, appetite, headache, bowel, or bladder. She is interested in driving again and would like drivers evaluation ordered. She has been in contact with the stroke support group and is doing well with them. Past Medical History:   Diagnosis Date    Aneurysm ([facility] [address] 2019    right MCA bifurcation aneurysm    Cancer ([facility] [address] 2018    left breast    Contact lens/glasses fitting     Fibromyalgia     Former smoker     Hyperlipidemia     Hypertension     Abhijeet Butterfield, CNP    Sleep apnea     C-pap / STATES HAS NOT BEEN WEARING      Past Surgical History:   Procedure Laterality Date    BACK SURGERY      neck fusion & L4-L5 fusion    BRAIN ANEURYSM SURGERY  2020    [employer] MRI CONDTIONAL 3T OK, SAFE IMMEDIATELY.   IR ANGIOGRAM CAROTID CEREBRAL BILATERAL,,S/P coil embolization of cerebral aneurysm    BREAST SURGERY Left 2018    lumpectomy    CARPAL TUNNEL RELEASE Bilateral      SECTION      x 2,  &     HYSTERECTOMY  2018    OTHER SURGICAL HISTORY  2021    DIAGNOSTIC CEREBRAL ANGIOGRAM / Previously treated right MCA M1 bifurcation aneurysm is with a small residual filling noted    TUNNELED VENOUS PORT PLACEMENT      placed , removed 2019     Family History   Family history unknown: Yes     Social History  clonazePAM (KLONOPIN) 0.5 MG tablet Take 0.5 mg by mouth 2 times daily as needed.  venlafaxine (EFFEXOR XR) 75 MG extended release capsule Take 75 mg by mouth daily      hydroCHLOROthiazide (HYDRODIURIL) 25 MG tablet Take 25 mg by mouth daily      Cholecalciferol (VITAMIN D3) 25 MCG (1000 UT) CAPS Take 2,000 Units by mouth nightly       carvedilol (COREG) 12.5 MG tablet Take 12.5 mg by mouth 2 times daily (with meals)       No current facility-administered medications for this visit. Allergies   Allergen Reactions    Adhesive Tape Rash     redness       Subjective:      Review of Systems  Constitutional: Negative for fever, chills and unexpected weight change. HENT: Negative for trouble swallowing. Respiratory: Negative for cough and shortness of breath. Cardiovascular: Negative for chest pain. Endocrine: Negative for polyuria. Genitourinary: Negative for dysuria, urgency, frequency, incontinence and difficulty urinating. Gastrointestinal: Negative for constipation or diarrhea. Musculoskeletal: Negative for arthralgias. Neurological: Negative for headaches, numbness, or tingling. Psychiatric: Negative for depressed mood or anxiety. Objective:     Physical Exam  There were no vitals taken for this visit. Constitutional: She appears well-developed and well-nourished. In no distress. HEENT: NCAT, PERRL, EOMI. Mucous membranes pink and moist.  Pulmonary/Chest: Respirations WNL and unlabored. MSK: Functional ROM all extremities. Strength 5/5 key muscles all extremities. Neurological: She is alert and oriented to person, place, and time. DTRs 2+ and symmetric. CNs intact. Romberg negative. Skin: Skin is warm dry and intact with good turgor. Psychiatric: She has a normal mood and affect. Her behavior is normal. Thought content normal.   Nursing note and vitals reviewed. Diagnostic Studies:  None new     Assessment:       Diagnosis Orders   1.  Acute right MCA stroke

## 2021-03-31 NOTE — TELEPHONE ENCOUNTER
Patient calling for refill of gabapentin.     Date of last fill: 2/19/21      Pt notified response time for refills is 24-48 hours

## 2021-04-02 RX ORDER — GABAPENTIN 100 MG/1
100 CAPSULE ORAL DAILY
Qty: 270 CAPSULE | Refills: 1 | Status: SHIPPED | OUTPATIENT
Start: 2021-04-02 | End: 2021-10-01 | Stop reason: SDUPTHER

## 2021-04-07 ENCOUNTER — HOSPITAL ENCOUNTER (OUTPATIENT)
Dept: OCCUPATIONAL THERAPY | Age: 51
Setting detail: THERAPIES SERIES
Discharge: HOME | End: 2021-04-07
Payer: COMMERCIAL

## 2021-04-07 PROCEDURE — 97537 COMMUNITY/WORK REINTEGRATION: CPT

## 2021-04-07 PROCEDURE — 97167 OT EVAL HIGH COMPLEX 60 MIN: CPT
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
## 2021-04-15 ENCOUNTER — HOSPITAL ENCOUNTER (OUTPATIENT)
Dept: OCCUPATIONAL THERAPY | Age: 51
Setting detail: THERAPIES SERIES
Discharge: HOME | End: 2021-04-15
Payer: COMMERCIAL

## 2021-04-15 PROCEDURE — 97537 COMMUNITY/WORK REINTEGRATION: CPT

## 2021-04-20 ENCOUNTER — HOSPITAL ENCOUNTER (OUTPATIENT)
Dept: OCCUPATIONAL THERAPY | Age: 51
Setting detail: THERAPIES SERIES
Discharge: HOME | End: 2021-04-20
Payer: COMMERCIAL

## 2021-04-20 PROCEDURE — 97537 COMMUNITY/WORK REINTEGRATION: CPT

## 2021-04-20 NOTE — PROGRESS NOTES
Occupational [facility]  Rehabilitation Services  Occupational Therapy  Rehabilitation Treatment Note  Date: 21  Patient Name: Edgardo Crump    MRN: [id number]  Account: [de-identified]   : 1970  (49 y.o.) Gender: female     Ins[facility] Information[facility]  20 visits hard max     OT Visits:  OT Visit Number: 3/20     Subjective: Patient reported that she has been discontinued from home Speech and Language therapy. 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     Objective: Results on clinical visual assessment revealed decreased dynamic vision in the areas of visual pursuits and saccades in the left visual field; on cognitive screen, patient demonstrated mildly impaired sustained auditory attention; significantly impaired divided visual attention, with frustration noted with higher level cognitive challenges that are perceived to be difficult; and, mildly impaired selective visual attention. Patient scores on simulated tests of driving indicate decreased cb positioning, with patient cb positioning skewed to the left of midline, with [id number] decreased divided visual attention for visual stimuli presented in the left visual field. Patient with a moderately delayed visual response speed, with decreased speed control while driving and decreased safety with negotiation of intersections.     Occupational Therapy Behind-Wheel  Evaluation On-Road     Behind the Wheel Assessment: Patient able to enter and exit the vehicle independently; patient was independent with adjusting mirrors and seat. Patient was independent with fastening and unfastening seatbelt.  Patient able to maintain visual clearance from the wheel.     Non-Traffic Driving: Patient able to operate the primary controls independently, that included the negotiating intersection with traffic light. Patient able to obey the rules of the road.                            Intervention by CDRS: Patient required verbal cues throughout drive to recall 2-step verbal directions and maintain attention to driving when patient became distracted by conversation.      Safety awareness: Patient required verbal cueing to maintain attention.     Assessment: Patient demonstrated decreased dynamic vision in the areas of pursuits and fixation to traffic in the left visual field and with selective and divided attention.     Plan: Plan to address specific  performance skills to include visual pursuits and saccades in the left visual field and selective and divided attention behind the wheel.

## 2021-04-22 ENCOUNTER — HOSPITAL ENCOUNTER (OUTPATIENT)
Dept: OCCUPATIONAL THERAPY | Age: 51
Setting detail: THERAPIES SERIES
Discharge: HOME | End: 2021-04-22
Payer: COMMERCIAL

## 2021-04-22 PROCEDURE — 97537 COMMUNITY/WORK REINTEGRATION: CPT

## 2021-04-22 NOTE — PROGRESS NOTES
Occupational [address]   Rehabilitation Services  Occupational Therapy Discharge Summary  Date: 21  Patient Name: Steff Peterson    MRN: [id number]  Account: [de-identified]   : 1970  (49 y.o.) Gender: female        Insurance Information:  [facility]  20 visits hard max     OT Visits:  OT Visit Number:     Status at Initial Evaluation: Madalyn Rodarte is a 49year-old female who was referred for a  evaluation on 2021 by her physiatrist, Dr. Tyra Merrill s/p right MCA aneurysm s/p web and stent placement 2020. DSA 2021 showed residual filling of the R MCA aneurysm and an incidental L MCA blister aneurysm.  She then presented on 2021 with acute L demetrice/weakness, neglect, r gaze pref, found to have acute large R MCA stroke. dSA 2021 showed partially occlusive thrombus at the R M2 branches at the stent site. S/p MT TICI2c and balloon angioplasty. Repeat CT head 2021 showed small R hemorrhagic conversion. Results on clinical visual assessment revealed decreased dynamic vision in the areas of visual pursuits and saccades in the left visual field; on cognitive screen, patient demonstrated mildly impaired sustained auditory attention; significantly impaired divided visual attention, with frustration noted with higher level cognitive challenges that are perceived to be difficult; and, mildly impaired selective visual attention. Patient scores on simulated tests of driving indicate decreased cb positioning, with patient cb positioning skewed to the left of midline, with [id number] decreased divided visual attention for visual stimuli presented in the left visual field. Patient with a moderately delayed visual response speed, with decreased speed control while driving and decreased safety with negotiation of intersections.  Patient performance behind the wheel indicated decreased dynamic vision in the areas of pursuits and verbal directions. Patient able to obey the rules of the road.                            Intervention by CDRS: Patient educated regarding defensive driving strategies that included slowing speed prior to approaching intersections with traffic lights and to execute left turns when vehicle is positioned in the middle of the intersection to allow for turn into proper cb. Patient educated regarding the use of aggressive visual search skills, slowing speed prior to approaching intersections with traffic lights, minimizing distractions behind the wheel; and, maintaining following and stopping distances between vehicle and vehicles ahead.     Assessment: Patient demonstrated  performance skills in the areas of visual skills, vehicle positioning and handling, and strategic skills and the ability to apply the use of defensive driving strategies taught throughout training.     Recommendations: Recommend patient transition back to driving gradually, with patient initially driving during daytime; familiar routes; during lower traffic hours; shorter distances; when weather conditions are dry; and on roads versus expressway. Patient Response to Recommendations: Patient agreeable to plan to transition back to driving.

## 2021-04-29 ENCOUNTER — OFFICE VISIT (OUTPATIENT)
Dept: PHYSICAL MEDICINE AND REHAB | Age: 51
End: 2021-04-29
Payer: COMMERCIAL

## 2021-04-29 VITALS
HEIGHT: 65 IN | HEART RATE: 72 BPM | TEMPERATURE: 97.2 F | WEIGHT: 228.4 LBS | SYSTOLIC BLOOD PRESSURE: 130 MMHG | DIASTOLIC BLOOD PRESSURE: 78 MMHG | BODY MASS INDEX: 38.05 KG/M2

## 2021-04-29 DIAGNOSIS — I63.511 RIGHT MIDDLE CEREBRAL ARTERY STROKE (HCC): Primary | ICD-10-CM

## 2021-04-29 PROCEDURE — 99212 OFFICE O/P EST SF 10 MIN: CPT | Performed by: PHYSICAL MEDICINE & REHABILITATION

## 2021-04-29 NOTE — PROGRESS NOTES
[address] AND REHABILITATION  [address] [address]  Dept: [phone]  Dept Fax: [phone]    Outpatient Followup Note    María Elena VASQUEZ, 49 y.o., female, presents for follow up c/o of Follow-up  . HPI:     HPI  Patient with R MCA CVA with R parietal hemorrhages 21 being seen in follow up today. She has completed home health therapies since our last visit and been discharged. She has passed a drivers evaluation and is driving short community distances with no issues. She denies any current issues with sleep, ADLs. She notes occasional pins/needles pain in her L hand, face, and scalp which is mild. She is taking gabapentin 100 mg at  for this. She is functioning independently at home without device or assistance. 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 She was able to host a dinner party last week for 10 people and complete all steps necessary to prepare the meal.    Past Medical History:   Diagnosis Date    Aneurysm ([facility] [address] 2019    right MCA bifurcation aneurysm    Cancer ([facility] [address] 2018    left breast    Contact lens/glasses fitting     Fibromyalgia     Former smoker     Hyperlipidemia     Hypertension     Theodor Staple, CNP    Sleep apnea     C-pap / STATES HAS NOT BEEN WEARING      Past Surgical History:   Procedure Laterality Date    BACK SURGERY      neck fusion & L4-L5 fusion    BRAIN ANEURYSM SURGERY  2020    [employer] EMOBLIZATION SYSTEM MRI CONDTIONAL 3T [place], SAFE IMMEDIATELY.   IR ANGIOGRAM CAROTID CEREBRAL BILATERAL,,S/P coil embolization of cerebral aneurysm    BREAST SURGERY Left 2018    lumpectomy    CARPAL TUNNEL RELEASE Bilateral      SECTION      x 2,  &     HYSTERECTOMY  2018    OTHER SURGICAL HISTORY  2021    DIAGNOSTIC CEREBRAL ANGIOGRAM / Previously treated right MCA M1 bifurcation aneurysm is with a small residual filling noted    TUNNELED VENOUS PORT PLACEMENT      placed , removed 2019     Family History   Family history unknown: Yes     Social History     Socioeconomic History    Marital status:      Spouse name: None    Number of children: None    Years of education: None    Highest education level: None   Occupational History    None   Social Needs    Financial resource strain: None    Food insecurity     Worry: None     Inability: None    Transportation needs     Medical: None     Non-medical: None   Tobacco Use    Smoking status: Former Smoker     Packs/day: 1.00     Years: 15.00     Pack years: 15.00     Types: Cigarettes     Quit date: 2000     Years since quittin.6    Smokeless tobacco: Never Used    Tobacco comment: quit 20 years ago   Substance and Sexual Activity    Alcohol use: None     Comment: drinks lwine     Drug use: Never    Sexual activity: None   Lifestyle    Physical activity     Days per week: None     Minutes per session: None    Stress: None   Relationships    Social connections     Talks on phone: None     Gets together: None     Attends Latter day service: None     Active member of club or organization: None     Attends meetings of clubs or organizations: None     Relationship status: None    Intimate partner violence     Fear of current or ex partner: None     Emotionally abused: None     Physically abused: None     Forced sexual activity: None   Other Topics Concern    None   Social History Narrative    None       Current Outpatient Medications   Medication Sig Dispense Refill    gabapentin (NEURONTIN) 100 MG capsule Take 1 capsule by mouth daily for 180 days.  Intended supply: 90 days 270 capsule 1    atorvastatin (LIPITOR) 40 MG tablet Take 1 tablet by mouth nightly 30 tablet 3    lisinopril (PRINIVIL;ZESTRIL) 20 MG tablet Take 1 tablet by mouth daily 30 tablet 3    clopidogrel (PLAVIX) 75 MG tablet Take 1 tablet by mouth daily 30 tablet 3    AMITRIPTYLINE HCL PO Take by mouth Psychiatric: She has a normal mood and affect. Her behavior is normal. Thought content normal.   Nursing note and vitals reviewed. Diagnostic Studies:  None new    Assessment:       Diagnosis Orders   1. Right middle cerebral artery stroke [facility])          Plan:      Patient has been discharged from therapies, is driving, is working, and is functionally independent at this time. She can follow up with me as needed including for any low back pain/fibromyalgia complaints or needs. No orders of the defined types were placed in this encounter. No orders of the defined types were placed in this encounter. Return if symptoms worsen or fail to improve. Electronically signedby Robert Wright MD on 4/29/2021 at 4:56 PM.     Please note that this chartwas generated using voice recognition Dragon dictation software. Although everyeffort was made to ensure the accuracy of this automated transcription, some errorsin transcription may have occurred.

## 2021-05-14 ENCOUNTER — HOSPITAL ENCOUNTER (OUTPATIENT)
Dept: MRI IMAGING | Age: 51
Discharge: HOME | End: 2021-05-16
Payer: COMMERCIAL

## 2021-05-14 DIAGNOSIS — I67.1 CEREBRAL ANEURYSM: ICD-10-CM

## 2021-05-14 PROCEDURE — 70546 MR ANGIOGRAPH HEAD W/O&W/DYE: CPT

## 2021-05-14 PROCEDURE — 6360000004 HC RX CONTRAST MEDICATION: Performed by: STUDENT IN AN ORGANIZED HEALTH CARE EDUCATION/TRAINING PROGRAM

## 2021-05-14 PROCEDURE — A9579 GAD-BASE MR CONTRAST NOS,1ML: HCPCS | Performed by: STUDENT IN AN ORGANIZED HEALTH CARE EDUCATION/TRAINING PROGRAM

## 2021-05-14 RX ADMIN — GADOTERIDOL 20 ML: 279.3 INJECTION, SOLUTION INTRAVENOUS at 14:16

## 2021-06-11 ENCOUNTER — OFFICE VISIT (OUTPATIENT)
Dept: NEUROLOGY | Age: 51
End: 2021-06-11
Payer: COMMERCIAL

## 2021-06-11 VITALS
WEIGHT: 228 LBS | OXYGEN SATURATION: 99 % | SYSTOLIC BLOOD PRESSURE: 126 MMHG | DIASTOLIC BLOOD PRESSURE: 80 MMHG | HEIGHT: 65 IN | BODY MASS INDEX: 37.99 KG/M2 | HEART RATE: 74 BPM

## 2021-06-11 DIAGNOSIS — Z98.890 HISTORY OF CEREBRAL ANEURYSM REPAIR: ICD-10-CM

## 2021-06-11 DIAGNOSIS — I67.1 MIDDLE CEREBRAL ARTERY ANEURYSM: ICD-10-CM

## 2021-06-11 DIAGNOSIS — I63.511 RIGHT MIDDLE CEREBRAL ARTERY STROKE (HCC): Primary | ICD-10-CM

## 2021-06-11 DIAGNOSIS — Z86.79 HISTORY OF CEREBRAL ANEURYSM REPAIR: ICD-10-CM

## 2021-06-11 PROCEDURE — 99214 OFFICE O/P EST MOD 30 MIN: CPT | Performed by: STUDENT IN AN ORGANIZED HEALTH CARE EDUCATION/TRAINING PROGRAM

## 2021-06-11 NOTE — PROGRESS NOTES
Endovascular Neurosurgery clinic note    Pt Name: Ladan Lovelace  MRN: [id number]  YOB: 1970  Date of evaluation: 6/11/2021  Primary Care Physician: POPEYE Benjamin CNP  Reason for evaluation: This is a follow-up for right MCA aneurysm. SUBJECTIVE:   History of Chief Complaint:    Ladan Lovelace is a 49 y.o. female with past medical history including hld, htn, breast cancer 2018, gray. Patient was recently following for right MCA aneurysm s/p web and stent placement 8/26/2020. DSA 1/21/2021 showed residual filling of the R MCA aneurysm and an incidental L MCA blister aneurysm. She then presented on 1/24/2021 with acute L hemiweakness, neglect, r gaze pref, found to have acute large R MCA stroke. MRA showed artifact at the aneurysm but no clear LVO. possible in-stent thrombosis following insufficient antithrombotic rx. dSA 1/26/2021 showed partially occlusive thrombus at the R M2 branches at the stent site. S/p MT TICI2c and balloon angioplasty.     S/p follow-up DSA on 2/2/2021   Impression:   --The prior noted right MCA M1/M2 subocclusive clot significantly improved/resolved. There is a small residual noted subocclusive clot in left MCA M2 posterior division not hemodynamically significant. --The prior treated right MCA bifurcation aneurysm with Web device currently with no residual noted with Irene Sox class at 1.        She improved clinically and was last seen in the clinic in February 2021. She presented today for a follow-up. She reported doing good. No new strokelike symptoms. No new weakness or speech difficulty. She is on dual antiplatelet therapy with aspirin and Plavix. No headache. Allergies  is allergic to adhesive tape. Medications  Prior to Admission medications    Medication Sig Start Date End Date Taking? Authorizing Provider   gabapentin (NEURONTIN) 100 MG capsule Take 1 capsule by mouth daily for 180 days.  Intended supply: 90 days 4/2/21 9/29/21 Yes Mele Shaffer MD   atorvastatin (LIPITOR) 40 MG tablet Take 1 tablet by mouth nightly 21  Yes POPEYE Hill CNP   lisinopril (PRINIVIL;ZESTRIL) 20 MG tablet Take 1 tablet by mouth daily 21  Yes POPEYE Hill CNP   clopidogrel (PLAVIX) 75 MG tablet Take 1 tablet by mouth daily 21  Yes POPEYE Hill CNP   AMITRIPTYLINE HCL PO Take by mouth nightly   Yes Historical Provider, MD   aspirin 325 MG tablet Take 1 tablet by mouth daily 20  Yes POPEYE Medina CNP   clonazePAM (KLONOPIN) 0.5 MG tablet Take 0.5 mg by mouth 2 times daily as needed. 20  Yes Historical Provider, MD   venlafaxine (EFFEXOR XR) 75 MG extended release capsule Take 75 mg by mouth daily 20  Yes Historical Provider, MD   hydroCHLOROthiazide (HYDRODIURIL) 25 MG tablet Take 25 mg by mouth daily 20  Yes Historical Provider, MD   Cholecalciferol (VITAMIN D3) 25 MCG (1000 UT) CAPS Take 2,000 Units by mouth nightly    Yes Historical Provider, MD   carvedilol (COREG) 12.5 MG tablet Take 12.5 mg by mouth 2 times daily (with meals)   Yes Historical Provider, MD    Scheduled Meds:  Continuous Infusions:  PRN Meds:.  Past Medical History   has a past medical history of Aneurysm ([facility] [address] Cancer ([facility] [address] Contact lens/glasses fitting, Fibromyalgia, Former smoker, Hyperlipidemia, Hypertension, and Sleep apnea. Past Surgical History   has a past surgical history that includes Breast surgery (Left, 2018); Tunneled venous port placement; Hysterectomy (2018); Carpal tunnel release (Bilateral);  section; back surgery; Brain aneurysm surgery (2020); and other surgical history (2021). Social History   reports that she quit smoking about 20 years ago. Her smoking use included cigarettes. She has a 15.00 pack-year smoking history. She has never used smokeless tobacco.   has no history on file for alcohol use. reports no history of drug use. Family History  Family history is unknown by patient.     Review of Systems:  CONSTITUTIONAL:  negative for fevers, chills, fatigue and malaise    EYES:  negative for double vision, blurred vision and photophobia     HEENT:  negative for tinnitus, epistaxis and sore throat    RESPIRATORY:  negative for cough, shortness of breath, wheezing    CARDIOVASCULAR:  negative for chest pain, palpitations, syncope, edema    GASTROINTESTINAL:  negative for nausea, vomiting    GENITOURINARY:  negative for incontinence    MUSCULOSKELETAL:  negative for neck or back pain    NEUROLOGICAL:  Negative for weakness and tingling  negative for headaches and dizziness    PSYCHIATRIC:  negative for anxiety      Review of systems otherwise negative. OBJECTIVE:   Vitals: /80   Pulse 74   Ht 5' 5\" (1.651 m)   Wt 228 lb (103.4 kg)   SpO2 99%   BMI 37.94 kg/m²   General appearance: NAD. HEENT: Atraumatic. Neck: Neck is supple. Lungs: No respiratory distress noted. Heart: normal sinus rhythm on tele. .   Abdomen: Soft nontender. Extremities: No lower limb edema noted. Neurologic: awake, following simple commands appropriately, able to name simple objects, intact comprehension, no dysarthria noted. CN: Has intact extraocular muscles movements, no facial droop noted  MOTOR: Has good strength in both upper and lower extremities, moving both upper and lower extremities against gravity  SENSORY: Intact sensation to simple touch bilaterally in both upper and lower extremities. COORDINATION: Intact finger-nose test bilaterally with no dysmetria noted. LABS:   No results for input(s): WBC, HGB, HCT, PLT, NA, K, CL, CO2, BUN, CREATININE, MG, PHOS, CALCIUM, PTT, INR, AST, ALT, BILITOT, BILIDIR, NITRU, COLORU, BACTERIA in the last 72 hours. Invalid input(s): PT, WBCU, RBCU, LEUKOCYTESUA  No results for input(s): ALKPHOS, ALT, AST, BILITOT, BILIDIR, LABALBU, AMYLASE, LIPASE in the last 72 hours.     RADIOLOGY:   Images were personally reviewed including:  CT head 1/30/2021  1.  Right middle [phone]  Stroke, [facility]  [id number] [place]  Electronically signed 6/11/2021 at 2:15 PM

## 2021-08-09 ENCOUNTER — OFFICE VISIT (OUTPATIENT)
Dept: NEUROLOGY | Age: 51
End: 2021-08-09
Payer: COMMERCIAL

## 2021-08-09 VITALS
DIASTOLIC BLOOD PRESSURE: 88 MMHG | HEART RATE: 74 BPM | OXYGEN SATURATION: 99 % | RESPIRATION RATE: 15 BRPM | WEIGHT: 228 LBS | BODY MASS INDEX: 37.99 KG/M2 | SYSTOLIC BLOOD PRESSURE: 126 MMHG | HEIGHT: 65 IN

## 2021-08-09 DIAGNOSIS — I67.1 CEREBRAL ANEURYSM: Primary | ICD-10-CM

## 2021-08-09 PROCEDURE — 99215 OFFICE O/P EST HI 40 MIN: CPT | Performed by: PSYCHIATRY & NEUROLOGY

## 2021-08-09 RX ORDER — CLOPIDOGREL BISULFATE 75 MG/1
75 TABLET ORAL DAILY
Qty: 30 TABLET | Refills: 5 | Status: SHIPPED | OUTPATIENT
Start: 2021-08-09 | End: 2022-02-12

## 2021-08-09 NOTE — PROGRESS NOTES
capsule Take 75 mg by mouth daily 20   Historical Provider, MD   hydroCHLOROthiazide (HYDRODIURIL) 25 MG tablet Take 25 mg by mouth daily 20   Historical Provider, MD   Cholecalciferol (VITAMIN D3) 25 MCG (1000 UT) CAPS Take 2,000 Units by mouth nightly     Historical Provider, MD   carvedilol (COREG) 12.5 MG tablet Take 12.5 mg by mouth 2 times daily (with meals)    Historical Provider, MD    Scheduled Meds:  Continuous Infusions:  PRN Meds:.  Past Medical History   has a past medical history of Aneurysm ([facility] [address] Cancer ([facility] [address] Contact lens/glasses fitting, Fibromyalgia, Former smoker, Hyperlipidemia, Hypertension, and Sleep apnea. Past Surgical History   has a past surgical history that includes Breast surgery (Left, 2018); Tunneled venous port placement; Hysterectomy (2018); Carpal tunnel release (Bilateral);  section; back surgery; Brain aneurysm surgery (2020); and other surgical history (2021). Social History   reports that she quit smoking about 20 years ago. Her smoking use included cigarettes. She has a 15.00 pack-year smoking history. She has never used smokeless tobacco.   has no history on file for alcohol use. reports no history of drug use. Family History  Family history is unknown by patient.     Review of Systems:  CONSTITUTIONAL:  negative for fevers, chills, fatigue and malaise    EYES:  negative for double vision, blurred vision and photophobia     HEENT:  negative for tinnitus, epistaxis and sore throat    RESPIRATORY:  negative for cough, shortness of breath, wheezing    CARDIOVASCULAR:  negative for chest pain, palpitations, syncope, edema    GASTROINTESTINAL:  negative for nausea, vomiting    GENITOURINARY:  negative for incontinence    MUSCULOSKELETAL:  negative for neck or back pain    NEUROLOGICAL:  Negative for weakness and tingling  negative for headaches and dizziness    PSYCHIATRIC:  negative for anxiety      Review of systems otherwise negative. OBJECTIVE:   Vitals: There were no vitals taken for this visit. General appearance: Lying in bed, NAD  HEENT: Head: Normocephalic, no lesions, without obvious abnormality. Neck: no adenopathy, no carotid bruit, no JVD, supple, symmetrical, trachea midline and thyroid not enlarged, symmetric, no tenderness/mass/nodules  Lungs: clear to auscultation bilaterally  Heart: regular rate and rhythm, S1, S2 normal, no murmur, click, rub or gallop  Abdomen: soft, non-tender; nondistended, bowel sounds normal  Extremities: extremities normal, atraumatic, no cyanosis or edema    Neurologic: Mental status: Alert, oriented, thought content appropriate, Alert and oriented x 3,   Language/speech: intact language, attention, knowledge  CN: II-XII intact  MOTOR: 5/5 throughout with normal tone and bulk for age  SENSORY: LT and PP symmetrical and intact  COORDINATION: F to N and Gait intact for age      LABS:   No results for input(s): WBC, HGB, HCT, PLT, NA, K, CL, CO2, BUN, CREATININE, MG, PHOS, CALCIUM, PTT, INR, AST, ALT, BILITOT, BILIDIR, NITRU, COLORU, BACTERIA in the last 72 hours. Invalid input(s): PT, WBCU, RBCU, LEUKOCYTESUA  No results for input(s): ALKPHOS, ALT, AST, BILITOT, BILIDIR, LABALBU, AMYLASE, LIPASE in the last 72 hours. RADIOLOGY:   Images were personally reviewed includin2020      MRA dec 1, 2020      MRI head 2020  There is a 9 mm saccular aneurysm arising off of the distal M1 segment of the right MCA.     MRA May 14, 2021      IMPRESSIONS:   Kenya Khanna is a 53 y.o. female who presents with hx of hypertension, hypercholesterolemia, breast cancer in 2018 and cancer free since then, c-spine surgery with fusion surgery, she has been having vague symptoms for the last year including dizziness, and during the evaluation, she was found to have:    Bifurcation right MCA aneurysm: A 9 mm saccular aneurysm arising off of the distal M1 segment of the right MCA on MR on May 26, 2020.    S/P aneurysm treatment with WEB and STENT on Aug, 26, 2020  Delayed thrombosis with stent s/p thrombectomy January of 2021    She is here for further management and evaluation    Bilateral hand numbness  Right dorsal foot numbness     does not have any pertinent problems on file. PLANS:   ASA  Plavix   DSA in January   RTC w 4 months     Consultation Visit Time:  40 minutes  Patient given educational materials - see patient instructions. Discussed use, benefit, and side effects of prescribed medications. Personally reviewed imaging with patients and all questions answered. Pt voiced understanding. Patient agreed with treatment plan. Follow up as directed below. Greater than 50% of the time was for counseling and providing answer to the patient question. The findings and the plan discussed with the patient and all her questions were answered. Thank you very much for your referral, please do not hesitate to contact me with any questions.     Swati Dewey MD Pager: [phone]  Stroke, [facility] Stroke MD [place], MD  Pager [phone]  Electronically signed 8/9/2021 at 3:39 PM

## 2021-09-15 ENCOUNTER — HOSPITAL ENCOUNTER (OUTPATIENT)
Dept: MRI IMAGING | Age: 51
Discharge: HOME | End: 2021-09-17
Payer: COMMERCIAL

## 2021-09-15 DIAGNOSIS — I67.1 CEREBRAL ANEURYSM: ICD-10-CM

## 2021-09-15 LAB
CREAT SERPL-MCNC: 0.55 MG/DL (ref 0.5–0.9)
GFR AFRICAN AMERICAN: >60 ML/MIN
GFR NON-AFRICAN AMERICAN: >60 ML/MIN
GFR SERPL CREATININE-BSD FRML MDRD: NORMAL ML/MIN/{1.73_M2}
GFR SERPL CREATININE-BSD FRML MDRD: NORMAL ML/MIN/{1.73_M2}

## 2021-09-15 PROCEDURE — 70546 MR ANGIOGRAPH HEAD W/O&W/DYE: CPT

## 2021-09-15 PROCEDURE — A9579 GAD-BASE MR CONTRAST NOS,1ML: HCPCS | Performed by: PSYCHIATRY & NEUROLOGY

## 2021-09-15 PROCEDURE — 36415 COLL VENOUS BLD VENIPUNCTURE: CPT

## 2021-09-15 PROCEDURE — 6360000004 HC RX CONTRAST MEDICATION: Performed by: PSYCHIATRY & NEUROLOGY

## 2021-09-15 PROCEDURE — 82565 ASSAY OF CREATININE: CPT

## 2021-09-15 RX ADMIN — GADOTERIDOL 20 ML: 279.3 INJECTION, SOLUTION INTRAVENOUS at 18:00

## 2021-10-01 RX ORDER — GABAPENTIN 100 MG/1
100 CAPSULE ORAL DAILY
Qty: 30 CAPSULE | Refills: 2 | Status: SHIPPED | OUTPATIENT
Start: 2021-10-01 | End: 2022-01-07

## 2021-10-01 NOTE — TELEPHONE ENCOUNTER
Gabapentin 100 daily was started by dr. Karo Varela 2/19/2021 L hand and foot burning pain. Unclear if the medication is working. F/u with mariela 12/14/2021. Assess if refills are needed at this time.

## 2021-12-21 ENCOUNTER — OFFICE VISIT (OUTPATIENT)
Dept: NEUROLOGY | Age: 51
End: 2021-12-21
Payer: MEDICAID

## 2021-12-21 VITALS
WEIGHT: 225 LBS | OXYGEN SATURATION: 98 % | SYSTOLIC BLOOD PRESSURE: 119 MMHG | DIASTOLIC BLOOD PRESSURE: 86 MMHG | HEIGHT: 65 IN | HEART RATE: 75 BPM | BODY MASS INDEX: 37.49 KG/M2

## 2021-12-21 DIAGNOSIS — I67.1 CEREBRAL ANEURYSM: Primary | ICD-10-CM

## 2021-12-21 DIAGNOSIS — Z95.828 MRI-SAFE ENDOVASCULAR ANEURYSM COIL PRESENT: ICD-10-CM

## 2021-12-21 PROCEDURE — G8427 DOCREV CUR MEDS BY ELIG CLIN: HCPCS | Performed by: PSYCHIATRY & NEUROLOGY

## 2021-12-21 PROCEDURE — G8417 CALC BMI ABV UP PARAM F/U: HCPCS | Performed by: PSYCHIATRY & NEUROLOGY

## 2021-12-21 PROCEDURE — 3017F COLORECTAL CA SCREEN DOC REV: CPT | Performed by: PSYCHIATRY & NEUROLOGY

## 2021-12-21 PROCEDURE — G8484 FLU IMMUNIZE NO ADMIN: HCPCS | Performed by: PSYCHIATRY & NEUROLOGY

## 2021-12-21 PROCEDURE — 99215 OFFICE O/P EST HI 40 MIN: CPT | Performed by: PSYCHIATRY & NEUROLOGY

## 2021-12-21 PROCEDURE — 1036F TOBACCO NON-USER: CPT | Performed by: PSYCHIATRY & NEUROLOGY

## 2021-12-21 NOTE — PROGRESS NOTES
Neurocritical Care, Stroke & Neurointerventional Note    [address]   [address] [place] [address],  [address], [address] [facility]   P: [phone]  Endovascular Neurosurgery In Person Consult    Pt Name: Laura Dietz  MRN: [id number]  YOB: 1970  Date of evaluation: 12/21/2021  Primary Care Physician: POPEYE Sheets CNP    Reason for evaluation: Cerebral Aneurysm     SUBJECTIVE:   History of Chief Complaint:    Laura Dietz is a 49 y.o. female who presents with hx of hypertension, hypercholesterolemia, breast cancer in 2018 and cancer free since then, c-spine surgery with fusion surgery, she has been having vague symptoms for the last year including dizziness, and during the evaluation, she was found to have:    Bifurcation right MCA aneurysm: A 9 mm saccular aneurysm arising off of the distal M1 segment of the right MCA on MR on May 26, 2020. S/P aneurysm treatment with WEB and STENT on Aug, 26, 2020    She is here for further management and evaluation    Doing well    mRS 0-1  Left hand intermittent numbness  On ASA and Plavix    Allergies  is allergic to adhesive tape. Medications  Prior to Admission medications    Medication Sig Start Date End Date Taking? Authorizing Provider   gabapentin (NEURONTIN) 100 MG capsule Take 1 capsule by mouth daily for 90 days. Intended supply: 90 days 10/1/21 12/30/21 Yes Reyna Carrillo MD   clopidogrel (PLAVIX) 75 MG tablet Take 1 tablet by mouth daily 8/9/21  Yes Kuldeep Peterson MD   atorvastatin (LIPITOR) 40 MG tablet Take 1 tablet by mouth nightly 2/5/21  Yes POPEYE Landeros CNP   lisinopril (PRINIVIL;ZESTRIL) 20 MG tablet Take 1 tablet by mouth daily 2/5/21  Yes POPEYE Landeros CNP   aspirin 325 MG tablet Take 1 tablet by mouth daily 8/27/20  Yes POPEYE Booker CNP   clonazePAM (KLONOPIN) 0.5 MG tablet Take 0.5 mg by mouth 2 times daily as needed.  6/19/20  Yes Historical Provider, MD   venlafaxine (EFFEXOR XR) 75 MG extended release capsule Take 75 mg by mouth daily 20  Yes Historical Provider, MD   hydroCHLOROthiazide (HYDRODIURIL) 25 MG tablet Take 25 mg by mouth daily 20  Yes Historical Provider, MD   Cholecalciferol (VITAMIN D3) 25 MCG (1000 UT) CAPS Take 2,000 Units by mouth nightly    Yes Historical Provider, MD   carvedilol (COREG) 12.5 MG tablet Take 12.5 mg by mouth 2 times daily (with meals)   Yes Historical Provider, MD   AMITRIPTYLINE HCL PO Take by mouth nightly  Patient not taking: Reported on 2021    Historical Provider, MD    Scheduled Meds:  Continuous Infusions:  PRN Meds:.  Past Medical History   has a past medical history of Aneurysm ([facility] [address] Cancer ([facility] [address] Contact lens/glasses fitting, Fibromyalgia, Former smoker, Hyperlipidemia, Hypertension, and Sleep apnea. Past Surgical History   has a past surgical history that includes Breast surgery (Left, 2018); Tunneled venous port placement; Hysterectomy (2018); Carpal tunnel release (Bilateral);  section; back surgery; Brain aneurysm surgery (2020); and other surgical history (2021). Social History   reports that she quit smoking about 21 years ago. Her smoking use included cigarettes. She has a 15.00 pack-year smoking history. She has never used smokeless tobacco.   has no history on file for alcohol use. reports no history of drug use. Family History  Family history is unknown by patient.     Review of Systems:  CONSTITUTIONAL:  negative for fevers, chills, fatigue and malaise    EYES:  negative for double vision, blurred vision and photophobia     HEENT:  negative for tinnitus, epistaxis and sore throat    RESPIRATORY:  negative for cough, shortness of breath, wheezing    CARDIOVASCULAR:  negative for chest pain, palpitations, syncope, edema    GASTROINTESTINAL:  negative for nausea, vomiting    GENITOURINARY:  negative for incontinence    MUSCULOSKELETAL:  negative for neck or back pain    NEUROLOGICAL:  Negative for last year including dizziness, and during the evaluation, she was found to have:    Bifurcation right MCA aneurysm: A 9 mm saccular aneurysm arising off of the distal M1 segment of the right MCA on MR on May 26, 2020. S/P aneurysm treatment with WEB and STENT on Aug, 26, 2020  Delayed thrombosis with stent s/p thrombectomy January of 2021    She is here for further management and evaluation    Left hand numbness only     mRS 0, 95% back to baseline    MRA on 09/15/2021 showed no residual aneurysm and open arteries       does not have any pertinent problems on file. PLANS:   ASA  Plavix   RTC w 9 months     Consultation Visit Time:  40 minutes  Patient given educational materials - see patient instructions. Discussed use, benefit, and side effects of prescribed medications. Personally reviewed imaging with patients and all questions answered. Pt voiced understanding. Patient agreed with treatment plan. Follow up as directed below. Greater than 50% of the time was for counseling and providing answer to the patient question. The findings and the plan discussed with the patient and all her questions were answered. Thank you very much for your referral, please do not hesitate to contact me with any questions.     Lovie Collet, MD Pager: [phone]  [facility] Sanz MD  Pager [phone]  Electronically signed 12/21/2021 at 3:53 PM

## 2022-01-01 DIAGNOSIS — G62.9 NEUROPATHY: Primary | ICD-10-CM

## 2022-01-07 RX ORDER — GABAPENTIN 100 MG/1
CAPSULE ORAL
Qty: 30 CAPSULE | Refills: 2 | Status: SHIPPED | OUTPATIENT
Start: 2022-01-07 | End: 2022-02-06

## 2022-02-12 RX ORDER — CLOPIDOGREL BISULFATE 75 MG/1
TABLET ORAL
Qty: 30 TABLET | Refills: 5 | Status: SHIPPED | OUTPATIENT
Start: 2022-02-12

## 2022-03-23 ENCOUNTER — APPOINTMENT (OUTPATIENT)
Dept: CT IMAGING | Age: 52
End: 2022-03-23
Payer: MEDICAID

## 2022-03-23 ENCOUNTER — HOSPITAL ENCOUNTER (EMERGENCY)
Age: 52
Discharge: HOME | End: 2022-03-23
Attending: EMERGENCY MEDICINE
Payer: MEDICAID

## 2022-03-23 VITALS
RESPIRATION RATE: 16 BRPM | SYSTOLIC BLOOD PRESSURE: 154 MMHG | WEIGHT: 221 LBS | BODY MASS INDEX: 36.82 KG/M2 | OXYGEN SATURATION: 97 % | HEIGHT: 65 IN | HEART RATE: 73 BPM | TEMPERATURE: 99.2 F | DIASTOLIC BLOOD PRESSURE: 86 MMHG

## 2022-03-23 DIAGNOSIS — R11.2 NAUSEA AND VOMITING, INTRACTABILITY OF VOMITING NOT SPECIFIED, UNSPECIFIED VOMITING TYPE: Primary | ICD-10-CM

## 2022-03-23 LAB
-: ABNORMAL
ABSOLUTE EOS #: 0.1 K/UL (ref 0–0.4)
ABSOLUTE LYMPH #: 1.8 K/UL (ref 1–4.8)
ABSOLUTE MONO #: 0.6 K/UL (ref 0.1–1.2)
ALBUMIN SERPL-MCNC: 4.1 G/DL (ref 3.5–5.2)
ALBUMIN/GLOBULIN RATIO: 1.1 (ref 1–2.5)
ALP BLD-CCNC: 120 U/L (ref 35–104)
ALT SERPL-CCNC: 15 U/L (ref 5–33)
AMYLASE: 84 U/L (ref 28–100)
ANION GAP SERPL CALCULATED.3IONS-SCNC: 13 MMOL/L (ref 9–17)
AST SERPL-CCNC: 16 U/L
BACTERIA: ABNORMAL
BASOPHILS # BLD: 1 % (ref 0–2)
BASOPHILS ABSOLUTE: 0.1 K/UL (ref 0–0.2)
BILIRUB SERPL-MCNC: 0.48 MG/DL (ref 0.3–1.2)
BILIRUBIN DIRECT: 0.1 MG/DL
BILIRUBIN URINE: ABNORMAL
BILIRUBIN, INDIRECT: 0.38 MG/DL (ref 0–1)
BUN BLDV-MCNC: 12 MG/DL (ref 6–20)
CALCIUM SERPL-MCNC: 9.6 MG/DL (ref 8.6–10.4)
CHLORIDE BLD-SCNC: 105 MMOL/L (ref 98–107)
CO2: 24 MMOL/L (ref 20–31)
COLOR: YELLOW
CREAT SERPL-MCNC: 0.46 MG/DL (ref 0.5–0.9)
EOSINOPHILS RELATIVE PERCENT: 2 % (ref 1–4)
EPITHELIAL CELLS UA: ABNORMAL /HPF (ref 0–5)
GFR AFRICAN AMERICAN: >60 ML/MIN
GFR NON-AFRICAN AMERICAN: >60 ML/MIN
GFR SERPL CREATININE-BSD FRML MDRD: ABNORMAL ML/MIN/{1.73_M2}
GLUCOSE BLD-MCNC: 103 MG/DL (ref 70–99)
GLUCOSE URINE: NEGATIVE
HCT VFR BLD CALC: 43.1 % (ref 36–46)
HEMOGLOBIN: 15.3 G/DL (ref 12–16)
KETONES, URINE: ABNORMAL
LEUKOCYTE ESTERASE, URINE: NEGATIVE
LIPASE: 40 U/L (ref 13–60)
LYMPHOCYTES # BLD: 23 % (ref 24–44)
MCH RBC QN AUTO: 33.6 PG (ref 26–34)
MCHC RBC AUTO-ENTMCNC: 35.5 G/DL (ref 31–37)
MCV RBC AUTO: 94.7 FL (ref 80–100)
MONOCYTES # BLD: 8 % (ref 2–11)
MUCUS: ABNORMAL
NITRITE, URINE: NEGATIVE
OTHER OBSERVATIONS UA: ABNORMAL
PDW BLD-RTO: 13 % (ref 12.5–15.4)
PH UA: 6 (ref 5–8)
PLATELET # BLD: 310 K/UL (ref 140–450)
PMV BLD AUTO: 7.9 FL (ref 6–12)
POTASSIUM SERPL-SCNC: 3.8 MMOL/L (ref 3.7–5.3)
PROTEIN UA: ABNORMAL
RBC # BLD: 4.55 M/UL (ref 4–5.2)
RBC UA: ABNORMAL /HPF (ref 0–2)
SEG NEUTROPHILS: 66 % (ref 36–66)
SEGMENTED NEUTROPHILS ABSOLUTE COUNT: 5.3 K/UL (ref 1.8–7.7)
SODIUM BLD-SCNC: 142 MMOL/L (ref 135–144)
SPECIFIC GRAVITY UA: 1.03 (ref 1–1.03)
TOTAL PROTEIN: 7.8 G/DL (ref 6.4–8.3)
TURBIDITY: ABNORMAL
URINE HGB: ABNORMAL
UROBILINOGEN, URINE: NORMAL
WBC # BLD: 7.9 K/UL (ref 3.5–11)
WBC UA: ABNORMAL /HPF (ref 0–5)

## 2022-03-23 PROCEDURE — 99284 EMERGENCY DEPT VISIT MOD MDM: CPT

## 2022-03-23 PROCEDURE — 93005 ELECTROCARDIOGRAM TRACING: CPT | Performed by: EMERGENCY MEDICINE

## 2022-03-23 PROCEDURE — 6360000002 HC RX W HCPCS: Performed by: EMERGENCY MEDICINE

## 2022-03-23 PROCEDURE — 82150 ASSAY OF AMYLASE: CPT

## 2022-03-23 PROCEDURE — 74177 CT ABD & PELVIS W/CONTRAST: CPT

## 2022-03-23 PROCEDURE — 85025 COMPLETE CBC W/AUTO DIFF WBC: CPT

## 2022-03-23 PROCEDURE — 80048 BASIC METABOLIC PNL TOTAL CA: CPT

## 2022-03-23 PROCEDURE — 81001 URINALYSIS AUTO W/SCOPE: CPT

## 2022-03-23 PROCEDURE — 83690 ASSAY OF LIPASE: CPT

## 2022-03-23 PROCEDURE — 96376 TX/PRO/DX INJ SAME DRUG ADON: CPT

## 2022-03-23 PROCEDURE — 36415 COLL VENOUS BLD VENIPUNCTURE: CPT

## 2022-03-23 PROCEDURE — 80076 HEPATIC FUNCTION PANEL: CPT

## 2022-03-23 PROCEDURE — 6360000004 HC RX CONTRAST MEDICATION: Performed by: EMERGENCY MEDICINE

## 2022-03-23 PROCEDURE — 2580000003 HC RX 258: Performed by: EMERGENCY MEDICINE

## 2022-03-23 PROCEDURE — 96374 THER/PROPH/DIAG INJ IV PUSH: CPT

## 2022-03-23 RX ORDER — ONDANSETRON 2 MG/ML
4 INJECTION INTRAMUSCULAR; INTRAVENOUS ONCE
Status: COMPLETED | OUTPATIENT
Start: 2022-03-23 | End: 2022-03-23

## 2022-03-23 RX ORDER — 0.9 % SODIUM CHLORIDE 0.9 %
80 INTRAVENOUS SOLUTION INTRAVENOUS ONCE
Status: DISCONTINUED | OUTPATIENT
Start: 2022-03-23 | End: 2022-03-23 | Stop reason: HOSPADM

## 2022-03-23 RX ORDER — SODIUM CHLORIDE 0.9 % (FLUSH) 0.9 %
10 SYRINGE (ML) INJECTION PRN
Status: DISCONTINUED | OUTPATIENT
Start: 2022-03-23 | End: 2022-03-23 | Stop reason: HOSPADM

## 2022-03-23 RX ORDER — ONDANSETRON 4 MG/1
4 TABLET, ORALLY DISINTEGRATING ORAL 3 TIMES DAILY PRN
Qty: 21 TABLET | Refills: 0 | Status: SHIPPED | OUTPATIENT
Start: 2022-03-23

## 2022-03-23 RX ORDER — 0.9 % SODIUM CHLORIDE 0.9 %
1000 INTRAVENOUS SOLUTION INTRAVENOUS ONCE
Status: COMPLETED | OUTPATIENT
Start: 2022-03-23 | End: 2022-03-23

## 2022-03-23 RX ADMIN — ONDANSETRON 4 MG: 2 INJECTION INTRAMUSCULAR; INTRAVENOUS at 13:52

## 2022-03-23 RX ADMIN — Medication 80 ML: at 15:12

## 2022-03-23 RX ADMIN — IOPAMIDOL 75 ML: 755 INJECTION, SOLUTION INTRAVENOUS at 15:16

## 2022-03-23 RX ADMIN — SODIUM CHLORIDE 1000 ML: 9 INJECTION, SOLUTION INTRAVENOUS at 13:51

## 2022-03-23 RX ADMIN — ONDANSETRON 4 MG: 2 INJECTION INTRAMUSCULAR; INTRAVENOUS at 15:02

## 2022-03-23 RX ADMIN — SODIUM CHLORIDE, PRESERVATIVE FREE 10 ML: 5 INJECTION INTRAVENOUS at 15:16

## 2022-03-23 NOTE — ED PROVIDER NOTES
[id number] [facility] ED  [address] [facility] [address] [place] [address]  Phone: [phone]  Fax: Pita Ar[phone]      Pt Name: Lorna Alcocer  MRN: [id number]  [place] 1970  Date of evaluation: 3/23/2022    CHIEF COMPLAINT       Chief Complaint   Patient presents with    Emesis       HISTORY OF PRESENT ILLNESS    Lorna Alcocer is a 47 y.o. female who presents to the emergency department with nausea and vomiting that started on Saturday. Persistent for the past 2 days. No fevers or chills. No abdominal pain. No chest pain or shortness of breath. Not able to take her medications she presents hypertensive. Denies any other complaints. No recent travel. No diarrhea. REVIEW OF SYSTEMS       Constitutional: No fevers or chills positive fatigue  HEENT: No sore throat, rhinorrhea, or earache   Eyes: No blurry vision or double vision no drainage   Cardiovascular: No chest pain or tachycardia   Respiratory: No wheezing or shortness of breath no cough   Gastrointestinal: Positive nausea vomiting no abdominal pain no diarrhea or constipation  : No hematuria or dysuria   Musculoskeletal: No swelling or pain   Skin: No rash   Neurological: No focal neurologic complaints, paresthesias, weakness, or headache     PAST MEDICAL HISTORY    has a past medical history of Aneurysm ([address] Cancer ([address] Cerebral artery occlusion with cerebral infarction ([address] Contact lens/glasses fitting, Fibromyalgia, Former smoker, Hyperlipidemia, Hypertension, and Sleep apnea. SURGICAL HISTORY      has a past surgical history that includes Breast surgery (Left, 2018); Tunneled venous port placement; Hysterectomy (2018); Carpal tunnel release (Bilateral);  section; back surgery; Brain aneurysm surgery (2020); and other surgical history (2021).     CURRENT MEDICATIONS       Previous Medications    AMITRIPTYLINE HCL PO    Take by mouth nightly    ASPIRIN 325 MG TABLET    Take 1 tablet by mouth daily    ATORVASTATIN (LIPITOR) 40 MG TABLET    Take 1 tablet by mouth nightly    CARVEDILOL (COREG) 12.5 MG TABLET    Take 12.5 mg by mouth 2 times daily (with meals)    CHOLECALCIFEROL (VITAMIN D3) 25 MCG (1000 UT) CAPS    Take 2,000 Units by mouth nightly     CLONAZEPAM (KLONOPIN) 0.5 MG TABLET    Take 0.5 mg by mouth 2 times daily as needed. CLOPIDOGREL (PLAVIX) 75 MG TABLET    TAKE ONE TABLET BY MOUTH DAILY    GABAPENTIN (NEURONTIN) 100 MG CAPSULE    TAKE ONE CAPSULE BY MOUTH DAILY    HYDROCHLOROTHIAZIDE (HYDRODIURIL) 25 MG TABLET    Take 25 mg by mouth daily    LISINOPRIL (PRINIVIL;ZESTRIL) 20 MG TABLET    Take 1 tablet by mouth daily    VENLAFAXINE (EFFEXOR XR) 75 MG EXTENDED RELEASE CAPSULE    Take 75 mg by mouth daily       ALLERGIES     is allergic to adhesive tape. FAMILY HISTORY     has no family status information on file. Family history is unknown by patient. SOCIAL HISTORY      reports that she quit smoking about 21 years ago. Her smoking use included cigarettes. She has a 15.00 pack-year smoking history. She has never used smokeless tobacco. She reports that she does not use drugs. PHYSICAL EXAM       ED Triage Vitals [03/23/22 1327]   BP Temp Temp Source Pulse Resp SpO2 Height Weight   (!) 174/101 99.2 °F (37.3 °C) Oral 73 16 97 % 5' 5\" (1.651 m) 221 lb (100.2 kg)       Constitutional: Alert, oriented x3, nontoxic, answering questions appropriately, acting properly for age, in no acute distress   HEENT: Extraocular muscles intact, mucus membranes dry, pupils equal and reactive to light  Neck: Trachea midline   Cardiovascular: Regular rhythm and rate no murmurs   Respiratory: Clear to auscultation bilaterally no wheezes, rhonchi, rales, no respiratory distress no tachypnea no retractions no hypoxia  Gastrointestinal: Soft, nontender, nondistended, diminished bowel sounds. No rebound, rigidity, or guarding.   No abdominal bruit no pulsatile mass  Musculoskeletal: No extremity pain or swelling   Neurologic: Moving all 4 extremities without difficulty there are no gross focal neurologic deficits   Skin: Warm and dry       DIFFERENTIAL DIAGNOSIS/ MDM:     IV fluids and labs. Hypertensive. EKG and cardiac work-up. DIAGNOSTIC RESULTS     EKG: All EKG's are interpreted by the Emergency Department Physician who either signs or Co-signs this chart in the absence of a cardiologist.    1400 sinus rhythm rate 57  QRS 86  no acute ST or T wave changes.     Not indicated unless otherwise documented above    LABS:  Results for orders placed or performed during the hospital encounter of 03/23/22   CBC with Auto Differential   Result Value Ref Range    WBC 7.9 3.5 - 11.0 k/uL    RBC 4.55 4.0 - 5.2 m/uL    Hemoglobin 15.3 12.0 - 16.0 g/dL    Hematocrit 43.1 36 - 46 %    MCV 94.7 80 - 100 fL    MCH 33.6 26 - 34 pg    MCHC 35.5 31 - 37 g/dL    RDW 13.0 12.5 - 15.4 %    Platelets 546 895 - 432 k/uL    MPV 7.9 6.0 - 12.0 fL    Seg Neutrophils 66 36 - 66 %    Lymphocytes 23 (L) 24 - 44 %    Monocytes 8 2 - 11 %    Eosinophils % 2 1 - 4 %    Basophils 1 0 - 2 %    Segs Absolute 5.30 1.8 - 7.7 k/uL    Absolute Lymph # 1.80 1.0 - 4.8 k/uL    Absolute Mono # 0.60 0.1 - 1.2 k/uL    Absolute Eos # 0.10 0.0 - 0.4 k/uL    Basophils Absolute 0.10 0.0 - 0.2 k/uL   Lipase   Result Value Ref Range    Lipase 40 13 - 60 U/L   Basic Metabolic Panel   Result Value Ref Range    Glucose 103 (H) 70 - 99 mg/dL    BUN 12 6 - 20 mg/dL    CREATININE 0.46 (L) 0.50 - 0.90 mg/dL    Calcium 9.6 8.6 - 10.4 mg/dL    Sodium 142 135 - 144 mmol/L    Potassium 3.8 3.7 - 5.3 mmol/L    Chloride 105 98 - 107 mmol/L    CO2 24 20 - 31 mmol/L    Anion Gap 13 9 - 17 mmol/L    GFR Non-African American >60 >60 mL/min    GFR African American >60 >60 mL/min    GFR Comment         Amylase   Result Value Ref Range    Amylase 84 28 - 100 U/L   Hepatic Function Panel   Result Value Ref Range    Albumin 4.1 3.5 - 5.2 g/dL    Alkaline Phosphatase 120 (H) 35 - 104 U/L    ALT 15 5 - 33 U/L    AST 16 <32 U/L    Total Bilirubin 0.48 0.3 - 1.2 mg/dL    Bilirubin, Direct 0.10 <0.31 mg/dL    Bilirubin, Indirect 0.38 0.00 - 1.00 mg/dL    Total Protein 7.8 6.4 - 8.3 g/dL    Albumin/Globulin Ratio 1.1 1.0 - 2.5   Urinalysis with Reflex to Culture    Specimen: Urine, clean catch   Result Value Ref Range    Color, UA Yellow Yellow    Turbidity UA SLIGHTLY CLOUDY (A) Clear    Glucose, Ur NEGATIVE NEGATIVE    Bilirubin Urine NEGATIVE  Verified by ictotest. (A) NEGATIVE    Ketones, Urine SMALL (A) NEGATIVE    Specific Gravity, UA 1.027 1.005 - 1.030    Urine Hgb TRACE (A) NEGATIVE    pH, UA 6.0 5.0 - 8.0    Protein, UA 1+ (A) NEGATIVE    Urobilinogen, Urine Normal Normal    Nitrite, Urine NEGATIVE NEGATIVE    Leukocyte Esterase, Urine NEGATIVE NEGATIVE   Microscopic Urinalysis   Result Value Ref Range    -          WBC, UA 0 TO 2 0 - 5 /HPF    RBC, UA 5 TO 10 0 - 2 /HPF    Epithelial Cells UA 20 TO 50 0 - 5 /HPF    Bacteria, UA MANY (A) None    Mucus, UA 1+ (A) None    Other Observations UA (A) NOT REQ. Utilizing a urinalysis as the only screening method to exclude a potential uropathogen can be unreliable in many patient populations. Rapid screening tests are less sensitive than culture and if UTI is a clinical possibility, culture should be considered despite a negative urinalysis. EKG 12 Lead   Result Value Ref Range    Ventricular Rate 57 BPM    Atrial Rate 57 BPM    P-R Interval 126 ms    QRS Duration 86 ms    Q-T Interval 462 ms    QTc Calculation (Bazett) 449 ms    P Axis 55 degrees    R Axis 24 degrees    T Axis 49 degrees       Not indicated unless otherwise documented above    RADIOLOGY:   I reviewed the radiologist interpretations:    CT ABDOMEN PELVIS W IV CONTRAST Additional Contrast? None   Final Result      1.   Minimal Isodensity in the dependent gallbladder on or about image 70 of   series 2 may represent a small amount of layering biliary sludge or tiny   gallstone. Otherwise unremarkable appearing gallbladder. 2.  7.6 cm cystic mass appearing to arise from the upper pole of the right   kidney. This has relatively dense dystrophic rim calcification and some   calcified septa. No definite soft tissue nodule or soft tissue component is   noted. This may just represent a complex cyst, with calcification, possibly   related to prior hemorrhage, however it may be prudent to consider a   follow-up MRI examination with and without contrast for further evaluation. 3.  Large fat containing but uncomplicated appearing periumbilical ventral   hernia. 4.  A normal appearing appendix is noted. 5.  Nonspecific bowel gas pattern with no current evidence of intestinal   obstruction. Not indicated unless otherwise documented above    EMERGENCY DEPARTMENT COURSE:     The patient was given the following medications:  Orders Placed This Encounter   Medications    0.9 % sodium chloride bolus    ondansetron (ZOFRAN) injection 4 mg    ondansetron (ZOFRAN) injection 4 mg    sodium chloride flush 0.9 % injection 10 mL    0.9 % sodium chloride bolus    iopamidol (ISOVUE-370) 76 % injection 75 mL    ondansetron (ZOFRAN-ODT) 4 MG disintegrating tablet     Sig: Take 1 tablet by mouth 3 times daily as needed for Nausea or Vomiting     Dispense:  21 tablet     Refill:  0        Vitals:   -------------------------  BP (!) 147/100   Pulse 73   Temp 99.2 °F (37.3 °C) (Oral)   Resp 16   Ht 5' 5\" (1.651 m)   Wt 100.2 kg (221 lb)   SpO2 97%   BMI 36.78 kg/m²     3 PM still complaining of nausea will repeat Zofran and reassess. Will obtain a CT of the abdomen and pelvis. Labs unremarkable normal CBC urine dirty catch. Normal electrolytes and kidney function. Normal lipase. 3:50 PM patient feels a little bit better did have 1 episode of vomiting.   CAT scan shows a large cyst on the right kidney which she is already aware of and sees a specialist for. Offered repeat medicines and more fluids patient would like to go home. We will give a prescription for Zofran. Follow-up with him physician for reevaluation and return if worsening symptoms or other concerns. The patient understands that at this time there is no evidence for a more malignant underlying process, but also understands that early in the process of an illness or injury, an emergency department workup can be falsely reassuring. Routine discharge counseling was given, and it is understood that worsening, changing or persistent symptoms should prompt an immediate call or follow up with their primary physician or return to the emergency department. The importance of appropriate follow up was also discussed. I have reviewed the disposition diagnosis. I have answered the questions and given discharge instructions. There was voiced understanding of these instructions and no further questions or complaints. CRITICAL CARE:    None    CONSULTS:    None    PROCEDURES:    None      OARRS Report if indicated             FINAL IMPRESSION      1.  Nausea and vomiting, intractability of vomiting not specified, unspecified vomiting type          DISPOSITION/PLAN   DISPOSITION          CONDITION ON DISPOSITION: STABLE       PATIENT REFERRED TO:  POPEYE Otero - [facility] [phone]  [address]    Schedule an appointment as soon as possible for a visit in 2 days        DISCHARGE MEDICATIONS:  New Prescriptions    ONDANSETRON (ZOFRAN-ODT) 4 MG DISINTEGRATING TABLET    Take 1 tablet by mouth 3 times daily as needed for Nausea or Vomiting       (Please note that portions of this note were completed with a voice recognition program.  Efforts were made to edit the dictations but occasionally words are mis-transcribed.)    Isabella Leonardo DO   Attending Emergency Physician      Isabella Leonardo,   03/23/22 5301 Patricio Spears DO  03/23/22 [address]

## 2022-03-24 LAB
EKG ATRIAL RATE: 57 BPM
EKG P AXIS: 55 DEGREES
EKG P-R INTERVAL: 126 MS
EKG Q-T INTERVAL: 462 MS
EKG QRS DURATION: 86 MS
EKG QTC CALCULATION (BAZETT): 449 MS
EKG R AXIS: 24 DEGREES
EKG T AXIS: 49 DEGREES
EKG VENTRICULAR RATE: 57 BPM

## 2022-08-10 ENCOUNTER — HOSPITAL ENCOUNTER (EMERGENCY)
Age: 52
Discharge: HOME | End: 2022-08-10
Attending: EMERGENCY MEDICINE
Payer: COMMERCIAL

## 2022-08-10 ENCOUNTER — APPOINTMENT (OUTPATIENT)
Dept: CT IMAGING | Age: 52
End: 2022-08-10
Payer: COMMERCIAL

## 2022-08-10 VITALS
TEMPERATURE: 98.1 F | DIASTOLIC BLOOD PRESSURE: 101 MMHG | WEIGHT: 195 LBS | RESPIRATION RATE: 16 BRPM | OXYGEN SATURATION: 99 % | SYSTOLIC BLOOD PRESSURE: 163 MMHG | HEIGHT: 65 IN | HEART RATE: 71 BPM | BODY MASS INDEX: 32.49 KG/M2

## 2022-08-10 DIAGNOSIS — R07.81 RIB PAIN ON LEFT SIDE: Primary | ICD-10-CM

## 2022-08-10 DIAGNOSIS — R10.9 LEFT FLANK PAIN: ICD-10-CM

## 2022-08-10 DIAGNOSIS — R10.12 LEFT UPPER QUADRANT ABDOMINAL PAIN: ICD-10-CM

## 2022-08-10 LAB
ABSOLUTE EOS #: 0.1 K/UL (ref 0–0.4)
ABSOLUTE LYMPH #: 2 K/UL (ref 1–4.8)
ABSOLUTE MONO #: 0.6 K/UL (ref 0.1–1.2)
ALBUMIN SERPL-MCNC: 4.2 G/DL (ref 3.5–5.2)
ALBUMIN/GLOBULIN RATIO: 1.4 (ref 1–2.5)
ALP BLD-CCNC: 96 U/L (ref 35–104)
ALT SERPL-CCNC: 14 U/L (ref 5–33)
ANION GAP SERPL CALCULATED.3IONS-SCNC: 8 MMOL/L (ref 9–17)
AST SERPL-CCNC: 15 U/L
BACTERIA: ABNORMAL
BASOPHILS # BLD: 1 % (ref 0–2)
BASOPHILS ABSOLUTE: 0 K/UL (ref 0–0.2)
BILIRUB SERPL-MCNC: 0.25 MG/DL (ref 0.3–1.2)
BILIRUBIN URINE: NEGATIVE
BUN BLDV-MCNC: 14 MG/DL (ref 6–20)
CALCIUM SERPL-MCNC: 9.7 MG/DL (ref 8.6–10.4)
CHLORIDE BLD-SCNC: 103 MMOL/L (ref 98–107)
CO2: 28 MMOL/L (ref 20–31)
COLOR: YELLOW
CREAT SERPL-MCNC: 0.47 MG/DL (ref 0.5–0.9)
EOSINOPHILS RELATIVE PERCENT: 1 % (ref 1–4)
EPITHELIAL CELLS UA: ABNORMAL /HPF (ref 0–5)
GFR AFRICAN AMERICAN: >60 ML/MIN
GFR NON-AFRICAN AMERICAN: >60 ML/MIN
GFR SERPL CREATININE-BSD FRML MDRD: ABNORMAL ML/MIN/{1.73_M2}
GLUCOSE BLD-MCNC: 94 MG/DL (ref 70–99)
GLUCOSE URINE: NEGATIVE
HCT VFR BLD CALC: 38.7 % (ref 36–46)
HEMOGLOBIN: 13.4 G/DL (ref 12–16)
KETONES, URINE: ABNORMAL
LEUKOCYTE ESTERASE, URINE: ABNORMAL
LIPASE: 36 U/L (ref 13–60)
LYMPHOCYTES # BLD: 27 % (ref 24–44)
MCH RBC QN AUTO: 33.1 PG (ref 26–34)
MCHC RBC AUTO-ENTMCNC: 34.7 G/DL (ref 31–37)
MCV RBC AUTO: 95.5 FL (ref 80–100)
MONOCYTES # BLD: 8 % (ref 2–11)
NITRITE, URINE: NEGATIVE
OTHER OBSERVATIONS UA: ABNORMAL
PDW BLD-RTO: 13.3 % (ref 12.5–15.4)
PH UA: 6 (ref 5–8)
PLATELET # BLD: 274 K/UL (ref 140–450)
PMV BLD AUTO: 7.7 FL (ref 6–12)
POTASSIUM SERPL-SCNC: 3.6 MMOL/L (ref 3.7–5.3)
PROTEIN UA: NEGATIVE
RBC # BLD: 4.06 M/UL (ref 4–5.2)
RBC UA: ABNORMAL /HPF (ref 0–2)
SEG NEUTROPHILS: 63 % (ref 36–66)
SEGMENTED NEUTROPHILS ABSOLUTE COUNT: 4.7 K/UL (ref 1.8–7.7)
SODIUM BLD-SCNC: 139 MMOL/L (ref 135–144)
SPECIFIC GRAVITY UA: 1.02 (ref 1–1.03)
TOTAL PROTEIN: 7.1 G/DL (ref 6.4–8.3)
TURBIDITY: CLEAR
URINE HGB: ABNORMAL
UROBILINOGEN, URINE: NORMAL
WBC # BLD: 7.5 K/UL (ref 3.5–11)
WBC UA: ABNORMAL /HPF (ref 0–5)

## 2022-08-10 PROCEDURE — 99284 EMERGENCY DEPT VISIT MOD MDM: CPT

## 2022-08-10 PROCEDURE — 83690 ASSAY OF LIPASE: CPT

## 2022-08-10 PROCEDURE — 80053 COMPREHEN METABOLIC PANEL: CPT

## 2022-08-10 PROCEDURE — 96372 THER/PROPH/DIAG INJ SC/IM: CPT

## 2022-08-10 PROCEDURE — 6360000002 HC RX W HCPCS: Performed by: PHYSICIAN ASSISTANT

## 2022-08-10 PROCEDURE — 87086 URINE CULTURE/COLONY COUNT: CPT

## 2022-08-10 PROCEDURE — 36415 COLL VENOUS BLD VENIPUNCTURE: CPT

## 2022-08-10 PROCEDURE — 85025 COMPLETE CBC W/AUTO DIFF WBC: CPT

## 2022-08-10 PROCEDURE — 74176 CT ABD & PELVIS W/O CONTRAST: CPT

## 2022-08-10 PROCEDURE — 81001 URINALYSIS AUTO W/SCOPE: CPT

## 2022-08-10 RX ORDER — KETOROLAC TROMETHAMINE 30 MG/ML
30 INJECTION, SOLUTION INTRAMUSCULAR; INTRAVENOUS ONCE
Status: COMPLETED | OUTPATIENT
Start: 2022-08-10 | End: 2022-08-10

## 2022-08-10 RX ORDER — LIDOCAINE 50 MG/G
1-2 PATCH TOPICAL DAILY
Qty: 15 PATCH | Refills: 0 | Status: SHIPPED | OUTPATIENT
Start: 2022-08-10

## 2022-08-10 RX ORDER — CEPHALEXIN 500 MG/1
500 CAPSULE ORAL 2 TIMES DAILY
Qty: 14 CAPSULE | Refills: 0 | Status: SHIPPED | OUTPATIENT
Start: 2022-08-10 | End: 2022-08-17

## 2022-08-10 RX ORDER — HYDROCODONE BITARTRATE AND ACETAMINOPHEN 5; 325 MG/1; MG/1
1 TABLET ORAL EVERY 8 HOURS PRN
Qty: 8 TABLET | Refills: 0 | Status: SHIPPED | OUTPATIENT
Start: 2022-08-10 | End: 2022-08-13

## 2022-08-10 RX ORDER — ORPHENADRINE CITRATE 100 MG/1
100 TABLET, EXTENDED RELEASE ORAL 2 TIMES DAILY
Qty: 14 TABLET | Refills: 0 | Status: SHIPPED | OUTPATIENT
Start: 2022-08-10

## 2022-08-10 RX ADMIN — KETOROLAC TROMETHAMINE 30 MG: 30 INJECTION, SOLUTION INTRAMUSCULAR at 19:27

## 2022-08-10 ASSESSMENT — PAIN - FUNCTIONAL ASSESSMENT: PAIN_FUNCTIONAL_ASSESSMENT: 0-10

## 2022-08-10 ASSESSMENT — PAIN SCALES - GENERAL
PAINLEVEL_OUTOF10: 10
PAINLEVEL_OUTOF10: 8

## 2022-08-10 ASSESSMENT — PAIN DESCRIPTION - ORIENTATION: ORIENTATION: LEFT;UPPER

## 2022-08-10 ASSESSMENT — PAIN DESCRIPTION - LOCATION: LOCATION: ABDOMEN

## 2022-08-10 NOTE — ED PROVIDER NOTES
[address] [facility] ED  [address] [address] [facility] [address]  Phone: [phone]  Fax: [phone] [facility]      Pt Name: Meenakshi Connolly  MRN: [id number]  [place] 1970  Date of evaluation: 8/10/22      CHIEF COMPLAINT:  Chief Complaint   Patient presents with    Flank Pain     Left side started last night, nausea no vomiting, umbilical hernia hx     Abdominal Pain     LUQ abdominal pain going into her flank area, denies blood in urine/problems urinating       HISTORY OF PRESENT ILLNESS    Meenakshi Connolly is a 47 y.o. female who presents with GI complaints:       Timing/Onset:   1 day  Context/Setting:   Pt here with LUQ pain that started last night, afebrile and nontraumatic. Tolerating fluids but poor appetite and some nausea at times. Abd pain radiated to her left flank. No chest/resp pain or symptoms. No dysuria/hematuria or stooling changes. Hx of hysterectomy. Location/Symptom:     Fever? NO  Vomiting? NO  Diarrhea? NO  Dysuria? NO  Back pain? YES  Trauma? NO  Vag Discharge? NO  Vag Bleeding? NO  Pain after eating? NO  AAA hx or 1* relative? NO  Connect tissue disorder hx? NO    Quality: sharp   achy   Duration: constant   Modifying Factors: Worse with movement  Severity:   moderate        Nursing Notes were reviewed. REVIEW OF SYSTEMS       Constitutional: per HPI  Eyes: No visual changes. Neck: No neck pain. Respiratory: Denies recent shortness of breath. Cardiac:  Denies recent chest pain or palpitations  GI:  Per HPI  : Per HPI    Musculoskeletal: per HPI   Neurologic: Denies headache or focal weakness. Skin:  Denies any rash. Negative in 10 essential Systems except as mentioned above and in the HPI.       PAST MEDICAL HISTORY   PMH:  has a past medical history of Aneurysm ([address] Cancer ([address] Cerebral artery occlusion with cerebral infarction ([address] Contact lens/glasses fitting, Fibromyalgia, Former smoker, Hyperlipidemia, Hypertension, and Sleep apnea. Surgical History:  has a past surgical history that includes Breast surgery (Left, 2018); Tunneled venous port placement; Hysterectomy (2018); Carpal tunnel release (Bilateral);  section; back surgery; Brain aneurysm surgery (2020); and other surgical history (2021). Social History:  reports that she quit smoking about 21 years ago. Her smoking use included cigarettes. She has a 15.00 pack-year smoking history. She has never used smokeless tobacco. She reports that she does not use drugs. Family History: None  Psychiatric History: None    Allergies:is allergic to adhesive tape. PHYSICAL EXAM     INITIAL VITALS: BP (!) 182/95   Pulse 80   Temp 98.1 °F (36.7 °C) (Oral)   Resp 16   Ht 5' 5\" (1.651 m)   Wt 88.5 kg (195 lb)   SpO2 98%   BMI 32.45 kg/m²   Constitutional:  Well developed, nontoxic. Well-appearing   Eyes:  Pupils equal/round  HENT:  Atraumatic, external ears normal, nose normal, oropharynx moist.   Respiratory:  Clear to auscultation bilaterally with good air exchange, no W/R/R  Cardiovascular:  RRR with normal S1 and S2  Gastrointestinal/Abdomen:  Soft, LUQ TTP, BS present. No RLQ TTP. No mass appreciated. Musculoskeletal:  Normal to inspection  Back:  No midline/paraspinal TTP. No bilat CVA/flank TTP. Normal to inspection. Integument:  No rash. Neurologic:  Alert & age appropriate mentation-interaction      DIAGNOSTIC RESULTS     EKG: All EKG's are interpreted by the Emergency Department Physician who either signs or Co-signs this chart in the absence of a cardiologist.    Not indicated    RADIOLOGY:   Reviewed the radiologist:  CT ABDOMEN PELVIS WO CONTRAST Additional Contrast? None   Final Result   Stable 7.6 cm cystic mass with rim calcification between the liver and upper   pole of the right kidney. This may be pedunculated from the right adrenal   gland or kidney.   Stability and CT appearance suggest a benign etiology. Noncalcified gallstone and or thick bile/sludge. No findings to suggest   acute cholecystitis. If there is clinical concern for gallbladder disease, a   follow-up gallbladder ultrasound would be helpful. Mild pancolonic diverticulosis with no evidence of diverticulitis. Stable fat containing paraumbilical hernia. LABS:  Labs Reviewed   COMPREHENSIVE METABOLIC PANEL - Abnormal; Notable for the following components:       Result Value    Creatinine 0.47 (*)     Potassium 3.6 (*)     Anion Gap 8 (*)     Total Bilirubin 0.25 (*)     All other components within normal limits   URINALYSIS WITH REFLEX TO CULTURE - Abnormal; Notable for the following components:    Ketones, Urine SMALL (*)     Urine Hgb TRACE (*)     Leukocyte Esterase, Urine TRACE (*)     All other components within normal limits   MICROSCOPIC URINALYSIS - Abnormal; Notable for the following components:    Bacteria, UA MODERATE (*)     Other Observations UA Culture ordered based on defined criteria. (*)     All other components within normal limits   CULTURE, URINE   CBC WITH AUTO DIFFERENTIAL   LIPASE         EMERGENCY DEPARTMENT COURSE, DDX and MDM:     8778  Abd labs and CT w/o contrast ordered. No chest/resp symptoms. LCTA. LUQ TTP. .      2023  Treating for UTI and discussed possible small stone not visualized on CT but no complications of one found. Symptomatic care for her pain and abx for her UTI. She confirms she has PCP for close outpt f/u. She is aware of the calcified mass near right kidney, this is stable per comparison CT. I have reviewed the disposition diagnosis with the patient and or their family/guardian. I have answered their questions and given discharge instructions. They voiced understanding of these instructions and did not have any further questions or complaints.     We estimate there is LOW risk for ACUTE APPENDICITIS, BOWEL OBSTRUCTION, CHOLECYSTITIS, DIVERTICULITIS, INCARCERATED HERNIA, PANCREATITIS, or PERFORATED BOWEL or ULCER, thus we consider the discharge disposition reasonable. Re-evaluation of the abdomen is benign. No guarding, peritoneal signs or significant tenderness noted. Also, there is no evidence or peritonitis, sepsis, or toxicity. The patient and/or family and I have discussed the diagnosis and risks, and we agree with discharging home to follow-up with their primary doctor. The patient presents with abdominal pain without signs of peritonitis or other life-threatening or serious etiology. The patient appears stable for discharge and has been instructed to return immediately if the symptoms worsen in any way, or in 8-12 hr if not improved for re-evaluation. We also discussed returning to the Emergency Department immediately if new or worsening symptoms occur. We have discussed the symptoms which are most concerning (e.g., bloody stool, fever, changing or worsening pain, persistent vomiting, chest pain shortness of breath, numbness or weakness to the arms or legs, coolness or color change to the arms or legs) that necessitate immediate return. The patient understands that at this time there is no evidence for a more malignant underlying process, but the patient also understands that early in the process of an illness or injury, an emergency department workup can be falsely reassuring. Routine discharge counseling was given, and the patient understands that worsening, changing or persistent symptoms should prompt an immediate call or follow up with their primary physician or return to the emergency department. The importance of appropriate follow up was also discussed. We have reviewed the disposition diagnosis with the patient and or their family/guardian. We have answered their questions and given discharge instructions. They voiced understanding of these instructions and did not have any further questions or complaints.       Orders Placed This Encounter   Medications    ketorolac (TORADOL) injection 30 mg    cephALEXin (KEFLEX) 500 MG capsule     Sig: Take 1 capsule by mouth in the morning and 1 capsule before bedtime. Do all this for 7 days. Dispense:  14 capsule     Refill:  0    lidocaine (LIDODERM) 5 %     Sig: Place 1-2 patches onto the skin in the morning. 12 hours on, 12 hours off. .     Dispense:  15 patch     Refill:  0    HYDROcodone-acetaminophen (NORCO) 5-325 MG per tablet     Sig: Take 1 tablet by mouth every 8 hours as needed for Pain for up to 3 days. Dispense:  8 tablet     Refill:  0    orphenadrine (NORFLEX) 100 MG extended release tablet     Sig: Take 1 tablet by mouth in the morning and 1 tablet before bedtime. Dispense:  14 tablet     Refill:  0         CONSULTS:  None      FINAL IMPRESSION      1. Rib pain on left side    2. Left flank pain    3. Left upper quadrant abdominal pain          DISPOSITION/PLAN:  DISPOSITION Decision To Discharge 08/10/2022 08:15:40 PM        PATIENT REFERRED TO:  [facility] [address]  [address]  [phone]    Schedule an appointment as soon as possible for a visit in 2 days  for re-evaluation of your symptoms    [facility] ED  [address] [place] [address] [phone]  Go to   for re-evaluation of your symptoms    DISCHARGE MEDICATIONS:  New Prescriptions    CEPHALEXIN (KEFLEX) 500 MG CAPSULE    Take 1 capsule by mouth in the morning and 1 capsule before bedtime. Do all this for 7 days. HYDROCODONE-ACETAMINOPHEN (NORCO) 5-325 MG PER TABLET    Take 1 tablet by mouth every 8 hours as needed for Pain for up to 3 days. LIDOCAINE (LIDODERM) 5 %    Place 1-2 patches onto the skin in the morning. 12 hours on, 12 hours off. .    ORPHENADRINE (NORFLEX) 100 MG EXTENDED RELEASE TABLET    Take 1 tablet by mouth in the morning and 1 tablet before bedtime.        (Please note that portions of this note were completed with a voice

## 2022-08-11 NOTE — ED NOTES
Patient provided with discharge instructions, prescriptions, and follow up information. Verbalized understanding. No IV access to discontinue. A&OX3. Steady gait noted at discharge. Wheelchair declined by patient.       Analisa Navarrete RN  08/10/22 2041

## 2022-08-11 NOTE — DISCHARGE INSTRUCTIONS
Please understand that at this time there is no evidence for a more serious underlying process, but that early in the process of an illness or injury, an emergency department workup can be falsely reassuring. You should contact your family doctor within the next 48 hours for a follow up appointment. Return to Emergency Dept for chest pain, shortness of breath, painful breathing, abd pain, altered mental status, persistent visual changes, inability to stay hydrated due to vomiting/diarrhea or severe headache. Franc Mccarty!!!    From [facility] ([facility]) and [address] [place] [address] Emergency Services    On behalf of the Emergency Department staff at [facility]), I would like to thank you for giving us the opportunity to address your health care needs and concerns. We hope that during your visit, our service was delivered in a professional and caring manner. Please keep [facility] ([facility]) in mind as we walk with you down the path to your own personal wellness. Please expect an automated text message or email from us so we can ask a few questions about your health and progress. Based on your answers, a clinician may call you back to offer help and instructions. Please understand that early in the process of an illness or injury, an emergency department workup can be falsely reassuring. If you notice any worsening, changing or persistent symptoms please call your family doctor or return to the ER immediately. Tell us how we did during your visit at [email or web address]   and let us know about your experience

## 2022-08-11 NOTE — ED PROVIDER NOTES
Emergency Department     Faculty Attestation    I performed a history and physical examination of the patient and discussed management with the mid level provideer. I reviewed the mid level provider's note and agree with the documented findings and plan of care. Any areas of disagreement are noted on the chart. I was personally present for the key portions of any procedures. I have documented in the chart those procedures where I was not present during the key portions. I have reviewed the emergency nurses triage note. I agree with the chief complaint, past medical history, past surgical history, allergies, medications, social and family history as documented unless otherwise noted below. Documentation of the HPI, Physical Exam and Medical Decision Making performed by medical students or scribes is based on my personal performance of the HPI, PE and MDM. For Physician Assistant/ Nurse Practitioner cases/documentation I have personally evaluated this patient and have completed at least one if not all key elements of the E/M (history, physical exam, and MDM). Additional findings are as noted. Primary Care Physician:   POPEYE Bautsita - MENDEL    CHIEF COMPLAINT       Chief Complaint   Patient presents with    Flank Pain     Left side started last night, nausea no vomiting, umbilical hernia hx     Abdominal Pain     LUQ abdominal pain going into her flank area, denies blood in urine/problems urinating       RECENT VITALS:   Temp: 98.1 °F (36.7 °C),  Heart Rate: 71, Resp: 16, BP: (!) 163/101    LABS:  Labs Reviewed   COMPREHENSIVE METABOLIC PANEL - Abnormal; Notable for the following components:       Result Value    Creatinine 0.47 (*)     Potassium 3.6 (*)     Anion Gap 8 (*)     Total Bilirubin 0.25 (*)     All other components within normal limits   URINALYSIS WITH REFLEX TO CULTURE - Abnormal; Notable for the following components:    Ketones, Urine SMALL (*)     Urine Hgb TRACE (*)     Leukocyte Esterase, Urine TRACE (*)     All other components within normal limits   MICROSCOPIC URINALYSIS - Abnormal; Notable for the following components:    Bacteria, UA MODERATE (*)     Other Observations UA Culture ordered based on defined criteria. (*)     All other components within normal limits   CULTURE, URINE   CBC WITH AUTO DIFFERENTIAL   LIPASE         PERTINENT ATTENDING PHYSICIAN COMMENTS:    67-year-old female patient presents to the emergency department for evaluation of left upper quadrant abdominal pain associate with nausea and decreased appetite. Abdominal pain radiates to the left flank. She denies any dysuria, hematuria or urinary frequency. She denies any constipation or diarrhea and denies any melena or hematochezia. Patient is afebrile and vital signs are stable. Her lungs are clear to auscultation, rhythm is regular without murmurs. Abdomen is soft and tender in the left upper quadrant. There is no rebound tenderness. She has active bowel sounds. CAT scan of the abdomen and pelvis reveals cystic mass between the liver and upper pole of right kidney approximately 7.6 cm in size. This is stable as per the prior CAT scan of the abdomen and pelvis. Urinalysis reveals findings suggestive of urinary tract infection. Plan is to discharge the patient on Keflex for 7 days course, Tylenol and/or ibuprofen as needed for the pain, Norco for uncontrolled pain, Norflex as needed for the muscle spasms, follow-up with PCP, return if worse.           Bianka Noel MD  08/11/22 4870

## 2022-08-12 LAB
CULTURE: NORMAL
SPECIMEN DESCRIPTION: NORMAL

## 2022-12-20 ENCOUNTER — APPOINTMENT (OUTPATIENT)
Dept: CT IMAGING | Age: 52
End: 2022-12-20
Payer: MEDICAID

## 2022-12-20 ENCOUNTER — HOSPITAL ENCOUNTER (EMERGENCY)
Age: 52
Discharge: HOME | End: 2022-12-20
Attending: EMERGENCY MEDICINE
Payer: MEDICAID

## 2022-12-20 VITALS
HEIGHT: 65 IN | SYSTOLIC BLOOD PRESSURE: 132 MMHG | HEART RATE: 85 BPM | DIASTOLIC BLOOD PRESSURE: 75 MMHG | RESPIRATION RATE: 16 BRPM | WEIGHT: 187 LBS | TEMPERATURE: 97.4 F | OXYGEN SATURATION: 96 % | BODY MASS INDEX: 31.16 KG/M2

## 2022-12-20 DIAGNOSIS — E87.6 HYPOKALEMIA: ICD-10-CM

## 2022-12-20 DIAGNOSIS — R55 SYNCOPE AND COLLAPSE: Primary | ICD-10-CM

## 2022-12-20 LAB
ABSOLUTE EOS #: 0.15 K/UL (ref 0–0.44)
ABSOLUTE IMMATURE GRANULOCYTE: 0.02 K/UL (ref 0–0.3)
ABSOLUTE LYMPH #: 3.92 K/UL (ref 1.1–3.7)
ABSOLUTE MONO #: 0.93 K/UL (ref 0.1–1.2)
ANION GAP SERPL CALCULATED.3IONS-SCNC: 13 MMOL/L (ref 9–17)
BASOPHILS # BLD: 0 % (ref 0–2)
BASOPHILS ABSOLUTE: 0.04 K/UL (ref 0–0.2)
BUN BLDV-MCNC: 12 MG/DL (ref 6–20)
BUN/CREAT BLD: 22 (ref 9–20)
CALCIUM SERPL-MCNC: 9.4 MG/DL (ref 8.6–10.4)
CHLORIDE BLD-SCNC: 100 MMOL/L (ref 98–107)
CO2: 24 MMOL/L (ref 20–31)
CREAT SERPL-MCNC: 0.54 MG/DL (ref 0.5–0.9)
EOSINOPHILS RELATIVE PERCENT: 2 % (ref 1–4)
ETHANOL PERCENT: 0.02 %
ETHANOL: 24 MG/DL
GFR SERPL CREATININE-BSD FRML MDRD: >60 ML/MIN/1.73M2
GLUCOSE BLD-MCNC: 111 MG/DL (ref 70–99)
HCT VFR BLD CALC: 38 % (ref 36.3–47.1)
HEMOGLOBIN: 13.2 G/DL (ref 11.9–15.1)
IMMATURE GRANULOCYTES: 0 %
LYMPHOCYTES # BLD: 39 % (ref 24–43)
MCH RBC QN AUTO: 31.8 PG (ref 25.2–33.5)
MCHC RBC AUTO-ENTMCNC: 34.7 G/DL (ref 28.4–34.8)
MCV RBC AUTO: 91.6 FL (ref 82.6–102.9)
MONOCYTES # BLD: 9 % (ref 3–12)
NRBC AUTOMATED: 0 PER 100 WBC
PDW BLD-RTO: 11.8 % (ref 11.8–14.4)
PLATELET # BLD: 325 K/UL (ref 138–453)
PMV BLD AUTO: 10.4 FL (ref 8.1–13.5)
POTASSIUM SERPL-SCNC: 2.8 MMOL/L (ref 3.7–5.3)
RBC # BLD: 4.15 M/UL (ref 3.95–5.11)
SEG NEUTROPHILS: 50 % (ref 36–65)
SEGMENTED NEUTROPHILS ABSOLUTE COUNT: 5.13 K/UL (ref 1.5–8.1)
SODIUM BLD-SCNC: 137 MMOL/L (ref 135–144)
TROPONIN, HIGH SENSITIVITY: 9 NG/L (ref 0–14)
WBC # BLD: 10.2 K/UL (ref 3.5–11.3)

## 2022-12-20 PROCEDURE — 96365 THER/PROPH/DIAG IV INF INIT: CPT

## 2022-12-20 PROCEDURE — 2580000003 HC RX 258: Performed by: EMERGENCY MEDICINE

## 2022-12-20 PROCEDURE — 99284 EMERGENCY DEPT VISIT MOD MDM: CPT

## 2022-12-20 PROCEDURE — 96366 THER/PROPH/DIAG IV INF ADDON: CPT

## 2022-12-20 PROCEDURE — 84484 ASSAY OF TROPONIN QUANT: CPT

## 2022-12-20 PROCEDURE — 6360000002 HC RX W HCPCS: Performed by: EMERGENCY MEDICINE

## 2022-12-20 PROCEDURE — 6370000000 HC RX 637 (ALT 250 FOR IP): Performed by: EMERGENCY MEDICINE

## 2022-12-20 PROCEDURE — G0480 DRUG TEST DEF 1-7 CLASSES: HCPCS

## 2022-12-20 PROCEDURE — 85025 COMPLETE CBC W/AUTO DIFF WBC: CPT

## 2022-12-20 PROCEDURE — 70450 CT HEAD/BRAIN W/O DYE: CPT

## 2022-12-20 PROCEDURE — 80048 BASIC METABOLIC PNL TOTAL CA: CPT

## 2022-12-20 RX ORDER — POTASSIUM CHLORIDE 20 MEQ/1
20 TABLET, EXTENDED RELEASE ORAL DAILY
Qty: 7 TABLET | Refills: 0 | Status: SHIPPED | OUTPATIENT
Start: 2022-12-20 | End: 2022-12-20 | Stop reason: SDUPTHER

## 2022-12-20 RX ORDER — 0.9 % SODIUM CHLORIDE 0.9 %
500 INTRAVENOUS SOLUTION INTRAVENOUS ONCE
Status: COMPLETED | OUTPATIENT
Start: 2022-12-20 | End: 2022-12-20

## 2022-12-20 RX ORDER — POTASSIUM CHLORIDE 20 MEQ/1
20 TABLET, EXTENDED RELEASE ORAL DAILY
Qty: 7 TABLET | Refills: 0 | Status: SHIPPED | OUTPATIENT
Start: 2022-12-20 | End: 2022-12-27

## 2022-12-20 RX ORDER — CLONIDINE HYDROCHLORIDE 0.1 MG/1
0.2 TABLET ORAL ONCE
Status: COMPLETED | OUTPATIENT
Start: 2022-12-20 | End: 2022-12-20

## 2022-12-20 RX ORDER — POTASSIUM CHLORIDE 20 MEQ/1
40 TABLET, EXTENDED RELEASE ORAL ONCE
Status: COMPLETED | OUTPATIENT
Start: 2022-12-20 | End: 2022-12-20

## 2022-12-20 RX ORDER — POTASSIUM CHLORIDE 7.45 MG/ML
10 INJECTION INTRAVENOUS ONCE
Status: COMPLETED | OUTPATIENT
Start: 2022-12-20 | End: 2022-12-20

## 2022-12-20 RX ADMIN — POTASSIUM CHLORIDE 40 MEQ: 1500 TABLET, EXTENDED RELEASE ORAL at 20:48

## 2022-12-20 RX ADMIN — POTASSIUM CHLORIDE 10 MEQ: 7.46 INJECTION, SOLUTION INTRAVENOUS at 20:48

## 2022-12-20 RX ADMIN — CLONIDINE HYDROCHLORIDE 0.2 MG: 0.1 TABLET ORAL at 22:15

## 2022-12-20 RX ADMIN — SODIUM CHLORIDE 500 ML: 9 INJECTION, SOLUTION INTRAVENOUS at 19:33

## 2022-12-20 ASSESSMENT — ENCOUNTER SYMPTOMS
ABDOMINAL DISTENTION: 0
CHEST TIGHTNESS: 0
SINUS PAIN: 0
SHORTNESS OF BREATH: 0
VOMITING: 0
APNEA: 0
EYES NEGATIVE: 1
CONSTIPATION: 0
DIARRHEA: 0
COLOR CHANGE: 0

## 2022-12-20 ASSESSMENT — PAIN - FUNCTIONAL ASSESSMENT: PAIN_FUNCTIONAL_ASSESSMENT: NONE - DENIES PAIN

## 2022-12-21 LAB
EKG ATRIAL RATE: 70 BPM
EKG P AXIS: 65 DEGREES
EKG P-R INTERVAL: 152 MS
EKG Q-T INTERVAL: 444 MS
EKG QRS DURATION: 94 MS
EKG QTC CALCULATION (BAZETT): 479 MS
EKG R AXIS: 43 DEGREES
EKG T AXIS: 64 DEGREES
EKG VENTRICULAR RATE: 70 BPM

## 2022-12-21 NOTE — DISCHARGE INSTRUCTIONS
If you do have issues with dizziness at home I recommend trying to get into a seated position as soon as possible. If you experience chest discomfort or shortness of breath or lightheadedness I recommend return to the emergency room. Take potassium as prescribed. Future BMP for next week is ordered. I recommend follow-up with your doctor.

## 2022-12-21 NOTE — ED NOTES
Pt arrived to ED with c/o Syncope. Pt was standing baking cookies with her daughter when she fell and hit her head on the baseboard. Pt states she has tightness in her back. Pt doesn't remember eating through out the day. Pt states she has been cleaning out her house moving things. Pt denies pain. Pt has a hx stroke and an aneurysm. Pt sometimes takes long period of time to answer questions.      Will continue to monitor pt     Swati Osborn RN  12/20/22 9097

## 2022-12-21 NOTE — ED PROVIDER NOTES
EMERGENCY DEPARTMENT ENCOUNTER    Pt Name: Lianne Skiff  MRN: [id number]  [place] 1970  Date of evaluation: 12/20/22  CHIEF COMPLAINT       Chief Complaint   Patient presents with    Loss of Consciousness     Fell from standing position     HISTORY OF PRESENT ILLNESS   52-year-old female presents the emergency room after head injury related to syncopal event. Patient had been making cookies with her granddaughters at her daughter's house. She reports suddenly she passed out. She does not remember if she had prodrome or not. She reports of associated dizziness here in the emergency room. She reports she was doing a lot of physical activity throughout the day today. She did not experience any chest pain but was moving a lot of things out of her house. She is planning on moving in with her daughter. She does not really remember eating or drinking much today. She had had a couple of cookies and had 1 alcoholic beverage today. Patient has medical history of aneurysm status post coil embolization and has had CVA related to occlusion of the left middle cerebral artery in the past.  She reports she is on a blood thinner but does not recall the name. And medical history from EMR she only appears to be on aspirin and plavix. REVIEW OF SYSTEMS     Review of Systems   Constitutional:  Negative for activity change, chills and diaphoresis. HENT:  Negative for congestion, sinus pain and tinnitus. Eyes: Negative. Respiratory:  Negative for apnea, chest tightness and shortness of breath. Gastrointestinal:  Negative for abdominal distention, constipation, diarrhea and vomiting. Genitourinary:  Negative for difficulty urinating and frequency. Musculoskeletal:  Negative for arthralgias and myalgias. Skin:  Negative for color change and rash. Neurological:  Positive for dizziness. Hematological: Negative. Psychiatric/Behavioral: Negative.        PASTMEDICAL HISTORY     Past Medical History: Diagnosis Date    Aneurysm ([facility] [address] 2019    right MCA bifurcation aneurysm    Cancer ([facility] [address] 2018    left breast    Cerebral artery occlusion with cerebral infarction ([facility] [address]     Contact lens/glasses fitting     Fibromyalgia     Former smoker     Hyperlipidemia     Hypertension     Sandy Gitelman, CNP    Sleep apnea     C-pap / STATES HAS NOT BEEN WEARING     Past Problem List  Patient Active Problem List   Diagnosis Code    S/P coil embolization of cerebral aneurysm Z98.890    Aneurysm ([facility] [address] I72.9    Dizziness R42    Family history of brain aneurysm Z82.49    History of cervical spinal surgery Z98.890    History of breast cancer Z85.3    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    Cerebrovascular accident (CVA) due to occlusion of left middle cerebral artery (HCC) I63.512    Right middle cerebral artery stroke [facility]) I63.511    History of cerebral aneurysm repair Z98.890, Z86.79     SURGICAL HISTORY       Past Surgical History:   Procedure Laterality Date    BACK SURGERY      neck fusion & L4-L5 fusion    BRAIN ANEURYSM SURGERY  2020    Twitt2goBLIZATION SYSTEM MRI CONDTIONAL 3T OK, SAFE IMMEDIATELY.   IR ANGIOGRAM CAROTID CEREBRAL BILATERAL,,S/P coil embolization of cerebral aneurysm    BREAST SURGERY Left 2018    lumpectomy    CARPAL TUNNEL RELEASE Bilateral      SECTION      x 2,  &     HYSTERECTOMY (CERVIX STATUS UNKNOWN)  2018    OTHER SURGICAL HISTORY  2021    DIAGNOSTIC CEREBRAL ANGIOGRAM / Previously treated right MCA M1 bifurcation aneurysm is with a small residual filling noted    TUNNELED VENOUS PORT PLACEMENT      placed , removed 2019     CURRENT MEDICATIONS       Previous Medications    AMITRIPTYLINE HCL PO    Take by mouth nightly    ASPIRIN 325 MG TABLET    Take 1 tablet by mouth daily    ATORVASTATIN (LIPITOR) 40 MG TABLET    Take 1 tablet by mouth nightly    CARVEDILOL (COREG) 12.5 MG TABLET    Take 12.5 mg by mouth 2 times daily (with meals)    CHOLECALCIFEROL (VITAMIN D3) 25 MCG (1000 UT) CAPS    Take 2,000 Units by mouth nightly     CLONAZEPAM (KLONOPIN) 0.5 MG TABLET    Take 0.5 mg by mouth 2 times daily as needed. CLOPIDOGREL (PLAVIX) 75 MG TABLET    TAKE ONE TABLET BY MOUTH DAILY    GABAPENTIN (NEURONTIN) 100 MG CAPSULE    TAKE ONE CAPSULE BY MOUTH DAILY    HYDROCHLOROTHIAZIDE (HYDRODIURIL) 25 MG TABLET    Take 25 mg by mouth daily    LIDOCAINE (LIDODERM) 5 %    Place 1-2 patches onto the skin in the morning. 12 hours on, 12 hours off. Karen Dorsey LISINOPRIL (PRINIVIL;ZESTRIL) 20 MG TABLET    Take 1 tablet by mouth daily    ONDANSETRON (ZOFRAN-ODT) 4 MG DISINTEGRATING TABLET    Take 1 tablet by mouth 3 times daily as needed for Nausea or Vomiting    ORPHENADRINE (NORFLEX) 100 MG EXTENDED RELEASE TABLET    Take 1 tablet by mouth in the morning and 1 tablet before bedtime. VENLAFAXINE (EFFEXOR XR) 75 MG EXTENDED RELEASE CAPSULE    Take 150 mg by mouth in the morning. ALLERGIES     is allergic to adhesive tape. FAMILY HISTORY     has no family status information on file. SOCIAL HISTORY       Social History     Tobacco Use    Smoking status: Former     Packs/day: 1.00     Years: 15.00     Pack years: 15.00     Types: Cigarettes     Quit date: 2000     Years since quittin.2    Smokeless tobacco: Never    Tobacco comments:     quit 20 years ago   Vaping Use    Vaping Use: Every day    Start date: 2022   Substance Use Topics    Drug use: Never     PHYSICAL EXAM     INITIAL VITALS: /75   Pulse 85   Temp 97.4 °F (36.3 °C) (Oral)   Resp 16   Ht 5' 5\" (1.651 m)   Wt 187 lb (84.8 kg)   SpO2 96%   BMI 31.12 kg/m²    Physical Exam  Constitutional:       General: She is not in acute distress. Appearance: She is well-developed. HENT:      Head: Normocephalic. Eyes:      Pupils: Pupils are equal, round, and reactive to light. Cardiovascular:      Rate and Rhythm: Normal rate and regular rhythm. Heart sounds: Normal heart sounds.    Pulmonary: Effort: Pulmonary effort is normal. No respiratory distress. Breath sounds: Normal breath sounds. Abdominal:      General: Bowel sounds are normal.      Palpations: Abdomen is soft. Tenderness: There is no abdominal tenderness. Musculoskeletal:         General: Normal range of motion. Skin:     General: Skin is warm and dry. Neurological:      Mental Status: She is alert and oriented to person, place, and time. MEDICAL DECISION MAKING:     Nondistressed 52-year-old female presenting to the emergency room with syncopal episode at home. During ED stay vitals have been stable. Patient has normal sinus rhythm has not had any episodes of arrhythmia. Patient denies chest pain or difficulty breathing during evaluation or earlier today at all. Etiology of syncope is unclear may be related to decreased p.o. intake and alcohol consumption. Again admission was offered given the syncope and hypokalemia which patient declined. We discussed return precautions. Patient has appropriate PCP follow-up. ED Course as of 12/20/22 2306   Tue Dec 20, 2022   2147 I reassessed patient she has no complaints at this time. I updated her on her potassium levels. We discussed admission given the syncope and potassium level however at this time she is declining. Patient was given risks and benefits of admission versus discharge. She would like to be discharged after fluids and potassium replacement.  [EG]      ED Course User Index  [EG] Brandie Knight MD       CRITICAL CARE:       PROCEDURES:    Procedures    DIAGNOSTIC RESULTS   EKG:All EKG's are interpreted by the Emergency Department Physician who either signs or Co-signs this chart in the absence of a cardiologist.    EKG sinus rhythm ventricular rate 70  No significant ST or T wave changes    QTc 479    RADIOLOGY:All plain film, CT, MRI, and formal ultrasound images (except ED bedside ultrasound) are read by the radiologist, see reports below, unless otherwisenoted in MDM or here. CT HEAD WO CONTRAST   Final Result   No acute intracranial abnormality. LABS: All lab results were reviewed by myself, and all abnormals are listed below. Labs Reviewed   BASIC METABOLIC PANEL - Abnormal; Notable for the following components:       Result Value    Glucose 111 (*)     Bun/Cre Ratio 22 (*)     Potassium 2.8 (*)     All other components within normal limits   ETHANOL - Abnormal; Notable for the following components:    Ethanol 24 (*)     Ethanol percent 0.024 (*)     All other components within normal limits   CBC WITH AUTO DIFFERENTIAL - Abnormal; Notable for the following components:    Absolute Lymph # 3.92 (*)     All other components within normal limits   TROPONIN       EMERGENCY DEPARTMENTCOURSE:         Vitals:    Vitals:    12/20/22 1908 12/20/22 1930 12/20/22 2000 12/20/22 2030   BP: 128/76 124/84 130/84 132/75   Pulse: 79 72  85   Resp: 20 20 16   Temp: 97.4 °F (36.3 °C)      TempSrc: Oral      SpO2: 97% 96%  96%   Weight: 187 lb (84.8 kg)      Height: 5' 5\" (1.651 m)          The patient was given the following medications while in the emergency department:  Orders Placed This Encounter   Medications    0.9 % sodium chloride bolus    potassium chloride 10 mEq/100 mL IVPB (Peripheral Line)    potassium chloride (KLOR-CON M) extended release tablet 40 mEq    cloNIDine (CATAPRES) tablet 0.2 mg    DISCONTD: potassium chloride (KLOR-CON M) 20 MEQ extended release tablet     Sig: Take 1 tablet by mouth daily for 7 days     Dispense:  7 tablet     Refill:  0    potassium chloride (KLOR-CON M) 20 MEQ extended release tablet     Sig: Take 1 tablet by mouth daily for 7 days     Dispense:  7 tablet     Refill:  0     CONSULTS:  None    FINAL IMPRESSION      1. Syncope and collapse    2.  Hypokalemia          DISPOSITION/PLAN   DISPOSITION Decision To Discharge 12/20/2022 11:00:41 PM      PATIENT REFERRED TO:  Dacia Marie [facility] [address]  [place] [address]  [phone]    Schedule an appointment as soon as possible for a visit in 1 week    DISCHARGE MEDICATIONS:  New Prescriptions    POTASSIUM CHLORIDE (KLOR-CON M) 20 MEQ EXTENDED RELEASE TABLET    Take 1 tablet by mouth daily for 7 days     Ela Bonner MD  Attending Emergency Physician      Care during this encounter was due to an unprecedented national emergency due to COVID-19.              Olga Justin MD  12/20/22 3421

## 2025-05-11 ENCOUNTER — HOSPITAL ENCOUNTER (EMERGENCY)
Age: 55
Discharge: HOME | End: 2025-05-11
Attending: EMERGENCY MEDICINE
Payer: MEDICARE

## 2025-05-11 ENCOUNTER — APPOINTMENT (OUTPATIENT)
Dept: CT IMAGING | Age: 55
End: 2025-05-11
Payer: MEDICARE

## 2025-05-11 VITALS
BODY MASS INDEX: 38.65 KG/M2 | TEMPERATURE: 98.2 F | HEIGHT: 65 IN | WEIGHT: 232 LBS | HEART RATE: 75 BPM | DIASTOLIC BLOOD PRESSURE: 95 MMHG | SYSTOLIC BLOOD PRESSURE: 142 MMHG | OXYGEN SATURATION: 97 % | RESPIRATION RATE: 16 BRPM

## 2025-05-11 DIAGNOSIS — R10.9 FLANK PAIN: ICD-10-CM

## 2025-05-11 DIAGNOSIS — N39.0 URINARY TRACT INFECTION WITHOUT HEMATURIA, SITE UNSPECIFIED: Primary | ICD-10-CM

## 2025-05-11 LAB
ANION GAP SERPL CALCULATED.3IONS-SCNC: 13 MMOL/L (ref 9–16)
BACTERIA URNS QL MICRO: ABNORMAL
BASOPHILS # BLD: 0.1 K/UL (ref 0–0.2)
BASOPHILS NFR BLD: 1 % (ref 0–2)
BILIRUB UR QL STRIP: NEGATIVE
BUN SERPL-MCNC: 9 MG/DL (ref 6–20)
CALCIUM SERPL-MCNC: 10.2 MG/DL (ref 8.6–10.4)
CHARACTER UR: ABNORMAL
CHLORIDE SERPL-SCNC: 101 MMOL/L (ref 98–107)
CLARITY UR: CLEAR
CO2 SERPL-SCNC: 25 MMOL/L (ref 20–31)
COLOR UR: YELLOW
CREAT SERPL-MCNC: 0.8 MG/DL (ref 0.6–0.9)
EOSINOPHIL # BLD: 0.9 K/UL (ref 0–0.4)
EOSINOPHILS RELATIVE PERCENT: 7 % (ref 1–4)
EPI CELLS #/AREA URNS HPF: ABNORMAL /HPF (ref 0–5)
ERYTHROCYTE [DISTWIDTH] IN BLOOD BY AUTOMATED COUNT: 13.2 % (ref 12.5–15.4)
GFR, ESTIMATED: 88 ML/MIN/1.73M2
GLUCOSE SERPL-MCNC: 106 MG/DL (ref 74–99)
GLUCOSE UR STRIP-MCNC: NEGATIVE MG/DL
HCT VFR BLD AUTO: 44.8 % (ref 36–46)
HGB BLD-MCNC: 14.9 G/DL (ref 12–16)
HGB UR QL STRIP.AUTO: ABNORMAL
KETONES UR STRIP-MCNC: NEGATIVE MG/DL
LEUKOCYTE ESTERASE UR QL STRIP: ABNORMAL
LYMPHOCYTES NFR BLD: 1.6 K/UL (ref 1–4.8)
LYMPHOCYTES RELATIVE PERCENT: 13 % (ref 24–44)
MCH RBC QN AUTO: 30.1 PG (ref 26–34)
MCHC RBC AUTO-ENTMCNC: 33.4 G/DL (ref 31–37)
MCV RBC AUTO: 90.3 FL (ref 80–100)
MONOCYTES NFR BLD: 0.7 K/UL (ref 0.1–1.2)
MONOCYTES NFR BLD: 5 % (ref 2–11)
NEUTROPHILS NFR BLD: 74 % (ref 36–66)
NEUTS SEG NFR BLD: 9.2 K/UL (ref 1.8–7.7)
NITRITE UR QL STRIP: NEGATIVE
PH UR STRIP: 6 [PH] (ref 5–8)
PLATELET # BLD AUTO: 379 K/UL (ref 140–450)
PMV BLD AUTO: 7 FL (ref 6–12)
POTASSIUM SERPL-SCNC: 3.9 MMOL/L (ref 3.7–5.3)
PROT UR STRIP-MCNC: ABNORMAL MG/DL
RBC # BLD AUTO: 4.96 M/UL (ref 4–5.2)
RBC #/AREA URNS HPF: ABNORMAL /HPF (ref 0–2)
SODIUM SERPL-SCNC: 139 MMOL/L (ref 136–145)
SP GR UR STRIP: 1.01 (ref 1–1.03)
UROBILINOGEN UR STRIP-ACNC: NORMAL EU/DL (ref 0–1)
WBC #/AREA URNS HPF: ABNORMAL /HPF (ref 0–5)
WBC OTHER # BLD: 12.5 K/UL (ref 3.5–11)

## 2025-05-11 PROCEDURE — 87086 URINE CULTURE/COLONY COUNT: CPT

## 2025-05-11 PROCEDURE — 85025 COMPLETE CBC W/AUTO DIFF WBC: CPT

## 2025-05-11 PROCEDURE — 99284 EMERGENCY DEPT VISIT MOD MDM: CPT

## 2025-05-11 PROCEDURE — 80048 BASIC METABOLIC PNL TOTAL CA: CPT

## 2025-05-11 PROCEDURE — 6370000000 HC RX 637 (ALT 250 FOR IP): Performed by: NURSE PRACTITIONER

## 2025-05-11 PROCEDURE — 74176 CT ABD & PELVIS W/O CONTRAST: CPT

## 2025-05-11 PROCEDURE — 81001 URINALYSIS AUTO W/SCOPE: CPT

## 2025-05-11 RX ORDER — CEPHALEXIN 500 MG/1
500 CAPSULE ORAL 2 TIMES DAILY
Qty: 14 CAPSULE | Refills: 0 | Status: SHIPPED | OUTPATIENT
Start: 2025-05-11 | End: 2025-05-18

## 2025-05-11 RX ADMIN — CEPHALEXIN 500 MG: 250 CAPSULE ORAL at 15:22

## 2025-05-11 ASSESSMENT — LIFESTYLE VARIABLES
HOW MANY STANDARD DRINKS CONTAINING ALCOHOL DO YOU HAVE ON A TYPICAL DAY: 1 OR 2
HOW OFTEN DO YOU HAVE A DRINK CONTAINING ALCOHOL: 2-4 TIMES A MONTH

## 2025-05-11 ASSESSMENT — PAIN - FUNCTIONAL ASSESSMENT: PAIN_FUNCTIONAL_ASSESSMENT: NONE - DENIES PAIN

## 2025-05-11 NOTE — ED PROVIDER NOTES
Attending Supervisory Note/Shared Visit   I have personally performed a face to face diagnostic evaluation on this patient. I have reviewed the mid-level’s findings and agree.      (Please note that portions of this note were completed with a voice recognition program.  Efforts were made to edit the dictations but occasionally words are mis-transcribed.)    Quincy Rodriguez MD  Attending Emergency Physician        Quincy Rodriguez MD  05/11/25 1500    
    SOCIAL HISTORY       Social History     Socioeconomic History    Marital status: Single   Tobacco Use    Smoking status: Former     Current packs/day: 0.00     Average packs/day: 1 pack/day for 15.0 years (15.0 ttl pk-yrs)     Types: Cigarettes     Start date: 1985     Quit date: 2000     Years since quittin.6    Smokeless tobacco: Never    Tobacco comments:     quit 20 years ago   Vaping Use    Vaping status: Every Day    Start date: 2022    Substances: Nicotine, THC, Flavoring   Substance and Sexual Activity    Alcohol use: Yes     Comment: drinks lwine     Drug use: Yes     Types: Marijuana (Weed)     Social Drivers of Health     Financial Resource Strain: Low Risk  (2022)    Received from [facility]    Overall Financial Resource Strain (CARDIA)     Difficulty of Paying Living Expenses: Not hard at all   Food Insecurity: No Food Insecurity (10/10/2024)    Received from [facility]    Hunger Screening     Within the past 12 months we worried whether our food would run out before we got money to buy more.: Never True     Within the past 12 months the food we bought just didn't last and we didn't have money to get more.: Never True   Transportation Needs: No Transportation Needs (2022)    Received from [facility]    PRAPARE - Transportation     Lack of Transportation (Medical): No     Lack of Transportation (Non-Medical): No   Physical Activity: Insufficiently Active (2022)    Received from [facility]    Exercise Vital Sign     Days of Exercise per Week: 2 days     Minutes of Exercise per Session: 20 min   Stress: Stress Concern Present (2022)    Received from [employer]    [place] [employer] [facility] [employer] [facility] - Occupational Stress Questionnaire     Feeling of Stress : To some extent   Social Connections: Socially Isolated (2022)    Received from [facility]    Social Connection and Isolation

## 2025-05-11 NOTE — DISCHARGE INSTRUCTIONS
We evaluated you today for right-sided flank pain, difficulty with urination.  It does appear that you had a slight urinary tract infection and you could have passed a kidney stone although on CT scan we do not see any evidence for kidney stone presently.    You have a benign cyst sitting just above the right kidney that is unchanged from 2022    Follow closely with PCP to ensure resolution of symptoms.  Return for any worsening symptoms, abdominal pain, nausea vomiting, fevers, symptoms not improved or any other concerns

## 2025-05-12 LAB
MICROORGANISM SPEC CULT: NORMAL
SPECIMEN DESCRIPTION: NORMAL